# Patient Record
Sex: MALE | Race: WHITE | NOT HISPANIC OR LATINO | Employment: UNEMPLOYED | URBAN - METROPOLITAN AREA
[De-identification: names, ages, dates, MRNs, and addresses within clinical notes are randomized per-mention and may not be internally consistent; named-entity substitution may affect disease eponyms.]

---

## 2018-08-10 ENCOUNTER — HOSPITAL ENCOUNTER (INPATIENT)
Facility: HOSPITAL | Age: 58
LOS: 3 days | Discharge: HOME | DRG: 571 | End: 2018-08-13
Attending: EMERGENCY MEDICINE | Admitting: HOSPITALIST
Payer: MEDICAID

## 2018-08-10 DIAGNOSIS — N30.00 ACUTE CYSTITIS WITHOUT HEMATURIA: ICD-10-CM

## 2018-08-10 DIAGNOSIS — S81.801A WOUND OF RIGHT LOWER EXTREMITY, INITIAL ENCOUNTER: ICD-10-CM

## 2018-08-10 DIAGNOSIS — S81.809A: ICD-10-CM

## 2018-08-10 DIAGNOSIS — R41.82 ALTERED MENTAL STATUS: ICD-10-CM

## 2018-08-10 DIAGNOSIS — R40.4 ALTERED CONSCIOUSNESS: ICD-10-CM

## 2018-08-10 DIAGNOSIS — N30.01 ACUTE CYSTITIS WITH HEMATURIA: ICD-10-CM

## 2018-08-10 DIAGNOSIS — F10.920 ALCOHOLIC INTOXICATION WITHOUT COMPLICATION: ICD-10-CM

## 2018-08-10 DIAGNOSIS — B86 CRUSTED SCABIES: Primary | ICD-10-CM

## 2018-08-10 DIAGNOSIS — S81.809A WOUND OF LOWER EXTREMITY: ICD-10-CM

## 2018-08-10 DIAGNOSIS — G62.1 ALCOHOLIC PERIPHERAL NEUROPATHY: ICD-10-CM

## 2018-08-10 DIAGNOSIS — L03.115 CELLULITIS OF RIGHT LOWER EXTREMITY: ICD-10-CM

## 2018-08-10 PROBLEM — E87.1 HYPONATREMIA: Status: ACTIVE | Noted: 2018-08-10

## 2018-08-10 PROBLEM — F10.929 ALCOHOL INTOXICATION: Status: ACTIVE | Noted: 2018-08-10

## 2018-08-10 PROBLEM — F10.10 ALCOHOL ABUSE: Status: ACTIVE | Noted: 2018-08-10

## 2018-08-10 LAB
ALBUMIN SERPL BCP-MCNC: 3.7 G/DL
ALP SERPL-CCNC: 110 U/L
ALT SERPL W/O P-5'-P-CCNC: 18 U/L
AMMONIA PLAS-SCNC: 28 UMOL/L
AMPHET+METHAMPHET UR QL: NEGATIVE
ANION GAP SERPL CALC-SCNC: 11 MMOL/L
APAP SERPL-MCNC: <3 UG/ML
AST SERPL-CCNC: 40 U/L
BACTERIA #/AREA URNS AUTO: ABNORMAL /HPF
BARBITURATES UR QL SCN>200 NG/ML: NEGATIVE
BASOPHILS # BLD AUTO: 0.13 K/UL
BASOPHILS NFR BLD: 2.3 %
BENZODIAZ UR QL SCN>200 NG/ML: NEGATIVE
BILIRUB SERPL-MCNC: 0.4 MG/DL
BILIRUB UR QL STRIP: NEGATIVE
BNP SERPL-MCNC: 20 PG/ML
BUN SERPL-MCNC: 3 MG/DL
BZE UR QL SCN: NEGATIVE
CALCIUM SERPL-MCNC: 9.1 MG/DL
CANNABINOIDS UR QL SCN: NORMAL
CHLORIDE SERPL-SCNC: 95 MMOL/L
CLARITY UR REFRACT.AUTO: ABNORMAL
CO2 SERPL-SCNC: 23 MMOL/L
COLOR UR AUTO: YELLOW
CREAT SERPL-MCNC: 0.6 MG/DL
CREAT UR-MCNC: 30 MG/DL
CRP SERPL-MCNC: 18 MG/L
DIFFERENTIAL METHOD: ABNORMAL
EOSINOPHIL # BLD AUTO: 0.3 K/UL
EOSINOPHIL NFR BLD: 5 %
ERYTHROCYTE [DISTWIDTH] IN BLOOD BY AUTOMATED COUNT: 15.5 %
EST. GFR  (AFRICAN AMERICAN): >60 ML/MIN/1.73 M^2
EST. GFR  (NON AFRICAN AMERICAN): >60 ML/MIN/1.73 M^2
ESTIMATED AVG GLUCOSE: 80 MG/DL
ETHANOL SERPL-MCNC: 269 MG/DL
GLUCOSE SERPL-MCNC: 91 MG/DL
GLUCOSE UR QL STRIP: NEGATIVE
HBA1C MFR BLD HPLC: 4.4 %
HCT VFR BLD AUTO: 34.6 %
HGB BLD-MCNC: 12 G/DL
HGB UR QL STRIP: ABNORMAL
IMM GRANULOCYTES # BLD AUTO: 0.02 K/UL
IMM GRANULOCYTES NFR BLD AUTO: 0.4 %
KETONES UR QL STRIP: NEGATIVE
LACTATE SERPL-SCNC: 2.1 MMOL/L
LEUKOCYTE ESTERASE UR QL STRIP: ABNORMAL
LIPASE SERPL-CCNC: 20 U/L
LYMPHOCYTES # BLD AUTO: 1.1 K/UL
LYMPHOCYTES NFR BLD: 19.1 %
MCH RBC QN AUTO: 35.1 PG
MCHC RBC AUTO-ENTMCNC: 34.7 G/DL
MCV RBC AUTO: 101 FL
METHADONE UR QL SCN>300 NG/ML: NEGATIVE
MICROSCOPIC COMMENT: ABNORMAL
MONOCYTES # BLD AUTO: 0.8 K/UL
MONOCYTES NFR BLD: 14.1 %
NEUTROPHILS # BLD AUTO: 3.3 K/UL
NEUTROPHILS NFR BLD: 59.1 %
NITRITE UR QL STRIP: POSITIVE
NON-SQ EPI CELLS #/AREA URNS AUTO: <1 /HPF
NRBC BLD-RTO: 0 /100 WBC
OPIATES UR QL SCN: NEGATIVE
PCP UR QL SCN>25 NG/ML: NEGATIVE
PH UR STRIP: 6 [PH] (ref 5–8)
PLATELET # BLD AUTO: 191 K/UL
PMV BLD AUTO: 8.9 FL
POTASSIUM SERPL-SCNC: 3.9 MMOL/L
PROT SERPL-MCNC: 7.8 G/DL
PROT UR QL STRIP: NEGATIVE
RBC # BLD AUTO: 3.42 M/UL
RBC #/AREA URNS AUTO: 12 /HPF (ref 0–4)
SALICYLATES SERPL-MCNC: <5 MG/DL
SODIUM SERPL-SCNC: 129 MMOL/L
SP GR UR STRIP: 1 (ref 1–1.03)
SQUAMOUS #/AREA URNS AUTO: 0 /HPF
TOXICOLOGY INFORMATION: NORMAL
TROPONIN I SERPL DL<=0.01 NG/ML-MCNC: <0.006 NG/ML
URN SPEC COLLECT METH UR: ABNORMAL
UROBILINOGEN UR STRIP-ACNC: NEGATIVE EU/DL
WBC # BLD AUTO: 5.59 K/UL
WBC #/AREA URNS AUTO: 81 /HPF (ref 0–5)
WBC CLUMPS UR QL AUTO: ABNORMAL

## 2018-08-10 PROCEDURE — 82140 ASSAY OF AMMONIA: CPT

## 2018-08-10 PROCEDURE — 83036 HEMOGLOBIN GLYCOSYLATED A1C: CPT

## 2018-08-10 PROCEDURE — 87186 SC STD MICRODIL/AGAR DIL: CPT

## 2018-08-10 PROCEDURE — 99285 EMERGENCY DEPT VISIT HI MDM: CPT | Mod: ,,, | Performed by: EMERGENCY MEDICINE

## 2018-08-10 PROCEDURE — 80329 ANALGESICS NON-OPIOID 1 OR 2: CPT

## 2018-08-10 PROCEDURE — 25000003 PHARM REV CODE 250: Performed by: EMERGENCY MEDICINE

## 2018-08-10 PROCEDURE — 87086 URINE CULTURE/COLONY COUNT: CPT

## 2018-08-10 PROCEDURE — G0378 HOSPITAL OBSERVATION PER HR: HCPCS

## 2018-08-10 PROCEDURE — 11000001 HC ACUTE MED/SURG PRIVATE ROOM

## 2018-08-10 PROCEDURE — 96361 HYDRATE IV INFUSION ADD-ON: CPT

## 2018-08-10 PROCEDURE — 87491 CHLMYD TRACH DNA AMP PROBE: CPT

## 2018-08-10 PROCEDURE — 25500020 PHARM REV CODE 255: Performed by: EMERGENCY MEDICINE

## 2018-08-10 PROCEDURE — 83880 ASSAY OF NATRIURETIC PEPTIDE: CPT

## 2018-08-10 PROCEDURE — 81001 URINALYSIS AUTO W/SCOPE: CPT

## 2018-08-10 PROCEDURE — 80307 DRUG TEST PRSMV CHEM ANLYZR: CPT

## 2018-08-10 PROCEDURE — 25000003 PHARM REV CODE 250: Performed by: STUDENT IN AN ORGANIZED HEALTH CARE EDUCATION/TRAINING PROGRAM

## 2018-08-10 PROCEDURE — 96365 THER/PROPH/DIAG IV INF INIT: CPT

## 2018-08-10 PROCEDURE — 87184 SC STD DISK METHOD PER PLATE: CPT

## 2018-08-10 PROCEDURE — 99285 EMERGENCY DEPT VISIT HI MDM: CPT | Mod: 25

## 2018-08-10 PROCEDURE — 25000003 PHARM REV CODE 250: Performed by: HOSPITALIST

## 2018-08-10 PROCEDURE — 99223 1ST HOSP IP/OBS HIGH 75: CPT | Mod: ,,, | Performed by: HOSPITALIST

## 2018-08-10 PROCEDURE — 80053 COMPREHEN METABOLIC PANEL: CPT

## 2018-08-10 PROCEDURE — 83605 ASSAY OF LACTIC ACID: CPT

## 2018-08-10 PROCEDURE — 86140 C-REACTIVE PROTEIN: CPT

## 2018-08-10 PROCEDURE — 87088 URINE BACTERIA CULTURE: CPT

## 2018-08-10 PROCEDURE — 96368 THER/DIAG CONCURRENT INF: CPT

## 2018-08-10 PROCEDURE — 80320 DRUG SCREEN QUANTALCOHOLS: CPT

## 2018-08-10 PROCEDURE — 87077 CULTURE AEROBIC IDENTIFY: CPT

## 2018-08-10 PROCEDURE — 93010 ELECTROCARDIOGRAM REPORT: CPT | Mod: ,,, | Performed by: INTERNAL MEDICINE

## 2018-08-10 PROCEDURE — 63600175 PHARM REV CODE 636 W HCPCS: Performed by: HOSPITALIST

## 2018-08-10 PROCEDURE — 63600175 PHARM REV CODE 636 W HCPCS: Performed by: STUDENT IN AN ORGANIZED HEALTH CARE EDUCATION/TRAINING PROGRAM

## 2018-08-10 PROCEDURE — 84484 ASSAY OF TROPONIN QUANT: CPT

## 2018-08-10 PROCEDURE — 83690 ASSAY OF LIPASE: CPT

## 2018-08-10 PROCEDURE — 85025 COMPLETE CBC W/AUTO DIFF WBC: CPT

## 2018-08-10 PROCEDURE — 96366 THER/PROPH/DIAG IV INF ADDON: CPT

## 2018-08-10 RX ORDER — CEFTRIAXONE 1 G/1
1 INJECTION, POWDER, FOR SOLUTION INTRAMUSCULAR; INTRAVENOUS
Status: DISCONTINUED | OUTPATIENT
Start: 2018-08-10 | End: 2018-08-11

## 2018-08-10 RX ORDER — QUETIAPINE FUMARATE 25 MG/1
50 TABLET, FILM COATED ORAL NIGHTLY
Status: DISCONTINUED | OUTPATIENT
Start: 2018-08-10 | End: 2018-08-13 | Stop reason: HOSPADM

## 2018-08-10 RX ORDER — SODIUM CHLORIDE 9 MG/ML
INJECTION, SOLUTION INTRAVENOUS CONTINUOUS
Status: DISCONTINUED | OUTPATIENT
Start: 2018-08-10 | End: 2018-08-11

## 2018-08-10 RX ORDER — THIAMINE HCL 100 MG
100 TABLET ORAL DAILY
Status: DISCONTINUED | OUTPATIENT
Start: 2018-08-11 | End: 2018-08-13 | Stop reason: HOSPADM

## 2018-08-10 RX ORDER — RAMELTEON 8 MG/1
8 TABLET ORAL NIGHTLY PRN
Status: DISCONTINUED | OUTPATIENT
Start: 2018-08-10 | End: 2018-08-13 | Stop reason: HOSPADM

## 2018-08-10 RX ORDER — FOLIC ACID 1 MG/1
1 TABLET ORAL DAILY
Status: DISCONTINUED | OUTPATIENT
Start: 2018-08-11 | End: 2018-08-13 | Stop reason: HOSPADM

## 2018-08-10 RX ORDER — CEFTRIAXONE 1 G/1
1 INJECTION, POWDER, FOR SOLUTION INTRAMUSCULAR; INTRAVENOUS
Status: DISCONTINUED | OUTPATIENT
Start: 2018-08-10 | End: 2018-08-10

## 2018-08-10 RX ORDER — MAGNESIUM SULFATE HEPTAHYDRATE 40 MG/ML
2 INJECTION, SOLUTION INTRAVENOUS
Status: COMPLETED | OUTPATIENT
Start: 2018-08-10 | End: 2018-08-10

## 2018-08-10 RX ORDER — ACETAMINOPHEN 325 MG/1
650 TABLET ORAL EVERY 6 HOURS PRN
Status: DISCONTINUED | OUTPATIENT
Start: 2018-08-10 | End: 2018-08-13 | Stop reason: HOSPADM

## 2018-08-10 RX ADMIN — RAMELTEON 8 MG: 8 TABLET, FILM COATED ORAL at 10:08

## 2018-08-10 RX ADMIN — SODIUM CHLORIDE: 0.9 INJECTION, SOLUTION INTRAVENOUS at 10:08

## 2018-08-10 RX ADMIN — QUETIAPINE FUMARATE 50 MG: 25 TABLET ORAL at 10:08

## 2018-08-10 RX ADMIN — CEFTRIAXONE SODIUM 1 G: 1 INJECTION, POWDER, FOR SOLUTION INTRAMUSCULAR; INTRAVENOUS at 09:08

## 2018-08-10 RX ADMIN — IOHEXOL 75 ML: 350 INJECTION, SOLUTION INTRAVENOUS at 06:08

## 2018-08-10 RX ADMIN — SODIUM CHLORIDE 500 ML: 0.9 INJECTION, SOLUTION INTRAVENOUS at 03:08

## 2018-08-10 RX ADMIN — MAGNESIUM SULFATE IN WATER 2 G: 40 INJECTION, SOLUTION INTRAVENOUS at 07:08

## 2018-08-10 RX ADMIN — FOLIC ACID: 5 INJECTION, SOLUTION INTRAMUSCULAR; INTRAVENOUS; SUBCUTANEOUS at 05:08

## 2018-08-10 NOTE — ED PROVIDER NOTES
Encounter Date: 8/10/2018    SCRIBE #1 NOTE: I, Martina Cleary, am scribing for, and in the presence of,  Dr. Corea. I have scribed the following portions of the note - the EKG reading and the Resident attestation. Other sections scribed: Attending ED Notes.       History     Chief Complaint   Patient presents with    Altered Mental Status     Pt brought in by EMS for altered mental status and necrotic right foot.      57M with a history of alcohol abuse presents with altered mental status and lesions on R foot. Patient was brought in by his roommate, though patient is not sure why. He has poor recollection of events prior to coming to the hospital, but does remember getting in his roommate's car and driving here with her. He states he has no medical history other than neuropathy of his feet, and he hasn't seen a doctor in some time. He states he takes no medications. He drinks about 10 beers per day. Patient has no complaints other than b/l foot discomfort and numbness, R>L. Denies nausea, vomiting, chest pain, SOB, dysuria, headache, lightheadedness, abdominal pain.  Patient laughed inappropriately throughout interview and repeatedly stated that he doesn't know why he's in the hospital and doesn't want to be here, though he made no attempt to leave. He had a small laceration above his left eye and was unsure of how it got there. Unsure when he last saw a physician.          Review of patient's allergies indicates:  No Known Allergies  Past Medical History:   Diagnosis Date    Neuropathy      History reviewed. No pertinent surgical history.  History reviewed. No pertinent family history.  Social History   Substance Use Topics    Smoking status: Current Every Day Smoker    Smokeless tobacco: Not on file    Alcohol use Yes     Review of Systems   Constitutional: Negative for chills, fatigue and fever.   HENT: Negative for congestion, sinus pain and sneezing.    Eyes: Negative for pain and redness.    Respiratory: Negative for cough, chest tightness and shortness of breath.    Cardiovascular: Negative for chest pain and palpitations.   Gastrointestinal: Negative for abdominal pain, diarrhea, nausea and vomiting.   Genitourinary: Negative for dysuria.   Musculoskeletal: Negative for arthralgias, back pain and neck pain.   Skin: Negative for pallor and rash.   Neurological: Positive for numbness. Negative for dizziness, weakness and light-headedness.   Psychiatric/Behavioral: Negative for agitation, hallucinations and suicidal ideas.       Physical Exam     Initial Vitals [08/10/18 1359]   BP Pulse Resp Temp SpO2   (!) 144/86 88 20 98.3 °F (36.8 °C) 99 %      MAP       --         Physical Exam    Constitutional: He is not diaphoretic. No distress.   HENT:   Head: Normocephalic.   Right Ear: External ear normal.   Left Ear: External ear normal.   Partially healed 2cm laceration above L eyebrow   Eyes: Conjunctivae and EOM are normal. Pupils are equal, round, and reactive to light. No scleral icterus.   Neck: Normal range of motion.   Cardiovascular: Normal rate and regular rhythm.   Pulmonary/Chest: Breath sounds normal. He has no wheezes. He has no rhonchi. He has no rales.   Abdominal: Soft. Bowel sounds are normal. He exhibits no distension. There is hepatomegaly. There is no tenderness.   Musculoskeletal: He exhibits no edema or tenderness.   Mild resting tremor, no asterixis.   Neurological: He is alert.   Oriented to person and place. Stated year is 2003.   Skin: Skin is warm and dry.   Raised pale-colored growth lesions on b/l LE, most prominent on R great toe which is encased. Areas of ulceration. Scattered similar lesions on feet and into calves.   Psychiatric:   Inappropriate laughter, tangential thought, impaired memory         ED Course   Procedures  Labs Reviewed   CBC W/ AUTO DIFFERENTIAL - Abnormal; Notable for the following:        Result Value    RBC 3.42 (*)     Hemoglobin 12.0 (*)     Hematocrit  34.6 (*)      (*)     MCH 35.1 (*)     RDW 15.5 (*)     MPV 8.9 (*)     Basophil% 2.3 (*)     All other components within normal limits    Narrative:     LOUISE SHARED   COMPREHENSIVE METABOLIC PANEL - Abnormal; Notable for the following:     Sodium 129 (*)     BUN, Bld 3 (*)     All other components within normal limits    Narrative:     LAVENDER SHARED   URINALYSIS, REFLEX TO URINE CULTURE - Abnormal; Notable for the following:     Appearance, UA Hazy (*)     Occult Blood UA 1+ (*)     Nitrite, UA Positive (*)     Leukocytes, UA 3+ (*)     All other components within normal limits    Narrative:     Preferred Collection Type->Urine, Clean Catch   ALCOHOL,MEDICAL (ETHANOL) - Abnormal; Notable for the following:     Alcohol, Medical, Serum 269 (*)     All other components within normal limits    Narrative:     LAVENDER SHARED   ACETAMINOPHEN LEVEL - Abnormal; Notable for the following:     Acetaminophen (Tylenol), Serum <3.0 (*)     All other components within normal limits    Narrative:     LAVENDER SHARED   SALICYLATE LEVEL - Abnormal; Notable for the following:     Salicylate Lvl <5.0 (*)     All other components within normal limits    Narrative:     LAVENDER SHARED   URINALYSIS MICROSCOPIC - Abnormal; Notable for the following:     RBC, UA 12 (*)     WBC, UA 81 (*)     WBC Clumps, UA Occasional (*)     Bacteria, UA Many (*)     All other components within normal limits    Narrative:     Preferred Collection Type->Urine, Clean Catch   C. TRACHOMATIS/N. GONORRHOEAE BY AMP DNA   CULTURE, URINE   C. TRACHOMATIS/N. GONORRHOEAE BY AMP DNA   AMMONIA    Narrative:     LAVENDER SHARED   DRUG SCREEN PANEL, URINE EMERGENCY    Narrative:     Preferred Collection Type->Urine, Clean Catch   LIPASE    Narrative:     LAVENDER SHARED   LACTIC ACID, PLASMA    Narrative:     LAVENDER SHARED   TROPONIN I    Narrative:     LAVENDER SHARED   B-TYPE NATRIURETIC PEPTIDE    Narrative:     LAVENDER SHARED   SEDIMENTATION RATE    C-REACTIVE PROTEIN     EKG Readings: (Independently Interpreted)   Initial Reading: No STEMI. Rhythm: Normal Sinus Rhythm. Heart Rate: 76.       Imaging Results          CT Foot With Contrast Right (In process)                CT Head Without Contrast (Final result)  Result time 08/10/18 16:27:12    Final result by Amandeep Coates MD (08/10/18 16:27:12)                 Impression:      1. No acute intracranial abnormalities.  2. Soft tissue induration overlying the left supraorbital region, correlation with any history of trauma.      Electronically signed by: Amandeep Coates MD  Date:    08/10/2018  Time:    16:27             Narrative:    EXAMINATION:  CT HEAD WITHOUT CONTRAST    CLINICAL HISTORY:  Confusion/delirium, altered LOC, unexplained;    TECHNIQUE:  Low dose axial images were obtained through the head.  Coronal and sagittal reformations were also performed. Contrast was not administered.    COMPARISON:  None.    FINDINGS:  There is generalized cerebral volume loss.  There is hypoattenuation in a periventricular fashion, likely sequela of chronic microvascular ischemic change.  There is no evidence of acute major vascular territory infarct, hemorrhage, or mass.  There is no hydrocephalus.  There are no abnormal extra-axial fluid collections.  The paranasal sinuses and mastoid air cells are clear, and there is no evidence of calvarial fracture.  The visualized soft tissues are remarkable for soft tissue induration involving the left supraorbital soft tissues, without postseptal involvement.    .                               X-Ray Chest PA And Lateral (Final result)  Result time 08/10/18 16:01:04    Final result by Damien Silverio Jr., MD (08/10/18 16:01:04)                 Impression:      No significant abnormality seen.      Electronically signed by: Damien Silverio MD  Date:    08/10/2018  Time:    16:01             Narrative:    EXAMINATION:  XR CHEST PA AND LATERAL    CLINICAL HISTORY:  Transient  alteration of awareness    TECHNIQUE:  PA and lateral views of the chest were performed.    COMPARISON:  None    FINDINGS:  Heart size and pulmonary vessels are normal.  The lungs are well aerated.  No confluent consolidation.  Bones are intact.                               X-Ray Foot Complete Bilateral (Final result)  Result time 08/10/18 16:11:21   Procedure changed from X-Ray Foot Complete Left     Final result by Damien Silverio Jr., MD (08/10/18 16:11:21)                 Impression:      Abnormal study as above bilaterally.  Correlation with clinical findings will be needed.      Electronically signed by: Damien Silverio MD  Date:    08/10/2018  Time:    16:11             Narrative:    EXAMINATION:  XR FOOT COMPLETE 3 VIEW BILATERAL    CLINICAL HISTORY:  open wound; Unspecified open wound, unspecified lower leg, initial encounter    TECHNIQUE:  AP, lateral, and oblique views of both feet were performed.    COMPARISON:  None    FINDINGS:  Significant soft tissue swelling about the distal aspect of the great toe.  Some hypertrophic new bone noted.  No definite bony destruction.  Follow-up studies may be beneficial as radiographic findings of osteomyelitis are often delayed.  No fracture.    On the left bones are well mineralized and alignment is satisfactory.  Mild narrowing at the IP joints.  Some irregularity distal fibula appears chronic.  1 cm cystic lucency base 2nd metatarsal.  There is some irregularity involving the proximal aspect of the distal phalanx of the right great toe.  Not certain if this is related to an acute or chronic injury.                                 Medical Decision Making:   History:   Old Medical Records: I decided to obtain old medical records.  Initial Assessment:   58M with history of alcohol abuse presents with altered mental status with hepatomegaly and partially-healed facial laceration as well as extensive lesions and some ulceration of feet, R>L. AMS likely secondary to  trauma or other intracranial process, metabolic abnormality, intoxication, drug or medication effect, infection. Concern for cellulitis vs osteomyelitis of R foot.  Ordering CBC, CMP, drug and ethanol panels, ammonia, lipase, troponin, EKG, head CT, CXR, UA, b/l foot x-ray.  Independently Interpreted Test(s):   I have ordered and independently interpreted EKG Reading(s) - see prior notes  Clinical Tests:   Lab Tests: Ordered and Reviewed  Radiological Study: Ordered and Reviewed  Medical Tests: Ordered and Reviewed  ED Management:  Patient's brother, Anish, now in room stating that patient's current mentation is his baseline with no acute change. Anish says that he is more concerned about the patient's foot. Have ordered a banana bag.  3:38 PM    Workup significant for nitrite positive UTI, sodium 129, EtOH 269, no definite bony erosion on foot x-ray. Ordered Mg. I waited for patient's brother to return to have discussion about admission. Explained that foot lesion may get significantly worse without treatment. Patient ultimately seemed to understand and agreed to inpatient admission for treatment of UTI, foot ulceration, hyponatremia. Per recommendation of medicine, ordered ESR, CRP, and foot CT. Will place in observation.  6:36 PM              Scribe Attestation:   Scribe #1: I performed the above scribed service and the documentation accurately describes the services I performed. I attest to the accuracy of the note.    Attending Attestation:   Physician Attestation Statement for Resident:  As the supervising MD   Physician Attestation Statement: I have personally seen and examined this patient.   I agree with the above history. -: 57 y.o. male with hx of alcohol abuse and neuropathy who was brought in by EMS with family's request to be evaluated for his feet and an ED complaint noted of AMS.  Pt's brother Anish is in the room with my examination.  He states that pt is at his normal baseline mentation and will  forget the year because he drinks all day.  The family is more concerned with the pt's foot to make sure infection is not going on.  The pt denies any complaints and is upset his family made him come here today.  When asked about injury noted on head, pt can't tell me when or how it happened.  He does not also have details about how long his foot has been injured.  Pt reports no to all questions regarding CP, SOB, fevers, chills, and palpitations.       As the supervising MD I agree with the above PE.   -: On exam there is a subacute 1 cm laceration over his left eyebrow with small underlying hematoma.  He is following all commands, unable to verbalize year, and responds that it's 2012.  Otherwise normal sensation, strength, and coordination.  Cardiac exam with regular rate and rhythm.  Abdomen is soft, nontender, and nondistended.  Right great toe with subacute foot ulcer that is dry in texture with scaling around the aspect of the wound over the lateral aspect of the right great toe.  There is adjacent warmth over the dorsal aspect of the right great toe with some erythema noted extending to the right mid foot.     As the supervising MD I agree with the above treatment, course, plan, and disposition.   -: Differential includes cellulitis vs sepsis vs intoxication vs metabolic derangement vs intracranial pathology.  AMS initiated prior to pt's family arriving giving collateral information.  Will reassess disposition pending results.              Attending ED Notes:   5:58 PM   Pt labs reveal positive UTI with hyponatremia with a sodium at 129.  Alcohol level is 269.  Lactate is 2.1.  Negative cardiac markers.  Normal CXR and CT head that doesn't show any intracranial pathologies.  Pt will be admitted with complaint of AMS with hyponatremia and UTI.    I, Dr. Fede Corea, personally performed the services described in this documentation. All medical record entries made by the mily were at my direction and in  my presence.  I have reviewed the chart and agree that the record reflects my personal performance and is accurate and complete. Fede Corea MD.  6:59 PM 08/10/2018             Clinical Impression:   Diagnoses of Altered mental status, Open wound, lower leg, Wound of lower extremity, and Altered consciousness were pertinent to this visit.                             Fede Corea MD  08/10/18 0205

## 2018-08-10 NOTE — ED TRIAGE NOTES
Pt in via EMS from home for eval of AMS/ necrosis to R foot. Pt sts he does not believe that anything is wrong with him and he does not know why he is here. Alert, but laughing when nurse asks him questions. Necrosis and swelling noted to R great toe, swelling/redness noted up into R calf. Pt has no complaints at this time and appears agitated at triage.

## 2018-08-10 NOTE — ED NOTES
Pt resting in bed, no apparent distress noted. Vitals stable. Pt visitor at bedside. Medications started as ordered - infusing well. No redness, swelling, or pain noted to IV site. Pt and pt visitor updated with wait time. No additional questions or concerns at this time. Will continue to monitor.

## 2018-08-11 PROBLEM — G62.1 ALCOHOLIC PERIPHERAL NEUROPATHY: Status: ACTIVE | Noted: 2018-08-11

## 2018-08-11 PROBLEM — B86 CRUSTED SCABIES: Status: ACTIVE | Noted: 2018-08-11

## 2018-08-11 LAB
ALBUMIN SERPL BCP-MCNC: 3.7 G/DL
ALP SERPL-CCNC: 109 U/L
ALT SERPL W/O P-5'-P-CCNC: 20 U/L
ANION GAP SERPL CALC-SCNC: 13 MMOL/L
AST SERPL-CCNC: 39 U/L
BILIRUB SERPL-MCNC: 0.7 MG/DL
BUN SERPL-MCNC: 4 MG/DL
C TRACH DNA SPEC QL NAA+PROBE: NOT DETECTED
C TRACH DNA SPEC QL NAA+PROBE: NOT DETECTED
CALCIUM SERPL-MCNC: 9.6 MG/DL
CHLORIDE SERPL-SCNC: 97 MMOL/L
CO2 SERPL-SCNC: 22 MMOL/L
CREAT SERPL-MCNC: 0.7 MG/DL
ERYTHROCYTE [SEDIMENTATION RATE] IN BLOOD BY WESTERGREN METHOD: 58 MM/HR
EST. GFR  (AFRICAN AMERICAN): >60 ML/MIN/1.73 M^2
EST. GFR  (NON AFRICAN AMERICAN): >60 ML/MIN/1.73 M^2
GLUCOSE SERPL-MCNC: 73 MG/DL
HIV1+2 IGG SERPL QL IA.RAPID: NEGATIVE
MAGNESIUM SERPL-MCNC: 2.3 MG/DL
N GONORRHOEA DNA SPEC QL NAA+PROBE: NOT DETECTED
N GONORRHOEA DNA SPEC QL NAA+PROBE: NOT DETECTED
POTASSIUM SERPL-SCNC: 3.8 MMOL/L
PROT SERPL-MCNC: 7.8 G/DL
SODIUM SERPL-SCNC: 132 MMOL/L

## 2018-08-11 PROCEDURE — 80053 COMPREHEN METABOLIC PANEL: CPT

## 2018-08-11 PROCEDURE — 0JBQ0ZZ EXCISION OF RIGHT FOOT SUBCUTANEOUS TISSUE AND FASCIA, OPEN APPROACH: ICD-10-PCS | Performed by: PODIATRIST

## 2018-08-11 PROCEDURE — 87491 CHLMYD TRACH DNA AMP PROBE: CPT

## 2018-08-11 PROCEDURE — 99233 SBSQ HOSP IP/OBS HIGH 50: CPT | Mod: ,,, | Performed by: PHYSICIAN ASSISTANT

## 2018-08-11 PROCEDURE — 87186 SC STD MICRODIL/AGAR DIL: CPT

## 2018-08-11 PROCEDURE — 87077 CULTURE AEROBIC IDENTIFY: CPT | Mod: 59

## 2018-08-11 PROCEDURE — 25000003 PHARM REV CODE 250: Performed by: PHYSICIAN ASSISTANT

## 2018-08-11 PROCEDURE — 86703 HIV-1/HIV-2 1 RESULT ANTBDY: CPT

## 2018-08-11 PROCEDURE — 83735 ASSAY OF MAGNESIUM: CPT

## 2018-08-11 PROCEDURE — 11000001 HC ACUTE MED/SURG PRIVATE ROOM

## 2018-08-11 PROCEDURE — 85652 RBC SED RATE AUTOMATED: CPT

## 2018-08-11 PROCEDURE — 36415 COLL VENOUS BLD VENIPUNCTURE: CPT

## 2018-08-11 PROCEDURE — 87070 CULTURE OTHR SPECIMN AEROBIC: CPT

## 2018-08-11 PROCEDURE — 11042 DBRDMT SUBQ TIS 1ST 20SQCM/<: CPT | Mod: ,,, | Performed by: PODIATRIST

## 2018-08-11 PROCEDURE — 99232 SBSQ HOSP IP/OBS MODERATE 35: CPT | Mod: 25,,, | Performed by: PODIATRIST

## 2018-08-11 PROCEDURE — 25000003 PHARM REV CODE 250: Performed by: HOSPITALIST

## 2018-08-11 RX ORDER — DIAZEPAM 5 MG/1
10 TABLET ORAL EVERY 6 HOURS
Status: DISCONTINUED | OUTPATIENT
Start: 2018-08-11 | End: 2018-08-13 | Stop reason: HOSPADM

## 2018-08-11 RX ORDER — SULFAMETHOXAZOLE AND TRIMETHOPRIM 800; 160 MG/1; MG/1
1 TABLET ORAL 2 TIMES DAILY
Status: DISCONTINUED | OUTPATIENT
Start: 2018-08-11 | End: 2018-08-13 | Stop reason: HOSPADM

## 2018-08-11 RX ORDER — CLINDAMYCIN HYDROCHLORIDE 150 MG/1
300 CAPSULE ORAL EVERY 6 HOURS
Status: DISCONTINUED | OUTPATIENT
Start: 2018-08-11 | End: 2018-08-11

## 2018-08-11 RX ORDER — DIAZEPAM 5 MG/1
5 TABLET ORAL EVERY 6 HOURS PRN
Status: DISCONTINUED | OUTPATIENT
Start: 2018-08-11 | End: 2018-08-11

## 2018-08-11 RX ADMIN — Medication 100 MG: at 08:08

## 2018-08-11 RX ADMIN — QUETIAPINE FUMARATE 50 MG: 25 TABLET ORAL at 09:08

## 2018-08-11 RX ADMIN — DIAZEPAM 10 MG: 5 TABLET ORAL at 11:08

## 2018-08-11 RX ADMIN — SULFAMETHOXAZOLE AND TRIMETHOPRIM 1 TABLET: 800; 160 TABLET ORAL at 09:08

## 2018-08-11 RX ADMIN — FOLIC ACID 1 MG: 1 TABLET ORAL at 08:08

## 2018-08-11 RX ADMIN — SULFAMETHOXAZOLE AND TRIMETHOPRIM 1 TABLET: 800; 160 TABLET ORAL at 03:08

## 2018-08-11 RX ADMIN — THERA TABS 1 TABLET: TAB at 08:08

## 2018-08-11 RX ADMIN — DIAZEPAM 10 MG: 5 TABLET ORAL at 06:08

## 2018-08-11 NOTE — PROGRESS NOTES
[facility] Hospital Medicine  Progress Note    Patient Name: Mohamud Patel  MRN: [id number]  Patient Class: IP- Inpatient   Admission Date: 8/10/2018  Length of Stay: 0 days  Attending Physician: Annamarie Germain MD  Primary Care Provider: Enmanuel Ferguson MD    [facility] Medicine Team: [facility] [place] HOSP MED E Gilda Griffith PA-C    Subjective:     Principal Problem:Crusted scabies    HPI:  Mr. Mohamud Patel is a 57 y.o. male with history of alcohol abuse who presents to the emergency department with a right foot lesion as well as altered mental status.  The patient is a poor historian, and is unable to tell why he came to the hospital.  He continues to voice that he does not want to be in the hospital and that he is ready to go home.  In regards to his right foot wound, he mentions that he stubbed his toe a couple weeks ago and that the swelling has been getting worse.  He denies any drainage from the toe.  He drinks about 10 beers a day, his last drink was the day prior to admission.  He denies any chest pain, shortness of breath, nausea, or vomiting.  His only complaint, is of neuropathy in his feet.  He denies any car accidents or history of diabetes causing his neuropathy.  He continues to state that he does not want to be in the hospital but does not want to leave.     In the emergency department, labs were notable for an elevated ethanol level in sodium of 129.  CT scan of the foot was performed which did not show osteo, only soft tissue swelling. He was given IV ceftriaxone for a urinary tract infection and right lower extremity cellulitis.  He was admitted to Hospital Medicine for further management.    Hospital Course:  Patient placed in observation for right foot lesion and cellulitis. Started on Ceftriaxone. Podiatry consulted and food debrided 8/11/18. Dermatology consulted due to concern for scabies and patient placed on contact precautions. He also has known alcohol abuse and  intoxication on admission. Started on MVI, Folic Acid, thiamine, and started Valium taper 8/11. Podiatry recommending LORIE (pending).     Interval History: No acute events overnight. Patient awake and alert on exam, anxious appearing. He states right toe has been that way for months but denies any itching or irritation. He says it started with hitting toe on a curb. Podiatry was already in room and did bedside debridement and dressed foot. Ordered LORIE and pending.   Patient is requesting to leave hospital, but explained concern that toe infection may be [place] scabies based on clinical appearance, dermatology consulted. Patient placed on contact precautions.     Review of Systems   Constitutional: Negative for chills, diaphoresis, fatigue, fever and unexpected weight change.   Respiratory: Negative for cough, shortness of breath and wheezing.    Cardiovascular: Negative for chest pain and leg swelling.   Gastrointestinal: Negative for abdominal pain, nausea and vomiting.   Skin: Positive for color change and wound.   Neurological: Positive for tremors and numbness. Negative for dizziness.   Psychiatric/Behavioral: Negative for agitation. The patient is nervous/anxious.      Objective:     Vital Signs (Most Recent):  Temp: 98.1 °F (36.7 °C) (08/11/18 1432)  Pulse: 75 (08/11/18 0715)  Resp: 14 (08/11/18 0715)  BP: (!) 185/77 (08/11/18 1134)  SpO2: 97 % (08/11/18 0715) Vital Signs (24h Range):  Temp:  [98.1 °F (36.7 °C)-98.7 °F (37.1 °C)] 98.1 °F (36.7 °C)  Pulse:  [73-90] 75  Resp:  [14-18] 14  SpO2:  [96 %-100 %] 97 %  BP: (139-185)/(69-89) 185/77     Weight: 71.5 kg (157 lb 10.1 oz)  Body mass index is 25.44 kg/m².    Intake/Output Summary (Last 24 hours) at 08/11/18 1513  Last data filed at 08/10/18 1628   Gross per 24 hour   Intake              500 ml   Output                0 ml   Net              500 ml      Physical Exam   Constitutional: He is oriented to person, place, and time. He appears well-developed  and well-nourished. No distress.   HENT:   Head: Normocephalic and atraumatic.   Neck: Normal range of motion. Neck supple. No JVD present.   Cardiovascular: Normal rate and regular rhythm.    No murmur heard.  Pulmonary/Chest: Effort normal and breath sounds normal. He has no wheezes. He has no rales.   Abdominal: Soft. Bowel sounds are normal. He exhibits no distension and no mass. There is no tenderness. There is no guarding.   Musculoskeletal: He exhibits no edema.   Right foot dressed and see podiatry and H&P for photo    Neurological: He is alert and oriented to person, place, and time. No cranial nerve deficit.       Significant Labs:   CBC:   Recent Labs  Lab 08/10/18  1517   WBC 5.59   HGB 12.0*   HCT 34.6*        CMP:   Recent Labs  Lab 08/10/18  1517 08/11/18  0648   * 132*   K 3.9 3.8   CL 95 97   CO2 23 22*   GLU 91 73   BUN 3* 4*   CREATININE 0.6 0.7   CALCIUM 9.1 9.6   PROT 7.8 7.8   ALBUMIN 3.7 3.7   BILITOT 0.4 0.7   ALKPHOS 110 109   AST 40 39   ALT 18 20   ANIONGAP 11 13   EGFRNONAA >60.0 >60.0     All pertinent labs within the past 24 hours have been reviewed.    Assessment/Plan:      Cellulitis of right lower extremity    Wound of right lower extremity  Crusted Scabies  · DDx include [place] scabies based on clinical picture and patient on contact precautions. Dermatology consulted.  If this is [place] scabies, roommate and house will need to be treated to prevent spread.   · Podiatry performed bedside debridement 8/11 and wound care noted. Patient will need DARCO shoe and non weight bearing to forefoot. Follow up with Podaitry in 1 week  · LORIE ordered and pending  · XR without findings suggestive of osteomyelitis  · ESR pending and CRP 18; check HIV  · Start IV ceftriaxone to cover strep and transitioned to PO Bactrim        Acute cystitis with hematuria    · (+) UA   · F/u urine culture  · Ceftriaxone started on admission and discontinued and started on PO bactrim  · GC screen  negative        Hyponatremia    Improving; Na+ 129>132  - Improved with IVFs and likely from dehydration and etoh  - repeat labs in AM        Alcohol abuse    Alcohol Intoxication - resolved  · Last drink on 8/9   · Banana bag, daily MVI, thiamine, and FA  · Monitor CIWA score  · 8/11: increased anxiety and tremors. Start Valium 10 mg PO q 6 hours and will taper           VTE Risk Mitigation         Ordered     IP VTE HIGH RISK PATIENT  Once      08/11/18 1454     Reason for No Pharmacological VTE Prophylaxis  Once      08/11/18 1454     Reason for no Mechanical VTE Prophylaxis  Once      08/11/18 1454              Gilda Griffith PA-C  Department of Hospital Medicine   [facility]

## 2018-08-11 NOTE — NURSING
Md advised of elevated BP. Manual systolic 185. IV fluids discontinued per MD order. Will continue to monitor.

## 2018-08-11 NOTE — ASSESSMENT & PLAN NOTE
Wound noted to plantar hallux, no acute SOI noted  Imaging reviewed  Debridement performed, see procedure note below  Cultures taken, orders placed  Pulses diminished, arterial jen and US ordered  Pt to f/u with podiatry within 1 week of D/C, wound benefit from HH   Wound dressed with medihoney, xeroform, 4x4, kerlix, ACE  Nursing and HH to change dressings as above  Pt NWB to forefoot, may ambulate in heel with DARCO shoe  Ok to D/C from podiatry standpoint  Podiatry will follow

## 2018-08-11 NOTE — HPI
Pt is a 56 y/o male  has a past medical history of Neuropathy. Also with PMHx of alcoholism admitted and consulted to podiatry for right great toe infection. Pt denies pain. States it has never been infected before. Does not currently see a podiatrist. No other pedal complaints at this time.

## 2018-08-11 NOTE — PLAN OF CARE
Problem: Fall Risk (Adult)  Goal: Absence of Falls  Patient will demonstrate the desired outcomes by discharge/transition of care.   Outcome: Ongoing (interventions implemented as appropriate)  Patient admitted from ED with wound to right toe.  Patient also intoxicated.  Friend says patient drinks a case of beer and 2 pints of Crown a day.  Patient is oriented to self and place.  Patient doesn't know the year or day.  Patient confused and unable to retain information.  Bed alarm and Tele sitter initiated due to being a fall risk.  Patient had 2 episodes of urine incontinence and 1 episode of urine continence.  Collected urine sample and sent to lab this am.  Patient has fine tremors.  Patient is unsteady and needs assistance ambulating.  Patient needs reminders and redirection.  Patient follows direct commands.  Continues to have confusion.  Patient has had no falls so far this shift.  Bed locked and in lowest position.  Call light within reach.  Non-skid yellow socks on.

## 2018-08-11 NOTE — CONSULTS
[facility]  Podiatry  Consult Note    Patient Name: Mohamud Patel  MRN: [id number]  Admission Date: 8/10/2018  Hospital Length of Stay: 0 days  Attending Physician: Annamarie Germain MD  Primary Care Provider: Enmanuel Ferguson MD     Inpatient consult to Podiatry  Consult performed by: Xin Santana MD  Consult ordered by: Jeri Smith MD  Reason for consult: Wound  Assessment/Recommendations: See A/P        Subjective:     History of Present Illness:  Pt is a 58 y/o male  has a past medical history of Neuropathy. Also with PMHx of alcoholism admitted and consulted to podiatry for right great toe infection. Pt denies pain. States it has never been infected before. Does not currently see a podiatrist. No other pedal complaints at this time.    Scheduled Meds:   cefTRIAXone (ROCEPHIN) IVPB  1 g Intravenous Q24H    folic acid  1 mg Oral Daily    multivitamin  1 tablet Oral Daily    QUEtiapine  50 mg Oral QHS    thiamine  100 mg Oral Daily     Continuous Infusions:  PRN Meds:acetaminophen, ramelteon    Review of patient's allergies indicates:  No Known Allergies     Past Medical History:   Diagnosis Date    Neuropathy      History reviewed. No pertinent surgical history.    Family History     None        Social History Main Topics    Smoking status: Current Every Day Smoker    Smokeless tobacco: Not on file    Alcohol use Yes    Drug use: Yes    Sexual activity: Not on file     Review of Systems   Constitutional: Negative for chills, diaphoresis, fatigue, fever and unexpected weight change.   Respiratory: Negative for shortness of breath.    Cardiovascular: Negative for chest pain and leg swelling.   Gastrointestinal: Negative for nausea and vomiting.   Skin: Positive for color change and wound.   Neurological: Positive for numbness.     Objective:     Vital Signs (Most Recent):  Temp: 98.4 °F (36.9 °C) (08/11/18 0715)  Pulse: 75 (08/11/18 0715)  Resp: 14 (08/11/18  0715)  BP: (!) 154/89 (08/11/18 0715)  SpO2: 97 % (08/11/18 0715) Vital Signs (24h Range):  Temp:  [98.3 °F (36.8 °C)-98.7 °F (37.1 °C)] 98.4 °F (36.9 °C)  Pulse:  [73-90] 75  Resp:  [14-20] 14  SpO2:  [96 %-100 %] 97 %  BP: (139-162)/(69-89) 154/89     Weight: 71.5 kg (157 lb 10.1 oz)  Body mass index is 25.44 kg/m².    Foot Exam    General  General Appearance: appears stated age and healthy   Orientation: alert and oriented to person, place, and time   Affect: appropriate       Right Foot/Ankle     Inspection and Palpation  Ecchymosis: none  Tenderness: none   Swelling: great toe interphalangeal joint   Skin Exam: drainage, dry skin, crusting dorsally and plantarly, skin changes and ulcer;     Neurovascular  Dorsalis pedis: 1+  Posterior tibial: 2+  Saphenous nerve sensation: diminished  Tibial nerve sensation: diminished  Superficial peroneal nerve sensation: diminished  Deep peroneal nerve sensation: diminished  Sural nerve sensation: diminished      Left Foot/Ankle      Inspection and Palpation  Ecchymosis: none  Tenderness: none   Swelling: none   Skin Exam: skin intact;     Neurovascular  Dorsalis pedis: 1+  Posterior tibial: 2+  Saphenous nerve sensation: diminished  Tibial nerve sensation: diminished  Superficial peroneal nerve sensation: diminished  Deep peroneal nerve sensation: diminished  Sural nerve sensation: diminished            Laboratory:  A1C:   Recent Labs  Lab 08/10/18  2027   HGBA1C 4.4     Blood Cultures: No results for input(s): LABBLOO in the last 48 hours.  BMP:   Recent Labs  Lab 08/11/18  0648   GLU 73   *   K 3.8   CL 97   CO2 22*   BUN 4*   CREATININE 0.7   CALCIUM 9.6   MG 2.3     CBC:   Recent Labs  Lab 08/10/18  1517   WBC 5.59   RBC 3.42*   HGB 12.0*   HCT 34.6*      *   MCH 35.1*   MCHC 34.7     CMP:   Recent Labs  Lab 08/11/18  0648   GLU 73   CALCIUM 9.6   ALBUMIN 3.7   PROT 7.8   *   K 3.8   CO2 22*   CL 97   BUN 4*   CREATININE 0.7   ALKPHOS 109   ALT  20   AST 39   BILITOT 0.7     Coagulation: No results for input(s): PT, INR, APTT in the last 168 hours.  CRP:   Recent Labs  Lab 08/10/18  1517   CRP 18.0*     ESR: No results for input(s): SEDRATE in the last 168 hours.  Prealbumin: No results for input(s): PREALBUMIN in the last 48 hours.  Wound Cultures: No results for input(s): LABAERO in the last 4320 hours.  Microbiology Results (last 7 days)     Procedure Component Value Units Date/Time    C. trachomatis/N. gonorrhoeae by AMP DNA [[id number]] Collected:  08/11/18 0524    Order Status:  Sent Specimen:  Genital Updated:  08/11/18 0626    C. trachomatis/N. gonorrhoeae by AMP DNA [[id number]] Collected:  08/10/18 1646    Order Status:  Completed Updated:  08/11/18 0221     Chlamydia, Amplified DNA Not Detected     N gonorrhoeae, amplified DNA Not Detected    C. trachomatis/N. gonorrhoeae by AMP DNA [facility]; Urine [[id number]]     Order Status:  Completed Specimen:  Genital     Urine culture [[id number]] Collected:  08/10/18 1646    Order Status:  No result Specimen:  Urine Updated:  08/10/18 1748        Specimen (12h ago through future)    None          Diagnostic Results:  CT: I have reviewed all pertinent results/findings within the past 24 hours.  Reviewed  X-Ray: I have reviewed all pertinent results/findings within the past 24 hours.  Reviewed    Xray: Significant soft tissue swelling about the distal aspect of the great toe.  Some hypertrophic new bone noted.  No definite bony destruction.  Follow-up studies may be beneficial as radiographic findings of osteomyelitis are often delayed.  No fracture.    On the left bones are well mineralized and alignment is satisfactory.  Mild narrowing at the IP joints.  Some irregularity distal fibula appears chronic.  1 cm cystic lucency base 2nd metatarsal.  There is some irregularity involving the proximal aspect of the distal phalanx of the right great toe.  Not certain if this is related to an acute or chronic  injury.    CT: Soft tissue swelling and induration identified at the right great toe suggesting infectious or noninfectious inflammatory changes.  No bony destruction to suggest osteomyelitis.  No focal collection to suggest abscess.    Clinical Findings:    Wound to dorsal right foot, measuring with hyperkeratotic borders, granular base, no periwound erythema, edema noted. No purulence, fluctuance or crepitus noted, stable.     Wound to plantar hallux as described below.     Pre-debridement 1.6xx2.6x0.1cm with hyperkeratotic borders and fibrogranular base. No purulence, fluctuance, or crepitus noted. No periwound erythema or edema noted. Skin temperature is WNL.       Post debridement 1.7x3.0x0.1cm with hyperkeratotic borders and fibrogranular base. No purulence, fluctuance, or crepitus noted. No periwound erythema or edema noted. Skin temperature is WNL.         Assessment/Plan:     * Wound of lower extremity    Wound noted to plantar hallux and dorsal foot, no acute SOI noted  Imaging reviewed  Debridement performed, see procedure note below  Cultures taken of big toe, orders placed  Pulses diminished, arterial jen and US ordered  Pt to f/u with podiatry within 1 week of D/C, wound benefit from HH   Wound dressed with medihoney, xeroform, 4x4, kerlix, ACE  Nursing and HH to change dressings as above  Pt NWB to forefoot, may ambulate in heel with DARCO shoe  Ok to D/C from podiatry standpoint  Podiatry will follow         Alcohol abuse    Per primary        Alcoholic peripheral neuropathy affecting both feet.    Crusted scabies on R foot determined after we left the room in discussion with primary team. Contact isolation initiated. Discussed with infection control. Derm consult pending.     Thank you for your consult. I will follow-up with patient. Please contact us if you have any additional questions.       OPERATIVE REPORT    SURGEON: Silvia Aguilar DPM  ASSITANT: Xin Santana DPM  PRE-OP DX: Ulceration  right plantar hallux  POST-OP DX: same.  PROCEDURE: Debridement ulceration plantar hallux and callous to dorsal foot, R foot  ANESTHESIA:  Absent protective sensation therefore no anesthesia required.  HEMOSTASIS: 4x4 with presssure  EBL: less than 1 ml    Time Out performed to verify patient and site and informed consent obtained. Betadine prep of site.      Procedure: Attention was directed to dorsal callous of right foot which was debrided of all hyperkeratotic tissue to reveal a granular wound measuring 0.7 x 1.2 x 0.1 cm with no periwound SOI noted, stable. Attention was then directed to the right hallux which was debrided of all hyperkeratotic tissue from the dorsal and plantar surface. The ulcer was debrided excisionally to the level of subcutaneous tissue utilizing a #15 scalpel and forceps.  Tissue removed included callus, fibrin, epidermis, dermis, and subcutaneous tissue. Pre-debridement, the plantar wound measured 1.6 x 2.6 x 0.1cm with hyperkeratotic borders and fibrogranular base. Post-debridement 1.7 x 3 x 0.2 cm with a beefy red granular base. No purulence, fluctuance, or crepitus noted. No periwound erythema or edema noted. Skin temperature surrounding the wound was within normal limits. No acute signs of infection noted. Cultures were taken of the plantar hallux wounds. Bleeding was controlled with pressure. Next, the ulcerations were flushed with sterile saline and dressed with medihoney, xeroform, 4x4, kerlix, and a loose ACE. The pt tolerated the procedure well.         Xin Santana MD  Podiatry  [facility] have personally taken the history and examined the patient and agree with the resident's note as stated above.

## 2018-08-11 NOTE — ASSESSMENT & PLAN NOTE
Alcohol Intoxication - resolved  · Last drink on 8/9   · Banana bag, daily MVI, thiamine, and FA  · Monitor CIWA score  · 8/11: increased anxiety and tremors. Start Valium 10 mg PO q 6 hours and will taper

## 2018-08-11 NOTE — H&P
Hospital Medicine  History and Physical      Patient Name: Mohamud Patel  MRN:  [id number]  [facility] Medicine Team: [facility] [place] [place] HOSP MED E Jeri Smith MD  Date of Admission:  8/10/2018     Principal Problem:  Wound of lower extremity   Primary Care Physician: Enmanuel Ferguson MD       History of Present Illness:    Mr. Mohamud Patel is a 57 y.o. male with history of alcohol abuse who presents to the emergency department with a right foot lesion as well as altered mental status.  The patient is a poor historian, and is unable to tell why he came to the hospital.  He continues to voice that he does not want to be in the hospital and that he is ready to go home.  In regards to his right foot wound, he mentions that he stubbed his toe a couple weeks ago and that the swelling has been getting worse.  He denies any drainage from the toe.  He drinks about 10 beers a day, his last drink was the day prior to admission.  He denies any chest pain, shortness of breath, nausea, or vomiting.  His only complaint, is of neuropathy in his feet.  He denies any car accidents or history of diabetes causing his neuropathy.  He continues to state that he does not want to be in the hospital but does not want to leave.    In the emergency department, labs were notable for an elevated ethanol level in sodium of 129.  CT scan of the foot was performed which did not show osteo, only soft tissue swelling. He was given IV ceftriaxone for a urinary tract infection and right lower extremity cellulitis.  He was admitted to Hospital Medicine for further management.      Review of Systems:  Constitutional: Negative for chills, fatigue, fever.   HENT: Negative for sore throat, trouble swallowing.    Eyes: Negative for photophobia, visual disturbance.   Respiratory: Negative for cough, shortness of breath.    Cardiovascular: Negative for chest pain, palpitations, leg swelling.   Gastrointestinal: Negative for abdominal pain, constipation,  diarrhea, nausea, vomiting.   Endocrine: Negative for cold intolerance, heat intolerance.   Genitourinary: Negative for dysuria, frequency.   Musculoskeletal: Negative for arthralgias, myalgias.   Skin: Negative for rash, wound, erythema   Neurological: Negative for dizziness, syncope, weakness, light-headedness.   Psychiatric/Behavioral: + confusion  All other systems reviewed and are negative.      Past Medical History: Patient has a past medical history of Neuropathy.    Past Surgical History: Patient has no past surgical history on file.    Social History: Patient reports that he has been smoking.  He does not have any smokeless tobacco history on file. He reports that he drinks alcohol. He reports that he uses drugs.    Family History: Patient's family history is not on file.    Medications: Scheduled Meds:   cefTRIAXone (ROCEPHIN) IVPB  1 g Intravenous Q24H    magnesium sulfate IVPB  2 g Intravenous ED 1 Time     Continuous Infusions:   sodium chloride 0.9%       PRN Meds:.    Allergies: Patient has No Known Allergies.      Physical Exam:    Temp:  [98.3 °F (36.8 °C)-98.4 °F (36.9 °C)]   Pulse:  [73-88]   Resp:  [20]   BP: (139-162)/(69-86)   SpO2:  [98 %-100 %]     Constitutional: Appears disheveled  Head: Normocephalic and atraumatic.   Mouth/Throat: Oropharynx is clear and moist.   Eyes: EOM are normal. Pupils are equal, round, and reactive to light. No scleral icterus. Left upper eyelid laceration  Neck: Normal range of motion. Neck supple.   Cardiovascular: Normal heart rate.  Regular heart rhythm.  No murmur heard.  Pulmonary/Chest: Effort normal and breath sounds normal. No respiratory distress. No wheezes, rales, or rhonchi  Abdominal: Soft. Bowel sounds are normal.  No distension.  No tenderness  Musculoskeletal: Normal range of motion. No edema.   Neurological: Alert and oriented to person, place, and time.   Skin: Skin is warm and dry. See foot photo  Psychiatric: Normal mood and affect. Behavior  is normal.                 Intake/Output Summary (Last 24 hours) at 08/10/18 2019  Last data filed at 08/10/18 1628   Gross per 24 hour   Intake              500 ml   Output                0 ml   Net              500 ml       Recent Labs  Lab 08/10/18  1517   WBC 5.59   HGB 12.0*   HCT 34.6*          Recent Labs  Lab 08/10/18  1517   *   K 3.9   CL 95   CO2 23   BUN 3*   CREATININE 0.6   GLU 91   CALCIUM 9.1   LIPASE 20       Recent Labs  Lab 08/10/18  1517   ALKPHOS 110   ALT 18   AST 40   ALBUMIN 3.7   PROT 7.8   BILITOT 0.4      No results for input(s): POCTGLUCOSE in the last 168 hours.  Recent Labs      08/10/18   1517   TROPONINI  <0.006       Recent Labs      08/10/18   1517   LACTATE  2.1          Assessment and Plan:    Mr. Mohamud Patel is a 57 y.o. male who presented to [facility] on 8/10/2018 with a RLE wound.    Wound of RLE  RLE Cellulitis  · CT scan without findings suggestive of osteomyelitis  · Check ESR and CRP  · Start IV ceftriaxone to cover strep  · Podiatry consult for toe wounds    Alcohol Abuse  Alcohol Intoxication  · Last drink on 8/9  · Banana bag  · Daily multivitamin  · Daily thiamine  · Daily folic acid  · CIWAR score  · Hold on Valium for now    Hyponatremia  · Sodium 129  · Likely from dehydration and alcohol  · Gentle IVF    Acute Cystitis with Hematuria  · UA dirty  · F/u urine culture  · Ceftriaxone as above  · Check GC screen     Diet:  Regular  GI PPx:  Not indicated  DVT PPx:  Ambulatory  Goals of Care:  Full      Disposition:  Home noel pending Podiatry  Discharge Needs:  TBABNER Smith MD  Hospital Medicine  Cell:  [phone]  Spectra:  [id number]  Pager:  [phone]

## 2018-08-11 NOTE — ASSESSMENT & PLAN NOTE
Improving; Na+ 129>132  - Improved with IVFs and likely from dehydration and etoh  - repeat labs in AM

## 2018-08-11 NOTE — HOSPITAL COURSE
Patient placed in observation for right foot lesion and cellulitis. Started on Ceftriaxone. Podiatry consulted and food debrided 8/11/18. Dermatology consulted due to concern for scabies and patient placed on contact precautions, see derm note 8/12. Patient treated empirically for scabies with permetherin and ivermectin. He also has known alcohol abuse and intoxication on admission. Started on MVI, Folic Acid, thiamine, and started Valium taper 8/11. Podiatry recommending ABIs which were unremarkable. Patient will need referral to podiatry on discharge for biopsy of neoplastic lesion on foot. Pending urine and wound culture results, likely home tomorrow.

## 2018-08-11 NOTE — SUBJECTIVE & OBJECTIVE
Scheduled Meds:   cefTRIAXone (ROCEPHIN) IVPB  1 g Intravenous Q24H    folic acid  1 mg Oral Daily    multivitamin  1 tablet Oral Daily    QUEtiapine  50 mg Oral QHS    thiamine  100 mg Oral Daily     Continuous Infusions:  PRN Meds:acetaminophen, ramelteon    Review of patient's allergies indicates:  No Known Allergies     Past Medical History:   Diagnosis Date    Neuropathy      History reviewed. No pertinent surgical history.    Family History     None        Social History Main Topics    Smoking status: Current Every Day Smoker    Smokeless tobacco: Not on file    Alcohol use Yes    Drug use: Yes    Sexual activity: Not on file     Review of Systems   Constitutional: Negative for chills, diaphoresis, fatigue, fever and unexpected weight change.   Respiratory: Negative for shortness of breath.    Cardiovascular: Negative for chest pain and leg swelling.   Gastrointestinal: Negative for nausea and vomiting.   Skin: Positive for color change and wound.   Neurological: Positive for numbness.     Objective:     Vital Signs (Most Recent):  Temp: 98.4 °F (36.9 °C) (08/11/18 0715)  Pulse: 75 (08/11/18 0715)  Resp: 14 (08/11/18 0715)  BP: (!) 154/89 (08/11/18 0715)  SpO2: 97 % (08/11/18 0715) Vital Signs (24h Range):  Temp:  [98.3 °F (36.8 °C)-98.7 °F (37.1 °C)] 98.4 °F (36.9 °C)  Pulse:  [73-90] 75  Resp:  [14-20] 14  SpO2:  [96 %-100 %] 97 %  BP: (139-162)/(69-89) 154/89     Weight: 71.5 kg (157 lb 10.1 oz)  Body mass index is 25.44 kg/m².    Foot Exam    General  General Appearance: appears stated age and healthy   Orientation: alert and oriented to person, place, and time   Affect: appropriate       Right Foot/Ankle     Inspection and Palpation  Ecchymosis: none  Tenderness: none   Swelling: great toe interphalangeal joint   Skin Exam: drainage, dry skin, maceration, skin changes and ulcer;     Neurovascular  Dorsalis pedis: 1+  Posterior tibial: 2+  Saphenous nerve sensation: diminished  Tibial nerve  sensation: diminished  Superficial peroneal nerve sensation: diminished  Deep peroneal nerve sensation: diminished  Sural nerve sensation: diminished      Left Foot/Ankle      Inspection and Palpation  Ecchymosis: none  Tenderness: none   Swelling: none   Skin Exam: skin intact;     Neurovascular  Dorsalis pedis: 1+  Posterior tibial: 2+  Saphenous nerve sensation: diminished  Tibial nerve sensation: diminished  Superficial peroneal nerve sensation: diminished  Deep peroneal nerve sensation: diminished  Sural nerve sensation: diminished            Laboratory:  A1C:   Recent Labs  Lab 08/10/18  2027   HGBA1C 4.4     Blood Cultures: No results for input(s): LABBLOO in the last 48 hours.  BMP:   Recent Labs  Lab 08/11/18  0648   GLU 73   *   K 3.8   CL 97   CO2 22*   BUN 4*   CREATININE 0.7   CALCIUM 9.6   MG 2.3     CBC:   Recent Labs  Lab 08/10/18  1517   WBC 5.59   RBC 3.42*   HGB 12.0*   HCT 34.6*      *   MCH 35.1*   MCHC 34.7     CMP:   Recent Labs  Lab 08/11/18  0648   GLU 73   CALCIUM 9.6   ALBUMIN 3.7   PROT 7.8   *   K 3.8   CO2 22*   CL 97   BUN 4*   CREATININE 0.7   ALKPHOS 109   ALT 20   AST 39   BILITOT 0.7     Coagulation: No results for input(s): PT, INR, APTT in the last 168 hours.  CRP:   Recent Labs  Lab 08/10/18  1517   CRP 18.0*     ESR: No results for input(s): SEDRATE in the last 168 hours.  Prealbumin: No results for input(s): PREALBUMIN in the last 48 hours.  Wound Cultures: No results for input(s): LABAERO in the last 4320 hours.  Microbiology Results (last 7 days)     Procedure Component Value Units Date/Time    C. trachomatis/N. gonorrhoeae by AMP DNA [[id number]] Collected:  08/11/18 0524    Order Status:  Sent Specimen:  Genital Updated:  08/11/18 0626    C. trachomatis/N. gonorrhoeae by AMP DNA [[id number]] Collected:  08/10/18 1646    Order Status:  Completed Updated:  08/11/18 0221     Chlamydia, Amplified DNA Not Detected     N gonorrhoeae, amplified DNA Not  Detected    C. trachomatis/N. gonorrhoeae by AMP DNA [facility]; Urine [[id number]]     Order Status:  Completed Specimen:  Genital     Urine culture [[id number]] Collected:  08/10/18 1646    Order Status:  No result Specimen:  Urine Updated:  08/10/18 1748        Specimen (12h ago through future)    None          Diagnostic Results:  CT: I have reviewed all pertinent results/findings within the past 24 hours.  Reviewed  X-Ray: I have reviewed all pertinent results/findings within the past 24 hours.  Reviewed    Xray: Significant soft tissue swelling about the distal aspect of the great toe.  Some hypertrophic new bone noted.  No definite bony destruction.  Follow-up studies may be beneficial as radiographic findings of osteomyelitis are often delayed.  No fracture.    On the left bones are well mineralized and alignment is satisfactory.  Mild narrowing at the IP joints.  Some irregularity distal fibula appears chronic.  1 cm cystic lucency base 2nd metatarsal.  There is some irregularity involving the proximal aspect of the distal phalanx of the right great toe.  Not certain if this is related to an acute or chronic injury.    CT: Soft tissue swelling and induration identified at the right great toe suggesting infectious or noninfectious inflammatory changes.  No bony destruction to suggest osteomyelitis.  No focal collection to suggest abscess.    Clinical Findings:

## 2018-08-11 NOTE — ASSESSMENT & PLAN NOTE
· (+) UA   · F/u urine culture  · Ceftriaxone started on admission and discontinued and started on PO bactrim  · GC screen negative

## 2018-08-11 NOTE — SUBJECTIVE & OBJECTIVE
Interval History: No acute events overnight. Patient awake and alert on exam, anxious appearing. Mental status appears to be at baseline. He states right toe has been that way for months but denies any itching or irritation. He says it started with hitting toe on a curb. Podiatry was already in room and did bedside debridement and dressed foot. Ordered LORIE and pending.   Patient is requesting to leave hospital, but explained concern that toe infection may be norwegian scabies based on clinical appearance, dermatology consulted. Patient placed on contact precautions.     Review of Systems   Constitutional: Negative for chills, diaphoresis, fatigue, fever and unexpected weight change.   Respiratory: Negative for cough, shortness of breath and wheezing.    Cardiovascular: Negative for chest pain and leg swelling.   Gastrointestinal: Negative for abdominal pain, nausea and vomiting.   Skin: Positive for color change and wound.   Neurological: Positive for tremors and numbness. Negative for dizziness.   Psychiatric/Behavioral: Negative for agitation. The patient is nervous/anxious.      Objective:     Vital Signs (Most Recent):  Temp: 98.1 °F (36.7 °C) (08/11/18 1432)  Pulse: 75 (08/11/18 0715)  Resp: 14 (08/11/18 0715)  BP: (!) 185/77 (08/11/18 1134)  SpO2: 97 % (08/11/18 0715) Vital Signs (24h Range):  Temp:  [98.1 °F (36.7 °C)-98.7 °F (37.1 °C)] 98.1 °F (36.7 °C)  Pulse:  [73-90] 75  Resp:  [14-18] 14  SpO2:  [96 %-100 %] 97 %  BP: (139-185)/(69-89) 185/77     Weight: 71.5 kg (157 lb 10.1 oz)  Body mass index is 25.44 kg/m².    Intake/Output Summary (Last 24 hours) at 08/11/18 1513  Last data filed at 08/10/18 1628   Gross per 24 hour   Intake              500 ml   Output                0 ml   Net              500 ml      Physical Exam   Constitutional: He is oriented to person, place, and time. He appears well-developed and well-nourished. No distress.   HENT:   Head: Normocephalic and atraumatic.   Neck: Normal  range of motion. Neck supple. No JVD present.   Cardiovascular: Normal rate and regular rhythm.    No murmur heard.  Pulmonary/Chest: Effort normal and breath sounds normal. He has no wheezes. He has no rales.   Abdominal: Soft. Bowel sounds are normal. He exhibits no distension and no mass. There is no tenderness. There is no guarding.   Musculoskeletal: He exhibits no edema.   Right foot dressed and see podiatry and H&P for photo    Neurological: He is alert and oriented to person, place, and time. No cranial nerve deficit.       Significant Labs:   CBC:   Recent Labs  Lab 08/10/18  1517   WBC 5.59   HGB 12.0*   HCT 34.6*        CMP:   Recent Labs  Lab 08/10/18  1517 08/11/18  0648   * 132*   K 3.9 3.8   CL 95 97   CO2 23 22*   GLU 91 73   BUN 3* 4*   CREATININE 0.6 0.7   CALCIUM 9.1 9.6   PROT 7.8 7.8   ALBUMIN 3.7 3.7   BILITOT 0.4 0.7   ALKPHOS 110 109   AST 40 39   ALT 18 20   ANIONGAP 11 13   EGFRNONAA >60.0 >60.0     All pertinent labs within the past 24 hours have been reviewed.

## 2018-08-11 NOTE — ASSESSMENT & PLAN NOTE
Wound of right lower extremity  Crusted Scabies  · DDx include Nauruan scabies based on clinical picture and patient on contact precautions. Dermatology consulted.  If this is Nauruan scabies, roommate and house will need to be treated to prevent spread.   · Podiatry performed bedside debridement 8/11 and wound care noted. Patient will need DARCO shoe and non weight bearing to forefoot. Follow up with Podaitry in 1 week  · LORIE ordered and pending  · XR without findings suggestive of osteomyelitis  · ESR pending and CRP 18; check HIV  · Start IV ceftriaxone to cover strep and transitioned to PO Bactrim

## 2018-08-11 NOTE — HPI
Mr. Mohamud Patel is a 57 y.o. male with history of alcohol abuse who presents to the emergency department with a right foot lesion as well as altered mental status.  The patient is a poor historian, and is unable to tell why he came to the hospital.  He continues to voice that he does not want to be in the hospital and that he is ready to go home.  In regards to his right foot wound, he mentions that he stubbed his toe a couple weeks ago and that the swelling has been getting worse.  He denies any drainage from the toe.  He drinks about 10 beers a day, his last drink was the day prior to admission.  He denies any chest pain, shortness of breath, nausea, or vomiting.  His only complaint, is of neuropathy in his feet.  He denies any car accidents or history of diabetes causing his neuropathy.  He continues to state that he does not want to be in the hospital but does not want to leave.     In the emergency department, labs were notable for an elevated ethanol level in sodium of 129.  CT scan of the foot was performed which did not show osteo, only soft tissue swelling. He was given IV ceftriaxone for a urinary tract infection and right lower extremity cellulitis.  He was admitted to Hospital Medicine for further management.

## 2018-08-11 NOTE — NURSING
Pt compliant with treatment plan and medications. VSS. Afebrile. Pt denies pain. Call light within reach. telesitter at bedside. Bed alarms activated.

## 2018-08-12 PROCEDURE — 25000003 PHARM REV CODE 250: Performed by: HOSPITALIST

## 2018-08-12 PROCEDURE — 25000003 PHARM REV CODE 250: Performed by: PHYSICIAN ASSISTANT

## 2018-08-12 PROCEDURE — 99233 SBSQ HOSP IP/OBS HIGH 50: CPT | Mod: ,,, | Performed by: PHYSICIAN ASSISTANT

## 2018-08-12 PROCEDURE — 11000001 HC ACUTE MED/SURG PRIVATE ROOM

## 2018-08-12 RX ORDER — PERMETHRIN 50 MG/G
CREAM TOPICAL ONCE
Status: COMPLETED | OUTPATIENT
Start: 2018-08-12 | End: 2018-08-12

## 2018-08-12 RX ORDER — IVERMECTIN 3 MG/1
200 TABLET ORAL ONCE
Status: COMPLETED | OUTPATIENT
Start: 2018-08-12 | End: 2018-08-12

## 2018-08-12 RX ADMIN — IVERMECTIN 15000 MCG: 3 TABLET ORAL at 12:08

## 2018-08-12 RX ADMIN — DIAZEPAM 10 MG: 5 TABLET ORAL at 01:08

## 2018-08-12 RX ADMIN — PERMETHRIN: 50 CREAM TOPICAL at 12:08

## 2018-08-12 RX ADMIN — SULFAMETHOXAZOLE AND TRIMETHOPRIM 1 TABLET: 800; 160 TABLET ORAL at 09:08

## 2018-08-12 RX ADMIN — Medication 100 MG: at 09:08

## 2018-08-12 RX ADMIN — DIAZEPAM 10 MG: 5 TABLET ORAL at 05:08

## 2018-08-12 RX ADMIN — QUETIAPINE FUMARATE 50 MG: 25 TABLET ORAL at 09:08

## 2018-08-12 RX ADMIN — RAMELTEON 8 MG: 8 TABLET, FILM COATED ORAL at 09:08

## 2018-08-12 RX ADMIN — FOLIC ACID 1 MG: 1 TABLET ORAL at 09:08

## 2018-08-12 RX ADMIN — ACETAMINOPHEN 650 MG: 325 TABLET ORAL at 09:08

## 2018-08-12 RX ADMIN — THERA TABS 1 TABLET: TAB at 09:08

## 2018-08-12 NOTE — PLAN OF CARE
CM attempted to assess pt - pt is confused and unable to participate. No emergency contacts and only number listed on facesheet (pt's number) p [phone] is disconnected. CM unable to obtain or clarify any info on pt.    Becca Khalil, Wknd   [id number]

## 2018-08-12 NOTE — PROGRESS NOTES
[facility] Hospital Medicine  Progress Note    Patient Name: Mohamud Patel  MRN: [id number]  Patient Class: IP- Inpatient   Admission Date: 8/10/2018  Length of Stay: 1 days  Attending Physician: Annamarie Germain MD  Primary Care Provider: Enmanuel Ferguson MD    [facility] Medicine Team: [facility] MED E Robles Nava PA-C    Subjective:     Principal Problem:Cellulitis of right lower extremity    HPI:  Mr. Mohamud Patel is a 57 y.o. male with history of alcohol abuse who presents to the emergency department with a right foot lesion as well as altered mental status.  The patient is a poor historian, and is unable to tell why he came to the hospital.  He continues to voice that he does not want to be in the hospital and that he is ready to go home.  In regards to his right foot wound, he mentions that he stubbed his toe a couple weeks ago and that the swelling has been getting worse.  He denies any drainage from the toe.  He drinks about 10 beers a day, his last drink was the day prior to admission.  He denies any chest pain, shortness of breath, nausea, or vomiting.  His only complaint, is of neuropathy in his feet.  He denies any car accidents or history of diabetes causing his neuropathy.  He continues to state that he does not want to be in the hospital but does not want to leave.     In the emergency department, labs were notable for an elevated ethanol level in sodium of 129.  CT scan of the foot was performed which did not show osteo, only soft tissue swelling. He was given IV ceftriaxone for a urinary tract infection and right lower extremity cellulitis.  He was admitted to Hospital Medicine for further management.    Hospital Course:  Patient placed in observation for right foot lesion and cellulitis. Started on Ceftriaxone. Podiatry consulted and food debrided 8/11/18. Dermatology consulted due to concern for scabies and patient placed on contact precautions, see derm note  8/12. Patient treated empirically for scabies with permetherin and ivermectin. He also has known alcohol abuse and intoxication on admission. Started on MVI, Folic Acid, thiamine, and started Valium taper 8/11. Podiatry recommending ABIs which were unremarkable. Patient will need referral to podiatry on discharge for biopsy of neoplastic lesion on foot. Pending urine and wound culture results, likely home tomorrow.     Interval History: Patient was entering other peoples rooms overnight looking for cigarettes and beer. Patient placed in restraints. Continue scheduled valium taper. Discussed consult with Dermatology, they feel this is low differential for scabies but will treat empirically. Patient updated on plan of care.    Review of Systems   Constitutional: Negative for chills, diaphoresis, fatigue, fever and unexpected weight change.   Respiratory: Negative for cough, shortness of breath and wheezing.    Cardiovascular: Negative for chest pain and leg swelling.   Gastrointestinal: Negative for abdominal pain, nausea and vomiting.   Skin: Positive for color change and wound.   Neurological: Positive for tremors and numbness. Negative for dizziness.   Psychiatric/Behavioral: Negative for agitation. The patient is nervous/anxious.      Objective:     Vital Signs (Most Recent):  Temp: 99 °F (37.2 °C) (08/12/18 0743)  Pulse: 104 (08/12/18 0743)  Resp: 16 (08/12/18 0743)  BP: (!) 157/84 (08/12/18 0743)  SpO2: 99 % (08/12/18 0743) Vital Signs (24h Range):  Temp:  [98 °F (36.7 °C)-99 °F (37.2 °C)] 99 °F (37.2 °C)  Pulse:  [] 104  Resp:  [14-17] 16  SpO2:  [99 %] 99 %  BP: (157-187)/(65-84) 157/84     Weight: 71.5 kg (157 lb 10.1 oz)  Body mass index is 25.44 kg/m².    Intake/Output Summary (Last 24 hours) at 8/12/2018 1121  Last data filed at 8/12/2018 0400  Gross per 24 hour   Intake 960 ml   Output --   Net 960 ml      Physical Exam   Constitutional: He is oriented to person, place, and time. He appears  well-developed and well-nourished. No distress.   HENT:   Head: Normocephalic and atraumatic.   Neck: Normal range of motion. Neck supple. No JVD present.   Cardiovascular: Normal rate and regular rhythm.   No murmur heard.  Pulmonary/Chest: Effort normal and breath sounds normal. He has no wheezes. He has no rales.   Abdominal: Soft. Bowel sounds are normal. He exhibits no distension and no mass. There is no tenderness. There is no guarding.   Musculoskeletal: He exhibits no edema.   Right foot dressed and see podiatry and H&P for photo    Neurological: He is alert and oriented to person, place, and time. No cranial nerve deficit.       Significant Labs:   BMP:   Recent Labs   Lab  08/11/18   0648   GLU  73   NA  132*   K  3.8   CL  97   CO2  22*   BUN  4*   CREATININE  0.7   CALCIUM  9.6   MG  2.3     CBC:   Recent Labs   Lab  08/10/18   1517   WBC  5.59   HGB  12.0*   HCT  34.6*   PLT  191     Urine Culture:   Recent Labs   Lab  08/10/18   1646   LABURIN  GRAM NEGATIVE JEAN MARIE, NON-LACTOSE   >100,000 cfu/ml  Identification and susceptibility pending       Urine Studies:   Recent Labs   Lab  08/10/18   1646   COLORU  Yellow   APPEARANCEUA  Hazy*   PHUR  6.0   SPECGRAV  1.005   PROTEINUA  Negative   GLUCUA  Negative   KETONESU  Negative   BILIRUBINUA  Negative   OCCULTUA  1+*   NITRITE  Positive*   UROBILINOGEN  Negative   LEUKOCYTESUR  3+*   RBCUA  12*   WBCUA  81*   BACTERIA  Many*   SQUAMEPITHEL  0       Significant Imaging: I have reviewed all pertinent imaging results/findings within the past 24 hours.    Assessment/Plan:      * Cellulitis of right lower extremity    Wound of right lower extremity  Crusted Scabies  · DDx include Eritrean scabies based on clinical picture and patient on contact precautions. Dermatology consulted.  If this is Eritrean scabies, roommate and house will need to be treated to prevent spread.   · Dermatology discussed, will treat empirically with Permethrin cream and  Ivermectin 200mcg/kg x 1, then repeat in 7 days. Concern for neoplastic lesion on toe, will need referral on discharge for biopsy. See consult 8/11  · Podiatry performed bedside debridement 8/11 and wound care noted. Patient will need DARCO shoe and non weight bearing to forefoot. Follow up with Podaitry in 1 week  · LORIE ordered and unremarkable   · XR without findings suggestive of osteomyelitis  · ESR 18 and CRP 18; HIV negative  · Start IV ceftriaxone to cover strep and transitioned to PO Bactrim        Acute cystitis with hematuria    · (+) UA   · F/u urine culture, growing GNR   · Ceftriaxone started on admission and discontinued and started on PO bactrim  · GC screen negative        Alcohol abuse    Alcohol Intoxication - resolved  · Last drink on 8/9   · Banana bag, daily MVI, thiamine, and FA  · Monitor CIWA score  · 8/11: increased anxiety and tremors. Start Valium 10 mg PO q 6 hours and will taper         Hyponatremia    Improving; Na+ 129>132  - Improved with IVFs and likely from dehydration and etoh  - continue labs          VTE Risk Mitigation (From admission, onward)        Ordered     IP VTE HIGH RISK PATIENT  Once      08/11/18 1454     Reason for No Pharmacological VTE Prophylaxis  Once      08/11/18 1454     Reason for no Mechanical VTE Prophylaxis  Once      08/11/18 1454              Robles Nava PA-C  Department of Hospital Medicine   [facility]

## 2018-08-12 NOTE — SUBJECTIVE & OBJECTIVE
Interval History: Patient was entering other peoples rooms overnight looking for cigarettes and beer. Patient placed in restraints. Continue scheduled valium taper. Discussed consult with Dermatology, they feel this is low differential for scabies but will treat empirically. Patient updated on plan of care.    Review of Systems   Constitutional: Negative for chills, diaphoresis, fatigue, fever and unexpected weight change.   Respiratory: Negative for cough, shortness of breath and wheezing.    Cardiovascular: Negative for chest pain and leg swelling.   Gastrointestinal: Negative for abdominal pain, nausea and vomiting.   Skin: Positive for color change and wound.   Neurological: Positive for tremors and numbness. Negative for dizziness.   Psychiatric/Behavioral: Negative for agitation. The patient is nervous/anxious.      Objective:     Vital Signs (Most Recent):  Temp: 99 °F (37.2 °C) (08/12/18 0743)  Pulse: 104 (08/12/18 0743)  Resp: 16 (08/12/18 0743)  BP: (!) 157/84 (08/12/18 0743)  SpO2: 99 % (08/12/18 0743) Vital Signs (24h Range):  Temp:  [98 °F (36.7 °C)-99 °F (37.2 °C)] 99 °F (37.2 °C)  Pulse:  [] 104  Resp:  [14-17] 16  SpO2:  [99 %] 99 %  BP: (157-187)/(65-84) 157/84     Weight: 71.5 kg (157 lb 10.1 oz)  Body mass index is 25.44 kg/m².    Intake/Output Summary (Last 24 hours) at 8/12/2018 1121  Last data filed at 8/12/2018 0400  Gross per 24 hour   Intake 960 ml   Output --   Net 960 ml      Physical Exam   Constitutional: He is oriented to person, place, and time. He appears well-developed and well-nourished. No distress.   HENT:   Head: Normocephalic and atraumatic.   Neck: Normal range of motion. Neck supple. No JVD present.   Cardiovascular: Normal rate and regular rhythm.   No murmur heard.  Pulmonary/Chest: Effort normal and breath sounds normal. He has no wheezes. He has no rales.   Abdominal: Soft. Bowel sounds are normal. He exhibits no distension and no mass. There is no tenderness. There  is no guarding.   Musculoskeletal: He exhibits no edema.   Right foot dressed and see podiatry and H&P for photo    Neurological: He is alert and oriented to person, place, and time. No cranial nerve deficit.       Significant Labs:   BMP:   Recent Labs   Lab  08/11/18   0648   GLU  73   NA  132*   K  3.8   CL  97   CO2  22*   BUN  4*   CREATININE  0.7   CALCIUM  9.6   MG  2.3     CBC:   Recent Labs   Lab  08/10/18   1517   WBC  5.59   HGB  12.0*   HCT  34.6*   PLT  191     Urine Culture:   Recent Labs   Lab  08/10/18   1646   LABURIN  GRAM NEGATIVE JEAN MARIE, NON-LACTOSE   >100,000 cfu/ml  Identification and susceptibility pending       Urine Studies:   Recent Labs   Lab  08/10/18   1646   COLORU  Yellow   APPEARANCEUA  Hazy*   PHUR  6.0   SPECGRAV  1.005   PROTEINUA  Negative   GLUCUA  Negative   KETONESU  Negative   BILIRUBINUA  Negative   OCCULTUA  1+*   NITRITE  Positive*   UROBILINOGEN  Negative   LEUKOCYTESUR  3+*   RBCUA  12*   WBCUA  81*   BACTERIA  Many*   SQUAMEPITHEL  0       Significant Imaging: I have reviewed all pertinent imaging results/findings within the past 24 hours.

## 2018-08-12 NOTE — CONSULTS
[facility]  Dermatology  Consult Note    Patient Name: Mohamud Patel  MRN: [id number]  Admission Date: 8/10/2018  Hospital Length of Stay: 1 days  Attending Physician: Annamarie Germain MD  Primary Care Provider: Enmanuel Ferguson MD     Inpatient consult to Dermatology  Consult performed by: Nano Anguiano MD  Consult ordered by: Gilda Griffith PA-C        Subjective:     Principal Problem:Crusted scabies    HPI:  Spoke to primary team regarding patient's case. Patient is being disruptive on the floor and wants to be discharged. I have discussed the case with staff and we have reviewed the clinical pictures. At this point, crusted scabies is low in our differential; however, we are ok with empirically treating patient for scabies. Based on the clinical pictures, we are concerned for a neoplastic vs infectious etiology and think that an excisional biopsy sent for H&E and tissue culture by general surgery or podiatry is warranted. I have spoken to the primary team who is in agreement and will set up a follow up for the patient.        No new subjective & objective note has been filed under this hospital service since the last note was generated.    Assessment/Plan:     No new Assessment & Plan notes have been filed under this hospital service since the last note was generated.  Service: Dermatology    Case discussed with resident. I would be unusual for such a severe case of scabies to present only on one isolated digit. Concern for infectious v neoplastic etiology.  Requires excisional bx for definitive diagnosis.  Primary team to schedule as an out patient. Can empirically treat for scabies as well. Thank you for consultation   Thank you for your consult.     Nano Anguiano MD  Dermatology  [facility]

## 2018-08-12 NOTE — ASSESSMENT & PLAN NOTE
Wound of right lower extremity  Crusted Scabies  · DDx include Spanish scabies based on clinical picture and patient on contact precautions. Dermatology consulted.  If this is Spanish scabies, roommate and house will need to be treated to prevent spread.   · Dermatology discussed, will treat empirically with Permethrin cream and Ivermectin 200mcg/kg x 1, then repeat in 7 days. Concern for neoplastic lesion on toe, will need referral on discharge for biopsy. See consult 8/11  · Podiatry performed bedside debridement 8/11 and wound care noted. Patient will need DARCO shoe and non weight bearing to forefoot. Follow up with Podaitry in 1 week  · LORIE ordered and unremarkable   · XR without findings suggestive of osteomyelitis  · ESR 18 and CRP 18; HIV negative  · Start IV ceftriaxone to cover strep and transitioned to PO Bactrim

## 2018-08-12 NOTE — ASSESSMENT & PLAN NOTE
Improving; Na+ 129>132  - Improved with IVFs and likely from dehydration and etoh  - continue labs

## 2018-08-12 NOTE — ASSESSMENT & PLAN NOTE
· (+) UA   · F/u urine culture, growing GNR   · Ceftriaxone started on admission and discontinued and started on PO bactrim  · GC screen negative

## 2018-08-12 NOTE — NURSING
Pt. Ambulating in hallway most of the night,looking for beer and cigarettes,in and out of other pts room,refusing to stay in room as isolation  contact initiated, placed call to Team E,requested soft wrist restraints,resitraints applied and pt. Is being monitored closely as he has attempted to take them off..

## 2018-08-12 NOTE — PLAN OF CARE
"CM to bedside - pt unable to provided assessment info; CM obtain info from pt's S/O, Chitra. Pt w/ DME in place, lives w/ S/O. Pt will likely d/c home w/ no needs. Chitra reports that pt appears to be at his baseline. Pt's S/O will provide at ride home at d/c.     Chitra has the following questions: was pt on 2 of his meds - gabapentin 300mg po tid and Seroquel 100mg po qhs. She also was inquiring if there was any further care needed r/t the scabies and she had questions/concerns about pt's decreased kidney function although pt's creatinine level is fine.       CM provided patient anticipated YONAS which will be update by nursing staff. Patient provided a Blue "My Health Packet" for d/c planning and health tool. Patient verbalized understanding.     08/12/18 7252   Discharge Assessment   Assessment Type Discharge Planning Assessment   Confirmed/corrected address and phone number on facesheet? Yes   Assessment information obtained from? Caregiver   Expected Length of Stay (days) 3   Communicated expected length of stay with patient/caregiver yes   Prior to hospitilization cognitive status: Unable to Assess   Prior to hospitalization functional status: Assistive Equipment;Needs Assistance   Current cognitive status: Not Oriented to Time;Not Oriented to Place   Current Functional Status: Assistive Equipment;Needs Assistance   Facility Arrived From: N/A   Lives With significant other   Able to Return to Prior Arrangements unable to determine at this time (comments)   Is patient able to care for self after discharge? Unable to determine at this time (comments)   Who are your caregiver(s) and their phone number(s)? S/O - Chitra [phone]   Patient's perception of discharge disposition home or selfcare   Readmission Within The Last 30 Days no previous admission in last 30 days   Patient currently being followed by outpatient case management? No   Patient currently receives any other outside agency services? No   Equipment " Currently Used at Home cane, straight;walker, rolling;shower chair;bedside commode   Do you have any problems affording any of your prescribed medications? No   Is the patient taking medications as prescribed? yes   Does the patient have transportation home? Yes   Transportation Available family or friend will provide   Dialysis Name and Scheduled days N/A   Does the patient receive services at the [facility]madin Clinic? No   Discharge Plan A Home with family   Discharge Plan B Home with family;Home Health   Patient/Family In Agreement With Plan yes

## 2018-08-12 NOTE — HPI
Spoke to primary team regarding patient's case. Patient is being disruptive on the floor and wants to be discharged. I have discussed the case with staff and we have reviewed the clinical pictures. At this point, crusted scabies is low in our differential; however, we are ok with empirically treating patient for scabies. Based on the clinical pictures, we are concerned for a neoplastic vs infectious etiology and think that an excisional biopsy sent for H&E and tissue culture by general surgery or podiatry is warranted. I have spoken to the primary team who is in agreement and will set up a follow up for the patient.

## 2018-08-13 VITALS
RESPIRATION RATE: 19 BRPM | TEMPERATURE: 98 F | WEIGHT: 157.63 LBS | HEIGHT: 66 IN | DIASTOLIC BLOOD PRESSURE: 66 MMHG | HEART RATE: 88 BPM | OXYGEN SATURATION: 100 % | SYSTOLIC BLOOD PRESSURE: 120 MMHG | BODY MASS INDEX: 25.33 KG/M2

## 2018-08-13 PROBLEM — D48.9 NEOPLASM OF UNCERTAIN BEHAVIOR: Status: ACTIVE | Noted: 2018-08-13

## 2018-08-13 LAB
ALBUMIN SERPL BCP-MCNC: 3.5 G/DL
ALP SERPL-CCNC: 95 U/L
ALT SERPL W/O P-5'-P-CCNC: 20 U/L
ANION GAP SERPL CALC-SCNC: 13 MMOL/L
AST SERPL-CCNC: 47 U/L
BACTERIA UR CULT: NORMAL
BASOPHILS # BLD AUTO: 0.05 K/UL
BASOPHILS NFR BLD: 1 %
BILIRUB SERPL-MCNC: 0.5 MG/DL
BUN SERPL-MCNC: 12 MG/DL
CALCIUM SERPL-MCNC: 9.9 MG/DL
CHLORIDE SERPL-SCNC: 98 MMOL/L
CO2 SERPL-SCNC: 21 MMOL/L
CREAT SERPL-MCNC: 0.8 MG/DL
DIFFERENTIAL METHOD: ABNORMAL
EOSINOPHIL # BLD AUTO: 0.1 K/UL
EOSINOPHIL NFR BLD: 1.6 %
ERYTHROCYTE [DISTWIDTH] IN BLOOD BY AUTOMATED COUNT: 14.8 %
EST. GFR  (AFRICAN AMERICAN): >60 ML/MIN/1.73 M^2
EST. GFR  (NON AFRICAN AMERICAN): >60 ML/MIN/1.73 M^2
GLUCOSE SERPL-MCNC: 94 MG/DL
HCT VFR BLD AUTO: 39 %
HGB BLD-MCNC: 13.4 G/DL
IMM GRANULOCYTES # BLD AUTO: 0.03 K/UL
IMM GRANULOCYTES NFR BLD AUTO: 0.6 %
LYMPHOCYTES # BLD AUTO: 0.4 K/UL
LYMPHOCYTES NFR BLD: 7.8 %
MAGNESIUM SERPL-MCNC: 1.7 MG/DL
MCH RBC QN AUTO: 35.3 PG
MCHC RBC AUTO-ENTMCNC: 34.4 G/DL
MCV RBC AUTO: 103 FL
MONOCYTES # BLD AUTO: 0.3 K/UL
MONOCYTES NFR BLD: 6.6 %
NEUTROPHILS # BLD AUTO: 4 K/UL
NEUTROPHILS NFR BLD: 82.4 %
NRBC BLD-RTO: 0 /100 WBC
PLATELET # BLD AUTO: 184 K/UL
PMV BLD AUTO: 9.7 FL
POTASSIUM SERPL-SCNC: 3.2 MMOL/L
PROT SERPL-MCNC: 7.9 G/DL
RBC # BLD AUTO: 3.8 M/UL
SODIUM SERPL-SCNC: 132 MMOL/L
WBC # BLD AUTO: 4.88 K/UL

## 2018-08-13 PROCEDURE — 25000003 PHARM REV CODE 250: Performed by: PHYSICIAN ASSISTANT

## 2018-08-13 PROCEDURE — 85025 COMPLETE CBC W/AUTO DIFF WBC: CPT

## 2018-08-13 PROCEDURE — 83735 ASSAY OF MAGNESIUM: CPT

## 2018-08-13 PROCEDURE — 25000003 PHARM REV CODE 250: Performed by: HOSPITALIST

## 2018-08-13 PROCEDURE — 36415 COLL VENOUS BLD VENIPUNCTURE: CPT

## 2018-08-13 PROCEDURE — 80053 COMPREHEN METABOLIC PANEL: CPT

## 2018-08-13 RX ORDER — PERMETHRIN 50 MG/G
CREAM TOPICAL ONCE
Qty: 60 G | Refills: 0 | Status: SHIPPED | OUTPATIENT
Start: 2018-08-13 | End: 2018-08-13

## 2018-08-13 RX ORDER — SULFAMETHOXAZOLE AND TRIMETHOPRIM 800; 160 MG/1; MG/1
1 TABLET ORAL 2 TIMES DAILY
Qty: 24 TABLET | Refills: 0 | Status: ON HOLD | OUTPATIENT
Start: 2018-08-13 | End: 2018-11-09 | Stop reason: HOSPADM

## 2018-08-13 RX ORDER — IVERMECTIN 3 MG/1
15 TABLET ORAL ONCE
Qty: 5 TABLET | Refills: 0 | Status: SHIPPED | OUTPATIENT
Start: 2018-08-19 | End: 2018-08-19

## 2018-08-13 RX ADMIN — THERA TABS 1 TABLET: TAB at 11:08

## 2018-08-13 RX ADMIN — DIAZEPAM 10 MG: 5 TABLET ORAL at 05:08

## 2018-08-13 RX ADMIN — SULFAMETHOXAZOLE AND TRIMETHOPRIM 1 TABLET: 800; 160 TABLET ORAL at 11:08

## 2018-08-13 RX ADMIN — DIAZEPAM 10 MG: 5 TABLET ORAL at 12:08

## 2018-08-13 RX ADMIN — FOLIC ACID 1 MG: 1 TABLET ORAL at 11:08

## 2018-08-13 RX ADMIN — Medication 100 MG: at 11:08

## 2018-08-13 RX ADMIN — DIAZEPAM 10 MG: 5 TABLET ORAL at 06:08

## 2018-08-13 NOTE — PLAN OF CARE
Problem: Patient Care Overview  Goal: Plan of Care Review  Outcome: Ongoing (interventions implemented as appropriate)  Pt is free from injury and falls during shift. Pt shows no signs of acute distress. Pt denies pain. Vitals stable. Dressing to foot changed per orders- clean, dry, intact. Bed is in low position with breaks locked. Side rails are up x 2. Environment is clutter free. Call light is within reach of the patient. Will continue to monitor.

## 2018-08-13 NOTE — PLAN OF CARE
Message sent to Dr. Aguilar's (pod) nurse requesting a hospital follow up appointment in 1-2 weeks. Dr. Aguilar's nurse to call this CM today or the patient tomorrow with appointment date & time. Will continue to follow.

## 2018-08-13 NOTE — SUBJECTIVE & OBJECTIVE
Subjective:     Principal Problem:Cellulitis of right lower extremity    Interval History: Patient seen and interviewed today. States has been having this toe wound for few weeks. Denies any pruritus.     Medications:  Continuous Infusions:  Scheduled Meds:   diazePAM  10 mg Oral Q6H    folic acid  1 mg Oral Daily    multivitamin  1 tablet Oral Daily    QUEtiapine  50 mg Oral QHS    sulfamethoxazole-trimethoprim 800-160mg  1 tablet Oral BID    thiamine  100 mg Oral Daily     PRN Meds:acetaminophen, ramelteon    Review of Systems   Constitutional: Negative for fever and chills.   HENT: Negative for mouth sores.    Gastrointestinal: Negative for nausea and vomiting.   Skin: Positive for dry skin. Negative for itching and rash.   Hematologic/Lymphatic: Negative for adenopathy.     Objective:     Vital Signs (Most Recent):  Temp: 98 °F (36.7 °C) (08/13/18 1143)  Pulse: 77 (08/13/18 1143)  Resp: 20 (08/13/18 1143)  BP: 121/63 (08/13/18 1143)  SpO2: 100 % (08/13/18 1143) Vital Signs (24h Range):  Temp:  [98 °F (36.7 °C)-98.9 °F (37.2 °C)] 98 °F (36.7 °C)  Pulse:  [77-95] 77  Resp:  [16-20] 20  SpO2:  [90 %-100 %] 100 %  BP: (117-146)/(63-78) 121/63     Weight: 71.5 kg (157 lb 10.1 oz)  Body mass index is 25.44 kg/m².    Physical Exam   Constitutional: He appears well-developed and well-nourished.   Psychiatric: He exhibits a depressed mood.   Skin:   Areas Examined (abnormalities noted in diagram):   Head / Face Inspection Performed  Neck Inspection Performed  Chest / Axilla Inspection Performed  RUE Inspected  LUE Inspection Performed  RLE Inspected  LLE Inspection Performed  Nails and Digits Inspection Performed

## 2018-08-13 NOTE — PROGRESS NOTES
[facility]  Dermatology  Progress Note    Patient Name: Mohamud Patle  MRN: [id number]  Admission Date: 8/10/2018  Hospital Length of Stay: 2 days  Attending Physician: Annamarie Germain MD  Primary Care Provider: Enmanuel Ferguson MD     Subjective:     Principal Problem:Cellulitis of right lower extremity    Interval History: Patient seen and interviewed today. States has been having this toe wound for few weeks. Denies any pruritus.     Medications:  Continuous Infusions:  Scheduled Meds:   diazePAM  10 mg Oral Q6H    folic acid  1 mg Oral Daily    multivitamin  1 tablet Oral Daily    QUEtiapine  50 mg Oral QHS    sulfamethoxazole-trimethoprim 800-160mg  1 tablet Oral BID    thiamine  100 mg Oral Daily     PRN Meds:acetaminophen, ramelteon    Review of Systems   Constitutional: Negative for fever and chills.   HENT: Negative for mouth sores.    Gastrointestinal: Negative for nausea and vomiting.   Skin: Positive for dry skin. Negative for itching and rash.   Hematologic/Lymphatic: Negative for adenopathy.     Objective:     Vital Signs (Most Recent):  Temp: 98 °F (36.7 °C) (08/13/18 1143)  Pulse: 77 (08/13/18 1143)  Resp: 20 (08/13/18 1143)  BP: 121/63 (08/13/18 1143)  SpO2: 100 % (08/13/18 1143) Vital Signs (24h Range):  Temp:  [98 °F (36.7 °C)-98.9 °F (37.2 °C)] 98 °F (36.7 °C)  Pulse:  [77-95] 77  Resp:  [16-20] 20  SpO2:  [90 %-100 %] 100 %  BP: (117-146)/(63-78) 121/63     Weight: 71.5 kg (157 lb 10.1 oz)  Body mass index is 25.44 kg/m².    Physical Exam   Constitutional: He appears well-developed and well-nourished.   Psychiatric: He exhibits a depressed mood.   Skin:   Areas Examined (abnormalities noted in diagram):   Head / Face Inspection Performed  Neck Inspection Performed  Chest / Axilla Inspection Performed  RUE Inspected  LUE Inspection Performed  RLE Inspected  LLE Inspection Performed  Nails and Digits Inspection Performed             Assessment/Plan:     Neoplasm of  uncertain behavior    Dermatology consulted for evaluation of possible crusted scabies. No scabies or eggs seen on mineral oil prep today. No other lesions elsewhere that is suggestive of scabies infection. Recommends evaluation by general surgery or podiatry for excisional biopsy of mass on medial right toe.             Thank you for your consult.     Nano Anguiano MD  Dermatology  [facility]

## 2018-08-13 NOTE — PLAN OF CARE
Problem: Patient Care Overview  Goal: Plan of Care Review  Outcome: Ongoing (interventions implemented as appropriate)  Pt remains confused on shift, disorientated to surroundings, and agitated at times. Tele sitter maintained, no falls on shift, pt remained in bed all night. Bed alarm on for added support. Pt incontinent to bowels 2x on shift.  No other issues.

## 2018-08-13 NOTE — ASSESSMENT & PLAN NOTE
Dermatology consulted for evaluation of possible crusted scabies. No scabies or eggs seen on mineral oil prep today. No other lesions elsewhere that is suggestive of scabies infection. Recommends evaluation by general surgery or podiatry for excisional biopsy of mass on medial right toe.

## 2018-08-13 NOTE — PROGRESS NOTES
Several attempts to contact significant other, Chitra, to inform of discharge orders. No answer and no call back at this time. Will try again.    2843- Spoke with Chitra. Chitra to send someone to  patient shortly.

## 2018-08-14 NOTE — DISCHARGE SUMMARY
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       History of the Present Illness:      Mr. Mohamud Patel is a 57 y.o. male with history of alcohol abuse who presents to the emergency department with a right foot lesion as well as altered mental status.  The patient is a poor historian, and is unable to tell why he came to the hospital.  He continues to voice that he does not want to be in the hospital and that he is ready to go home.  In regards to his right foot wound, he mentions that he stubbed his toe a couple weeks ago and that the swelling has been getting worse.  He denies any drainage from the toe.  He drinks about 10 beers a day, his last drink was the day prior to admission.  He denies any chest pain, shortness of breath, nausea, or vomiting.  His only complaint, is of neuropathy in his feet.  He denies any car accidents or history of diabetes causing his neuropathy.  He continues to state that he does not want to be in the hospital but does not want to leave.     In the emergency department, labs were notable for an elevated ethanol level in sodium of 129.  CT scan of the foot was performed which did not show osteo, only soft tissue swelling. He was given IV ceftriaxone for a  urinary tract infection and right lower extremity cellulitis.  He was admitted to Hospital Medicine for further management.        Hospital Course of Principle Problem Addressed:       Patient placed in observation for right foot lesion and cellulitis. Started on Ceftriaxone. Podiatry consulted and food debrided 8/11/18. Dermatology consulted due to concern for scabies and patient placed on contact precautions, see derm note 8/12. Patient treated empirically for scabies with permetherin and ivermectin. He also has known alcohol abuse and intoxication on admission. Started on MVI, Folic Acid, thiamine, and started Valium taper 8/11 until discharge. Given patient likely to drink, will not discharge with valium Podiatry recommending ABIs which were unremarkable. Patient will need referral to podiatry on discharge for biopsy of neoplastic lesion on foot. Referral placed. Patient stable for discharge, repeat permetherin cream and ivermectin ordered on 8/19.        Other Medical Problems Addressed in the Hospital:     * Cellulitis of right lower extremity     Wound of right lower extremity  Crusted Scabies  · DDx include [place] scabies based on clinical picture and patient on contact precautions. Dermatology consulted.  If this is [place] scabies, roommate and house will need to be treated to prevent spread.   · Dermatology discussed, will treat empirically with Permethrin cream and Ivermectin 200mcg/kg x 1, then repeat in 7 days. Concern for neoplastic lesion on toe, will need referral on discharge for biopsy. See consult 8/11  · Podiatry performed bedside debridement 8/11 and wound care noted. Patient will need DARCO shoe and non weight bearing to forefoot. Follow up with Podaitry in 1 week  · LORIE ordered and unremarkable   · XR without findings suggestive of osteomyelitis  · ESR 18 and CRP 18; HIV negative  · Start IV ceftriaxone to cover strep and transitioned to PO Bactrim          Acute cystitis with hematuria      · (+) UA   · F/u urine culture, growing GNR   · Ceftriaxone started on admission and discontinued and started on PO bactrim  · GC screen negative          Alcohol abuse     Alcohol Intoxication - resolved  · Last drink on 8/9   · Banana bag, daily MVI, thiamine, and FA  · Monitor CIWA score  · 8/11: increased anxiety and tremors. Start Valium 10 mg PO q 6 hours and will taper           Hyponatremia     Improving; Na+ 129>132  - Improved with IVFs and likely from dehydration and etoh  - continue labs              Significant diagnostic tests     Recent Labs   Lab  08/10/18   1517  08/13/18   0704   WBC  5.59  4.88   HGB  12.0*  13.4*   HCT  34.6*  39.0*   PLT  191  184     Recent Labs   Lab  08/10/18   1517  08/11/18   0648  08/13/18   0704   NA  129*  132*  132*   K  3.9  3.8  3.2*   CL  95  97  98   CO2  23  22*  21*   BUN  3*  4*  12   CREATININE  0.6  0.7  0.8   CALCIUM  9.1  9.6  9.9   MG   --   2.3  1.7   PROT  7.8  7.8  7.9   BILITOT  0.4  0.7  0.5   ALKPHOS  110  109  95   ALT  18  20  20   AST  40  39  47*       Other diagnostic tests none    Special Treatments/ Procedures:      none    Discharge Medications:          Discharge Medication List as of 8/13/2018  5:19 PM      START taking these medications    Details   ivermectin (STROMECTOL) 3 mg Tab Take 5 tablets (15 mg total) by mouth once. for 1 dose, Starting Sun 8/19/2018, Print      permethrin (ELIMITE) 5 % cream Apply topically once. Apply to entire body once on 8/19/18. for 1 dose, Starting Mon 8/13/2018, Print      sulfamethoxazole-trimethoprim 800-160mg (BACTRIM DS) 800-160 mg Tab Take 1 tablet by mouth 2 (two) times daily., Starting Mon 8/13/2018, Print             Discharge Diet:cardiac diet with Normal Fluid intake of 1500 - 2000 mL per day    Activity: activity as tolerated    Discharge Condition: Good    Disposition: Home or Self Care    Tests pending at the time of discharge: none      Time spent  on the discharge of the patient including  review of hospital course with the patient. reviewing discharge medications and arranging follow-up care 36 minutes    Discharge examination Patient was seen and examined on the date of discharge and determined to be suitable for discharge.    No future appointments.    Robles Nava PA-C   [facility]

## 2018-08-14 NOTE — PLAN OF CARE
08/14/18 0755   Final Note   Assessment Type Final Discharge Note     Patient discharged home with no needs on 8/13/18. Pt's face sheet, ambulatory referral to podiatry, discharge summary, & podiatry progress note faxed to [facility] (f) [phone] to have a hospital follow up appointment scheduled. [facility] to call the patient with appointment date & time. CM informed the patient's significant other, Chitra Bacon ([phone]), of above. Chitra verbalized understanding.

## 2018-08-15 ENCOUNTER — PATIENT OUTREACH (OUTPATIENT)
Dept: ADMINISTRATIVE | Facility: CLINIC | Age: 58
End: 2018-08-15

## 2018-08-15 LAB
BACTERIA SPEC AEROBE CULT: NORMAL
BACTERIA SPEC AEROBE CULT: NORMAL

## 2018-08-15 NOTE — PATIENT INSTRUCTIONS
Discharge Instructions for Cellulitis  You have been diagnosed with cellulitis. This is an infection in the deepest layer of the skin. In some cases, the infection also affects the muscle. Cellulitis is caused by bacteria. The bacteria can enter the body through broken skin. This can happen with a cut, scratch, animal bite, or an insect bite that has been scratched. You may have been treated in the hospital with antibiotics and fluids. You will likely be given a prescription for antibiotics to take at home. This sheet will help you take care of yourself at home.  Home care  When you are home:  · Take the prescribed antibiotic medicine you are given as directed until it is gone. Take it even if you feel better. It treats the infection and stops it from returning. Not taking all the medicine can make future infections hard to treat.  · Keep the infected area clean.  · When possible, raise the infected area above the level of your heart. This helps keep swelling down.  · Talk with your healthcare provider if you are in pain. Ask what kind of over-the-counter medicine you can take for pain.  · Apply clean bandages as advised.  · Take your temperature once a day for a week.  · Wash your hands often to prevent spreading the infection.  In the future, wash your hands before and after you touch cuts, scratches, or bandages. This will help prevent infection.   When to call your healthcare provider  Call your healthcare provider immediately if you have any of the following:  · Difficulty or pain when moving the joints above or below the infected area  · Discharge or pus draining from the area  · Fever of 100.4°F (38°C) or higher, or as directed by your healthcare provider  · Pain that gets worse in or around the infected   · Redness that gets worse in or around the infected area, particularly if the area of redness expands to a wider area  · Shaking chills  · Swelling of the infected area  · Vomiting   Date Last Reviewed:  8/1/2016  © 9948-1450 [employer]. [address] [place] [address], [place] [address]. All rights reserved. This information is not intended as a substitute for professional medical care. Always follow your healthcare professional's instructions.

## 2018-08-20 NOTE — PHYSICIAN QUERY
"PT Name: Mohamud Patel  MR #: [id number]    Physician Query Form - Procedure Clarification     CDS: Randi Junior RN, CCDS         Contact information :ext [phone]  [email or web address]                                                                                                                                Query closed, procedure note  8/21/18  "The ulcer was debrided excisionally to the level of subcutaneous tissue utilizing a #15 scalpel and forceps.  Tissue removed included callus, fibrin, epidermis, dermis, and subcutaneous tissue"      This form is a permanent document in the medical record                                                                                                                                             Query Date: August 20, 2018  By submitting this query, we are merely seeking further clarification of documentation. Please utilize your independent clinical judgment when addressing the question(s) below.    The Medical record contains the following:     Indicators       Supporting Clinical Findings   Location in Medical Record   x Documentation of "Debridement"   "Attention was directed to dorsal callous of right foot which was debrided of all hyperkeratotic tissue to reveal a granular wound measuring 0.7 x 1.2 x 0.1 cm with no periwound SOI noted, stable. Attention was then directed to the right hallux which was debrided of all hyperkeratotic tissue from the dorsal and plantar surface. Pre-debridement, the plantar wound measured 1.6x2.6x0.1cm. Post-debridement 1.7 x 3 x 0.2 cm with hyperkeratotic borders and fibrogranular base." Podiatry consult 8/11/18    Documentation of "I & D"      EBL =      Other:       Excisional debridement is a surgical removal of  nonvitalized tissue, necrosis or slough. The use of a sharp instrument does not always indicate that an excisional debridement was performed.  Non excisional debridement is the scraping, washing, irrigating, " brushing away or removal of loose tissue fragments.    Doctor, please specify type of procedure(s) performed:  Please document type of debridement as excisional or non-excisional and please document depth of tissue debrided.    [  ] Excisional Debridement    * Depth of tissue excised:    [  ] Skin [  ]Subcutaneous Tissue/Fascia   [ ] Muscle [  ] Tendon [ ] Bone     [  ] Non-excisional Debridement   * Depth of tissue excised:    [  ] Skin [  ]Subcutaneous Tissue/Fascia   [ ] Muscle [  ] Tendon [ ] Bone           [  ] Other Procedure (Specify) ________________________________    [  ] Clinically Undetermined

## 2018-08-20 NOTE — PHYSICIAN QUERY
"PT Name: Mohamud Patel  MR #: [id number]     CDS: Randi Junior RN, CCDS         Contact information :ext [phone])  [email or web address]     This form is a permanent document in the medical record.     Query Date: August 20, 2018    By submitting this query, we are merely seeking further clarification of documentation to reflect the severity of illness of your patient. Please utilize your independent clinical judgment when addressing the question(s) below.    The Medical record reflects the following:     Indicators   Supporting Clinical Findings Location in Medical Record   x AMS, Confusion,  LOC, etc.  " altered mental status"  "+confusion"   H&P 8/10/18   x Acute / Chronic Illness "RLE cellulitis"  "Acute cystitis"  "Alcohol abuse"  "alcohol Intoxication" H&P 8/10/18    Radiology Findings     x Electrolyte Imbalance "Hyponatremia  Sodium 129  Likely from dehydration and alcohol  Gentle IVF" H&P 8/10/18   x Medication diazePAM tablet 10 mg    MAR 8/11-8/13/18   x Treatment         "Gentle IVF"  "Banana bag" H&P m8/10/18   x Other Na 129-132 Lab 8/10-8/13/18     Encephalopathy- is a general term for any diffuse disease of the brain that alters brain function or structure. Treatment of the cognitive dysfunction varies but is ultimately dependent on the treatment of the underlying condition.    Major Symptoms of Encephalopathy - Decreased level of consciousness, fluctuating alertness/concentration, confusion, agitation, lethargy, somnolence, drowsiness, obtundation, stupor, or coma.         References: National Institutes of Healths (NIH) National [place] of Neurological Disorders and Strokes;  HCPro 2016; Advisory Board     Clinical Guidelines:   These guidelines will set system standards to assist providers in managing, documentation, and coding of encephalopathy. The intent of this document is to serve as a system guideline, not replace the providers clinical judgment:  Provider, please specify " the diagnosis or diagnoses associated with above clinical findings.    [ x  ] Alcoholic Encephalopathy - Degeneration of the nervous system due to alcohol     [   ] Metabolic Encephalopathy - Due to electrolye imbalance, metabolic derangements, or infections processes, includes Septic Encephalopthy    [   ] Wernickes Encephalopathy - Neurological symptoms caused by biochemical lesions of the central nervous system after exhaustion of   B-vitamin reserves, in particular thiamine. Commonly related to alcohol use    [   ] Other Encephalopathy     [   ] Unspecified Encephalopathy      [   ] Other Neurological Condition- (please specify condition): _________    [   ] Clinically Undetermined  Please document in your progress notes daily for the duration of treatment until resolved, and include in your discharge summary.

## 2018-10-04 ENCOUNTER — HOSPITAL ENCOUNTER (INPATIENT)
Facility: HOSPITAL | Age: 58
LOS: 37 days | Discharge: HOME | DRG: 897 | End: 2018-11-10
Attending: EMERGENCY MEDICINE | Admitting: HOSPITALIST
Payer: MEDICAID

## 2018-10-04 DIAGNOSIS — F10.20 ALCOHOL USE DISORDER, SEVERE, DEPENDENCE: Chronic | ICD-10-CM

## 2018-10-04 DIAGNOSIS — F10.931 ALCOHOL WITHDRAWAL DELIRIUM: ICD-10-CM

## 2018-10-04 DIAGNOSIS — S81.801D WOUND OF RIGHT LOWER EXTREMITY, SUBSEQUENT ENCOUNTER: ICD-10-CM

## 2018-10-04 DIAGNOSIS — E51.2 WERNICKE ENCEPHALOPATHY: ICD-10-CM

## 2018-10-04 DIAGNOSIS — T50.905A DRUG-INDUCED LONG QT SYNDROME: ICD-10-CM

## 2018-10-04 DIAGNOSIS — E87.1 HYPONATREMIA: ICD-10-CM

## 2018-10-04 DIAGNOSIS — R94.31 QT PROLONGATION: ICD-10-CM

## 2018-10-04 DIAGNOSIS — F99 MENTAL HEALTH DISORDER: Primary | ICD-10-CM

## 2018-10-04 DIAGNOSIS — S81.801A WOUND OF RIGHT LOWER EXTREMITY, INITIAL ENCOUNTER: ICD-10-CM

## 2018-10-04 DIAGNOSIS — F10.939 ALCOHOL WITHDRAWAL: ICD-10-CM

## 2018-10-04 DIAGNOSIS — F10.930 ALCOHOL WITHDRAWAL SYNDROME WITHOUT COMPLICATION: ICD-10-CM

## 2018-10-04 DIAGNOSIS — Z78.9 IMPAIRED MOBILITY AND ADLS: ICD-10-CM

## 2018-10-04 DIAGNOSIS — I45.81 DRUG-INDUCED LONG QT SYNDROME: ICD-10-CM

## 2018-10-04 DIAGNOSIS — Z74.09 IMPAIRED MOBILITY AND ADLS: ICD-10-CM

## 2018-10-04 LAB
ALBUMIN SERPL BCP-MCNC: 3.2 G/DL
ALP SERPL-CCNC: 112 U/L
ALT SERPL W/O P-5'-P-CCNC: 41 U/L
ANION GAP SERPL CALC-SCNC: 16 MMOL/L
AST SERPL-CCNC: 78 U/L
BASOPHILS # BLD AUTO: 0.07 K/UL
BASOPHILS NFR BLD: 1.1 %
BILIRUB SERPL-MCNC: 0.4 MG/DL
BUN SERPL-MCNC: 4 MG/DL
CALCIUM SERPL-MCNC: 9 MG/DL
CHLORIDE SERPL-SCNC: 97 MMOL/L
CO2 SERPL-SCNC: 18 MMOL/L
CREAT SERPL-MCNC: 0.7 MG/DL
DIFFERENTIAL METHOD: ABNORMAL
EOSINOPHIL # BLD AUTO: 0.1 K/UL
EOSINOPHIL NFR BLD: 1.6 %
ERYTHROCYTE [DISTWIDTH] IN BLOOD BY AUTOMATED COUNT: 13.6 %
EST. GFR  (AFRICAN AMERICAN): >60 ML/MIN/1.73 M^2
EST. GFR  (NON AFRICAN AMERICAN): >60 ML/MIN/1.73 M^2
ETHANOL SERPL-MCNC: 239 MG/DL
GLUCOSE SERPL-MCNC: 113 MG/DL
HCT VFR BLD AUTO: 35.9 %
HGB BLD-MCNC: 12.7 G/DL
IMM GRANULOCYTES # BLD AUTO: 0.02 K/UL
IMM GRANULOCYTES NFR BLD AUTO: 0.3 %
LYMPHOCYTES # BLD AUTO: 0.8 K/UL
LYMPHOCYTES NFR BLD: 12.6 %
MCH RBC QN AUTO: 33.9 PG
MCHC RBC AUTO-ENTMCNC: 35.4 G/DL
MCV RBC AUTO: 96 FL
MONOCYTES # BLD AUTO: 0.6 K/UL
MONOCYTES NFR BLD: 8.6 %
NEUTROPHILS # BLD AUTO: 4.9 K/UL
NEUTROPHILS NFR BLD: 75.8 %
NRBC BLD-RTO: 0 /100 WBC
PLATELET # BLD AUTO: 164 K/UL
PMV BLD AUTO: 9.5 FL
POTASSIUM SERPL-SCNC: 3.8 MMOL/L
PROT SERPL-MCNC: 7.6 G/DL
RBC # BLD AUTO: 3.75 M/UL
SODIUM SERPL-SCNC: 131 MMOL/L
WBC # BLD AUTO: 6.41 K/UL

## 2018-10-04 PROCEDURE — 99285 EMERGENCY DEPT VISIT HI MDM: CPT | Mod: ,,, | Performed by: PHYSICIAN ASSISTANT

## 2018-10-04 PROCEDURE — 99285 EMERGENCY DEPT VISIT HI MDM: CPT | Mod: 25

## 2018-10-04 PROCEDURE — 85025 COMPLETE CBC W/AUTO DIFF WBC: CPT

## 2018-10-04 PROCEDURE — 12000002 HC ACUTE/MED SURGE SEMI-PRIVATE ROOM

## 2018-10-04 PROCEDURE — 80320 DRUG SCREEN QUANTALCOHOLS: CPT

## 2018-10-04 PROCEDURE — 80053 COMPREHEN METABOLIC PANEL: CPT

## 2018-10-04 PROCEDURE — 93005 ELECTROCARDIOGRAM TRACING: CPT

## 2018-10-04 PROCEDURE — 96374 THER/PROPH/DIAG INJ IV PUSH: CPT

## 2018-10-04 PROCEDURE — 96361 HYDRATE IV INFUSION ADD-ON: CPT

## 2018-10-05 PROBLEM — F99 MENTAL HEALTH DISORDER: Status: ACTIVE | Noted: 2018-10-05

## 2018-10-05 PROBLEM — E51.2 WERNICKE ENCEPHALOPATHY: Status: ACTIVE | Noted: 2018-10-05

## 2018-10-05 PROBLEM — F10.939 ALCOHOL WITHDRAWAL: Status: ACTIVE | Noted: 2018-10-05

## 2018-10-05 LAB
AMMONIA PLAS-SCNC: 37 UMOL/L
AMPHET+METHAMPHET UR QL: NEGATIVE
APAP SERPL-MCNC: <3 UG/ML
BACTERIA #/AREA URNS AUTO: ABNORMAL /HPF
BARBITURATES UR QL SCN>200 NG/ML: NEGATIVE
BENZODIAZ UR QL SCN>200 NG/ML: NEGATIVE
BILIRUB UR QL STRIP: NEGATIVE
BZE UR QL SCN: NEGATIVE
CANNABINOIDS UR QL SCN: NORMAL
CLARITY UR REFRACT.AUTO: ABNORMAL
COLOR UR AUTO: YELLOW
CREAT UR-MCNC: 38 MG/DL
ESTIMATED AVG GLUCOSE: 105 MG/DL
ETHANOL SERPL-MCNC: 95 MG/DL
GLUCOSE UR QL STRIP: NEGATIVE
HBA1C MFR BLD HPLC: 5.3 %
HGB UR QL STRIP: ABNORMAL
INR PPP: 0.9
KETONES UR QL STRIP: NEGATIVE
LEUKOCYTE ESTERASE UR QL STRIP: ABNORMAL
METHADONE UR QL SCN>300 NG/ML: NEGATIVE
MICROSCOPIC COMMENT: ABNORMAL
NITRITE UR QL STRIP: NEGATIVE
OPIATES UR QL SCN: NEGATIVE
OSMOLALITY SERPL: 312 MOSM/KG
OSMOLALITY UR: 392 MOSM/KG
PCP UR QL SCN>25 NG/ML: NEGATIVE
PH UR STRIP: 6 [PH] (ref 5–8)
PROT UR QL STRIP: NEGATIVE
PROTHROMBIN TIME: 9.9 SEC
RBC #/AREA URNS AUTO: 3 /HPF (ref 0–4)
SODIUM UR-SCNC: 130 MMOL/L
SP GR UR STRIP: 1 (ref 1–1.03)
SQUAMOUS #/AREA URNS AUTO: 1 /HPF
TOXICOLOGY INFORMATION: NORMAL
URN SPEC COLLECT METH UR: ABNORMAL
UROBILINOGEN UR STRIP-ACNC: NEGATIVE EU/DL
WBC #/AREA URNS AUTO: 10 /HPF (ref 0–5)

## 2018-10-05 PROCEDURE — 11000001 HC ACUTE MED/SURG PRIVATE ROOM

## 2018-10-05 PROCEDURE — 87086 URINE CULTURE/COLONY COUNT: CPT

## 2018-10-05 PROCEDURE — 25000003 PHARM REV CODE 250: Performed by: HOSPITALIST

## 2018-10-05 PROCEDURE — 82140 ASSAY OF AMMONIA: CPT

## 2018-10-05 PROCEDURE — 36415 COLL VENOUS BLD VENIPUNCTURE: CPT

## 2018-10-05 PROCEDURE — 83930 ASSAY OF BLOOD OSMOLALITY: CPT

## 2018-10-05 PROCEDURE — 83935 ASSAY OF URINE OSMOLALITY: CPT

## 2018-10-05 PROCEDURE — 85610 PROTHROMBIN TIME: CPT

## 2018-10-05 PROCEDURE — 80307 DRUG TEST PRSMV CHEM ANLYZR: CPT

## 2018-10-05 PROCEDURE — 84300 ASSAY OF URINE SODIUM: CPT

## 2018-10-05 PROCEDURE — 81001 URINALYSIS AUTO W/SCOPE: CPT

## 2018-10-05 PROCEDURE — 80329 ANALGESICS NON-OPIOID 1 OR 2: CPT

## 2018-10-05 PROCEDURE — 93010 ELECTROCARDIOGRAM REPORT: CPT | Mod: ,,, | Performed by: INTERNAL MEDICINE

## 2018-10-05 PROCEDURE — 99223 1ST HOSP IP/OBS HIGH 75: CPT | Mod: ,,, | Performed by: HOSPITALIST

## 2018-10-05 PROCEDURE — 87186 SC STD MICRODIL/AGAR DIL: CPT

## 2018-10-05 PROCEDURE — 80320 DRUG SCREEN QUANTALCOHOLS: CPT

## 2018-10-05 PROCEDURE — 63600175 PHARM REV CODE 636 W HCPCS: Performed by: PHYSICIAN ASSISTANT

## 2018-10-05 PROCEDURE — S4991 NICOTINE PATCH NONLEGEND: HCPCS | Performed by: EMERGENCY MEDICINE

## 2018-10-05 PROCEDURE — 63600175 PHARM REV CODE 636 W HCPCS: Performed by: HOSPITALIST

## 2018-10-05 PROCEDURE — 83036 HEMOGLOBIN GLYCOSYLATED A1C: CPT

## 2018-10-05 PROCEDURE — 25000003 PHARM REV CODE 250: Performed by: PHYSICIAN ASSISTANT

## 2018-10-05 PROCEDURE — 25000003 PHARM REV CODE 250: Performed by: EMERGENCY MEDICINE

## 2018-10-05 PROCEDURE — 87088 URINE BACTERIA CULTURE: CPT

## 2018-10-05 PROCEDURE — 87077 CULTURE AEROBIC IDENTIFY: CPT

## 2018-10-05 RX ORDER — THIAMINE HCL 100 MG
100 TABLET ORAL DAILY
Status: DISCONTINUED | OUTPATIENT
Start: 2018-10-06 | End: 2018-10-05

## 2018-10-05 RX ORDER — IBUPROFEN 200 MG
1 TABLET ORAL
Status: COMPLETED | OUTPATIENT
Start: 2018-10-05 | End: 2018-10-06

## 2018-10-05 RX ORDER — LORAZEPAM 2 MG/ML
1 INJECTION INTRAMUSCULAR
Status: DISCONTINUED | OUTPATIENT
Start: 2018-10-05 | End: 2018-10-05

## 2018-10-05 RX ORDER — THIAMINE HCL 100 MG
100 TABLET ORAL
Status: COMPLETED | OUTPATIENT
Start: 2018-10-05 | End: 2018-10-05

## 2018-10-05 RX ORDER — IBUPROFEN 200 MG
1 TABLET ORAL DAILY
Status: DISCONTINUED | OUTPATIENT
Start: 2018-10-06 | End: 2018-11-10 | Stop reason: HOSPADM

## 2018-10-05 RX ORDER — LORAZEPAM 1 MG/1
2 TABLET ORAL EVERY 4 HOURS PRN
Status: DISCONTINUED | OUTPATIENT
Start: 2018-10-05 | End: 2018-10-05

## 2018-10-05 RX ORDER — LORAZEPAM 2 MG/ML
2 INJECTION INTRAMUSCULAR EVERY 4 HOURS PRN
Status: DISCONTINUED | OUTPATIENT
Start: 2018-10-05 | End: 2018-10-06

## 2018-10-05 RX ORDER — SODIUM CHLORIDE 0.9 % (FLUSH) 0.9 %
5 SYRINGE (ML) INJECTION
Status: DISCONTINUED | OUTPATIENT
Start: 2018-10-05 | End: 2018-11-10 | Stop reason: HOSPADM

## 2018-10-05 RX ORDER — CHLORDIAZEPOXIDE HYDROCHLORIDE 25 MG/1
25 CAPSULE, GELATIN COATED ORAL EVERY 4 HOURS PRN
Status: DISCONTINUED | OUTPATIENT
Start: 2018-10-05 | End: 2018-10-06

## 2018-10-05 RX ORDER — LORAZEPAM 2 MG/ML
2 INJECTION INTRAMUSCULAR EVERY 4 HOURS PRN
Status: DISCONTINUED | OUTPATIENT
Start: 2018-10-05 | End: 2018-10-05

## 2018-10-05 RX ORDER — FOLIC ACID 1 MG/1
1 TABLET ORAL DAILY
Status: DISCONTINUED | OUTPATIENT
Start: 2018-10-06 | End: 2018-11-10 | Stop reason: HOSPADM

## 2018-10-05 RX ORDER — CHLORDIAZEPOXIDE HYDROCHLORIDE 25 MG/1
25 CAPSULE, GELATIN COATED ORAL 4 TIMES DAILY PRN
Status: DISCONTINUED | OUTPATIENT
Start: 2018-10-05 | End: 2018-10-05

## 2018-10-05 RX ORDER — CEFTRIAXONE 1 G/1
1 INJECTION, POWDER, FOR SOLUTION INTRAMUSCULAR; INTRAVENOUS
Status: DISCONTINUED | OUTPATIENT
Start: 2018-10-05 | End: 2018-10-08

## 2018-10-05 RX ORDER — CHLORDIAZEPOXIDE HYDROCHLORIDE 25 MG/1
25 CAPSULE, GELATIN COATED ORAL EVERY 4 HOURS PRN
Status: DISCONTINUED | OUTPATIENT
Start: 2018-10-05 | End: 2018-10-05

## 2018-10-05 RX ORDER — FOLIC ACID 1 MG/1
1 TABLET ORAL
Status: COMPLETED | OUTPATIENT
Start: 2018-10-05 | End: 2018-10-05

## 2018-10-05 RX ORDER — LORAZEPAM 2 MG/ML
2 INJECTION INTRAMUSCULAR
Status: COMPLETED | OUTPATIENT
Start: 2018-10-05 | End: 2018-10-05

## 2018-10-05 RX ORDER — THIAMINE HCL 100 MG
100 TABLET ORAL DAILY
Status: DISCONTINUED | OUTPATIENT
Start: 2018-10-05 | End: 2018-10-05

## 2018-10-05 RX ORDER — FOLIC ACID 1 MG/1
1 TABLET ORAL DAILY
Status: DISCONTINUED | OUTPATIENT
Start: 2018-10-05 | End: 2018-10-05

## 2018-10-05 RX ORDER — ENOXAPARIN SODIUM 100 MG/ML
40 INJECTION SUBCUTANEOUS EVERY 24 HOURS
Status: DISCONTINUED | OUTPATIENT
Start: 2018-10-05 | End: 2018-11-10 | Stop reason: HOSPADM

## 2018-10-05 RX ADMIN — ENOXAPARIN SODIUM 40 MG: 100 INJECTION SUBCUTANEOUS at 05:10

## 2018-10-05 RX ADMIN — THIAMINE HYDROCHLORIDE 500 MG: 100 INJECTION, SOLUTION INTRAMUSCULAR; INTRAVENOUS at 11:10

## 2018-10-05 RX ADMIN — THIAMINE HYDROCHLORIDE 500 MG: 100 INJECTION, SOLUTION INTRAMUSCULAR; INTRAVENOUS at 05:10

## 2018-10-05 RX ADMIN — SODIUM CHLORIDE 1000 ML: 0.9 INJECTION, SOLUTION INTRAVENOUS at 01:10

## 2018-10-05 RX ADMIN — LORAZEPAM 2 MG: 2 INJECTION INTRAMUSCULAR; INTRAVENOUS at 06:10

## 2018-10-05 RX ADMIN — LORAZEPAM 2 MG: 2 INJECTION, SOLUTION INTRAMUSCULAR; INTRAVENOUS at 01:10

## 2018-10-05 RX ADMIN — LORAZEPAM 2 MG: 1 TABLET ORAL at 12:10

## 2018-10-05 RX ADMIN — NICOTINE 1 PATCH: 21 PATCH, EXTENDED RELEASE TOPICAL at 04:10

## 2018-10-05 RX ADMIN — Medication 100 MG: at 01:10

## 2018-10-05 RX ADMIN — FOLIC ACID 1 MG: 1 TABLET ORAL at 01:10

## 2018-10-05 RX ADMIN — CEFTRIAXONE SODIUM 1 G: 1 INJECTION, POWDER, FOR SOLUTION INTRAMUSCULAR; INTRAVENOUS at 08:10

## 2018-10-05 RX ADMIN — CHLORDIAZEPOXIDE HYDROCHLORIDE 25 MG: 25 CAPSULE ORAL at 02:10

## 2018-10-05 NOTE — ED NOTES
Assigned to care for patient at this time.  Report received from Camryn Vivar RN.  Patient does not appear to be in any acute distress.  Patient is pending inpatient admission.  Patient denies any chest pain, SOB, N/V/D or generalized pain.  Will continue to monitor patient for any acute changes or distress. Patient to be moved to room 13 for cardiac monitoring.

## 2018-10-05 NOTE — ED NOTES
"Silvestre SY from Psych briefly spoke to patient while in the ED. Pt reports trouble ambulating, he states he has a hard time getting around his house, does not use a walker or warren. Pt is asked if a walker will help with his gait, he states "i don't know, we can try". Pt reports he is able to perform ADLs, occasional incontinence reported.   "

## 2018-10-05 NOTE — ED NOTES
Pt remains in paper scrubs, resting in stretcher comfortably. No signs of distress noted. Sitter remains at bedside in direct visual contact, charting per protocol every 15 minutes. No equipment or belongings are in the patients room. Will continue to monitor.

## 2018-10-05 NOTE — ED NOTES
Requested psych consult to [facility] Charge nurse. Dr Rios would like to see pt prior to seeking placement in behavioral unit.

## 2018-10-05 NOTE — H&P
"Hospital Medicine  History and Physical Exam    [place] [facility] [place] [facility] [place] Prateek Gudino MD  Admit Date: 10/4/2018  Principal Problem:  Alcohol withdrawal   Patient information was obtained from patient and ER records.   Primary Care Physician: Enmanuel Ferguson MD  Code status: Full Code    HPI:   Mohamud Patel is a 57 y.o. man with a history of Severe Alcohol Abuse, Chronic Wounds of Bilateral LE's, Alcoholic Neuropathy, Hyponatremia, and Tobacco Use Disorder who was brought to the ED by EMS after his girlfriend called 911 while the patient was severely intoxicated. Per ED documentation, patient has not been caring for himself at home, often urinating and defecating on his clothes. He as also not been tending to his chronic bilateral LE wounds which includes a large horizontal laceration to the right foot. He is able to answer most questions appropriately but is not oriented to time or place. He endorses mild burning of his bilateral feet. He has very poor insight into the gravity of his alcohol addiction and its consequences on his health. He believes that he is taking care of himself well at home and that he has actually "cut back and doing a better job with his drinking;" however, he endorses drinking 6-12 beers per day at home, last drink yesterday morning. He believes that he took a medication for his urinary tract infection following last hospital discharge but is unsure. He denies current dysuria, oliguria, or darkened urine and believes that the last time he had dysuria was "several months ago." He has been doing minimal wound care to his bilateral feet. He was also seen in the ED by Psychiatry who feel that he does not need a PEC but does not have capacity to make his own medical decisions    Past Medical History: Patient has a past medical history of Neuropathy and Scabies.    Past Surgical History: Patient has no past surgical history on file.    Social History: Patient reports that he has been smoking " cigarettes.  He has been smoking about 2.00 packs per day. he has never used smokeless tobacco. He reports that he drinks about 42.0 oz of alcohol per week. He reports that he does not use drugs.    Family History: family history is not on file.    Medications: reviewed     Allergies: Patient has No Known Allergies.    ROS  Pain Scale: 0 /10   Constitutional: no fever, positive for chills  Respiratory: no cough or shortness of breath  Cardiovascular: no chest pain or palpitations  Gastrointestinal: no nausea or vomiting, no abdominal pain or change in bowel habits  Genitourinary: no hematuria or dysuria  Integument/Breast: positive for multiple scabs on bilateral feet and lateral laceration of right foot, negative for pruritis  Hematologic/Lymphatic: no easy bruising or lymphadenopathy  Musculoskeletal: no arthralgias or myalgias  Neurological: no seizures, positive for tremors  Behavioral/Psych: no depression, positive for anxiety    PEx  Temp:  [97.7 °F (36.5 °C)-98.9 °F (37.2 °C)]   Pulse:  [81-98]   Resp:  [11-24]   BP: (116-168)/(60-87)   SpO2:  [96 %-100 %]   Body mass index is 23.75 kg/m².   No intake or output data in the 24 hours ending 10/05/18 1455    General appearance: no distress, laying down in bed, answers most questions appropriately  Mental status: Alert and oriented to person only, not to place or time  HEENT:  conjunctivae/corneas clear, PERRL  Pulm:   normal respiratory effort, CTA B, no c/w/r  Card: RRR, S1, S2 normal, no murmur, click, rub or gallop  Abd: soft, NT, ND, BS present; no masses, no organomegaly  Ext: no clubbing  Pulses: 2+, symmetric  Skin: multiple scabbed lesions on bilateral feet, thickened skin on bilateral dorsal feet worse at the toes, no interdigital pits or evidence of scabies, large linear laceration with secondary healing on right foot, no surrounding erythema or induration  Neuro: ataxia with finger to nose bilaterally,  strength 5/5, cranial nerves  intact    Recent Results (from the past 24 hour(s))   CBC auto differential    Collection Time: 10/04/18 10:44 PM   Result Value Ref Range    WBC 6.41 3.90 - 12.70 K/uL    RBC 3.75 (L) 4.60 - 6.20 M/uL    Hemoglobin 12.7 (L) 14.0 - 18.0 g/dL    Hematocrit 35.9 (L) 40.0 - 54.0 %    MCV 96 82 - 98 fL    MCH 33.9 (H) 27.0 - 31.0 pg    MCHC 35.4 32.0 - 36.0 g/dL    RDW 13.6 11.5 - 14.5 %    Platelets 164 150 - 350 K/uL    MPV 9.5 9.2 - 12.9 fL    Immature Granulocytes 0.3 0.0 - 0.5 %    Gran # (ANC) 4.9 1.8 - 7.7 K/uL    Immature Grans (Abs) 0.02 0.00 - 0.04 K/uL    Lymph # 0.8 (L) 1.0 - 4.8 K/uL    Mono # 0.6 0.3 - 1.0 K/uL    Eos # 0.1 0.0 - 0.5 K/uL    Baso # 0.07 0.00 - 0.20 K/uL    nRBC 0 0 /100 WBC    Gran% 75.8 (H) 38.0 - 73.0 %    Lymph% 12.6 (L) 18.0 - 48.0 %    Mono% 8.6 4.0 - 15.0 %    Eosinophil% 1.6 0.0 - 8.0 %    Basophil% 1.1 0.0 - 1.9 %    Differential Method Automated    Comprehensive metabolic panel    Collection Time: 10/04/18 10:44 PM   Result Value Ref Range    Sodium 131 (L) 136 - 145 mmol/L    Potassium 3.8 3.5 - 5.1 mmol/L    Chloride 97 95 - 110 mmol/L    CO2 18 (L) 23 - 29 mmol/L    Glucose 113 (H) 70 - 110 mg/dL    BUN, Bld 4 (L) 6 - 20 mg/dL    Creatinine 0.7 0.5 - 1.4 mg/dL    Calcium 9.0 8.7 - 10.5 mg/dL    Total Protein 7.6 6.0 - 8.4 g/dL    Albumin 3.2 (L) 3.5 - 5.2 g/dL    Total Bilirubin 0.4 0.1 - 1.0 mg/dL    Alkaline Phosphatase 112 55 - 135 U/L    AST 78 (H) 10 - 40 U/L    ALT 41 10 - 44 U/L    Anion Gap 16 8 - 16 mmol/L    eGFR if African American >60.0 >60 mL/min/1.73 m^2    eGFR if non African American >60.0 >60 mL/min/1.73 m^2   Ethanol    Collection Time: 10/04/18 10:44 PM   Result Value Ref Range    Alcohol, Medical, Serum 239 (H) <10 mg/dL   Urinalysis, Reflex to Urine Culture    Collection Time: 10/05/18 12:30 AM   Result Value Ref Range    Specimen UA Urine, Clean Catch     Color, UA Yellow Yellow, Straw, Kimberly    Appearance, UA Hazy (A) Clear    pH, UA 6.0 5.0 - 8.0     Specific Gravity, UA 1.005 1.005 - 1.030    Protein, UA Negative Negative    Glucose, UA Negative Negative    Ketones, UA Negative Negative    Bilirubin (UA) Negative Negative    Occult Blood UA 1+ (A) Negative    Nitrite, UA Negative Negative    Urobilinogen, UA Negative <2.0 EU/dL    Leukocytes, UA 3+ (A) Negative   Drug screen panel, emergency    Collection Time: 10/05/18 12:30 AM   Result Value Ref Range    Benzodiazepines Negative     Methadone metabolites Negative     Cocaine (Metab.) Negative     Opiate Scrn, Ur Negative     Barbiturate Screen, Ur Negative     Amphetamine Screen, Ur Negative     THC Presumptive Positive     Phencyclidine Negative     Creatinine, Random Ur 38.0 23.0 - 375.0 mg/dL    Toxicology Information SEE COMMENT    Urinalysis Microscopic    Collection Time: 10/05/18 12:30 AM   Result Value Ref Range    RBC, UA 3 0 - 4 /hpf    WBC, UA 10 (H) 0 - 5 /hpf    Bacteria, UA Many (A) None-Occ /hpf    Squam Epithel, UA 1 /hpf    Microscopic Comment SEE COMMENT    Protime-INR    Collection Time: 10/05/18  1:21 AM   Result Value Ref Range    Prothrombin Time 9.9 9.0 - 12.5 sec    INR 0.9 0.8 - 1.2   Ammonia    Collection Time: 10/05/18  2:12 AM   Result Value Ref Range    Ammonia 37 10 - 50 umol/L   Ethanol    Collection Time: 10/05/18  4:52 AM   Result Value Ref Range    Alcohol, Medical, Serum 95 (H) <10 mg/dL   Acetaminophen level    Collection Time: 10/05/18  4:52 AM   Result Value Ref Range    Acetaminophen (Tylenol), Serum <3.0 (L) 10.0 - 20.0 ug/mL     Active Hospital Problems    Diagnosis  POA    *Alcohol withdrawal [F10.239]  Yes    Mental health disorder [F99]  Yes    Wernicke encephalopathy [E51.2]  Yes    Alcoholic peripheral neuropathy [G62.1]  Yes    Wound of lower extremity [S81.809A]  Yes    Alcohol abuse [F10.10]  Yes    Hyponatremia [E87.1]  Yes      Resolved Hospital Problems   No resolved problems to display.     Assessment and Plan:  Mohamud Patel is a 57 y.o.  man with a history of Severe Alcohol Abuse, Chronic Wounds of Bilateral LE's, Alcoholic Neuropathy, Hyponatremia, and Tobacco Use Disorder who was brought to the ED by EMS after his girlfriend called 911 while the patient was severely intoxicated. 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 Initial ethanol level elevated (239); UDS positive for marijuana. Denies any history of Alcoholic Hallucinosis, DT's, or Withdrawal Seizures, but showing signs of alcohol withdrawal - CIWA (14): Tremor, Agitation, Orientation  - Will treat with symptom triggered therapy, per guidelines: Librium 25 mg PO every 4 hours PRN CIWA > 8. Atival 1 mg IV PRN withdrawal seizures. CIWA assessments every 4 hours per nursing  - Will treat for Wernicke's Encephalopathy: Thiamine 500 mg IV every 8 hours, infuse over 30 minutes x 2 days > Thiamine 250 mg IV daily x 5 days > Thiamine 100 mg PO daily. Folic acid and MVI daily  - Hyponatremia likely 2/2 Beer Potomania, f/u Serum Osm + Urine sodium and osm. Daily CMP, Mg, Phos  - Fall, aspiration, and seizure precautions  - PT/OT consulted  - Psychiatry consulted appreciate assistance, will probably rescind PEC tomorrow if patient shows some clinical improvement. He does not have capacity to make medical decisions at this time. Sitter at bedside    Complicated UTI  - UA again consistent with UTI without significant hematuria. No fevers, no leukocytosis, unclear if patient was adequately treated as outpatient for previous UTI in 9/18  - Ceftriaxone 1 g IV daily for now, f/u Urine Cx to narrow if possible    Multiple Bilateral Lower Extremity Wounds  - No signs of active infection and no evidence of systemic  toxicity. Starting CTX for UTI per above  - Wound Care consult, pay warrant Orthopedics consult for RLE foot laceration    Tobacco Use Disorder  - Nicotine patch daily      DVT PPx: Lovenox SubQ Daily    Diet: Regular      Prateek Gudino MD  Hospital Medicine Staff  [facility]   Pager: [phone]

## 2018-10-05 NOTE — CONSULTS
"Emergency Psychiatry Consult Note      Chief Complaint / Reason for Consult:     Inability to care for self, heavy alcohol use      Subjective:     History of Present Illness:   Mohamud Patel is a 57 y.o. male with a history of Alcohol Use Disorder who [place] [facility] [place] 911 with concern that patient was unable to care for himself. Psychiatry was consulted to address the patient's symptoms of potential grave disability.     Per ED RN: "Spoke with PT's significant other who stated that PT was having difficulty breathing at home, urinating and defecating on hisself, and will not take care of the wounds on his feet or take his medication. She is concerned about his health and feels he needs to be kept in the hospital."       Per ER PA: "57-year-old male presents to the ER via EMS for alcohol intoxication.  Reportedly the patient's room a contacted 911 because the patient was intoxicated.  EMS brought the patient here and reported that he has been urinating on himself, not caring for his chronic foot wounds, and drinking daily. Pt is alert and oriented and in no distress. He stated that he did not want to come to the hospital but was told he did have a choice.  He denies any fever chills nausea vomiting and chest pain.  He denies any abdominal pain or back pain. He denies any syncope dizziness or lightheadedness.  When questioned about why the patient does not have any close he tells me that he does not know.  Per pt room mate who called the ER, she was concerned about his drinking problem and that he was not caring for himself @ home."      Per Psych RN: "Silvestre SY from Psych briefly spoke to patient while in the ED. Pt reports trouble ambulating, he states he has a hard time getting around his house, does not use a walker or warren. Pt is asked if a walker will help with his gait, he states "i don't know, we can try". Pt reports he is able to perform ADLs, occasional incontinence " "reported."      Patient was calm and cooperative at time of evaluation.  Bedside sitter reported that patient had recently lost continence of bowel and bladder and his bedsheets had just been changed.  When asked what brought him to the ED, patient was unable to answer and talked about his worry about his girlfriend being home alone.  Patient discussed the wounds on his feet, and states "Maybe I could put on some neosporin.  I'm not worried about it right now."  Discussed that patient is also experiencing some neuropathy.  The only medication he has been taking in ativan 2-3 times daily at unknown dose, as prescribed by Dr. Yanez his GP.  Denies problems with depressed mood or SI/HI.  Believes it is currently 2012.  Patient reports he has been drinking 6 beers daily.  (Per chart review from prior admission, family member reported patient drank large volumes of liquor and at baseline had memory problems).  Patient extending hands to display tremors.  Denies history of seizures or hallucinations related to alcohol withdrawal.  Reports being to rehab in the past, but when pressed for details digressed into his problem with neuropathy.  Patient reports unemployment and a recent inheritance from his  mother.  His girlfriend is living with him in [facility].  Makes comments about retiring at age 65 but cannot report any recent employment history.  Patient reports that he is unable to walk in large places, like this hospital, but is able to manage at home with a lot of carefulness.  States he does not think he could stand at this moment.     Medical Review of Systems:  Pertinent items are noted in HPI.    Psychiatric Review of Systems:  sleep: yes  appetite: yes  weight: yes  energy/anergy: yes  interest/pleasure/anhedonia: yes  somatic symptoms: no  libido: yes  anxiety/panic: no  guilty/hopelessness: no  concentration: yes  S.I.B.s/risky behavior: no  any drugs: yes, marijuana   alcohol: yes " "    Allergies:  Patient has no known allergies.    Past Medical/Surgical History:  Past Medical History:   Diagnosis Date    Neuropathy     Scabies      History reviewed. No pertinent surgical history.    Past Psychiatric History:  Previous Medication Trials: yes, ativan from PCP  Previous Psychiatric Hospitalizations: "I want to say no.  I'm not sure"   Previous Suicide Attempts: no   History of Violence: no  Outpatient Psychiatrist: no    Social History:  Marital Status:   Children: 1   Employment Status/Info: unemployed  Education: college graduate, Associates degree in Computer Science   Special Ed: no  Housing Status: with girlfriend in [facility]   History of phys/sexual abuse: "I want to say no"  Access to gun: yes    Substance Abuse History:  Recreational Drugs: marijuana  Use of Alcohol: heavy  Rehab History:yes   Tobacco Use:yes  Use of OTC: denied    Legal History:  Past Charges/Incarcerations:yes, DUI   Pending charges:no     Family Psychiatric History:   denied    Objective:     Current Medications:  Infusions:    Scheduled:   lorazepam  1 mg Intravenous ED 1 Time    nicotine  1 patch Transdermal ED 1 Time     PRN:  lorazepam, LORazepam    Home Medications:  Prior to Admission medications    Medication Sig Start Date End Date Taking? Authorizing Provider   sulfamethoxazole-trimethoprim 800-160mg (BACTRIM DS) 800-160 mg Tab Take 1 tablet by mouth 2 (two) times daily. 8/13/18   Robles Nava Jr., PA-C     Vital Signs:  Temp:  [97.7 °F (36.5 °C)-98.9 °F (37.2 °C)]   Pulse:  [81-98]   Resp:  [11-24]   BP: (116-168)/(60-87)   SpO2:  [96 %-100 %]     Physical Exam:  Gen: calm, cooperative, NAD   Head: MMM, nystagmus    Back: Symmetric, no curvature, ROM normal   Lungs: respirations unlabored   Chest wall: No tenderness or deformity   Heart: RRR   Abdomen: +BS   Extremities: Open wounds on toes and feet bilaterally    Skin:  no rashes   Neurologic: Reports poor balance, UE tremor with arm " extension      Mental Status Exam:  Appearance: older than stated age, disheveled, malnourished, lying in bed   Behavior: friendly and cooperative   Speech/Language: normal tone, normal rate, normal pitch, normal volume   Mood: steady   Affect: Easily distracted, steady, mood congruent   Thought Process:  tangential   Thought Content: normal, no suicidality, no homicidality, delusions, or paranoia   Orientation: person, place, disoriented to date, president, year   Cognition: impaired   Insight: poor   Judgment: poor     Laboratory Data:  Recent Results (from the past 48 hour(s))   CBC auto differential    Collection Time: 10/04/18 10:44 PM   Result Value Ref Range    WBC 6.41 3.90 - 12.70 K/uL    RBC 3.75 (L) 4.60 - 6.20 M/uL    Hemoglobin 12.7 (L) 14.0 - 18.0 g/dL    Hematocrit 35.9 (L) 40.0 - 54.0 %    MCV 96 82 - 98 fL    MCH 33.9 (H) 27.0 - 31.0 pg    MCHC 35.4 32.0 - 36.0 g/dL    RDW 13.6 11.5 - 14.5 %    Platelets 164 150 - 350 K/uL    MPV 9.5 9.2 - 12.9 fL    Immature Granulocytes 0.3 0.0 - 0.5 %    Gran # (ANC) 4.9 1.8 - 7.7 K/uL    Immature Grans (Abs) 0.02 0.00 - 0.04 K/uL    Lymph # 0.8 (L) 1.0 - 4.8 K/uL    Mono # 0.6 0.3 - 1.0 K/uL    Eos # 0.1 0.0 - 0.5 K/uL    Baso # 0.07 0.00 - 0.20 K/uL    nRBC 0 0 /100 WBC    Gran% 75.8 (H) 38.0 - 73.0 %    Lymph% 12.6 (L) 18.0 - 48.0 %    Mono% 8.6 4.0 - 15.0 %    Eosinophil% 1.6 0.0 - 8.0 %    Basophil% 1.1 0.0 - 1.9 %    Differential Method Automated    Comprehensive metabolic panel    Collection Time: 10/04/18 10:44 PM   Result Value Ref Range    Sodium 131 (L) 136 - 145 mmol/L    Potassium 3.8 3.5 - 5.1 mmol/L    Chloride 97 95 - 110 mmol/L    CO2 18 (L) 23 - 29 mmol/L    Glucose 113 (H) 70 - 110 mg/dL    BUN, Bld 4 (L) 6 - 20 mg/dL    Creatinine 0.7 0.5 - 1.4 mg/dL    Calcium 9.0 8.7 - 10.5 mg/dL    Total Protein 7.6 6.0 - 8.4 g/dL    Albumin 3.2 (L) 3.5 - 5.2 g/dL    Total Bilirubin 0.4 0.1 - 1.0 mg/dL    Alkaline Phosphatase 112 55 - 135 U/L    AST 78 (H)  10 - 40 U/L    ALT 41 10 - 44 U/L    Anion Gap 16 8 - 16 mmol/L    eGFR if African American >60.0 >60 mL/min/1.73 m^2    eGFR if non African American >60.0 >60 mL/min/1.73 m^2   Ethanol    Collection Time: 10/04/18 10:44 PM   Result Value Ref Range    Alcohol, Medical, Serum 239 (H) <10 mg/dL   Urinalysis, Reflex to Urine Culture    Collection Time: 10/05/18 12:30 AM   Result Value Ref Range    Specimen UA Urine, Clean Catch     Color, UA Yellow Yellow, Straw, Kimberly    Appearance, UA Hazy (A) Clear    pH, UA 6.0 5.0 - 8.0    Specific Gravity, UA 1.005 1.005 - 1.030    Protein, UA Negative Negative    Glucose, UA Negative Negative    Ketones, UA Negative Negative    Bilirubin (UA) Negative Negative    Occult Blood UA 1+ (A) Negative    Nitrite, UA Negative Negative    Urobilinogen, UA Negative <2.0 EU/dL    Leukocytes, UA 3+ (A) Negative   Drug screen panel, emergency    Collection Time: 10/05/18 12:30 AM   Result Value Ref Range    Benzodiazepines Negative     Methadone metabolites Negative     Cocaine (Metab.) Negative     Opiate Scrn, Ur Negative     Barbiturate Screen, Ur Negative     Amphetamine Screen, Ur Negative     THC Presumptive Positive     Phencyclidine Negative     Creatinine, Random Ur 38.0 23.0 - 375.0 mg/dL    Toxicology Information SEE COMMENT    Urinalysis Microscopic    Collection Time: 10/05/18 12:30 AM   Result Value Ref Range    RBC, UA 3 0 - 4 /hpf    WBC, UA 10 (H) 0 - 5 /hpf    Bacteria, UA Many (A) None-Occ /hpf    Squam Epithel, UA 1 /hpf    Microscopic Comment SEE COMMENT    Protime-INR    Collection Time: 10/05/18  1:21 AM   Result Value Ref Range    Prothrombin Time 9.9 9.0 - 12.5 sec    INR 0.9 0.8 - 1.2   Ammonia    Collection Time: 10/05/18  2:12 AM   Result Value Ref Range    Ammonia 37 10 - 50 umol/L   Ethanol    Collection Time: 10/05/18  4:52 AM   Result Value Ref Range    Alcohol, Medical, Serum 95 (H) <10 mg/dL   Acetaminophen level    Collection Time: 10/05/18  4:52 AM    Result Value Ref Range    Acetaminophen (Tylenol), Serum <3.0 (L) 10.0 - 20.0 ug/mL      No results found for: PHENYTOIN, PHENOBARB, VALPROATE, CBMZ  Imaging:  Imaging Results    None       Assessment:     Mohamud Patel is a 57 y.o. male with a history of Alcohol Use Disorder who [place] [facility] [place] after his girlfriend called 911 with concern that patient was unable to care for himself. Psychiatry was consulted to address the patient's symptoms of potential grave disability.     Recommendations:     Alcohol Use Disorder, severe  Wernicke's Encephalopathy   · Psychiatry was consulted to evaluate this patient for grave disability due to a mental illness.  It appears at this time that cognitive problems related to a severe alcohol use disorder and nutritional deficiency are responsible for patient's inability to care for himself.  These problems would be best suited for a medical floor.  Patient does not require a PEC for grave disability.  However, he does not have capacity for medical decision making at this time. He appears to be cooperative with medical recommendations.     · Patient is currently experiencing peripheral neuropathy, encephalopathy, oculomotor dysfunction, and gait ataxia consistent with Wernicke's Encephalopathy.  He would benefit from IV Thiamine and may require further assistance for activities of daily living in a nursing home setting after completion of hospitalization.  · Patient's alcohol use disorder is severe.  To prevent withdrawals, recommend starting Valium taper at 10 mg QID, first dose now, with additional ativan q1h prn for breakthrough withdrawal symptoms  · Psychiatry to sign off.    Case discussed with staff psychiatrist, Andre Hahn MD.      Edel Rios MD  [facility] PGY3  [phone]

## 2018-10-05 NOTE — ED NOTES
Spoke with PT's significant other who stated that PT was having difficulty breathing at home, urinating and defecating on hisself, and will not take care of the wounds on his feet or take his medication. She is concerned about his health and feels he needs to be kept in the hospital.

## 2018-10-05 NOTE — ED NOTES
Updated report from nurse Bryan  In which he reports that patient's placement to the [facility] is jeopardized by his shaky, unsteady gait & wounds to his lower extremities. Oncoming RN to follow up.

## 2018-10-05 NOTE — ED PROVIDER NOTES
Encounter Date: 10/4/2018       History     Chief Complaint   Patient presents with    Alcohol Intoxication     Pt presents to ED via EMS c/o +ETOH intoxication. EMS reports pt has been incontinent and unable to walk independently. + scabies      57-year-old male presents to the ER via EMS for alcohol intoxication.  Reportedly the patient's room a contacted 911 because the patient was intoxicated.  EMS brought the patient here and reported that he has been urinating on himself, not caring for his chronic foot wounds, and drinking daily. Pt is alert and oriented and in no distress. He stated that he did not want to come to the hospital but was told he did have a choice.  He denies any fever chills nausea vomiting and chest pain.  He denies any abdominal pain or back pain. He denies any syncope dizziness or lightheadedness.  When questioned about why the patient does not have any close he tells me that he does not know.    Per pt room mate who called the ER, she was concerned about his drinking problem and that he was not caring for himself @ home.           Review of patient's allergies indicates:  No Known Allergies  Past Medical History:   Diagnosis Date    Neuropathy     Scabies      History reviewed. No pertinent surgical history.  History reviewed. No pertinent family history.  Social History     Tobacco Use    Smoking status: Current Every Day Smoker     Packs/day: 2.00     Types: Cigarettes    Smokeless tobacco: Never Used   Substance Use Topics    Alcohol use: Yes     Alcohol/week: 42.0 oz     Types: 70 Shots of liquor per week    Drug use: No     Review of Systems   Constitutional: Negative for fever.   HENT: Negative for sore throat.    Respiratory: Negative for shortness of breath.    Cardiovascular: Negative for chest pain.   Gastrointestinal: Negative for nausea.   Genitourinary: Negative for dysuria.   Musculoskeletal: Negative for back pain.   Skin: Positive for rash.   Neurological: Negative  for weakness.   Hematological: Does not bruise/bleed easily.       Physical Exam     Initial Vitals [10/04/18 2104]   BP Pulse Resp Temp SpO2   116/60 90 18 97.7 °F (36.5 °C) 100 %      MAP       --         Physical Exam    Constitutional: Vital signs are normal. He appears well-developed and well-nourished. He is not diaphoretic. No distress.   HENT:   Head: Normocephalic and atraumatic.   Right Ear: External ear normal.   Left Ear: External ear normal.   Eyes: Conjunctivae are normal.   Cardiovascular: Normal rate, regular rhythm and normal heart sounds. Exam reveals no gallop and no friction rub.    No murmur heard.  Pulmonary/Chest: No respiratory distress. He has no wheezes. He has no rhonchi. He has no rales. He exhibits no tenderness.   Lungs are clear   Abdominal: Soft. Normal appearance and bowel sounds are normal. There is no tenderness.   Nontender and soft   Musculoskeletal: Normal range of motion.   Neurological: He is alert and oriented to person, place, and time.   Skin: Skin is warm and intact.   Chronic maculopapular wound to the feet bilaterally worse to the right great toe,   crusting to multiple areas, worse on R great toe.     No signs of cellulitis, No open wounds or drainage.   No signs of abscess.    Psychiatric: He has a normal mood and affect. His speech is normal and behavior is normal. Cognition and memory are normal.         ED Course   Procedures  Labs Reviewed   CBC W/ AUTO DIFFERENTIAL - Abnormal; Notable for the following components:       Result Value    RBC 3.75 (*)     Hemoglobin 12.7 (*)     Hematocrit 35.9 (*)     MCH 33.9 (*)     Lymph # 0.8 (*)     Gran% 75.8 (*)     Lymph% 12.6 (*)     All other components within normal limits   COMPREHENSIVE METABOLIC PANEL - Abnormal; Notable for the following components:    Sodium 131 (*)     CO2 18 (*)     Glucose 113 (*)     BUN, Bld 4 (*)     Albumin 3.2 (*)     AST 78 (*)     All other components within normal limits   URINALYSIS,  REFLEX TO URINE CULTURE - Abnormal; Notable for the following components:    Appearance, UA Hazy (*)     Occult Blood UA 1+ (*)     Leukocytes, UA 3+ (*)     All other components within normal limits    Narrative:     1 orange cup   ALCOHOL,MEDICAL (ETHANOL) - Abnormal; Notable for the following components:    Alcohol, Medical, Serum 239 (*)     All other components within normal limits   URINALYSIS MICROSCOPIC - Abnormal; Notable for the following components:    WBC, UA 10 (*)     Bacteria, UA Many (*)     All other components within normal limits    Narrative:     1 orange cup   ALCOHOL,MEDICAL (ETHANOL) - Abnormal; Notable for the following components:    Alcohol, Medical, Serum 95 (*)     All other components within normal limits   ACETAMINOPHEN LEVEL - Abnormal; Notable for the following components:    Acetaminophen (Tylenol), Serum <3.0 (*)     All other components within normal limits   DRUG SCREEN PANEL, URINE EMERGENCY    Narrative:     1 orange cup   PROTIME-INR   AMMONIA          Imaging Results    None          Medical Decision Making:   ED Management:  57-year-old male to the ER with alcohol intoxication  Patient was admitted recently for similar presentation with skin infection concern for scabies.  He was evaluated extensively by Dermatology and Infectious Disease, see previous admission for further details.   Exam tonight not concerning for cellulitis, Pt has chronic wounds to the feet that can be managed in the outpatient setting  While allowing the patient to rest in the ER and sober up he became anxious and tachycardic  Patient was given fluids and Ativan oral thiamine and folic acid out of precaution  Case discussed with hospital medicine who does not believe the pt to be in DTs based on his elevated Ethanol and vitals.   I do not believe he is stable for DC.   The patient's vital signs have improved greatly,   Patient remains disoriented and confused, on further exam he appears disheveled, there  is concern for grave disability.  Secondary to this the patient will be placed under a PEC and Transferred for mental health  Repeat ethanol at 5:40 a.m. less than 100.   Patient is medically cleared for transfer to mental health facility as we do not have any beds available here                      Clinical Impression:   Mental Health Disorder      Disposition:   Disposition: Transferred  Condition: Stable                        Amandeep Pena PA-C  10/05/18 0540       Amandeep Pena PA-C  10/05/18 0652

## 2018-10-05 NOTE — ED TRIAGE NOTES
"Mohamud Patel, a 57 y.o. male presents to the ED via EMS and has no complaints. PT stated that "he does not know why he is here." EMS stated that PT's girlfriend called because PT was unable to stay standing and fell multiple times. Denies chest pain, SOB, fever, and n/v/d. PT drinks a pint of liquor a day and had last drink 2 hours ago and denies withdrawal symptoms at this time. PT has no other concerns.    Triage note:  Chief Complaint   Patient presents with    Alcohol Intoxication     Pt presents to ED via EMS c/o +ETOH intoxication. EMS reports pt has been incontinent and unable to walk independently. + scabies      Review of patient's allergies indicates:  No Known Allergies  Past Medical History:   Diagnosis Date    Neuropathy     Scabies      Adult Physical Assessment  LOC: Mohamud Patel, 57 y.o. male verified via two identifiers.  The patient is awake, alert, oriented and speaking appropriately at this time.  APPEARANCE: Patient resting comfortably and appears to be in no acute distress at this time. Patient is unkept with visible dirt on body. When asked PT denies incontinence.  SKIN:The skin is warm and dry, color consistent with ethnicity, patient has normal skin turgor and moist mucus membranes. 4 in laceration to top of right foot, lichenification to right and left toes and ball of feet, generalized bruising to all extremities.  MUSCULOSKELETAL: Patient moving all extremities well, no obvious swelling or deformities noted.  RESPIRATORY: Airway is open and patent, respirations are spontaneous, patient has a normal effort and rate, no accessory muscle use noted.  CARDIAC: Patient has a normal rate and rhythm, no periphreal edema noted in any extremity, capillary refill < 3 seconds in all extremities  ABDOMEN: Soft and non tender to palpation, no abdominal distention noted  NEUROLOGIC: Eyes open spontaneously, behavior appropriate to situation, follows commands, facial expression " symmetrical, purposeful motor response noted.

## 2018-10-06 PROBLEM — T14.8XXA ABRASION: Status: ACTIVE | Noted: 2018-10-06

## 2018-10-06 PROBLEM — F10.931 ALCOHOL WITHDRAWAL DELIRIUM: Status: ACTIVE | Noted: 2018-10-06

## 2018-10-06 PROBLEM — L97.529 ULCER OF TOE OF LEFT FOOT: Status: ACTIVE | Noted: 2018-10-06

## 2018-10-06 PROBLEM — F10.20 ALCOHOL USE DISORDER, SEVERE, DEPENDENCE: Chronic | Status: ACTIVE | Noted: 2018-08-10

## 2018-10-06 LAB
ALBUMIN SERPL BCP-MCNC: 3.4 G/DL
ALP SERPL-CCNC: 112 U/L
ALT SERPL W/O P-5'-P-CCNC: 33 U/L
ANION GAP SERPL CALC-SCNC: 15 MMOL/L
AST SERPL-CCNC: 45 U/L
BASOPHILS # BLD AUTO: 0.08 K/UL
BASOPHILS NFR BLD: 1.4 %
BILIRUB SERPL-MCNC: 0.8 MG/DL
BUN SERPL-MCNC: 8 MG/DL
CALCIUM SERPL-MCNC: 9.9 MG/DL
CHLORIDE SERPL-SCNC: 92 MMOL/L
CO2 SERPL-SCNC: 24 MMOL/L
CREAT SERPL-MCNC: 0.7 MG/DL
DIFFERENTIAL METHOD: ABNORMAL
EOSINOPHIL # BLD AUTO: 0.1 K/UL
EOSINOPHIL NFR BLD: 1 %
ERYTHROCYTE [DISTWIDTH] IN BLOOD BY AUTOMATED COUNT: 13.5 %
EST. GFR  (AFRICAN AMERICAN): >60 ML/MIN/1.73 M^2
EST. GFR  (NON AFRICAN AMERICAN): >60 ML/MIN/1.73 M^2
GLUCOSE SERPL-MCNC: 98 MG/DL
HCT VFR BLD AUTO: 35.2 %
HGB BLD-MCNC: 12.3 G/DL
IMM GRANULOCYTES # BLD AUTO: 0.03 K/UL
IMM GRANULOCYTES NFR BLD AUTO: 0.5 %
LYMPHOCYTES # BLD AUTO: 0.7 K/UL
LYMPHOCYTES NFR BLD: 11.8 %
MAGNESIUM SERPL-MCNC: 1.8 MG/DL
MCH RBC QN AUTO: 34.1 PG
MCHC RBC AUTO-ENTMCNC: 34.9 G/DL
MCV RBC AUTO: 98 FL
MONOCYTES # BLD AUTO: 0.6 K/UL
MONOCYTES NFR BLD: 10.4 %
NEUTROPHILS # BLD AUTO: 4.4 K/UL
NEUTROPHILS NFR BLD: 74.9 %
NRBC BLD-RTO: 0 /100 WBC
PHOSPHATE SERPL-MCNC: 2.6 MG/DL
PLATELET # BLD AUTO: 142 K/UL
PMV BLD AUTO: 10.2 FL
POTASSIUM SERPL-SCNC: 3.1 MMOL/L
PROT SERPL-MCNC: 8 G/DL
RBC # BLD AUTO: 3.61 M/UL
SODIUM SERPL-SCNC: 131 MMOL/L
TSH SERPL DL<=0.005 MIU/L-ACNC: 1.88 UIU/ML
WBC # BLD AUTO: 5.86 K/UL

## 2018-10-06 PROCEDURE — G8978 MOBILITY CURRENT STATUS: HCPCS | Mod: CK

## 2018-10-06 PROCEDURE — 97535 SELF CARE MNGMENT TRAINING: CPT

## 2018-10-06 PROCEDURE — 85025 COMPLETE CBC W/AUTO DIFF WBC: CPT

## 2018-10-06 PROCEDURE — 36415 COLL VENOUS BLD VENIPUNCTURE: CPT

## 2018-10-06 PROCEDURE — 25000003 PHARM REV CODE 250: Performed by: HOSPITALIST

## 2018-10-06 PROCEDURE — 97165 OT EVAL LOW COMPLEX 30 MIN: CPT

## 2018-10-06 PROCEDURE — 97602 WOUND(S) CARE NON-SELECTIVE: CPT

## 2018-10-06 PROCEDURE — 84443 ASSAY THYROID STIM HORMONE: CPT

## 2018-10-06 PROCEDURE — 63600175 PHARM REV CODE 636 W HCPCS: Performed by: HOSPITALIST

## 2018-10-06 PROCEDURE — S4991 NICOTINE PATCH NONLEGEND: HCPCS | Performed by: HOSPITALIST

## 2018-10-06 PROCEDURE — 90792 PSYCH DIAG EVAL W/MED SRVCS: CPT | Mod: AF,HB,, | Performed by: PSYCHIATRY & NEUROLOGY

## 2018-10-06 PROCEDURE — 99232 SBSQ HOSP IP/OBS MODERATE 35: CPT | Mod: ,,, | Performed by: HOSPITALIST

## 2018-10-06 PROCEDURE — G8979 MOBILITY GOAL STATUS: HCPCS | Mod: CJ

## 2018-10-06 PROCEDURE — 84100 ASSAY OF PHOSPHORUS: CPT

## 2018-10-06 PROCEDURE — 80053 COMPREHEN METABOLIC PANEL: CPT

## 2018-10-06 PROCEDURE — 83735 ASSAY OF MAGNESIUM: CPT

## 2018-10-06 PROCEDURE — 97161 PT EVAL LOW COMPLEX 20 MIN: CPT

## 2018-10-06 PROCEDURE — 11000001 HC ACUTE MED/SURG PRIVATE ROOM

## 2018-10-06 RX ORDER — POTASSIUM CHLORIDE 20 MEQ/1
40 TABLET, EXTENDED RELEASE ORAL
Status: COMPLETED | OUTPATIENT
Start: 2018-10-06 | End: 2018-10-06

## 2018-10-06 RX ORDER — LORAZEPAM 2 MG/ML
1 INJECTION INTRAMUSCULAR EVERY 4 HOURS PRN
Status: DISCONTINUED | OUTPATIENT
Start: 2018-10-06 | End: 2018-10-14

## 2018-10-06 RX ORDER — DIAZEPAM 5 MG/1
10 TABLET ORAL 4 TIMES DAILY
Status: DISCONTINUED | OUTPATIENT
Start: 2018-10-06 | End: 2018-10-08

## 2018-10-06 RX ADMIN — DIAZEPAM 10 MG: 5 TABLET ORAL at 04:10

## 2018-10-06 RX ADMIN — LORAZEPAM 1 MG: 2 INJECTION, SOLUTION INTRAMUSCULAR; INTRAVENOUS at 01:10

## 2018-10-06 RX ADMIN — POTASSIUM CHLORIDE 40 MEQ: 1500 TABLET, EXTENDED RELEASE ORAL at 12:10

## 2018-10-06 RX ADMIN — DIAZEPAM 10 MG: 5 TABLET ORAL at 12:10

## 2018-10-06 RX ADMIN — POTASSIUM CHLORIDE 40 MEQ: 1500 TABLET, EXTENDED RELEASE ORAL at 02:10

## 2018-10-06 RX ADMIN — LORAZEPAM 1 MG: 2 INJECTION, SOLUTION INTRAMUSCULAR; INTRAVENOUS at 06:10

## 2018-10-06 RX ADMIN — POTASSIUM CHLORIDE 40 MEQ: 1500 TABLET, EXTENDED RELEASE ORAL at 04:10

## 2018-10-06 RX ADMIN — MICONAZOLE NITRATE: 20 OINTMENT TOPICAL at 08:10

## 2018-10-06 RX ADMIN — THIAMINE HYDROCHLORIDE 500 MG: 100 INJECTION, SOLUTION INTRAMUSCULAR; INTRAVENOUS at 08:10

## 2018-10-06 RX ADMIN — ENOXAPARIN SODIUM 40 MG: 100 INJECTION SUBCUTANEOUS at 04:10

## 2018-10-06 RX ADMIN — THIAMINE HYDROCHLORIDE 500 MG: 100 INJECTION, SOLUTION INTRAMUSCULAR; INTRAVENOUS at 03:10

## 2018-10-06 RX ADMIN — DIAZEPAM 10 MG: 5 TABLET ORAL at 08:10

## 2018-10-06 RX ADMIN — NICOTINE 1 PATCH: 21 PATCH, EXTENDED RELEASE TRANSDERMAL at 08:10

## 2018-10-06 RX ADMIN — FOLIC ACID 1 MG: 1 TABLET ORAL at 08:10

## 2018-10-06 RX ADMIN — DIAZEPAM 10 MG: 5 TABLET ORAL at 10:10

## 2018-10-06 RX ADMIN — CEFTRIAXONE SODIUM 1 G: 1 INJECTION, POWDER, FOR SOLUTION INTRAMUSCULAR; INTRAVENOUS at 06:10

## 2018-10-06 NOTE — PLAN OF CARE
Problem: Patient Care Overview  Goal: Plan of Care Review  Pt free of falls and injury. Pt AAOx3. Constantly asking for beer and cigarettes, teaching provided, no plan of future compliance expressed by pt.Denies pain during shift.VS stable.Bed low, breaks locked, SR up x2, sitter at bedside,call light within reach, will continue to monitor.

## 2018-10-06 NOTE — NURSING
Assumed care of pt from nurse Héctor.  Pt is very agitated and asking me to go get him a beer and a shot of crown.  Explained to pt that he is in the hospital and we do not have alcohol here.  Will continue to assess for withdrawal symptoms and confusion.

## 2018-10-06 NOTE — PROGRESS NOTES
A Wound  Consult was received from MD for multiple bilateral foot wounds, significant laceration of the right foot no evidence of infection   The patient was admitted with severely intoxicated and has a past medical history of urinating/defecating on clothes, not caring for his feet/wounds, mild burning to feet, UTI  Upon assessment, the patient has occasional tremors, resting in bed. Bilateral lower legs have numerous scabbed over abrasions with animal hair noted. The bilateral 1st toes have ulcerations noted with fungal infection, callused around wound beds and thick skin peeling off around wounds. The right dorsal foot has a large laceration- cleansed of debris, animal hair, exudate.  Wound bed is red/dry no sign of infection noted, no erythema, no pain, no drainage.  Cleansed lower legs with cleansing cloths, debri-soft to feet/toes. Sween 24 lotion applied to lower legs. The right foot laceration- applied medi-honey to wound bed, covered with gauze and telfa island dressing.  The bilateral 1st toes- medi-honey applied to wound beds, covered with foam dressings to provide moisture to the wound beds and promote autolytic debridement of wound beds while promoting healing.  Betadine solution applied to scabs on bilateral lower legs to dry the scabs, promote healing.  Tolerated well.   The plan of care was discussed with the patient, verbalized understanding.   The Unit Nurse was notified of the care provided and we discussed the treatment plan.  Dr Gudino was notified of and approved recommendations for care.     Consultant Recommendations:   1. Right foot laceration/bilateral 1st toes- medi-honey to wound base, mepore/telfa island dressing daily  2.  Bilateral  lower legs abrasion scabs- betadine daily  3. Bilateral toes- yeast infection- miconazole ointment BID  4.  Consult podiatry     Wound care team to follow prn.   10/06/18 1225       Wound 10/05/18 1600 Ulceration plantar Toe, first   Date First  Assessed/Time First Assessed: 10/05/18 1600   Pre-existing: Yes  Primary Wound Type: Ulceration  Side: Left  Orientation: plantar  Location: Toe, first   Wound Image    Wound WDL ex   Dressing Appearance Open to air;No dressing   Drainage Amount None   Appearance Red;Dry   Tissue loss description Full thickness   Red (%), Wound Tissue Color 100 %   Periwound Area Intact;Dry  (dry hard skin)   Wound Edges Callused;Open   Wound Length (cm) 3 cm   Wound Width (cm) 1 cm   Wound Depth (cm) 0.5 cm   Wound Volume (cm^3) 1.5 cm^3   Tunneling (depth (cm)/location) 0   Undermining (depth (cm)/location) 0   Care Cleansed with:;Sterile normal saline   Dressing Applied;Honey;Island/border   Dressing Change Due 10/07/18       Wound 10/05/18 1600 Laceration anterior Foot   Date First Assessed/Time First Assessed: 10/05/18 1600   Primary Wound Type: Laceration  Side: Right  Orientation: anterior  Location: Foot   Wound Image    Wound WDL ex   Dressing Appearance Open to air;No dressing   Drainage Amount None   Appearance Red;Slough;Dry   Tissue loss description Full thickness   Red (%), Wound Tissue Color 50 %   Yellow (%), Wound Tissue Color 50 %   Periwound Area Intact;Dry  (yeast)   Wound Edges Open   Wound Length (cm) 1 cm   Wound Width (cm) 6 cm   Wound Depth (cm) 0.5 cm   Wound Volume (cm^3) 3 cm^3   Tunneling (depth (cm)/location) 0   Undermining (depth (cm)/location) 0   Care Cleansed with:;Sterile normal saline;Applied:;Skin Barrier   Dressing Applied;Honey;Gauze;Island/border   Dressing Change Due 10/07/18       Wound 10/06/18 Ulceration anterior Toe, first   Date First Assessed: 10/06/18   Pre-existing: Yes  Primary Wound Type: Ulceration  Side: Right  Orientation: anterior  Location: Toe, first   Wound Image    Wound WDL ex   Dressing Appearance Open to air;No dressing   Drainage Amount None   Appearance Red;Dry   Tissue loss description Full thickness   Red (%), Wound Tissue Color 100 %   Periwound Area Intact;Dry    Wound Edges Callused;Open  (yeast)   Wound Length (cm) 1 cm   Wound Width (cm) 1 cm   Wound Depth (cm) 0.6 cm   Wound Volume (cm^3) 0.6 cm^3   Tunneling (depth (cm)/location) 0   Undermining (depth (cm)/location) 0   Dressing Applied;Honey;Island/border   Dressing Change Due 10/07/18       Wound 10/06/18 1225 Abrasion(s) lower Leg   Date First Assessed/Time First Assessed: 10/06/18 1225   Pre-existing: Yes  Primary Wound Type: Abrasion(s)  Side: Right  Orientation: lower  Location: Leg   Wound Image    Wound WDL ex   Dressing Appearance Open to air;No dressing   Drainage Amount None   Appearance Dry  (multiple scabbed over abrasions)   Periwound Area Intact   Care Cleansed with:;Soap and water;Applied:;Povidone iodine       Wound 10/06/18 1225 Abrasion(s) lower Leg   Date First Assessed/Time First Assessed: 10/06/18 1225   Pre-existing: Yes  Primary Wound Type: Abrasion(s)  Side: Left  Orientation: lower  Location: Leg   Wound WDL ex   Dressing Appearance Open to air;No dressing   Drainage Amount None   Appearance Dry   Periwound Area Intact   Care Cleansed with:;Soap and water;Applied:;Povidone iodine

## 2018-10-06 NOTE — PROGRESS NOTES
"Hospital Medicine   Progress Note     Team: [facility] Prateek Gudino MD   Admit Date: 10/4/2018   YONAS    Code status: Full Code   Principal Problem: Alcohol withdrawal     Interval hx: NAEO. This AM patient severely disoriented and confabulating - stated that he was "only here for a brief vacation from home." He also implied that he had a relationship with his sitter and that she "has driven him around and sometimes she brings me here." He was oriented to person and place only, not to time. Was able to perform serial additions. Worked with PT/OT who described gait as severely ataxic and wide-based.    ROS   Const: negative for fevers and chills  Respiratory: negative for cough, shortness of breath   Cardiovascular: negative for chest discomfort, palpitations   Gastrointestinal: negative for nausea or vomiting, abdominal pain, constipation, diarrhea     PEx   Temp:  [98.2 °F (36.8 °C)-99.2 °F (37.3 °C)]   Pulse:  []   Resp:  [16-18]   BP: (118-143)/(63-89)   SpO2:  [98 %-100 %]      I & O (Last 24H):     Intake/Output Summary (Last 24 hours) at 10/6/2018 1525  Last data filed at 10/6/2018 0400  Gross per 24 hour   Intake --   Output 250 ml   Net -250 ml     General appearance: no distress, siting up in bed, tremulous  Mental status: Alert and oriented to person only, not to place or time  HEENT: conjunctivae/corneas clear, PERRL  Pulm:  normal respiratory effort, CTA B, no c/w/r  Card: RRR, S1, S2 normal, no murmur, click, rub or gallop  Abd: soft, NT, ND, BS present; no masses, no organomegaly  Ext: no clubbing  Pulses: 2+, symmetric  Skin: multiple scabbed lesions on bilateral feet, thickened skin on bilateral dorsal feet worse at the toes consistent with onychomycosis, no interdigital pits or evidence of scabies, large linear laceration with secondary healing on right foot, no surrounding erythema or induration  Neuro: ataxia with finger to nose bilaterally,  strength 5/5, wide-based ataxic " gait    Recent Results (from the past 24 hour(s))   Hemoglobin A1c    Collection Time: 10/05/18  3:27 PM   Result Value Ref Range    Hemoglobin A1C 5.3 4.0 - 5.6 %    Estimated Avg Glucose 105 68 - 131 mg/dL   Comprehensive Metabolic Panel (CMP)    Collection Time: 10/06/18  5:54 AM   Result Value Ref Range    Sodium 131 (L) 136 - 145 mmol/L    Potassium 3.1 (L) 3.5 - 5.1 mmol/L    Chloride 92 (L) 95 - 110 mmol/L    CO2 24 23 - 29 mmol/L    Glucose 98 70 - 110 mg/dL    BUN, Bld 8 6 - 20 mg/dL    Creatinine 0.7 0.5 - 1.4 mg/dL    Calcium 9.9 8.7 - 10.5 mg/dL    Total Protein 8.0 6.0 - 8.4 g/dL    Albumin 3.4 (L) 3.5 - 5.2 g/dL    Total Bilirubin 0.8 0.1 - 1.0 mg/dL    Alkaline Phosphatase 112 55 - 135 U/L    AST 45 (H) 10 - 40 U/L    ALT 33 10 - 44 U/L    Anion Gap 15 8 - 16 mmol/L    eGFR if African American >60.0 >60 mL/min/1.73 m^2    eGFR if non African American >60.0 >60 mL/min/1.73 m^2   Magnesium    Collection Time: 10/06/18  5:54 AM   Result Value Ref Range    Magnesium 1.8 1.6 - 2.6 mg/dL   Phosphorus    Collection Time: 10/06/18  5:54 AM   Result Value Ref Range    Phosphorus 2.6 (L) 2.7 - 4.5 mg/dL   CBC with Automated Differential    Collection Time: 10/06/18  5:54 AM   Result Value Ref Range    WBC 5.86 3.90 - 12.70 K/uL    RBC 3.61 (L) 4.60 - 6.20 M/uL    Hemoglobin 12.3 (L) 14.0 - 18.0 g/dL    Hematocrit 35.2 (L) 40.0 - 54.0 %    MCV 98 82 - 98 fL    MCH 34.1 (H) 27.0 - 31.0 pg    MCHC 34.9 32.0 - 36.0 g/dL    RDW 13.5 11.5 - 14.5 %    Platelets 142 (L) 150 - 350 K/uL    MPV 10.2 9.2 - 12.9 fL    Immature Granulocytes 0.5 0.0 - 0.5 %    Gran # (ANC) 4.4 1.8 - 7.7 K/uL    Immature Grans (Abs) 0.03 0.00 - 0.04 K/uL    Lymph # 0.7 (L) 1.0 - 4.8 K/uL    Mono # 0.6 0.3 - 1.0 K/uL    Eos # 0.1 0.0 - 0.5 K/uL    Baso # 0.08 0.00 - 0.20 K/uL    nRBC 0 0 /100 WBC    Gran% 74.9 (H) 38.0 - 73.0 %    Lymph% 11.8 (L) 18.0 - 48.0 %    Mono% 10.4 4.0 - 15.0 %    Eosinophil% 1.0 0.0 - 8.0 %    Basophil% 1.4 0.0 - 1.9  %    Differential Method Automated       cefTRIAXone (ROCEPHIN) IVPB  1 g Intravenous Q24H    diazePAM  10 mg Oral QID    enoxparin  40 mg Subcutaneous Daily    folic acid  1 mg Oral Daily    miconazole nitrate 2%   Topical (Top) BID    nicotine  1 patch Transdermal Daily    potassium chloride  40 mEq Oral Q2H    [START ON 10/7/2018] thiamine (VITAMIN B1) IVPB  250 mg Intravenous Daily    thiamine (VITAMIN B1) IVPB  500 mg Intravenous TID     lorazepam, sodium chloride 0.9%    Assessment & Plan:  Mohamud Patel is a 57 y.o. man with a history of Severe Alcohol Abuse, Chronic Wounds of Bilateral LE's with Onychomycosis, Alcoholic Neuropathy, Hyponatremia, and Tobacco Use Disorder who was brought to the ED by EMS after his girlfriend called 911 while the patient was severely intoxicated. He is admitted to Hospital Medicine for Alcohol Withdrawal, possible Wernicke's Encephalopathy, and Acute Cystitis.     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 Initial ethanol level elevated (239); UDS positive for marijuana. Denies any history of Alcoholic Hallucinosis, DT's, or Withdrawal Seizures, but showing signs of alcohol withdrawal - CIWA (14) at admission: Tremor, Agitation, Orientation  - Attempted symptom triggered therapy, per guidelines; however, patient clearly not receiving enough benzodiazepine coverage for withdrawal symptoms, will start Valium Taper: 10 mg four times daily today > will taper accordingly.  - Atival 1 mg IV PRN breakthrough symptoms or withdrawal seizures.   - CIWA assessments every 4 hours per nursing  - Will treat for Wernicke's Encephalopathy: Thiamine 500 mg IV every 8 hours, infuse over 30 minutes x 2 days > Thiamine 250 mg IV daily x 5 days > Thiamine 100 mg PO daily. Folic  acid and MVI daily. Patient confabulating and exam becoming more concerning for WE  - Hyponatremia possibly 2/2 Beer Potomania -  However, Urine osm and sodium elevated, will check TSH to rule out hypothyroidism - if this is negative will consider workup for adrenal insufficiency (much less likely)  - Daily CMP, Mg, Phos  - Fall, aspiration, and seizure precautions  - PT/OT consulted  - Will check MMA and Homocysteine to evaluate for B12 and folate deficiency given ataxic wide-based gait  - Will rescind PEC as patient nor threatening to leave, is calm and cooperative. Patient does not have capacity to make his own medical decisions at this time due to encephalopathy. Will maintain sitter at bedside     Acute Cystitis without Hematuria  - UA again consistent with UTI without significant hematuria. No fevers, no leukocytosis, unclear if patient was adequately treated as outpatient for previous UTI in 9/18  - Ceftriaxone 1 g IV daily for now, f/u Urine Cx to narrow if possible     Multiple Bilateral Lower Extremity Wounds  Onychomycosis of Bilateral Feet   - No signs of active infection and no evidence of systemic toxicity. Starting CTX for UTI per above  - Wound Care consulted, appreciate assistance - have debrided wounds, applying medi-honey, no evidence of active infection  - Podiatry consulted for further assistance     Tobacco Use Disorder  - Nicotine patch daily        DVT PPx: Lovenox SubQ Daily    Diet: Regular  Discharge plan and follow up: Plan for possible D/C to SNF following medical stabalization      Prateek Gudino MD  Hospital Medicine Staff  [facility]   Pager: [phone]

## 2018-10-06 NOTE — PLAN OF CARE
Problem: Occupational Therapy Goal  Goal: Occupational Therapy Goal  Goals to be met by: 10/20  Patient will increase functional independence with ADLs by performing:    UE Dressing with Modified [place] while seated.  LE Dressing with Modified [place].  Grooming while standing at sink with Supervision.  Toileting from toilet with Modified [place] for hygiene and clothing management.   Supine to sit with Supervision and HOB flat.  Stand pivot transfers with Supervision to chair and/or toilet.  Functional mobility with AD as needed for short household distance with no LOB with supervision.     Outcome: Ongoing (interventions implemented as appropriate)  OT livier completed. SAM Almaraz 10/6/2018

## 2018-10-06 NOTE — PT/OT/SLP EVAL
Physical Therapy Evaluation    Patient Name:  Mohamud Patel   MRN:  [id number]    Recommendations:     Discharge Recommendations:  ([facility] SNF)   Discharge Equipment Recommendations: none   Barriers to discharge: Inaccessible home and Decreased caregiver support    Assessment:     Mohamud Patel is a 57 y.o. male admitted with a medical diagnosis of Alcohol withdrawal.  He presents with the following impairments/functional limitations:  weakness, impaired functional mobilty, impaired endurance, gait instability, impaired balance, impaired cognition Patient tolerated evaluation  fairly well. Patient limited at this time by increased tremors and req min A for all OOB mobility. Pt very confused stating he was in [facility] despite constant re direction to orientation. Pt able to gait x 24 feet with no AD with min A. .  Patient will continue to require skilled PT services to address the above impairments to return to prior level of function as independent as possible. Discharge recommendation  to short stay skilled nursing facility  to maximize functional mobility, safety and decrease caregiver burden prior to returning home.         Rehab Prognosis:  good; patient would benefit from acute skilled PT services to address these deficits and reach maximum level of function.      Recent Surgery: * No surgery found *      Plan:     During this hospitalization, patient to be seen 3 x/week to address the above listed problems via gait training, therapeutic activities, therapeutic exercises, neuromuscular re-education  · Plan of Care Expires:  11/05/18   Plan of Care Reviewed with: patient    Subjective     Communicated with RN  prior to session.  Patient found supine upon PT entry to room, agreeable to evaluation.      Chief Complaint: pt states he is too tired   Patient comments/goals: pt states that he just needs some sleep   Pain/Comfort:  · Pain Rating 1: 0/10    Patients cultural, spiritual, Sikh conflicts  given the current situation: none stated    Living Environment:  Pt questionable historian; states he lives in a H with  With threshold to enter with his girlfriend.   Prior to admission, patients level of function was ( I) with all activities, driving but not working. Pt states he has not worked in year.  Patient has the following equipment: (walker, rolling; cane, straight).  DME owned (not currently used): none.  Upon discharge, patient will have assistance from girlfriend PRN2.    Objective:     Patient found with: (telemetry)     General Precautions: Standard, (fall; aspiration; seizure)   Orthopedic Precautions:N/A   Braces: N/A     Exams:  · Cognitive Exam:  Patient is oriented to Person and unaware of location, time or situation despite cues; pt unable to recall when given orientation after 5 min  · Fine Motor Coordination: -       Intact  · Gross Motor Coordination:  gross tremors with all UE/LE movement  · Postural Exam:  Patient presented with the following abnormalities: -       Rounded shoulders  · -       Forward head  · Sensation: -       Impaired  light/touch feet   · Skin Integrity/Edema:  -       Skin integrity: laceration to R ankle at dorsal proximal foot, decubitus ulcer on R great toe; MD aware and states wound care has been consulted  · RLE ROM: WFL  · RLE Strength: WFL  · LLE ROM: WFL  · LLE Strength: WFL    Functional Mobility:  · Bed Mobility:  Rolling Left:  contact guard assistance  · Scooting: contact guard assistance  · Supine to Sit: minimum assistance  · Sit to Supine: contact guard assistance  · Transfers:  Sit to Stand:  minimum assistance with no AD and hand-held assist  · Gait: x 24 feet with min A for increased global tremors; pt with increased CORNELL and decreased stacey  · Balance: min A for dyanmic gait; SBA for EOB sitting    AM-PAC 6 CLICK MOBILITY  Total Score:18       Therapeutic Activities and Exercises:  ·  Whiteboard updated in patients room to current assistance  level  · All of patients questions were answered within the scope of PT    Patient education  · Patient educated on the role of PT and POC  · Patient educated on importance  activity while in the hosptial per tolerance for improved endurance and to limit deconditioning   · Patient educated on safe transfers with nursing as appropriate  · Patient educated on energy conservation, pursed lip breathing  · Patient educated on proper transfer mechanics and safety      Patient left HOB elevated with all lines intact and call button in reach.    GOALS:   Multidisciplinary Problems     Physical Therapy Goals        Problem: Physical Therapy Goal    Goal Priority Disciplines Outcome Goal Variances Interventions   Physical Therapy Goal     PT, PT/OT Ongoing (interventions implemented as appropriate)     Description:  Goals to be met by: 10/16/18     Patient will increase functional independence with mobility by performin. Sit to stand transfer with Stand-by Assistance  2. Bed to chair transfer with Stand-by Assistance using LRD.   3. Gait  x 100 feet with Contact Guard Assistance using Rolling Walker or LRD>   4. Lower extremity exercise program x20  reps per handout, with independence                      History:     Past Medical History:   Diagnosis Date    Neuropathy     Scabies        History reviewed. No pertinent surgical history.    Clinical Decision Making:     History  Co-morbidities and personal factors that may impact the plan of care Examination  Body Structures and Functions, activity limitations and participation restrictions that may impact the plan of care Clinical Presentation   Decision Making/ Complexity Score   Co-morbidities:   [] Time since onset of injury / illness / exacerbation  [] Status of current condition  []Patient's cognitive status and safety concerns    [] Multiple Medical Problems (see med hx)  Personal Factors:   [] Patient's age  [x] Prior Level of function   [] Patient's home  situation (environment and family support)  [] Patient's level of motivation  [] Expected progression of patient      HISTORY:(criteria)    [] 26039 - no personal factors/history    [x] 19284 - has 1-2 personal factor/comorbidity     [] 27712 - has >3 personal factor/comorbidity     Body Regions:  [] Objective examination findings  [x] Head     []  Neck  [] Trunk   [] Upper Extremity  [x] Lower Extremity    Body Systems:  [x] For communication ability, affect, cognition, language, and learning style: the assessment of the ability to make needs known, consciousness, orientation (person, place, and time), expected emotional /behavioral responses, and learning preferences (eg, learning barriers, education  needs)  [x] For the neuromuscular system: a general assessment of gross coordinated movement (eg, balance, gait, locomotion, transfers, and transitions) and motor function  (motor control and motor learning)  [] For the musculoskeletal system: the assessment of gross symmetry, gross range of motion, gross strength, height, and weight  [] For the integumentary system: the assessment of pliability(texture), presence of scar formation, skin color, and skin integrity  [] For cardiovascular/pulmonary system: the assessment of heart rate, respiratory rate, blood pressure, and edema     Activity limitations:    [] Patient's cognitive status and saf ety concerns          [] Status of current condition      [] Weight bearing restriction  [] Cardiopulmunary Restriction    Participation Restrictions:   [] Goals and goal agreement with the patient     [] Rehab potential (prognosis) and probable outcome      Examination of Body System: (criteria)    [] 99741 - addressing 1-2 elements    [x] 43850 - addressing a total of 3 or more elements     [] 44220 -  Addressing a total of 4 or more elements         Clinical Presentation: (criteria)  Stable - 07307     On examination of body system using standardized tests and measures patient  presents with 1-2 elements from any of the following: body structures and functions, activity limitations, and/or participation restrictions.  Leading to a clinical presentation that is considered evolving with changing characteristics                              Clinical Decision Making  (Eval Complexity):  Low- 58669     Time Tracking:     PT Received On: 10/06/18  PT Start Time: 0933     PT Stop Time: 0954  PT Total Time (min): 21 min     Billable Minutes: Evaluation x16 min      Tacho Sevilla, PT  10/06/2018

## 2018-10-06 NOTE — PT/OT/SLP EVAL
"Occupational Therapy   Evaluation    Name: Mohamud Patel  MRN: [id number]  Admitting Diagnosis:  Alcohol withdrawal      Recommendations:     Discharge Recommendations: nursing facility, skilled(short stay)  Discharge Equipment Recommendations:  none  Barriers to discharge:  None    History:     Occupational Profile:  *pt is a questionable historian  Living Environment: Pt lives with girlfriend in [facility] with no steps to enter. Walk in shower with seat.   Previous level of function: Pt was independent with ADL and IADL tasks. He occasionally drives. Was a , but has not worked in over 1 year.   Roles and Routines: caretaker to self and home, significant other, , community dweller  Equipment Used at Home:  walker, rolling, cane, straight  Assistance upon Discharge: unable to determine, possibly from girlfriend    Past Medical History:   Diagnosis Date    Neuropathy     Scabies        History reviewed. No pertinent surgical history.    Subjective     Chief Complaint: "We just came on a little vacation, that's all."  Patient/Family Comments/goals: none stated (pt disoriented and unable to state)    Pain/Comfort:  · Pain Rating 1: 0/10    Patients cultural, spiritual, Gnosticist conflicts given the current situation: none reported     Objective:     Communicated with: RN (Alvaro) prior to session.  Patient found all lines intact, call button in reach and sitter present and telemetry(bedside sitter, paper scrubs) upon OT entry to room. Eval completed with PT.     General Precautions: Standard, fall, aspiration, seizure(delirium)   Orthopedic Precautions:N/A   Braces: N/A     Occupational Performance:    Bed Mobility:    · Patient completed Rolling/Turning to Right with contact guard assistance  · Patient completed Scooting/Bridging with contact guard assistance  · Patient completed Supine to Sit with minimum assistance  · Patient completed Sit to Supine with minimum " "assistance    Functional Mobility/Transfers:  · Patient completed Sit <> Stand Transfer with minimum assistance  with  no assistive device   · Functional Mobility: min-mod A for short household distance with no AD--demo unsteadiness, tremors, and wide base of support     Activities of Daily Living:  · Lower Body Dressing: minimum assistance to don R sock, CGA to don L sock, while seated in 4 point position EOB     Cognitive/Visual Perceptual:  Cognitive/Psychosocial Skills:     -       Oriented to: Person    -day of week: stated "thursday"   -month: stated "march"   -year: stated "2013"   -place: stated "[place]"   -correct daily orientation of Saturday, October, 2018, and LACEY provided to pt due to delirium precautions  -       Follows Commands/attention:Follows one-step commands  -       Communication: clear/fluent  -       Memory: Impaired STM, Impaired LTM and Poor immediate recall   -~5min delay after OT providing correct daily orientation before MD entered and asked pt orientation questions    -stated "2539-6715" for year  -       Safety awareness/insight to disability: impaired   -       Mood/Affect/Coping skills/emotional control: Lethargic and Agitated  Visual/Perceptual:      -Intact     Physical Exam:  Balance:    -       SBA for sitting, CGA-min A to recover from LOB  Postural examination/scapula alignment:    -       Rounded shoulders  -       Posterior pelvic tilt  Skin integrity: Wound B feet  Edema:  None noted  Sensation:    -       Intact BUE  Motor Planning:    -       intact   Dominant hand:    -       R  Upper Extremity Range of Motion:     -       Right Upper Extremity: Deficits: shoulder flexion limited to ~45*   -       Left Upper Extremity: WFL    Upper Extremity Strength:    -       Right Upper Extremity: WFL  -       Left Upper Extremity: WFL    Strength:    -       Right Upper Extremity: WFL   -       Left Upper Extremity: WFL   Fine Motor Coordination:    -       Intact  Left " "hand thumb/finger opposition skills, Right hand thumb/finger opposition skills, Left hand, manipulation of objects and Right hand, manipulation of objects    AMPAC 6 Click ADL:  AMPAC Total Score: 17    Treatment & Education:  -Edu for OT role and POC  -Edu for importance of OOB activity and impact on healing  -Pt repeatedly referred to juan as his girlfriend "Kerry" despite reorientation efforts   -Whiteboard updated   -Questions and concerns addressed within OT scope of practice   Education:    Patient left with bed in chair position with all lines intact, call button in reach and sitter present    Assessment:     Mohamud Patel is a 57 y.o. male with a medical diagnosis of Alcohol withdrawal.  He presents with the following performance deficits affecting function: weakness, impaired endurance, impaired self care skills, impaired functional mobilty, decreased coordination, impaired cognition, impaired balance, gait instability, decreased upper extremity function, decreased lower extremity function, impaired cardiopulmonary response to activity, impaired coordination, decreased safety awareness. He demo decreased functional independence in various areas of his life. He demo decreased cognition, particularly memory and orientation. He demo decreased balance. He demo decreased ADL and functional mobility skills. Pt demo decreased endurance to functional task. Pt would continue to benefit from skilled OT services for maximum functional outcome and safety.    Rehab Prognosis: Fair; patient would benefit from acute skilled OT services to address these deficits and reach maximum level of function.         Clinical Decision Makin.  OT Low:  "Pt evaluation falls under low complexity for evaluation coding due to performance deficits noted in 1-3 areas as stated above and 0 co-morbities affecting current functional status. Data obtained from problem focused assessments. No modifications or assistance was " "required for completion of evaluation. Only brief occupational profile and history review completed."     Plan:     Patient to be seen 3 x/week to address the above listed problems via self-care/home management, therapeutic activities, therapeutic exercises  · Plan of Care Expires: 11/04/18  · Plan of Care Reviewed with: patient    This Plan of care has been discussed with the patient who was involved in its development and understands and is in agreement with the identified goals and treatment plan    GOALS:   Multidisciplinary Problems     Occupational Therapy Goals        Problem: Occupational Therapy Goal    Goal Priority Disciplines Outcome Interventions   Occupational Therapy Goal     OT, PT/OT Ongoing (interventions implemented as appropriate)    Description:  Goals to be met by: 10/20  Patient will increase functional independence with ADLs by performing:    UE Dressing with Modified [place] while seated.  LE Dressing with Modified [place].  Grooming while standing at sink with Supervision.  Toileting from toilet with Modified [place] for hygiene and clothing management.   Supine to sit with Supervision and HOB flat.  Stand pivot transfers with Supervision to chair and/or toilet.  Functional mobility with AD as needed for short household distance with no LOB with supervision.                       Time Tracking:     OT Date of Treatment: 10/06/18  OT Start Time: 0933  OT Stop Time: 0955  OT Total Time (min): 22 min    Billable Minutes:Aramis 12  Self Care/Home Management 8    SAM Almaraz  10/6/2018    "

## 2018-10-06 NOTE — PLAN OF CARE
Problem: Physical Therapy Goal  Goal: Physical Therapy Goal  Goals to be met by: 10/16/18     Patient will increase functional independence with mobility by performin. Sit to stand transfer with Stand-by Assistance  2. Bed to chair transfer with Stand-by Assistance using LRD.   3. Gait  x 100 feet with Contact Guard Assistance using Rolling Walker or LRD>   4. Lower extremity exercise program x20  reps per handout, with independence    Outcome: Ongoing (interventions implemented as appropriate)  Patient evaluated today. All goals established are appropriate for patient progression at this time.   Tacho Sevilla PT, DPT  10/6/2018  Pager: [phone]

## 2018-10-06 NOTE — NURSING
Psych nursing safety rounds done on patient today. Notified RN of safety findings. Sitter @ bedside, 1:1 charting every 15 mins. Per flowsheet. PEC/CEC in patient chart.

## 2018-10-07 LAB
ALBUMIN SERPL BCP-MCNC: 3.2 G/DL
ALP SERPL-CCNC: 109 U/L
ALT SERPL W/O P-5'-P-CCNC: 30 U/L
ANION GAP SERPL CALC-SCNC: 13 MMOL/L
AST SERPL-CCNC: 48 U/L
BASOPHILS # BLD AUTO: 0.05 K/UL
BASOPHILS NFR BLD: 1 %
BILIRUB SERPL-MCNC: 0.8 MG/DL
BUN SERPL-MCNC: 8 MG/DL
CALCIUM SERPL-MCNC: 9.4 MG/DL
CHLORIDE SERPL-SCNC: 92 MMOL/L
CHOLEST SERPL-MCNC: 118 MG/DL
CHOLEST/HDLC SERPL: 2.7 {RATIO}
CO2 SERPL-SCNC: 20 MMOL/L
CREAT SERPL-MCNC: 0.6 MG/DL
DIFFERENTIAL METHOD: ABNORMAL
EOSINOPHIL # BLD AUTO: 0.1 K/UL
EOSINOPHIL NFR BLD: 2.4 %
ERYTHROCYTE [DISTWIDTH] IN BLOOD BY AUTOMATED COUNT: 13.1 %
EST. GFR  (AFRICAN AMERICAN): >60 ML/MIN/1.73 M^2
EST. GFR  (NON AFRICAN AMERICAN): >60 ML/MIN/1.73 M^2
GLUCOSE SERPL-MCNC: 97 MG/DL
HCT VFR BLD AUTO: 33.4 %
HCYS SERPL-SCNC: 13.7 UMOL/L
HDLC SERPL-MCNC: 43 MG/DL
HDLC SERPL: 36.4 %
HGB BLD-MCNC: 11.3 G/DL
IMM GRANULOCYTES # BLD AUTO: 0.02 K/UL
IMM GRANULOCYTES NFR BLD AUTO: 0.4 %
LDLC SERPL CALC-MCNC: 62.8 MG/DL
LYMPHOCYTES # BLD AUTO: 0.7 K/UL
LYMPHOCYTES NFR BLD: 14.9 %
MAGNESIUM SERPL-MCNC: 1.5 MG/DL
MCH RBC QN AUTO: 32.9 PG
MCHC RBC AUTO-ENTMCNC: 33.8 G/DL
MCV RBC AUTO: 97 FL
MONOCYTES # BLD AUTO: 0.5 K/UL
MONOCYTES NFR BLD: 9.4 %
NEUTROPHILS # BLD AUTO: 3.5 K/UL
NEUTROPHILS NFR BLD: 71.9 %
NONHDLC SERPL-MCNC: 75 MG/DL
NRBC BLD-RTO: 0 /100 WBC
PHOSPHATE SERPL-MCNC: 3 MG/DL
PLATELET # BLD AUTO: 139 K/UL
PMV BLD AUTO: 9.8 FL
POTASSIUM SERPL-SCNC: 3.3 MMOL/L
PROT SERPL-MCNC: 7.3 G/DL
RBC # BLD AUTO: 3.43 M/UL
SODIUM SERPL-SCNC: 125 MMOL/L
SODIUM SERPL-SCNC: 126 MMOL/L
SODIUM SERPL-SCNC: 127 MMOL/L
SODIUM SERPL-SCNC: 128 MMOL/L
TRIGL SERPL-MCNC: 61 MG/DL
WBC # BLD AUTO: 4.91 K/UL

## 2018-10-07 PROCEDURE — 80053 COMPREHEN METABOLIC PANEL: CPT

## 2018-10-07 PROCEDURE — 85025 COMPLETE CBC W/AUTO DIFF WBC: CPT

## 2018-10-07 PROCEDURE — 83921 ORGANIC ACID SINGLE QUANT: CPT

## 2018-10-07 PROCEDURE — 83735 ASSAY OF MAGNESIUM: CPT

## 2018-10-07 PROCEDURE — 11000001 HC ACUTE MED/SURG PRIVATE ROOM

## 2018-10-07 PROCEDURE — 25000003 PHARM REV CODE 250: Performed by: HOSPITALIST

## 2018-10-07 PROCEDURE — 84295 ASSAY OF SERUM SODIUM: CPT | Mod: 91

## 2018-10-07 PROCEDURE — 83090 ASSAY OF HOMOCYSTEINE: CPT

## 2018-10-07 PROCEDURE — S4991 NICOTINE PATCH NONLEGEND: HCPCS | Performed by: HOSPITALIST

## 2018-10-07 PROCEDURE — 36415 COLL VENOUS BLD VENIPUNCTURE: CPT

## 2018-10-07 PROCEDURE — 84100 ASSAY OF PHOSPHORUS: CPT

## 2018-10-07 PROCEDURE — 80061 LIPID PANEL: CPT

## 2018-10-07 PROCEDURE — 63600175 PHARM REV CODE 636 W HCPCS: Performed by: HOSPITALIST

## 2018-10-07 PROCEDURE — 99233 SBSQ HOSP IP/OBS HIGH 50: CPT | Mod: ,,,

## 2018-10-07 PROCEDURE — 99232 SBSQ HOSP IP/OBS MODERATE 35: CPT | Mod: ,,, | Performed by: HOSPITALIST

## 2018-10-07 RX ORDER — DIAZEPAM 5 MG/1
5 TABLET ORAL ONCE
Status: COMPLETED | OUTPATIENT
Start: 2018-10-07 | End: 2018-10-07

## 2018-10-07 RX ORDER — SODIUM CHLORIDE AND POTASSIUM CHLORIDE 150; 900 MG/100ML; MG/100ML
INJECTION, SOLUTION INTRAVENOUS CONTINUOUS
Status: DISPENSED | OUTPATIENT
Start: 2018-10-07 | End: 2018-10-08

## 2018-10-07 RX ADMIN — DIAZEPAM 10 MG: 5 TABLET ORAL at 12:10

## 2018-10-07 RX ADMIN — DIAZEPAM 10 MG: 5 TABLET ORAL at 08:10

## 2018-10-07 RX ADMIN — ENOXAPARIN SODIUM 40 MG: 100 INJECTION SUBCUTANEOUS at 05:10

## 2018-10-07 RX ADMIN — DIAZEPAM 10 MG: 5 TABLET ORAL at 09:10

## 2018-10-07 RX ADMIN — CEFTRIAXONE SODIUM 1 G: 1 INJECTION, POWDER, FOR SOLUTION INTRAMUSCULAR; INTRAVENOUS at 06:10

## 2018-10-07 RX ADMIN — THIAMINE HYDROCHLORIDE 500 MG: 100 INJECTION, SOLUTION INTRAMUSCULAR; INTRAVENOUS at 09:10

## 2018-10-07 RX ADMIN — THIAMINE HYDROCHLORIDE 250 MG: 100 INJECTION, SOLUTION INTRAMUSCULAR; INTRAVENOUS at 08:10

## 2018-10-07 RX ADMIN — MICONAZOLE NITRATE: 20 OINTMENT TOPICAL at 09:10

## 2018-10-07 RX ADMIN — POTASSIUM CHLORIDE AND SODIUM CHLORIDE: 900; 150 INJECTION, SOLUTION INTRAVENOUS at 09:10

## 2018-10-07 RX ADMIN — MICONAZOLE NITRATE: 20 OINTMENT TOPICAL at 08:10

## 2018-10-07 RX ADMIN — POTASSIUM CHLORIDE AND SODIUM CHLORIDE: 900; 150 INJECTION, SOLUTION INTRAVENOUS at 11:10

## 2018-10-07 RX ADMIN — FOLIC ACID 1 MG: 1 TABLET ORAL at 09:10

## 2018-10-07 RX ADMIN — DIAZEPAM 10 MG: 5 TABLET ORAL at 05:10

## 2018-10-07 RX ADMIN — LORAZEPAM 1 MG: 2 INJECTION, SOLUTION INTRAMUSCULAR; INTRAVENOUS at 04:10

## 2018-10-07 RX ADMIN — DIAZEPAM 5 MG: 5 TABLET ORAL at 03:10

## 2018-10-07 RX ADMIN — NICOTINE 1 PATCH: 21 PATCH, EXTENDED RELEASE TRANSDERMAL at 09:10

## 2018-10-07 NOTE — PLAN OF CARE
Problem: Patient Care Overview  Goal: Plan of Care Review  Outcome: Ongoing (interventions implemented as appropriate)  Patient is alert to self only.Patient is unaware of place,time and situation. Pt shows no s/s of distress or discomfort. Safety measures met. Free from falls and injury. Denies any pain. Able to verbalize all needs. Remains with soft wrist restraints. Has adequate food and fluid intake. Bed locked and placed in lowest position. Bed alarm set. Call light within reach.

## 2018-10-07 NOTE — SUBJECTIVE & OBJECTIVE
Scheduled Meds:   cefTRIAXone (ROCEPHIN) IVPB  1 g Intravenous Q24H    diazePAM  10 mg Oral QID    enoxparin  40 mg Subcutaneous Daily    folic acid  1 mg Oral Daily    miconazole nitrate 2%   Topical (Top) BID    nicotine  1 patch Transdermal Daily    thiamine (VITAMIN B1) IVPB  250 mg Intravenous Daily     Continuous Infusions:   0/9% NACL & POTASSIUM CHLORIDE 20 MEQ/L 125 mL/hr at 10/07/18 0921     PRN Meds:lorazepam, sodium chloride 0.9%    Review of patient's allergies indicates:  No Known Allergies     Past Medical History:   Diagnosis Date    Neuropathy     Scabies      History reviewed. No pertinent surgical history.    Family History     None        Tobacco Use    Smoking status: Current Every Day Smoker     Packs/day: 2.00     Types: Cigarettes    Smokeless tobacco: Never Used   Substance and Sexual Activity    Alcohol use: Yes     Alcohol/week: 42.0 oz     Types: 70 Shots of liquor per week    Drug use: No    Sexual activity: Yes     Partners: Female     Review of Systems   Unable to perform ROS: Other   Patient refusing to answer questions. Constantly pleading to be brought outside.  Objective:     Vital Signs (Most Recent):  Temp: 97.9 °F (36.6 °C) (10/07/18 1508)  Pulse: 92 (10/07/18 1508)  Resp: 17 (10/07/18 1508)  BP: 116/72 (10/07/18 1508)  SpO2: 98 % (10/07/18 1508) Vital Signs (24h Range):  Temp:  [97.4 °F (36.3 °C)-98.5 °F (36.9 °C)] 97.9 °F (36.6 °C)  Pulse:  [66-95] 92  Resp:  [16-18] 17  SpO2:  [96 %-99 %] 98 %  BP: (115-156)/(69-86) 116/72     Weight: 67.2 kg (148 lb 2.4 oz)  Body mass index is 19.02 kg/m².    Foot Exam    General  General Appearance: appears stated age and healthy   Orientation: unable to assess   Affect: appropriate       Right Foot/Ankle     Inspection and Palpation  Ecchymosis: none  Tenderness: none   Swelling: none     Neurovascular  Dorsalis pedis: 2+  Posterior tibial: 2+  Saphenous nerve sensation: diminished  Tibial nerve sensation:  diminished  Superficial peroneal nerve sensation: diminished  Deep peroneal nerve sensation: diminished  Sural nerve sensation: diminished    Comments  B/l hallux wounds are stable with no malodor. Fibrotic bases with hyperkeratotic rim. No fluctuance, no erythema, no signs of infection.    The right anterior ankle wound is also stable. No drainage, no fluctuance, no signs of infection.    Left Foot/Ankle      Inspection and Palpation  Ecchymosis: none  Tenderness: none   Swelling: none     Neurovascular  Dorsalis pedis: 2+  Posterior tibial: 2+  Saphenous nerve sensation: diminished  Tibial nerve sensation: diminished  Superficial peroneal nerve sensation: diminished  Deep peroneal nerve sensation: diminished  Sural nerve sensation: diminished                            Laboratory:  CBC:   Recent Labs   Lab  10/07/18   0615   WBC  4.91   RBC  3.43*   HGB  11.3*   HCT  33.4*   PLT  139*   MCV  97   MCH  32.9*   MCHC  33.8     CMP:   Recent Labs   Lab  10/07/18   0615   10/07/18   1539   GLU  97   --    --    CALCIUM  9.4   --    --    ALBUMIN  3.2*   --    --    PROT  7.3   --    --    NA  125*   < >  127*   K  3.3*   --    --    CO2  20*   --    --    CL  92*   --    --    BUN  8   --    --    CREATININE  0.6   --    --    ALKPHOS  109   --    --    ALT  30   --    --    AST  48*   --    --    BILITOT  0.8   --    --     < > = values in this interval not displayed.       Diagnostic Results:  None

## 2018-10-07 NOTE — PROGRESS NOTES
"Hospital Medicine   Progress Note     [place] [facility] [place] Prateek Gudino MD   Admit Date: 10/4/2018   YONAS    Code status: Full Code   Principal Problem: Alcohol withdrawal     Interval hx: NAEO. Patient remains disoriented to place, believes that he is at home and asking for cigarettes and his lighter which are "on the table next to the stump." VS remain stable, afebrile.    ROS   Const: negative for fevers and chills  Respiratory: negative for cough, shortness of breath   Cardiovascular: negative for chest discomfort, palpitations   Gastrointestinal: negative for nausea or vomiting, abdominal pain, constipation, diarrhea     PEx   Temp:  [97.4 °F (36.3 °C)-98.5 °F (36.9 °C)]   Pulse:  [66-95]   Resp:  [16-18]   BP: (115-178)/(69-86)   SpO2:  [96 %-100 %]      I & O (Last 24H):     Intake/Output Summary (Last 24 hours) at 10/7/2018 1400  Last data filed at 10/7/2018 0323  Gross per 24 hour   Intake --   Output 500 ml   Net -500 ml     General appearance: no distress, siting up in bed, mild tremors  Mental status: Alert and oriented to person only, not to place or time  Pulm:  normal respiratory effort, CTA B, no c/w/r  Card: RRR, S1, S2 normal, no murmur, click, rub or gallop  Abd: soft, NT, ND, BS present; no masses, no organomegaly  Ext: no clubbing  Pulses: 2+, symmetric  Skin: multiple scabbed lesions on bilateral feet, thickened skin on bilateral dorsal feet worse at the toes consistent with onychomycosis, no interdigital pits or evidence of scabies, large linear laceration with secondary healing on right foot, no surrounding erythema or induration  Neuro: ataxia with finger to nose bilaterally,  strength 5/5, wide-based ataxic gait    Recent Results (from the past 24 hour(s))   Comprehensive Metabolic Panel (CMP)    Collection Time: 10/07/18  6:15 AM   Result Value Ref Range    Sodium 125 (L) 136 - 145 mmol/L    Potassium 3.3 (L) 3.5 - 5.1 mmol/L    Chloride 92 (L) 95 - 110 mmol/L    CO2 20 (L) 23 - 29 " mmol/L    Glucose 97 70 - 110 mg/dL    BUN, Bld 8 6 - 20 mg/dL    Creatinine 0.6 0.5 - 1.4 mg/dL    Calcium 9.4 8.7 - 10.5 mg/dL    Total Protein 7.3 6.0 - 8.4 g/dL    Albumin 3.2 (L) 3.5 - 5.2 g/dL    Total Bilirubin 0.8 0.1 - 1.0 mg/dL    Alkaline Phosphatase 109 55 - 135 U/L    AST 48 (H) 10 - 40 U/L    ALT 30 10 - 44 U/L    Anion Gap 13 8 - 16 mmol/L    eGFR if African American >60.0 >60 mL/min/1.73 m^2    eGFR if non African American >60.0 >60 mL/min/1.73 m^2   Magnesium    Collection Time: 10/07/18  6:15 AM   Result Value Ref Range    Magnesium 1.5 (L) 1.6 - 2.6 mg/dL   Phosphorus    Collection Time: 10/07/18  6:15 AM   Result Value Ref Range    Phosphorus 3.0 2.7 - 4.5 mg/dL   CBC with Automated Differential    Collection Time: 10/07/18  6:15 AM   Result Value Ref Range    WBC 4.91 3.90 - 12.70 K/uL    RBC 3.43 (L) 4.60 - 6.20 M/uL    Hemoglobin 11.3 (L) 14.0 - 18.0 g/dL    Hematocrit 33.4 (L) 40.0 - 54.0 %    MCV 97 82 - 98 fL    MCH 32.9 (H) 27.0 - 31.0 pg    MCHC 33.8 32.0 - 36.0 g/dL    RDW 13.1 11.5 - 14.5 %    Platelets 139 (L) 150 - 350 K/uL    MPV 9.8 9.2 - 12.9 fL    Immature Granulocytes 0.4 0.0 - 0.5 %    Gran # (ANC) 3.5 1.8 - 7.7 K/uL    Immature Grans (Abs) 0.02 0.00 - 0.04 K/uL    Lymph # 0.7 (L) 1.0 - 4.8 K/uL    Mono # 0.5 0.3 - 1.0 K/uL    Eos # 0.1 0.0 - 0.5 K/uL    Baso # 0.05 0.00 - 0.20 K/uL    nRBC 0 0 /100 WBC    Gran% 71.9 38.0 - 73.0 %    Lymph% 14.9 (L) 18.0 - 48.0 %    Mono% 9.4 4.0 - 15.0 %    Eosinophil% 2.4 0.0 - 8.0 %    Basophil% 1.0 0.0 - 1.9 %    Differential Method Automated    Homocysteine, serum    Collection Time: 10/07/18  6:15 AM   Result Value Ref Range    Homocysteine 13.7 4.0 - 16.5 umol/L   Lipid panel    Collection Time: 10/07/18  8:11 AM   Result Value Ref Range    Cholesterol 118 (L) 120 - 199 mg/dL    Triglycerides 61 30 - 150 mg/dL    HDL 43 40 - 75 mg/dL    LDL Cholesterol 62.8 (L) 63.0 - 159.0 mg/dL    HDL/Chol Ratio 36.4 20.0 - 50.0 %    Total  Cholesterol/HDL Ratio 2.7 2.0 - 5.0    Non-HDL Cholesterol 75 mg/dL   Sodium    Collection Time: 10/07/18 12:05 PM   Result Value Ref Range    Sodium 126 (L) 136 - 145 mmol/L      cefTRIAXone (ROCEPHIN) IVPB  1 g Intravenous Q24H    diazePAM  10 mg Oral QID    enoxparin  40 mg Subcutaneous Daily    folic acid  1 mg Oral Daily    miconazole nitrate 2%   Topical (Top) BID    nicotine  1 patch Transdermal Daily    thiamine (VITAMIN B1) IVPB  250 mg Intravenous Daily     lorazepam, sodium chloride 0.9%    Assessment & Plan:  Mohamud Patel is a 57 y.o. man with a history of Severe Alcohol Abuse, Chronic Wounds of Bilateral LE's with Onychomycosis, Alcoholic Neuropathy, Hyponatremia, and Tobacco Use Disorder who was brought to the ED by EMS after his girlfriend called 911 while the patient was severely intoxicated. He is admitted to Hospital Medicine for Alcohol Withdrawal, possible Wernicke's Encephalopathy, and Acute Cystitis.     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 Initial ethanol level elevated (239); UDS positive for marijuana. Denies any history of Alcoholic Hallucinosis, DT's, or Withdrawal Seizures, but showing signs of alcohol withdrawal - CIWA (14) at admission: Tremor, Agitation, Orientation  - Attempted symptom triggered therapy, per guidelines; however, patient was not receiving adequate benzodiazepine coverage for withdrawal symptoms, Started Valium at 10 mg four times daily today > will taper accordingly, unable to taper 10/7 due to persistent symptoms  - Atival 1 mg IV PRN breakthrough symptoms or withdrawal seizures.   - CIWA assessments every 4 hours per nursing  - For Wernicke's Encephalopathy: Thiamine 500 mg IV every 8 hours, infuse over 30 minutes x 2 days > Thiamine 250 mg IV  daily x 5 days > Thiamine 100 mg PO daily. Folic acid and MVI daily. Patient confabulating and exam concerning for WE  - Hyponatremia worsening (131>126) and possibly 2/2 Beer Potomania -  however, Urine osm and sodium elevated, TSH and Lipid Panel wnl's: starting 0.9% NS with 20m Eq potassium at 125 cc/hour, with q4 hour sodiums, discussed with nursing  - Daily CMP, Mg, Phos  - Fall, aspiration, and seizure precautions  - PT/OT consulted  - Homocysteine level wnl's, MMA pending - low suspicion for Vitamin B12 deficiency at this time     Acute Cystitis without Hematuria  - UA again consistent with UTI without significant hematuria. No fevers, no leukocytosis, unclear if patient was adequately treated as outpatient for previous UTI in 9/18  - Ceftriaxone 1 g IV daily for now, Urine Culture growing GNR's non lactose , will f/u speciation and sensitivities     Multiple Bilateral Lower Extremity Wounds  Onychomycosis of Bilateral Feet   - No signs of active infection and no evidence of systemic toxicity. Starting CTX for UTI per above  - Wound Care consulted, appreciate assistance - have debrided wounds, applying medi-honey, no evidence of active infection. Miconazole ointment to toes  - Podiatry consulted for further assistance     Tobacco Use Disorder  - Nicotine 21 mg patch daily        DVT PPx: Lovenox SubQ Daily    Diet: Regular  Discharge plan and follow up: Plan for possible D/C to SNF following medical stabalization      Prateek Gudino MD  Hospital Medicine Staff  [facility]   Pager: [phone]

## 2018-10-07 NOTE — HPI
Mohamud Patel is a 57 y.o. male who  has a past medical history of Neuropathy and Scabies. alcohol abuse and withdrawal    Patient admitted for alcohol withdrawal.  Consulted to Podiatry for foot wounds. Patient has history of foot wounds dating back to his last admission on 08/11/18. It is unclear if he has had follow up since but presents today with new wounds of unknown cause. Patient is unable to give any history as he is constantly pleading to be brought outside.

## 2018-10-07 NOTE — NURSING
Pt very agitated.  Gave IV lorazepam for agitation and CIWA score 16.  PEC discontinued.  Pt will need a sitter as he continuously tries to get out of bed and is very confused.  Pt is unaware of his own limitations and is a high falls risk.  Will put bed alarm on as well as order an avaysys if available.  Pt had incontinent episode, changed pt's and applied barrier cream to pts buttocks and perineum as he has multiple blisters, scratches, incontinent associated dermatitis.

## 2018-10-08 PROBLEM — Z74.09 IMPAIRED MOBILITY AND ADLS: Status: ACTIVE | Noted: 2018-10-08

## 2018-10-08 PROBLEM — Z78.9 IMPAIRED MOBILITY AND ADLS: Status: ACTIVE | Noted: 2018-10-08

## 2018-10-08 LAB
ALBUMIN SERPL BCP-MCNC: 3 G/DL
ALP SERPL-CCNC: 98 U/L
ALT SERPL W/O P-5'-P-CCNC: 29 U/L
ANION GAP SERPL CALC-SCNC: 10 MMOL/L
AST SERPL-CCNC: 41 U/L
BACTERIA UR CULT: NORMAL
BASOPHILS # BLD AUTO: 0.05 K/UL
BASOPHILS NFR BLD: 1.1 %
BILIRUB SERPL-MCNC: 0.7 MG/DL
BUN SERPL-MCNC: 7 MG/DL
CALCIUM SERPL-MCNC: 9 MG/DL
CHLORIDE SERPL-SCNC: 101 MMOL/L
CO2 SERPL-SCNC: 20 MMOL/L
CREAT SERPL-MCNC: 0.7 MG/DL
DIFFERENTIAL METHOD: ABNORMAL
EOSINOPHIL # BLD AUTO: 0.1 K/UL
EOSINOPHIL NFR BLD: 3 %
ERYTHROCYTE [DISTWIDTH] IN BLOOD BY AUTOMATED COUNT: 13.2 %
EST. GFR  (AFRICAN AMERICAN): >60 ML/MIN/1.73 M^2
EST. GFR  (NON AFRICAN AMERICAN): >60 ML/MIN/1.73 M^2
GLUCOSE SERPL-MCNC: 89 MG/DL
HCT VFR BLD AUTO: 33.6 %
HGB BLD-MCNC: 11.7 G/DL
IMM GRANULOCYTES # BLD AUTO: 0.02 K/UL
IMM GRANULOCYTES NFR BLD AUTO: 0.5 %
LYMPHOCYTES # BLD AUTO: 1 K/UL
LYMPHOCYTES NFR BLD: 23 %
MAGNESIUM SERPL-MCNC: 1.5 MG/DL
MCH RBC QN AUTO: 33.7 PG
MCHC RBC AUTO-ENTMCNC: 34.8 G/DL
MCV RBC AUTO: 97 FL
MONOCYTES # BLD AUTO: 0.5 K/UL
MONOCYTES NFR BLD: 11.8 %
NEUTROPHILS # BLD AUTO: 2.7 K/UL
NEUTROPHILS NFR BLD: 60.6 %
NRBC BLD-RTO: 0 /100 WBC
PHOSPHATE SERPL-MCNC: 3.1 MG/DL
PLATELET # BLD AUTO: 146 K/UL
PMV BLD AUTO: 9.8 FL
POTASSIUM SERPL-SCNC: 3.4 MMOL/L
PROT SERPL-MCNC: 6.8 G/DL
RBC # BLD AUTO: 3.47 M/UL
SODIUM SERPL-SCNC: 127 MMOL/L
SODIUM SERPL-SCNC: 130 MMOL/L
SODIUM SERPL-SCNC: 131 MMOL/L
SODIUM SERPL-SCNC: 131 MMOL/L
SODIUM SERPL-SCNC: 132 MMOL/L
SODIUM SERPL-SCNC: 133 MMOL/L
SODIUM SERPL-SCNC: 134 MMOL/L
WBC # BLD AUTO: 4.4 K/UL

## 2018-10-08 PROCEDURE — 99233 SBSQ HOSP IP/OBS HIGH 50: CPT | Mod: ,,, | Performed by: NURSE PRACTITIONER

## 2018-10-08 PROCEDURE — 63600175 PHARM REV CODE 636 W HCPCS: Performed by: HOSPITALIST

## 2018-10-08 PROCEDURE — 97530 THERAPEUTIC ACTIVITIES: CPT

## 2018-10-08 PROCEDURE — 84100 ASSAY OF PHOSPHORUS: CPT

## 2018-10-08 PROCEDURE — 99232 SBSQ HOSP IP/OBS MODERATE 35: CPT | Mod: ,,, | Performed by: HOSPITALIST

## 2018-10-08 PROCEDURE — 84295 ASSAY OF SERUM SODIUM: CPT | Mod: 91

## 2018-10-08 PROCEDURE — 97535 SELF CARE MNGMENT TRAINING: CPT

## 2018-10-08 PROCEDURE — 80053 COMPREHEN METABOLIC PANEL: CPT

## 2018-10-08 PROCEDURE — 85025 COMPLETE CBC W/AUTO DIFF WBC: CPT

## 2018-10-08 PROCEDURE — 83735 ASSAY OF MAGNESIUM: CPT

## 2018-10-08 PROCEDURE — 11000001 HC ACUTE MED/SURG PRIVATE ROOM

## 2018-10-08 PROCEDURE — 97116 GAIT TRAINING THERAPY: CPT

## 2018-10-08 PROCEDURE — 25000003 PHARM REV CODE 250: Performed by: HOSPITALIST

## 2018-10-08 PROCEDURE — S4991 NICOTINE PATCH NONLEGEND: HCPCS | Performed by: HOSPITALIST

## 2018-10-08 PROCEDURE — 36415 COLL VENOUS BLD VENIPUNCTURE: CPT

## 2018-10-08 RX ORDER — DIAZEPAM 5 MG/1
10 TABLET ORAL 3 TIMES DAILY
Status: DISCONTINUED | OUTPATIENT
Start: 2018-10-08 | End: 2018-10-11

## 2018-10-08 RX ORDER — LANOLIN ALCOHOL/MO/W.PET/CERES
400 CREAM (GRAM) TOPICAL 2 TIMES DAILY
Status: DISCONTINUED | OUTPATIENT
Start: 2018-10-08 | End: 2018-11-10 | Stop reason: HOSPADM

## 2018-10-08 RX ORDER — CIPROFLOXACIN 500 MG/1
500 TABLET ORAL EVERY 12 HOURS
Status: DISCONTINUED | OUTPATIENT
Start: 2018-10-09 | End: 2018-10-12

## 2018-10-08 RX ORDER — SODIUM CHLORIDE AND POTASSIUM CHLORIDE 150; 900 MG/100ML; MG/100ML
INJECTION, SOLUTION INTRAVENOUS CONTINUOUS
Status: DISCONTINUED | OUTPATIENT
Start: 2018-10-08 | End: 2018-10-09

## 2018-10-08 RX ADMIN — THIAMINE HYDROCHLORIDE 250 MG: 100 INJECTION, SOLUTION INTRAMUSCULAR; INTRAVENOUS at 08:10

## 2018-10-08 RX ADMIN — Medication 400 MG: at 10:10

## 2018-10-08 RX ADMIN — FOLIC ACID 1 MG: 1 TABLET ORAL at 08:10

## 2018-10-08 RX ADMIN — ENOXAPARIN SODIUM 40 MG: 100 INJECTION SUBCUTANEOUS at 05:10

## 2018-10-08 RX ADMIN — DIAZEPAM 10 MG: 5 TABLET ORAL at 08:10

## 2018-10-08 RX ADMIN — DIAZEPAM 10 MG: 5 TABLET ORAL at 02:10

## 2018-10-08 RX ADMIN — MICONAZOLE NITRATE: 20 OINTMENT TOPICAL at 08:10

## 2018-10-08 RX ADMIN — POTASSIUM CHLORIDE AND SODIUM CHLORIDE: 900; 150 INJECTION, SOLUTION INTRAVENOUS at 08:10

## 2018-10-08 RX ADMIN — NICOTINE 1 PATCH: 21 PATCH, EXTENDED RELEASE TRANSDERMAL at 08:10

## 2018-10-08 RX ADMIN — CEFTRIAXONE SODIUM 1 G: 1 INJECTION, POWDER, FOR SOLUTION INTRAMUSCULAR; INTRAVENOUS at 05:10

## 2018-10-08 RX ADMIN — POTASSIUM CHLORIDE AND SODIUM CHLORIDE: 900; 150 INJECTION, SOLUTION INTRAVENOUS at 11:10

## 2018-10-08 NOTE — PLAN OF CARE
Problem: Physical Therapy Goal  Goal: Physical Therapy Goal  Goals to be met by: 10/16/18     Patient will increase functional independence with mobility by performin. Sit to stand transfer with Stand-by Assistance  2. Bed to chair transfer with Stand-by Assistance using LRD.   3. Gait  x 100 feet with Contact Guard Assistance using Rolling Walker or LRD>   4. Lower extremity exercise program x20  reps per handout, with independence     Outcome: Ongoing (interventions implemented as appropriate)  Pt goals remain appropriate.     GALINA MAYA, PT  10/8/2018

## 2018-10-08 NOTE — PLAN OF CARE
Problem: Occupational Therapy Goal  Goal: Occupational Therapy Goal  Goals to be met by: 10/20  Patient will increase functional independence with ADLs by performing:    UE Dressing with Modified [place] while seated.  LE Dressing with Modified [place].  Grooming while standing at sink with Supervision.  Toileting from toilet with Modified [place] for hygiene and clothing management.   Supine to sit with Supervision and HOB flat.  Stand pivot transfers with Supervision to chair and/or toilet.  Functional mobility with AD as needed for short household distance with no LOB with supervision.      Outcome: Ongoing (interventions implemented as appropriate)  Patient's goals are appropriate.   SAM Rosario  10/8/2018

## 2018-10-08 NOTE — SUBJECTIVE & OBJECTIVE
Past Medical History:   Diagnosis Date    Neuropathy     Scabies      History reviewed. No pertinent surgical history.  Review of patient's allergies indicates:  No Known Allergies    Scheduled Medications:    cefTRIAXone (ROCEPHIN) IVPB  1 g Intravenous Q24H    diazePAM  10 mg Oral QID    enoxparin  40 mg Subcutaneous Daily    folic acid  1 mg Oral Daily    miconazole nitrate 2%   Topical (Top) BID    nicotine  1 patch Transdermal Daily    thiamine (VITAMIN B1) IVPB  250 mg Intravenous Daily       PRN Medications: lorazepam, sodium chloride 0.9%    Family History     None        Tobacco Use    Smoking status: Current Every Day Smoker     Packs/day: 2.00     Types: Cigarettes    Smokeless tobacco: Never Used   Substance and Sexual Activity    Alcohol use: Yes     Alcohol/week: 42.0 oz     Types: 70 Shots of liquor per week    Drug use: No    Sexual activity: Yes     Partners: Female     Review of Systems   Unable to perform ROS: Other (impaired cognition/patient participation)     Objective:     Vital Signs (Most Recent):  Temp: 97.6 °F (36.4 °C) (10/08/18 0817)  Pulse: 66 (10/08/18 0817)  Resp: 20 (10/08/18 0817)  BP: 134/79 (10/08/18 0817)  SpO2: 99 % (10/08/18 0817)    Vital Signs (24h Range):  Temp:  [97.6 °F (36.4 °C)-98.5 °F (36.9 °C)] 97.6 °F (36.4 °C)  Pulse:  [66-92] 66  Resp:  [17-20] 20  SpO2:  [94 %-99 %] 99 %  BP: (116-160)/(72-85) 134/79     Body mass index is 19.19 kg/m².    Physical Exam   Constitutional: He appears well-developed and well-nourished. He appears lethargic. He is easily aroused. No distress.   HENT:   Head: Normocephalic. Head is with contusion (above R eye/eyebrow).   Right Ear: External ear normal.   Left Ear: External ear normal.   Nose: Nose normal.   Eyes: Right eye exhibits no discharge. Left eye exhibits no discharge. No scleral icterus.   Neck: Normal range of motion.   Cardiovascular: Normal rate, regular rhythm and intact distal pulses.   Pulmonary/Chest: Effort  normal. No respiratory distress. He has no wheezes.   Abdominal: Soft. He exhibits no distension. There is no tenderness.   Musculoskeletal: Normal range of motion. He exhibits no edema or tenderness.   BLE/feet dressing CDI.    Neurological: He is easily aroused. He appears lethargic. He exhibits normal muscle tone.   -  Mental Status:  Lethargic.  Opened eyes to voice.  Oriented to self only.  Follows commands.    -  Facial movement (CN VII): symmetrical.   -  Vision:  Difficulty opening right eye  -  Motor:  Moves all extremities spontaneously.  Unable to formally assess 22 patient participation.    Skin: Skin is warm and dry. No rash noted.   Psychiatric: He is slowed. Cognition and memory are impaired.   Bilateral wrist restraints + camera sitter He is inattentive.   Vitals reviewed.         Diagnostic Results:   Labs: Reviewed  EKG: Reviewed

## 2018-10-08 NOTE — PT/OT/SLP PROGRESS
Physical Therapy Treatment    Patient Name:  Mohamud Patel   MRN:  [id number]    Recommendations:     Discharge Recommendations:  rehabilitation facility   Discharge Equipment Recommendations: other (see comments)(TBD)   Barriers to discharge: Decreased caregiver support    Assessment:     Mohamud Patel is a 57 y.o. male admitted with a medical diagnosis of Alcohol withdrawal.  He presents with the following impairments/functional limitations:  weakness, gait instability, impaired endurance, decreased lower extremity function, impaired balance, decreased safety awareness, decreased coordination, impaired functional mobilty, impaired self care skills. Pt performed bed mobility CGA/min A and transfers min A. Pt took 3-4 side steps EOB min A with B HHA, ataxic gait and post lean requiring assist with WS and upright posture; x3 trials. Pt will continue to benefit from skilled PT to improve deficits and increase overall functional mobility.     Rehab Prognosis:  Good; patient would benefit from acute skilled PT services to address these deficits and reach maximum level of function.      Recent Surgery: * No surgery found *      Plan:     During this hospitalization, patient to be seen 3 x/week to address the above listed problems via gait training, therapeutic activities, therapeutic exercises, neuromuscular re-education  · Plan of Care Expires:  11/05/18   Plan of Care Reviewed with: patient    Subjective     Communicated with RN prior to session.  Patient found supine in bed upon PT entry to room, agreeable to treatment.      Chief Complaint: NA  Patient comments/goals: ambulate   Pain/Comfort:  · Pain Rating 1: 0/10  · Pain Rating Post-Intervention 1: 0/10    Patients cultural, spiritual, Moravian conflicts given the current situation: none stated    Objective:     Patient found with: telemetry     General Precautions: Standard, aspiration, fall, seizure   Orthopedic Precautions:N/A   Braces: N/A      Functional Mobility:  Bed Mobility:     · Supine to Sit: minimum assistance  · Sit to Supine: contact guard assistance    Transfers:     · Sit to Stand:  minimum assistance with no AD    Gait: 3-4 side steps EOB min A with B HHA, ataxic gait and post lean requiring assist with WS and upright posture; x3 trials       AM-PAC 6 CLICK MOBILITY  Turning over in bed (including adjusting bedclothes, sheets and blankets)?: 3  Sitting down on and standing up from a chair with arms (e.g., wheelchair, bedside commode, etc.): 3  Moving from lying on back to sitting on the side of the bed?: 3  Moving to and from a bed to a chair (including a wheelchair)?: 3  Need to walk in hospital room?: 3  Climbing 3-5 steps with a railing?: 2  Basic Mobility Total Score: 17       Therapeutic Activities and Exercises:  Pt sat EOB with CGA/SBA, verbal and tactile cues for upright posture.  Pt stood EOB min A with post lean, assist with WS.  Pt educated on:  -safety with mobility  -importance of OOB activity    Patient left HOB elevated with all lines intact, call button in reach, restraints reapplied at end of session and RN notified..    GOALS:   Multidisciplinary Problems     Physical Therapy Goals        Problem: Physical Therapy Goal    Goal Priority Disciplines Outcome Goal Variances Interventions   Physical Therapy Goal     PT, PT/OT Ongoing (interventions implemented as appropriate)     Description:  Goals to be met by: 10/16/18     Patient will increase functional independence with mobility by performin. Sit to stand transfer with Stand-by Assistance  2. Bed to chair transfer with Stand-by Assistance using LRD.   3. Gait  x 100 feet with Contact Guard Assistance using Rolling Walker or LRD>   4. Lower extremity exercise program x20  reps per handout, with independence                      Time Tracking:     PT Received On: 10/08/18  PT Start Time: 1318     PT Stop Time: 1341  PT Total Time (min): 23 min     Billable Minutes:  Gait Training 10 and Therapeutic Activity 13    Treatment Type: Treatment  PT/PTA: PT     PTA Visit Number: 0     GALINA MAYA, PT  10/08/2018

## 2018-10-08 NOTE — CONSULTS
Consult received.  Reviewed patient history and current admission.  Rehab team following.  Full consult to follow.    NICA Ferrer, FNP-C  Physical Medicine & Rehabilitation   10/08/2018  Spectralink: [phone]

## 2018-10-08 NOTE — ASSESSMENT & PLAN NOTE
Wounds assessed today. Stable with no signs of infection. Wounds appear to be doing well. Patient constantly begging to be brought outside so ROS unobtainable.     Plan:  -No surgical intervention warranted.  -Agree with Wound Care's recommendations.  -Podiatry will sign off. Please re-consult if wounds regress.

## 2018-10-08 NOTE — PT/OT/SLP PROGRESS
Occupational Therapy   Treatment    Name: Mohamud Patel  MRN: [id number]  Admitting Diagnosis:  Alcohol withdrawal       Recommendations:     Discharge Recommendations: rehabilitation facility  Discharge Equipment Recommendations:  (TBD)  Barriers to discharge:  None    Subjective     Communicated with: patient prior to session.  Pain/Comfort:  · Pain Rating 1: 0/10  · Pain Rating Post-Intervention 1: 0/10    Patients cultural, spiritual, Pentecostalism conflicts given the current situation: none reported     Objective:     Patient found with: telemetry    General Precautions: Standard, aspiration, fall, seizure   Orthopedic Precautions:N/A   Braces: N/A     Occupational Performance:    Bed Mobility:    · Patient completed Supine to Sit with minimum assistance  · Patient completed Sit to Supine with contact guard assistance     Functional Mobility/Transfers:  · Patient completed Sit <> Stand Transfer with minimum assistance  with  no assistive device   · Functional Mobility: Patient side stepped to [facility] HHA. Patient presents with ataxia and a posterior lean needing assistance to maintain upright posture    Activities of Daily Living:  · Upper Body Dressing: moderate assistance Donning and doffing back gown  · Lower Body Dressing: total assistance Donning and doffing socks    Patient left HOB elevated with call button in reach, bed alarm on, restraints reapplied at end of session, nurse notified and all needs met.     [facility] 6 Click:  AMPA Total Score: 14    Treatment & Education:  Role and purpose of OT  Education:  safety with functional mobility    Assessment:     Mohamud Patel is a 57 y.o. male with a medical diagnosis of Alcohol withdrawal.    Performance deficits affecting function are weakness, impaired endurance, impaired self care skills, impaired functional mobilty, gait instability, impaired balance, decreased safety awareness, decreased coordination, decreased lower extremity function. Patient  tolerated treatment session and was motivated to complete tasks. Patient was unsteady during functional mobility. Continue POC to decrease burden of care, maximize independence, and ensure safety prior to discharge.       Rehab Prognosis:  good; patient would benefit from acute skilled OT services to address these deficits and reach maximum level of function.       Plan:     Patient to be seen 3 x/week to address the above listed problems via self-care/home management, therapeutic activities, therapeutic exercises  · Plan of Care Expires: 11/04/18  · Plan of Care Reviewed with: patient    This Plan of care has been discussed with the patient who was involved in its development and understands and is in agreement with the identified goals and treatment plan    GOALS:   Multidisciplinary Problems     Occupational Therapy Goals        Problem: Occupational Therapy Goal    Goal Priority Disciplines Outcome Interventions   Occupational Therapy Goal     OT, PT/OT Ongoing (interventions implemented as appropriate)    Description:  Goals to be met by: 10/20  Patient will increase functional independence with ADLs by performing:    UE Dressing with Modified [place] while seated.  LE Dressing with Modified [place].  Grooming while standing at sink with Supervision.  Toileting from toilet with Modified [place] for hygiene and clothing management.   Supine to sit with Supervision and HOB flat.  Stand pivot transfers with Supervision to chair and/or toilet.  Functional mobility with AD as needed for short household distance with no LOB with supervision.                       Time Tracking:     OT Date of Treatment: 10/08/18  OT Start Time: 1321  OT Stop Time: 1340  OT Total Time (min): 19 min    Billable Minutes:Self Care/Home Management 19    SAM Rosario  10/8/2018

## 2018-10-08 NOTE — CONSULTS
[facility]  Podiatry  Consult Note    Patient Name: Mohamud Patel  MRN: [id number]  Admission Date: 10/4/2018  Hospital Length of Stay: 2 days  Attending Physician: Prateek Gudino MD  Primary Care Provider: Enmanuel Ferguson MD     Inpatient consult to Podiatry  Consult performed by: Chadwick Milton MD  Consult ordered by: Prateek Gudino MD  Reason for consult: b/l LE wounds  Assessment/Recommendations: Please see bottom of consult note for recommendations.        Subjective:     History of Present Illness:  Mohamud Patel is a 57 y.o. male who  has a past medical history of Neuropathy and Scabies. alcohol abuse and withdrawal    Patient admitted for alcohol withdrawal.  Consulted to Podiatry for foot wounds. Patient has history of foot wounds dating back to his last admission on 08/11/18. It is unclear if he has had follow up since but presents today with new wounds of unknown cause. Patient is unable to give any history as he is constantly pleading to be brought outside.       Review of Systems   Unable to perform ROS: Other   Patient refusing to answer questions. Constantly pleading to be brought outside.  Objective:      Vital Signs (Most Recent):  Temp: 97.9 °F (36.6 °C) (10/07/18 1508)  Pulse: 92 (10/07/18 1508)  Resp: 17 (10/07/18 1508)  BP: 116/72 (10/07/18 1508)  SpO2: 98 % (10/07/18 1508) Vital Signs (24h Range):  Temp:  [97.4 °F (36.3 °C)-98.5 °F (36.9 °C)] 97.9 °F (36.6 °C)  Pulse:  [66-95] 92  Resp:  [16-18] 17  SpO2:  [96 %-99 %] 98 %  BP: (115-156)/(69-86) 116/72      Weight: 67.2 kg (148 lb 2.4 oz)  Body mass index is 19.02 kg/m².     Foot Exam     General  General Appearance: appears stated age and healthy   Orientation: unable to assess   Affect: appropriate         Right Foot/Ankle      Inspection and Palpation  Ecchymosis: none  Tenderness: none   Swelling: none      Neurovascular  Dorsalis pedis: 2+  Posterior tibial: 2+  Saphenous nerve sensation:  diminished  Tibial nerve sensation: diminished  Superficial peroneal nerve sensation: diminished  Deep peroneal nerve sensation: diminished  Sural nerve sensation: diminished     Comments  B/l hallux wounds are stable with no malodor. Fibrotic bases with hyperkeratotic rim. No fluctuance, no erythema, no signs of infection.     The right anterior ankle wound is also stable. No drainage, no fluctuance, no signs of infection.     Left Foot/Ankle       Inspection and Palpation  Ecchymosis: none  Tenderness: none   Swelling: none      Neurovascular  Dorsalis pedis: 2+  Posterior tibial: 2+  Saphenous nerve sensation: diminished  Tibial nerve sensation: diminished  Superficial peroneal nerve sensation: diminished  Deep peroneal nerve sensation: diminished  Sural nerve sensation: diminished                                     Laboratory:  CBC:       Recent Labs   Lab  10/07/18   0615   WBC  4.91   RBC  3.43*   HGB  11.3*   HCT  33.4*   PLT  139*   MCV  97   MCH  32.9*   MCHC  33.8      CMP:         Recent Labs   Lab  10/07/18   0615    10/07/18   1539   GLU  97   --    --    CALCIUM  9.4   --    --    ALBUMIN  3.2*   --    --    PROT  7.3   --    --    NA  125*   < >  127*   K  3.3*   --    --    CO2  20*   --    --    CL  92*   --    --    BUN  8   --    --    CREATININE  0.6   --    --    ALKPHOS  109   --    --    ALT  30   --    --    AST  48*   --    --    BILITOT  0.8   --    --     < > = values in this interval not displayed.         Diagnostic Results:  None           Assessment/Plan:          Wound of lower extremity     Wounds assessed today. Stable with no signs of infection. Wounds appear to be doing well. Patient constantly begging to be brought outside so ROS unobtainable.      Plan:  -No surgical intervention warranted.  -Agree with Wound Care's recommendations.  -Podiatry will sign off. Please re-consult if wounds regress.                Chadwick Milton MD  Podiatry  [facility]            Thank you for your consult. I will sign off. Please contact us if you have any additional questions.    Chadwick Milton MD  Podiatry  [facility]

## 2018-10-08 NOTE — SUBJECTIVE & OBJECTIVE
Scheduled Meds:   cefTRIAXone (ROCEPHIN) IVPB  1 g Intravenous Q24H    diazePAM  10 mg Oral QID    enoxparin  40 mg Subcutaneous Daily    folic acid  1 mg Oral Daily    miconazole nitrate 2%   Topical (Top) BID    nicotine  1 patch Transdermal Daily    thiamine (VITAMIN B1) IVPB  250 mg Intravenous Daily     Continuous Infusions:   0/9% NACL & POTASSIUM CHLORIDE 20 MEQ/L 125 mL/hr at 10/07/18 0921     PRN Meds:lorazepam, sodium chloride 0.9%    Review of patient's allergies indicates:  No Known Allergies     Past Medical History:   Diagnosis Date    Neuropathy     Scabies      History reviewed. No pertinent surgical history.    Family History     None        Tobacco Use    Smoking status: Current Every Day Smoker     Packs/day: 2.00     Types: Cigarettes    Smokeless tobacco: Never Used   Substance and Sexual Activity    Alcohol use: Yes     Alcohol/week: 42.0 oz     Types: 70 Shots of liquor per week    Drug use: No    Sexual activity: Yes     Partners: Female     Review of Systems  Objective:     Vital Signs (Most Recent):  Temp: 98.5 °F (36.9 °C) (10/07/18 1939)  Pulse: 85 (10/07/18 1939)  Resp: 18 (10/07/18 1939)  BP: (!) 144/79 (10/07/18 1939)  SpO2: 98 % (10/07/18 1939) Vital Signs (24h Range):  Temp:  [97.4 °F (36.3 °C)-98.5 °F (36.9 °C)] 98.5 °F (36.9 °C)  Pulse:  [66-95] 85  Resp:  [16-18] 18  SpO2:  [96 %-99 %] 98 %  BP: (115-156)/(69-86) 144/79     Weight: 67.2 kg (148 lb 2.4 oz)  Body mass index is 19.02 kg/m².    Foot Exam    Laboratory:  CBC:   Recent Labs   Lab  10/07/18   0615   WBC  4.91   RBC  3.43*   HGB  11.3*   HCT  33.4*   PLT  139*   MCV  97   MCH  32.9*   MCHC  33.8     CMP:   Recent Labs   Lab  10/07/18   0615   10/07/18   1539   GLU  97   --    --    CALCIUM  9.4   --    --    ALBUMIN  3.2*   --    --    PROT  7.3   --    --    NA  125*   < >  127*   K  3.3*   --    --    CO2  20*   --    --    CL  92*   --    --    BUN  8   --    --    CREATININE  0.6   --    --    ALKPHOS   109   --    --    ALT  30   --    --    AST  48*   --    --    BILITOT  0.8   --    --     < > = values in this interval not displayed.       Diagnostic Results:  None

## 2018-10-08 NOTE — HPI
Mohamud Patel is a 57-year-old male with PMHx of chronic BLE wounds with onychomycosis, tobacco use, severe EtOH abuse, and recent admission 8/10/18-8/13/18 for RLE wound/cellulitis, crusted scabies, UTI, EtOH intoxication, and hyponatremia with discharge home.  Patient presented to [facility] [place] [place] on 10/4/18 for severe alcohol intoxication with concern from family that patient is unable to care for himself.  Patient's girlfriend reported patient to have episodes of difficulty breathing and urinating and defecating on himself.  In addition, he is not caring for chronic foot wounds or taking prescribed medications.  On arrival, toxicology presumptive positive for THC with serum alcohol level of 239.  Evaluated by Psych.  Presentation consistent with Wernicke's encephalopathy and severe alcohol use disorder, and IV Thiamine and Valium taper recommended.  Admitted to Hospital Medicine for further management.  Podiatry consulted for chronic LE wounds.  No surgical intervention indicated, wound care following.  Hospital course further complicated by UTI (urine culture +GNR) treated with Ceftriaxone, EtOH withdrawal, hyponatremia with possible beer potomania, alcoholic neuropathy, multiple BLE wounds, and discharge planning.      Functional History: Patient lives in [place] with his girlfriend in a single story home with threshold to enter.  Prior to admission, he required assistance with ADLs and mobility.  Girlfriend/caregiver provided assistance with all shopping, cooking, cleaning, bill paying, etc.  Frequent falls at home.  DME: [employer].

## 2018-10-08 NOTE — PLAN OF CARE
Problem: Patient Care Overview  Goal: Plan of Care Review  Outcome: Ongoing (interventions implemented as appropriate)  Patient turned and repositioned PRN. Continues with abrasions to backside and all extremities, protective cream applied. Patient pain and safety monitored q 1-2 hrs this shift. Dressing dry and intact to bilateral feet. Bedrest maintained. Bed locked and in lowest position. Bed side rails elevated x 3. Bed alarm set and audible. Avasys monitor in place. Remains disoriented to place and situation. Bilateral wrist restraint order renewed at 0511 for confusion/pulling at medical devices. Will continue to monitor   10/08/18 0532   Coping/Psychosocial   Plan Of Care Reviewed With patient

## 2018-10-08 NOTE — ASSESSMENT & PLAN NOTE
-  (I) at baseline--> GF reported patient not caring for himself at home  -  Evaluated by PT and OT- not at baseline  Recommendations  -  Encourage mobility, OOB in chair, and early ambulation as appropriate  -  PT/OT evaluate and treat  -  Pain management  -  DVT prophylaxis  -  Monitor for and prevent skin breakdown and pressure ulcers  · Early mobility, repositioning/weight shifting every 20-30 minutes when sitting, turn patient every 2 hours, proper mattress/overlay and chair cushioning, pressure relief/heel protector boots

## 2018-10-08 NOTE — HOSPITAL COURSE
10/6/18:  Evaluated by PT and OT.  Bed mobility CGA-Raul.  Sit to stand CGA and transfers Raul.  Ambulated 24 ft Raul.  LBD CGA-Raul.  10/7/18:  No PT or OT.   10/8/18:  Participated with PT and OT.  Bed mobility CGA-Raul.  EOB SBA-CGA.  Sit to stand Raul.  Ambulated 3-4 side steps Raul.  UBD modA and LBD totalA.   10/9/18:  No PT or OT.   10/10/18:  No PT or OT.  10/11/18:  Participated with PT and OT.  Bed mobility SV.  Sit to stand SBA with RW.  Ambulated 75 ft CGA with RW.  UBD Raul and LBD maxA.   10/15/18:  Participated with PT.  Bed mobility SV.  Sit to stand SBA with RW.  Ambulated 100 ft CGA with RW.   10/16/18:  No OT 2/2 agitation and attempting to leave.   10/17/18:  Participated with PT and OT.  Bed mobility mod(I)-SBA.  Sit to stand and transfers min-modA with RW.  Ambulated 20 ft Raul with RW.  UBD Raul and LBD maxA.    10/19/18:  Participated with PT.  Bed mobility CGA.  Sit to stand CGA with RW.  Ambulated 10 ft + 20 ft Raul with RW.   10/22/18:  Participated with PT and OT.  Bed mobility CGA-Raul.  Sit to stand mod-totalA (max-totalA x 2) with RW.  Ambulated 4 lateral steps totalA (maxA x 2) with RW.  UBD modA and LBD SBA.  10/23/18:  Participated with PT.  Evaluated by SLP.  Found to have cognitive-linguistic impairment with moderate deficits possibly near baseline d/t long term ETOH abuse, significant delusional behavior, and poor insight.  Will require significant supervision.  His ability to participate in skilled SLP services is limited d/t poor insight and understanding, as well as psych barriers.  Bed mobility SV-Raul.  Sit to stand min-maxA with RW.  Ambulated 5-6 ft forward and backwards maxA with RW.   10/24/18:  Participated with OT.  Showed improvement in cognition.  Bed mobility SV.  Sit to stand modA.  Ambulated 2 lateral steps maxA.  UBD Raul, grooming set-up assist, and toileting maxA.    10/25/18:  Participated with PT and OT.  Bed mobility SV.  Sit to stand CGA-Raul with RW.   Ambulated 20 ft min-modA with RW.  EOB SV.  UBD SBA.      10/28/18:  Participated with OT.  Bed mobility SBA.  Sit to stand CGA with RW.  Ambulated 15 ft Raul with RW.  LBD SBA.  Highly impulsive during session.

## 2018-10-08 NOTE — PLAN OF CARE
Problem: Patient Care Overview  Goal: Plan of Care Review  Outcome: Ongoing (interventions implemented as appropriate)  Neuro: Pt. is A & O x1 self. CIWA and Neurocheck completed-see assessment.    Bilateral soft wrist restraints maintained.   Skin: Wound care completed per order. Leah care completed.   Activity: Pt. worked with PT and OT today.   VSS. Assisted with Oral intake.   Safety and pt. care rounds maintained. Avasys in use. Wctm.

## 2018-10-08 NOTE — ASSESSMENT & PLAN NOTE
-  H/o EtOH use disorder  -  Presented intoxicated with serum alcohol level of 239  -  Psych following

## 2018-10-08 NOTE — PROGRESS NOTES
[facility]  Podiatry  Progress Note    Patient Name: Mohamud Patel  MRN: [id number]  Admission Date: 10/4/2018  Hospital Length of Stay: 2 days  Attending Physician: Prateek Gudino MD  Primary Care Provider: Enmanuel Ferguson MD   Scheduled Meds:   cefTRIAXone (ROCEPHIN) IVPB  1 g Intravenous Q24H    diazePAM  10 mg Oral QID    enoxparin  40 mg Subcutaneous Daily    folic acid  1 mg Oral Daily    miconazole nitrate 2%   Topical (Top) BID    nicotine  1 patch Transdermal Daily    thiamine (VITAMIN B1) IVPB  250 mg Intravenous Daily     Continuous Infusions:   0/9% NACL & POTASSIUM CHLORIDE 20 MEQ/L 125 mL/hr at 10/07/18 0921     PRN Meds:lorazepam, sodium chloride 0.9%    Review of patient's allergies indicates:  No Known Allergies     Past Medical History:   Diagnosis Date    Neuropathy     Scabies      History reviewed. No pertinent surgical history.    Family History     None        Tobacco Use    Smoking status: Current Every Day Smoker     Packs/day: 2.00     Types: Cigarettes    Smokeless tobacco: Never Used   Substance and Sexual Activity    Alcohol use: Yes     Alcohol/week: 42.0 oz     Types: 70 Shots of liquor per week    Drug use: No    Sexual activity: Yes     Partners: Female     Review of Systems   Unable to perform ROS: Other   Patient refusing to answer questions. Constantly pleading to be brought outside.  Objective:     Vital Signs (Most Recent):  Temp: 97.9 °F (36.6 °C) (10/07/18 1508)  Pulse: 92 (10/07/18 1508)  Resp: 17 (10/07/18 1508)  BP: 116/72 (10/07/18 1508)  SpO2: 98 % (10/07/18 1508) Vital Signs (24h Range):  Temp:  [97.4 °F (36.3 °C)-98.5 °F (36.9 °C)] 97.9 °F (36.6 °C)  Pulse:  [66-95] 92  Resp:  [16-18] 17  SpO2:  [96 %-99 %] 98 %  BP: (115-156)/(69-86) 116/72     Weight: 67.2 kg (148 lb 2.4 oz)  Body mass index is 19.02 kg/m².    Foot Exam    General  General Appearance: appears stated age and healthy   Orientation: unable to assess   Affect:  appropriate       Right Foot/Ankle     Inspection and Palpation  Ecchymosis: none  Tenderness: none   Swelling: none     Neurovascular  Dorsalis pedis: 2+  Posterior tibial: 2+  Saphenous nerve sensation: diminished  Tibial nerve sensation: diminished  Superficial peroneal nerve sensation: diminished  Deep peroneal nerve sensation: diminished  Sural nerve sensation: diminished    Comments  B/l hallux wounds are stable with no malodor. Fibrotic bases with hyperkeratotic rim. No fluctuance, no erythema, no signs of infection.    The right anterior ankle wound is also stable. No drainage, no fluctuance, no signs of infection.    Left Foot/Ankle      Inspection and Palpation  Ecchymosis: none  Tenderness: none   Swelling: none     Neurovascular  Dorsalis pedis: 2+  Posterior tibial: 2+  Saphenous nerve sensation: diminished  Tibial nerve sensation: diminished  Superficial peroneal nerve sensation: diminished  Deep peroneal nerve sensation: diminished  Sural nerve sensation: diminished                            Laboratory:  CBC:   Recent Labs   Lab  10/07/18   0615   WBC  4.91   RBC  3.43*   HGB  11.3*   HCT  33.4*   PLT  139*   MCV  97   MCH  32.9*   MCHC  33.8     CMP:   Recent Labs   Lab  10/07/18   0615   10/07/18   1539   GLU  97   --    --    CALCIUM  9.4   --    --    ALBUMIN  3.2*   --    --    PROT  7.3   --    --    NA  125*   < >  127*   K  3.3*   --    --    CO2  20*   --    --    CL  92*   --    --    BUN  8   --    --    CREATININE  0.6   --    --    ALKPHOS  109   --    --    ALT  30   --    --    AST  48*   --    --    BILITOT  0.8   --    --     < > = values in this interval not displayed.       Diagnostic Results:  None        Assessment/Plan:     Wound of lower extremity    Wounds assessed today. Stable with no signs of infection. Wounds appear to be doing well. Patient constantly begging to be brought outside so ROS unobtainable.     Plan:  -No surgical intervention warranted.  -Agree with Wound  Care's recommendations.  -Podiatry will sign off. Please re-consult if wounds regress.            Chadwick Milton MD  Podiatry  [facility]

## 2018-10-08 NOTE — CONSULTS
[facility]  Physical Medicine & Rehab  Consult Note    Patient Name: Mohamud Patel  MRN: [id number]  Admission Date: 10/4/2018  Hospital Length of Stay: 3 days  Attending Physician: Prateek Gudino MD     Inpatient consult to Physical Medicine & Rehabilitation  Consult performed by: Di Nickerson NP  Consult requested by:  Prateek Gudino MD    Collaborating Physician: Leydi Donald MD  Reason for Consult:  Rehab evaluation     Consults  Subjective:     Principal Problem: Alcohol withdrawal    HPI: Mohamud Patel is a 57-year-old male with PMHx of chronic BLE wounds with onychomycosis, tobacco use, severe EtOH abuse, and recent admission 8/10/18-8/13/18 for RLE wound/cellulitis, crusted scabies, UTI, EtOH intoxication, and hyponatremia with discharge home.  Patient presented to [facility] on 10/4/18 for severe alcohol intoxication with concern from family that patient is unable to care for himself.  Patient's girlfriend reported patient to have episodes of difficulty breathing and urinating and defecating on himself.  In addition, he is not caring for chronic foot wounds or taking prescribed medications.  On arrival, toxicology presumptive positive for THC with serum alcohol level of 239.  Evaluated by Psych.  Presentation consistent with Wernicke's encephalopathy and severe alcohol use disorder, and IV Thiamine and Valium taper recommended.  Admitted to Hospital Medicine for further management.  Hospital course further complicated by UTI (urine culture +GNR) treated with Ceftriaxone, EtOH withdrawal, hyponatremia with possible beer potomania, alcoholic neuropathy, and multiple BLE wounds.  Podiatry consulted for chronic LE wounds.  No surgical intervention indicated, wound care following.     Functional History: Patient lives in [place] with his girlfriend in a single story home with threshold to enter.  Prior to admission, he was (I) with ADLs, including driving, and mobility.  [place]    Hospital Course: 10/6/18:  Evaluated by PT and OT.  Bed mobility CGA-Raul.  Sit to stand CGA and transfers Raul.  Ambulated 24 ft Raul.  LBD CGA-Raul.      Past Medical History:   Diagnosis Date    Neuropathy     Scabies      History reviewed. No pertinent surgical history.  Review of patient's allergies indicates:  No Known Allergies    Scheduled Medications:    cefTRIAXone (ROCEPHIN) IVPB  1 g Intravenous Q24H    diazePAM  10 mg Oral QID    enoxparin  40 mg Subcutaneous Daily    folic acid  1 mg Oral Daily    miconazole nitrate 2%   Topical (Top) BID    nicotine  1 patch Transdermal Daily    thiamine (VITAMIN B1) IVPB  250 mg Intravenous Daily       PRN Medications: lorazepam, sodium chloride 0.9%    Family History     None        Tobacco Use    Smoking status: Current Every Day Smoker     Packs/day: 2.00     Types: Cigarettes    Smokeless tobacco: Never Used   Substance and Sexual Activity    Alcohol use: Yes     Alcohol/week: 42.0 oz     Types: 70 Shots of liquor per week    Drug use: No    Sexual activity: Yes     Partners: Female     Review of Systems   Unable to perform ROS: Other (impaired cognition/patient participation)     Objective:     Vital Signs (Most Recent):  Temp: 97.6 °F (36.4 °C) (10/08/18 0817)  Pulse: 66 (10/08/18 0817)  Resp: 20 (10/08/18 0817)  BP: 134/79 (10/08/18 0817)  SpO2: 99 % (10/08/18 0817)    Vital Signs (24h Range):  Temp:  [97.6 °F (36.4 °C)-98.5 °F (36.9 °C)] 97.6 °F (36.4 °C)  Pulse:  [66-92] 66  Resp:  [17-20] 20  SpO2:  [94 %-99 %] 99 %  BP: (116-160)/(72-85) 134/79     Body mass index is 19.19 kg/m².    Physical Exam   Constitutional: He appears well-developed and well-nourished. He appears lethargic. He is easily aroused. No distress.   HENT:   Head: Normocephalic. Head is with contusion (above R eye/eyebrow).   Right Ear: External ear normal.   Left Ear: External ear normal.   Nose: Nose normal.   Eyes: Right eye exhibits no discharge. Left eye exhibits  no discharge. No scleral icterus.   Neck: Normal range of motion.   Cardiovascular: Normal rate, regular rhythm and intact distal pulses.   Pulmonary/Chest: Effort normal. No respiratory distress. He has no wheezes.   Abdominal: Soft. He exhibits no distension. There is no tenderness.   Musculoskeletal: Normal range of motion. He exhibits no edema or tenderness.   BLE/feet dressing CDI.    Neurological: He is easily aroused. He appears lethargic. He exhibits normal muscle tone.   -  Mental Status:  Lethargic.  Opened eyes to voice.  Oriented to self only.  Follows commands.    -  Facial movement (CN VII): symmetrical.   -  Vision:  Difficulty opening right eye  -  Motor:  Moves all extremities spontaneously.  Unable to formally assess 22 patient participation.    Skin: Skin is warm and dry. No rash noted.   Psychiatric: He is slowed. Cognition and memory are impaired.   Bilateral wrist restraints + camera sitter He is inattentive.   Vitals reviewed.    Diagnostic Results:   Labs: Reviewed  EKG: Reviewed    Assessment/Plan:     Impaired mobility and ADLs    -  (I) at baseline--> GF reported patient not caring for himself at home  -  Evaluated by PT and OT- not at baseline  Recommendations  -  Encourage mobility, OOB in chair, and early ambulation as appropriate  -  PT/OT evaluate and treat  -  Pain management  -  DVT prophylaxis  -  Monitor for and prevent skin breakdown and pressure ulcers  · Early mobility, repositioning/weight shifting every 20-30 minutes when sitting, turn patient every 2 hours, proper mattress/overlay and chair cushioning, pressure relief/heel protector boots      Wernicke encephalopathy    -  On Thiamine       Hyponatremia    -  Possibly /2 beer potomania  -  NS with 20m Eq potassium at 125 ml/hr started  -  Monitoring sodium       Alcohol use disorder, severe, dependence    -  H/o EtOH use disorder  -  Presented intoxicated with serum alcohol level of 239  -  Psych following      Wound of  lower extremity    -  Wound care following  -  Podiatry consulted--> No surgical intervention indicated      Patient with therapy needs.  Lethargic with limited participation on evaluation.  Not medically ready for discharge.  Similar admission in August with discharge home.  Continue therapy.  Will follow progress for final post-acute/rehab recommendation.      Thank you for your consult.     ZEE Ferrer  Department of Physical Medicine & Rehab  [facility]     hand held assist

## 2018-10-09 LAB
ALBUMIN SERPL BCP-MCNC: 3.4 G/DL
ALP SERPL-CCNC: 100 U/L
ALT SERPL W/O P-5'-P-CCNC: 31 U/L
ANION GAP SERPL CALC-SCNC: 11 MMOL/L
AST SERPL-CCNC: 38 U/L
BASOPHILS # BLD AUTO: 0.07 K/UL
BASOPHILS NFR BLD: 1.5 %
BILIRUB SERPL-MCNC: 0.6 MG/DL
BUN SERPL-MCNC: 6 MG/DL
CALCIUM SERPL-MCNC: 9.6 MG/DL
CHLORIDE SERPL-SCNC: 103 MMOL/L
CO2 SERPL-SCNC: 20 MMOL/L
CREAT SERPL-MCNC: 0.6 MG/DL
DIFFERENTIAL METHOD: ABNORMAL
EOSINOPHIL # BLD AUTO: 0.2 K/UL
EOSINOPHIL NFR BLD: 3.5 %
ERYTHROCYTE [DISTWIDTH] IN BLOOD BY AUTOMATED COUNT: 13.4 %
EST. GFR  (AFRICAN AMERICAN): >60 ML/MIN/1.73 M^2
EST. GFR  (NON AFRICAN AMERICAN): >60 ML/MIN/1.73 M^2
GLUCOSE SERPL-MCNC: 87 MG/DL
HCT VFR BLD AUTO: 37.4 %
HGB BLD-MCNC: 12.7 G/DL
IMM GRANULOCYTES # BLD AUTO: 0.02 K/UL
IMM GRANULOCYTES NFR BLD AUTO: 0.4 %
LYMPHOCYTES # BLD AUTO: 0.9 K/UL
LYMPHOCYTES NFR BLD: 18.6 %
MAGNESIUM SERPL-MCNC: 1.7 MG/DL
MCH RBC QN AUTO: 33.8 PG
MCHC RBC AUTO-ENTMCNC: 34 G/DL
MCV RBC AUTO: 100 FL
MONOCYTES # BLD AUTO: 0.5 K/UL
MONOCYTES NFR BLD: 10.6 %
NEUTROPHILS # BLD AUTO: 3.1 K/UL
NEUTROPHILS NFR BLD: 65.4 %
NRBC BLD-RTO: 0 /100 WBC
PHOSPHATE SERPL-MCNC: 2.8 MG/DL
PLATELET # BLD AUTO: 178 K/UL
PMV BLD AUTO: 9.6 FL
POCT GLUCOSE: 88 MG/DL (ref 70–110)
POTASSIUM SERPL-SCNC: 3.3 MMOL/L
PROT SERPL-MCNC: 7.7 G/DL
RBC # BLD AUTO: 3.76 M/UL
SODIUM SERPL-SCNC: 134 MMOL/L
SODIUM SERPL-SCNC: 135 MMOL/L
SODIUM SERPL-SCNC: 138 MMOL/L
WBC # BLD AUTO: 4.79 K/UL

## 2018-10-09 PROCEDURE — 25000003 PHARM REV CODE 250: Performed by: HOSPITALIST

## 2018-10-09 PROCEDURE — 85025 COMPLETE CBC W/AUTO DIFF WBC: CPT

## 2018-10-09 PROCEDURE — 36415 COLL VENOUS BLD VENIPUNCTURE: CPT

## 2018-10-09 PROCEDURE — 99232 SBSQ HOSP IP/OBS MODERATE 35: CPT | Mod: ,,, | Performed by: HOSPITALIST

## 2018-10-09 PROCEDURE — 99232 SBSQ HOSP IP/OBS MODERATE 35: CPT | Mod: ,,, | Performed by: NURSE PRACTITIONER

## 2018-10-09 PROCEDURE — 84295 ASSAY OF SERUM SODIUM: CPT | Mod: 91

## 2018-10-09 PROCEDURE — 84295 ASSAY OF SERUM SODIUM: CPT

## 2018-10-09 PROCEDURE — 84100 ASSAY OF PHOSPHORUS: CPT

## 2018-10-09 PROCEDURE — 63600175 PHARM REV CODE 636 W HCPCS: Performed by: HOSPITALIST

## 2018-10-09 PROCEDURE — S4991 NICOTINE PATCH NONLEGEND: HCPCS | Performed by: HOSPITALIST

## 2018-10-09 PROCEDURE — 80053 COMPREHEN METABOLIC PANEL: CPT

## 2018-10-09 PROCEDURE — A4216 STERILE WATER/SALINE, 10 ML: HCPCS | Performed by: HOSPITALIST

## 2018-10-09 PROCEDURE — 11000001 HC ACUTE MED/SURG PRIVATE ROOM

## 2018-10-09 PROCEDURE — 83735 ASSAY OF MAGNESIUM: CPT

## 2018-10-09 RX ORDER — POTASSIUM CHLORIDE 20 MEQ/1
40 TABLET, EXTENDED RELEASE ORAL 2 TIMES DAILY
Status: COMPLETED | OUTPATIENT
Start: 2018-10-09 | End: 2018-10-09

## 2018-10-09 RX ADMIN — MICONAZOLE NITRATE: 20 OINTMENT TOPICAL at 10:10

## 2018-10-09 RX ADMIN — ENOXAPARIN SODIUM 40 MG: 100 INJECTION SUBCUTANEOUS at 06:10

## 2018-10-09 RX ADMIN — DIAZEPAM 10 MG: 5 TABLET ORAL at 10:10

## 2018-10-09 RX ADMIN — MICONAZOLE NITRATE: 20 OINTMENT TOPICAL at 09:10

## 2018-10-09 RX ADMIN — CIPROFLOXACIN HYDROCHLORIDE 500 MG: 500 TABLET, FILM COATED ORAL at 09:10

## 2018-10-09 RX ADMIN — LORAZEPAM 1 MG: 2 INJECTION, SOLUTION INTRAMUSCULAR; INTRAVENOUS at 04:10

## 2018-10-09 RX ADMIN — DIAZEPAM 10 MG: 5 TABLET ORAL at 02:10

## 2018-10-09 RX ADMIN — LORAZEPAM 1 MG: 2 INJECTION, SOLUTION INTRAMUSCULAR; INTRAVENOUS at 11:10

## 2018-10-09 RX ADMIN — FOLIC ACID 1 MG: 1 TABLET ORAL at 10:10

## 2018-10-09 RX ADMIN — THIAMINE HYDROCHLORIDE 250 MG: 100 INJECTION, SOLUTION INTRAMUSCULAR; INTRAVENOUS at 09:10

## 2018-10-09 RX ADMIN — Medication 5 ML: at 11:10

## 2018-10-09 RX ADMIN — POTASSIUM CHLORIDE 40 MEQ: 1500 TABLET, EXTENDED RELEASE ORAL at 10:10

## 2018-10-09 RX ADMIN — DIAZEPAM 10 MG: 5 TABLET ORAL at 09:10

## 2018-10-09 RX ADMIN — Medication 400 MG: at 09:10

## 2018-10-09 RX ADMIN — POTASSIUM CHLORIDE 40 MEQ: 1500 TABLET, EXTENDED RELEASE ORAL at 09:10

## 2018-10-09 RX ADMIN — NICOTINE 1 PATCH: 21 PATCH, EXTENDED RELEASE TRANSDERMAL at 10:10

## 2018-10-09 RX ADMIN — CIPROFLOXACIN HYDROCHLORIDE 500 MG: 500 TABLET, FILM COATED ORAL at 10:10

## 2018-10-09 RX ADMIN — Medication 400 MG: at 10:10

## 2018-10-09 NOTE — PROGRESS NOTES
"Hospital Medicine   Progress Note     [place] [facility] [place] Prateek Gudino MD   Admit Date: 10/4/2018   YONAS    Code status: Full Code   Principal Problem: Alcohol withdrawal     Interval hx: NAEO. Patient remains disoriented to place, asking me why I want to visit "[facility]" and saying he knows I "took the map."    ROS   Const: negative for fevers and chills  Respiratory: negative for cough, shortness of breath   Cardiovascular: negative for chest discomfort, palpitations   Gastrointestinal: negative for nausea or vomiting, abdominal pain, constipation, diarrhea     PEx   Temp:  [97.5 °F (36.4 °C)-98.7 °F (37.1 °C)]   Pulse:  [66-77]   Resp:  [16-20]   BP: (130-160)/(77-85)   SpO2:  [94 %-100 %]      I & O (Last 24H):     Intake/Output Summary (Last 24 hours) at 10/8/2018 2159  Last data filed at 10/8/2018 1800  Gross per 24 hour   Intake 1600 ml   Output 1600 ml   Net 0 ml     General appearance: no distress, siting up in bed, no tremors  Mental status: Awake and oriented to person only, not to place or time  Pulm:  normal respiratory effort, CTA B, no c/w/r  Card: RRR, S1, S2 normal, no murmur, click, rub or gallop  Abd: soft, NT, ND, BS present; no masses, no organomegaly  Ext: no clubbing  Pulses: 2+, symmetric  Skin: multiple scabbed lesions on bilateral feet, thickened skin on bilateral dorsal feet worse at the toes consistent with onychomycosis, no interdigital pits or evidence of scabies, large linear laceration with secondary healing on right foot, no surrounding erythema or induration  Neuro: ataxia with finger to nose bilaterally,  strength 5/5, wide-based ataxic gait    Recent Results (from the past 24 hour(s))   Sodium    Collection Time: 10/07/18 11:50 PM   Result Value Ref Range    Sodium 127 (L) 136 - 145 mmol/L   Comprehensive Metabolic Panel (CMP)    Collection Time: 10/08/18  6:58 AM   Result Value Ref Range    Sodium 131 (L) 136 - 145 mmol/L    Potassium 3.4 (L) 3.5 - 5.1 mmol/L    " Chloride 101 95 - 110 mmol/L    CO2 20 (L) 23 - 29 mmol/L    Glucose 89 70 - 110 mg/dL    BUN, Bld 7 6 - 20 mg/dL    Creatinine 0.7 0.5 - 1.4 mg/dL    Calcium 9.0 8.7 - 10.5 mg/dL    Total Protein 6.8 6.0 - 8.4 g/dL    Albumin 3.0 (L) 3.5 - 5.2 g/dL    Total Bilirubin 0.7 0.1 - 1.0 mg/dL    Alkaline Phosphatase 98 55 - 135 U/L    AST 41 (H) 10 - 40 U/L    ALT 29 10 - 44 U/L    Anion Gap 10 8 - 16 mmol/L    eGFR if African American >60.0 >60 mL/min/1.73 m^2    eGFR if non African American >60.0 >60 mL/min/1.73 m^2   Magnesium    Collection Time: 10/08/18  6:58 AM   Result Value Ref Range    Magnesium 1.5 (L) 1.6 - 2.6 mg/dL   Phosphorus    Collection Time: 10/08/18  6:58 AM   Result Value Ref Range    Phosphorus 3.1 2.7 - 4.5 mg/dL   CBC with Automated Differential    Collection Time: 10/08/18  6:58 AM   Result Value Ref Range    WBC 4.40 3.90 - 12.70 K/uL    RBC 3.47 (L) 4.60 - 6.20 M/uL    Hemoglobin 11.7 (L) 14.0 - 18.0 g/dL    Hematocrit 33.6 (L) 40.0 - 54.0 %    MCV 97 82 - 98 fL    MCH 33.7 (H) 27.0 - 31.0 pg    MCHC 34.8 32.0 - 36.0 g/dL    RDW 13.2 11.5 - 14.5 %    Platelets 146 (L) 150 - 350 K/uL    MPV 9.8 9.2 - 12.9 fL    Immature Granulocytes 0.5 0.0 - 0.5 %    Gran # (ANC) 2.7 1.8 - 7.7 K/uL    Immature Grans (Abs) 0.02 0.00 - 0.04 K/uL    Lymph # 1.0 1.0 - 4.8 K/uL    Mono # 0.5 0.3 - 1.0 K/uL    Eos # 0.1 0.0 - 0.5 K/uL    Baso # 0.05 0.00 - 0.20 K/uL    nRBC 0 0 /100 WBC    Gran% 60.6 38.0 - 73.0 %    Lymph% 23.0 18.0 - 48.0 %    Mono% 11.8 4.0 - 15.0 %    Eosinophil% 3.0 0.0 - 8.0 %    Basophil% 1.1 0.0 - 1.9 %    Differential Method Automated    Sodium    Collection Time: 10/08/18  6:58 AM   Result Value Ref Range    Sodium 131 (L) 136 - 145 mmol/L   Sodium    Collection Time: 10/08/18  8:58 AM   Result Value Ref Range    Sodium 130 (L) 136 - 145 mmol/L   Sodium    Collection Time: 10/08/18 12:31 PM   Result Value Ref Range    Sodium 132 (L) 136 - 145 mmol/L   Sodium    Collection Time: 10/08/18   3:41 PM   Result Value Ref Range    Sodium 133 (L) 136 - 145 mmol/L   Sodium    Collection Time: 10/08/18  7:43 PM   Result Value Ref Range    Sodium 134 (L) 136 - 145 mmol/L      cefTRIAXone (ROCEPHIN) IVPB  1 g Intravenous Q24H    diazePAM  10 mg Oral TID    enoxparin  40 mg Subcutaneous Daily    folic acid  1 mg Oral Daily    miconazole nitrate 2%   Topical (Top) BID    nicotine  1 patch Transdermal Daily    thiamine (VITAMIN B1) IVPB  250 mg Intravenous Daily     lorazepam, sodium chloride 0.9%    Assessment & Plan:  Mohamud Patel is a 57 y.o. man with a history of Severe Alcohol Abuse, Chronic Wounds of Bilateral LE's with Onychomycosis, Alcoholic Neuropathy, Hyponatremia, and Tobacco Use Disorder who was brought to the ED by EMS after his girlfriend called 911 while the patient was severely intoxicated. He is admitted to Hospital Medicine for Alcohol Withdrawal, possible Wernicke's Encephalopathy, and Acute Cystitis.     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 Initial ethanol level elevated (239); UDS positive for marijuana. Denies any history of Alcoholic Hallucinosis, DT's, or Withdrawal Seizures, but showing signs of alcohol withdrawal - CIWA (14) at admission: Tremor, Agitation, Orientation  - Attempted symptom triggered therapy, per guidelines; however, patient was not receiving adequate benzodiazepine coverage for withdrawal symptoms, Started Valium at 10 mg four times daily today > tapered 10/8 to Valium 10 mg TID.  - Ativan 1 mg IV PRN breakthrough symptoms or withdrawal seizures.   - CIWA assessments every 4 hours per nursing  - For Wernicke's Encephalopathy: Thiamine 500 mg IV every 8 hours, infuse over 30 minutes x 2 days > Thiamine 250 mg IV daily x 5 days > Thiamine 100 mg PO  daily. Folic acid and MVI daily. Patient confabulating and exam concerning for WE  - Hyponatremia improvin (10/8), possibly 2/2 Beer Potomania -  however, Urine osm and sodium elevated, TSH and Lipid Panel wnl's: continue 0.9% NS with 20m Eq potassium at 125 cc/hour, with q4 hour sodiums, goal for 10/9 of 135  - Daily CMP, Mg, Phos  - Fall, aspiration, and seizure precautions  - PT/OT consulted  - Homocysteine level wnl's, MMA pending - low suspicion for Vitamin B12 deficiency at this time     Acute Cystitis without Hematuria  - UA again consistent with UTI without significant hematuria, UCx: E.Coli, sensitive to Ciprofloxacin and Augmentin. De-escalate from Ceftriaxone 1 g IV daily to Ciprofloxacin 500 mg PO BID for 6 additional days, end 10/14 Ciprofloxacin     Multiple Bilateral Lower Extremity Wounds  Onychomycosis of Bilateral Feet   - No signs of active infection and no evidence of systemic toxicity. Starting CTX for UTI per above  - Wound Care consulted, appreciate assistance - have debrided wounds, applying medi-honey, no evidence of active infection. Miconazole ointment to toes  - Podiatry consulted for further assistance     Tobacco Use Disorder  - Nicotine 21 mg patch daily        DVT PPx: Lovenox SubQ Daily    Diet: Regular  Discharge plan and follow up: Plan for possible D/C to SNF following medical stabalization      Prateek Gudino MD  Hospital Medicine Staff  [facility]   Pager: [phone]

## 2018-10-09 NOTE — PROGRESS NOTES
[facility]  Physical Medicine & Rehab  Progress Note    Patient Name: Mohamud Patel  MRN: [id number]  Admission Date: 10/4/2018  Length of Stay: 4 days  Attending Physician: Sobia Ortiz MD    Subjective:     Principal Problem:Alcohol withdrawal    Hospital Course:   10/6/18:  Evaluated by PT and OT.  Bed mobility CGA-Raul.  Sit to stand CGA and transfers Raul.  Ambulated 24 ft Raul.  LBD CGA-Raul.  10/8/18:  Participated with PT and OT.  Bed mobility CGA-Raul.  EOB SBA-CGA.  Sit to stand Raul.  Ambulated 3-4 side steps Raul.  UBD modA and LBD totalA.     Interval History 10/9/2018:  Patient is seen for follow-up rehab evaluation and recommendations: No acute events over night.  More awake and interactive today, disoriented to place, time, and situation.  Camera sitter at bedside, restraints discontinued.   Participating with therapy, not at functional baseline.     HPI, Past Medical, Family, and Social History remains the same as documented in the initial encounter.    Scheduled Medications:    ciprofloxacin HCl  500 mg Oral Q12H    diazePAM  10 mg Oral TID    enoxparin  40 mg Subcutaneous Daily    folic acid  1 mg Oral Daily    magnesium oxide  400 mg Oral BID    miconazole nitrate 2%   Topical (Top) BID    nicotine  1 patch Transdermal Daily    potassium chloride  40 mEq Oral BID    thiamine (VITAMIN B1) IVPB  250 mg Intravenous Daily     PRN Medications: lorazepam, sodium chloride 0.9%    Review of Systems   Constitutional: Positive for activity change. Negative for chills, fatigue and fever.   HENT: Negative for drooling, hearing loss, trouble swallowing and voice change.    Eyes: Negative for pain and visual disturbance.   Respiratory: Negative for cough, shortness of breath and wheezing.    Cardiovascular: Negative for chest pain and palpitations.   Gastrointestinal: Negative for abdominal pain, nausea and vomiting.   Genitourinary: Negative for difficulty urinating and  flank pain.   Musculoskeletal: Positive for gait problem and myalgias. Negative for back pain and neck pain.   Skin: Positive for wound. Negative for rash.   Neurological: Negative for dizziness, numbness and headaches.   Psychiatric/Behavioral: Positive for confusion. Negative for agitation and hallucinations. The patient is not nervous/anxious.      Objective:     Vital Signs (Most Recent):  Temp: 98 °F (36.7 °C) (10/09/18 1047)  Pulse: 83 (10/09/18 0819)  Resp: 20 (10/09/18 0819)  BP: (!) 154/85 (10/09/18 0819)  SpO2: 99 % (10/09/18 0819)    Vital Signs (24h Range):  Temp:  [97.5 °F (36.4 °C)-98.7 °F (37.1 °C)] 98 °F (36.7 °C)  Pulse:  [64-83] 83  Resp:  [16-20] 20  SpO2:  [97 %-100 %] 99 %  BP: (140-173)/(77-85) 154/85     Physical Exam   Constitutional: He is easily aroused. No distress.   Appears thin, older than stated age   HENT:   Head: Normocephalic and atraumatic.   Right Ear: External ear normal.   Left Ear: External ear normal.   Nose: Nose normal.   Eyes: Right eye exhibits no discharge. Left eye exhibits no discharge. No scleral icterus.   Neck: Normal range of motion.   Cardiovascular: Normal rate, regular rhythm and intact distal pulses.   Pulmonary/Chest: Effort normal. No respiratory distress. He has no wheezes.   Abdominal: Soft. He exhibits no distension. There is no tenderness.   Musculoskeletal: Normal range of motion. He exhibits no edema or tenderness.   BLE/feet dressing CDI.    Neurological: He is alert and easily aroused. No sensory deficit. He exhibits normal muscle tone.   -  Mental Status:  Awake and alert.  Oriented to self only.  Follows commands.    -  Facial movement (CN VII): symmetrical.   -  Motor:  Moves all extremities spontaneously.  No focal weakness.    Skin: Skin is warm and dry. No rash noted.   Psychiatric: He has a normal mood and affect. His behavior is normal. Cognition and memory are impaired.   Camera sitter at bedside   Vitals reviewed.    Diagnostic Results:    Labs: Reviewed  EKG: Reviewed  CTH: Reviewed    Assessment/Plan:      Impaired mobility and ADLs    -  (I) at baseline--> GF reported patient not caring for himself at home  -  Evaluated by PT and OT- not at baseline  Recommendations  -  Encourage mobility, OOB in chair, and early ambulation as appropriate  -  PT/OT evaluate and treat  -  Pain management  -  DVT prophylaxis  -  Monitor for and prevent skin breakdown and pressure ulcers  · Early mobility, repositioning/weight shifting every 20-30 minutes when sitting, turn patient every 2 hours, proper mattress/overlay and chair cushioning, pressure relief/heel protector boots      Wernicke encephalopathy    -  On Thiamine, folic acid, MVI  -  CT 10/8/18 without acute pathology      Hyponatremia    -  Possibly /2 beer potomania  -  Replacing as needed  -  Monitoring      Alcohol use disorder, severe, dependence    -  H/o EtOH use disorder  -  Presented intoxicated with serum alcohol level of 239  -  Psych following      Wound of lower extremity    -  Wound care following  -  Podiatry consulted--> No surgical intervention indicated      Patient with therapy needs.  Mental status better today.  Will follow progress with therapy and improvement in encephalopathy for final post-acute/rehab recommendation.    ZEE Ferrer  Department of Physical Medicine & Rehab   [facility]

## 2018-10-09 NOTE — PROGRESS NOTES
"Hospital Medicine   Progress note      Team: [facility] R Sobia Ortiz MD   Admit Date: 10/4/2018   Hospital Day: 4  YONAS: 10/12/2018   Code status: Full Code   Principal Problem: Alcohol withdrawal     Summary:  Mohamud Patel is a 57 y.o. man with a history of Severe Alcohol Abuse, Chronic Wounds of Bilateral LE's with Onychomycosis, Alcoholic Neuropathy, Hyponatremia, and Tobacco Use Disorder who was brought to the ED by EMS after his girlfriend called 911 while the patient was severely intoxicated. He is admitted to Hospital Medicine for Alcohol Withdrawal, possible Wernicke's Encephalopathy, and Acute Cystitis.    Interval hx:   Pt was seen and examined at bedside. Pt is acutely confused and actively confabulating. He tells he "is worried about the notes in his chart and wants to make sure everything is updated for his wife". Denies any acute complaints. In the afternoon pt was noted to be increasingly agitated, actively having tactile hallucinations, attempting to get out of bed. He was given ativan IV x1 and bedside sitter was ordered.     ROS (Positive in Bold, otherwise negative) unable to obtain proper review of systems due to pts confusion but pt replies "no" to all questions.   Constitutional: fever, chills, night sweats  CV: chest pain, edema, palpitations  Resp: SOB, cough, sputum production  GI: changes in appetite, NVDC, pain, melena, hematochezia, GERD, hematemesis  : Dysuria, hematuria, urinary urgency, frequency  MSK: arthralgia/myalgia, joint swelling  Neuro/Psych: anxiety, depression    PEx   Temp:  [97.3 °F (36.3 °C)-98.6 °F (37 °C)]   Pulse:  [64-99]   Resp:  [16-20]   BP: (124-173)/(78-85)   SpO2:  [97 %-100 %]      I & O (Last 24H):     Intake/Output Summary (Last 24 hours) at 10/9/2018 1650  Last data filed at 10/8/2018 1800  Gross per 24 hour   Intake 1150 ml   Output 200 ml   Net 950 ml       General:  male  in no acute distress. Nontoxic. Resting in bed. " Cooperative.  HEENT: NCAT. PERRL. EOMI. Sclera Anicteric.  CVS: RRR. Normal S1 S2. No murmurs  Pulm: CTAB. Normal respiratory effort. No wheezes, rhonchi, or crackles.  Abdomen: Soft. Non-distended. No tenderness to palpation. No rebound or guarding. +BS.  Extremities: No edema. No cyanosis. Full ROM.  Skin: multiple scabbed lesions on bilateral feet, thickened skin on bilateral dorsal feet worse at the toes consistent with onychomycosis, no interdigital pits or evidence of scabies, large linear laceration with secondary healing on right foot, no surrounding erythema or induration  Neuro: Alert, oriented x 1, Spont mvt of all extremities with no focal deficits noted.    Recent Results (from the past 24 hour(s))   Sodium    Collection Time: 10/08/18  7:43 PM   Result Value Ref Range    Sodium 134 (L) 136 - 145 mmol/L   Sodium    Collection Time: 10/09/18 12:39 AM   Result Value Ref Range    Sodium 134 (L) 136 - 145 mmol/L   Comprehensive Metabolic Panel (CMP)    Collection Time: 10/09/18  6:31 AM   Result Value Ref Range    Sodium 134 (L) 136 - 145 mmol/L    Potassium 3.3 (L) 3.5 - 5.1 mmol/L    Chloride 103 95 - 110 mmol/L    CO2 20 (L) 23 - 29 mmol/L    Glucose 87 70 - 110 mg/dL    BUN, Bld 6 6 - 20 mg/dL    Creatinine 0.6 0.5 - 1.4 mg/dL    Calcium 9.6 8.7 - 10.5 mg/dL    Total Protein 7.7 6.0 - 8.4 g/dL    Albumin 3.4 (L) 3.5 - 5.2 g/dL    Total Bilirubin 0.6 0.1 - 1.0 mg/dL    Alkaline Phosphatase 100 55 - 135 U/L    AST 38 10 - 40 U/L    ALT 31 10 - 44 U/L    Anion Gap 11 8 - 16 mmol/L    eGFR if African American >60.0 >60 mL/min/1.73 m^2    eGFR if non African American >60.0 >60 mL/min/1.73 m^2   Magnesium    Collection Time: 10/09/18  6:31 AM   Result Value Ref Range    Magnesium 1.7 1.6 - 2.6 mg/dL   Phosphorus    Collection Time: 10/09/18  6:31 AM   Result Value Ref Range    Phosphorus 2.8 2.7 - 4.5 mg/dL   CBC with Automated Differential    Collection Time: 10/09/18  6:31 AM   Result Value Ref Range    WBC  4.79 3.90 - 12.70 K/uL    RBC 3.76 (L) 4.60 - 6.20 M/uL    Hemoglobin 12.7 (L) 14.0 - 18.0 g/dL    Hematocrit 37.4 (L) 40.0 - 54.0 %     (H) 82 - 98 fL    MCH 33.8 (H) 27.0 - 31.0 pg    MCHC 34.0 32.0 - 36.0 g/dL    RDW 13.4 11.5 - 14.5 %    Platelets 178 150 - 350 K/uL    MPV 9.6 9.2 - 12.9 fL    Immature Granulocytes 0.4 0.0 - 0.5 %    Gran # (ANC) 3.1 1.8 - 7.7 K/uL    Immature Grans (Abs) 0.02 0.00 - 0.04 K/uL    Lymph # 0.9 (L) 1.0 - 4.8 K/uL    Mono # 0.5 0.3 - 1.0 K/uL    Eos # 0.2 0.0 - 0.5 K/uL    Baso # 0.07 0.00 - 0.20 K/uL    nRBC 0 0 /100 WBC    Gran% 65.4 38.0 - 73.0 %    Lymph% 18.6 18.0 - 48.0 %    Mono% 10.6 4.0 - 15.0 %    Eosinophil% 3.5 0.0 - 8.0 %    Basophil% 1.5 0.0 - 1.9 %    Differential Method Automated    Sodium    Collection Time: 10/09/18  6:31 AM   Result Value Ref Range    Sodium 134 (L) 136 - 145 mmol/L   POCT glucose    Collection Time: 10/09/18  8:08 AM   Result Value Ref Range    POCT Glucose 88 70 - 110 mg/dL   Sodium    Collection Time: 10/09/18  8:36 AM   Result Value Ref Range    Sodium 135 (L) 136 - 145 mmol/L   Sodium    Collection Time: 10/09/18 11:29 AM   Result Value Ref Range    Sodium 135 (L) 136 - 145 mmol/L   Sodium    Collection Time: 10/09/18  3:41 PM   Result Value Ref Range    Sodium 135 (L) 136 - 145 mmol/L       Recent Labs   Lab  10/09/18   0808   POCTGLUCOSE  88       Hemoglobin A1C   Date Value Ref Range Status   10/05/2018 5.3 4.0 - 5.6 % Final     Comment:     ADA Screening Guidelines:  5.7-6.4%  Consistent with prediabetes  >or=6.5%  Consistent with diabetes  High levels of fetal hemoglobin interfere with the HbA1C  assay. Heterozygous hemoglobin variants (HbS, HgC, etc)do  not significantly interfere with this assay.   However, presence of multiple variants may affect accuracy.     08/10/2018 4.4 4.0 - 5.6 % Final     Comment:     ADA Screening Guidelines:  5.7-6.4%  Consistent with prediabetes  >or=6.5%  Consistent with diabetes  High levels of  fetal hemoglobin interfere with the HbA1C  assay. Heterozygous hemoglobin variants (HbS, HgC, etc)do  not significantly interfere with this assay.   However, presence of multiple variants may affect accuracy.          Active Hospital Problems    Diagnosis  POA    *Alcohol withdrawal [F10.239]  Yes    Impaired mobility and ADLs [Z74.09]  Unknown    Ulcer of toe of left foot [L97.529]  Yes    Abrasion [T14.8XXA]  Yes    Alcohol withdrawal delirium [F10.231]  Yes    Mental health disorder [F99]  Yes    Wernicke encephalopathy [E51.2]  Yes    Alcoholic peripheral neuropathy [G62.1]  Yes    Wound of lower extremity [S81.809A]  Yes    Alcohol use disorder, severe, dependence [F10.20]  Yes     Chronic    Hyponatremia [E87.1]  Yes      Resolved Hospital Problems   No resolved problems to display.      Assessment and Plan for problems addressed today:      ciprofloxacin HCl  500 mg Oral Q12H    diazePAM  10 mg Oral TID    enoxparin  40 mg Subcutaneous Daily    folic acid  1 mg Oral Daily    magnesium oxide  400 mg Oral BID    miconazole nitrate 2%   Topical (Top) BID    nicotine  1 patch Transdermal Daily    potassium chloride  40 mEq Oral BID    thiamine (VITAMIN B1) IVPB  250 mg Intravenous Daily     lorazepam, sodium chloride 0.9%    Alcohol Withdrawal  Severe Alcohol Use Disorder  Concern for Wernicke's Encephalopathy (WE)  Hyponatremia, Suspect Beer Potomania (improving)  Alcoholic Neuropathy  -Patient presents with severe alcohol abuse disorder, last drink morning of admission (10/4). Initial ethanol level elevated (239); UDS positive for marijuana. Denies any history of Alcoholic Hallucinosis, DT's, or Withdrawal Seizures, but showing signs of alcohol withdrawal - CIWA (14) at admission: Tremor, Agitation, Orientation  -Started Valium at 10 mg four times daily today > tapered 10/8 to Valium 10 mg TID.  -Ativan 1 mg IV PRN breakthrough symptoms or withdrawal seizures.   -CIWA assessments every 4  hours per nursing  -For Wernicke's Encephalopathy: Thiamine 500 mg IV every 8 hours, infuse over 30 minutes x 2 days > Thiamine 250 mg IV daily x 5 days > Thiamine 100 mg PO daily. Folic acid and MVI daily. Patient confabulating and exam concerning for WE  -Hyponatremia improvin (10/8), possibly 2/2 Beer Potomania -  however, Urine osm and sodium elevated, TSH and Lipid Panel wnl's  -pt was given IVF, Na improved to 135, will stop IVF, encourage PO intake   -Daily CMP, Mg, Phos  -Fall, aspiration, and seizure precautions  -PT/OT consulted  -Homocysteine level wnl's, MMA pending - low suspicion for Vitamin B12 deficiency at this time     Acute Cystitis without Hematuria  -UA again consistent with UTI without significant hematuria, UCx: E.Coli, sensitive to Ciprofloxacin and Augmentin.  -was given Ceftriaxone 1 g IV daily inially,now changed to Ciprofloxacin 500 mg PO BID for 6 additional days, end 10/14 Ciprofloxacin      Multiple Bilateral Lower Extremity Wounds  Onychomycosis of Bilateral Feet   -No signs of active infection and no evidence of systemic toxicity. Starting CTX for UTI per above  -Wound Care consulted, appreciate assistance - have debrided wounds, applying medi-honey, no evidence of active infection. Miconazole ointment to toes  -Podiatry consulted for further assistance     Tobacco Use Disorder  -Nicotine 21 mg patch daily     DVT PPx: Lovenox SubQ Daily    Diet: Regular  Discharge plan and follow up: Plan for possible D/C to SNF vs NH following medical stabalization    Sobia Ortiz MD  Hospital Medicine Staff  [phone] pager

## 2018-10-09 NOTE — PLAN OF CARE
Problem: Patient Care Overview  Goal: Plan of Care Review  Outcome: Ongoing (interventions implemented as appropriate)  Pt free of falls/injuries throughout the shift. Bed locked, in lowest position, call bell within reach. Pt afebrile, pain assessed & denied. VSS, pt attempting to get out of bed.  Pt restless and needed re-directing during the night, will continue to monitor.

## 2018-10-09 NOTE — PLAN OF CARE
On Discharge planning assessment patient is in bed, resting quietly, pt takes long pauses when answering questions and some answers are not appropriate responses.  Introduced myself and CM role in patients care plan, patient verbalized understanding.     Pt lives in a single story home alone. Patient denies HD, H/H and coumadin.  Verified Pts Address, Emergency Contact, Pharmacy and PCP.     Pt denies any concerns for this visit, CM will continue to follow. CM name and number placed on patients white board and Health Packet given to the patient with a brief overview of the information provided patient verbalized understanding.        10/09/18 0916   Discharge Assessment   Assessment Type Discharge Planning Assessment   Confirmed/corrected address and phone number on facesheet? Yes   Assessment information obtained from? Patient   Communicated expected length of stay with patient/caregiver no  (MD will discuss )   Prior to hospitilization cognitive status: Inappropriate Behavior;Not Oriented to Place;Not Oriented to Time   Prior to hospitalization functional status: Independent   Current cognitive status: Inappropriate Behavior;Not Oriented to Place   Current Functional Status: Needs Assistance   Facility Arrived From: N/A   Lives With significant other   Able to Return to Prior Arrangements unable to determine at this time (comments)   Is patient able to care for self after discharge? No   Who are your caregiver(s) and their phone number(s)? Chitra Jordi-Significant vupto-[phone]   Patient's perception of discharge disposition nursing home   Readmission Within The Last 30 Days no previous admission in last 30 days   Patient currently being followed by outpatient case management? No   Patient currently receives any other outside agency services? No   Equipment Currently Used at Home none   Do you have any problems affording any of your prescribed medications? No   Is the patient taking medications as  prescribed? yes   Does the patient have transportation home? Yes   Transportation Available Medicaid transportation   Dialysis Name and Scheduled days N/A   Does the patient receive services at the [facility]? No   Discharge Plan A Rehab   Discharge Plan B New Nursing Home placement - shelter care facility   Patient/Family In Agreement With Plan yes     Enmanuel Ferguson MD  [address] [address] / [place] [address]    Extended Emergency Contact Information  Primary Emergency Contact: Chitra Bacon   [place]  Mobile Phone: [phone]  Relation: Significant other      [id number] - [place] - [address]  [address]  [place] [address]  Phone: [phone] Fax: [phone]

## 2018-10-09 NOTE — SUBJECTIVE & OBJECTIVE
Interval History 10/9/2018:  Patient is seen for follow-up rehab evaluation and recommendations: No acute events over night.  More awake and interactive today, disoriented to place, time, and situation.  Camera sitter at bedside, restraints discontinued.   Participating with therapy, not at functional baseline.     HPI, Past Medical, Family, and Social History remains the same as documented in the initial encounter.    Scheduled Medications:    ciprofloxacin HCl  500 mg Oral Q12H    diazePAM  10 mg Oral TID    enoxparin  40 mg Subcutaneous Daily    folic acid  1 mg Oral Daily    magnesium oxide  400 mg Oral BID    miconazole nitrate 2%   Topical (Top) BID    nicotine  1 patch Transdermal Daily    potassium chloride  40 mEq Oral BID    thiamine (VITAMIN B1) IVPB  250 mg Intravenous Daily     PRN Medications: lorazepam, sodium chloride 0.9%    Review of Systems   Constitutional: Positive for activity change. Negative for chills, fatigue and fever.   HENT: Negative for drooling, hearing loss, trouble swallowing and voice change.    Eyes: Negative for pain and visual disturbance.   Respiratory: Negative for cough, shortness of breath and wheezing.    Cardiovascular: Negative for chest pain and palpitations.   Gastrointestinal: Negative for abdominal pain, nausea and vomiting.   Genitourinary: Negative for difficulty urinating and flank pain.   Musculoskeletal: Positive for gait problem and myalgias. Negative for back pain and neck pain.   Skin: Positive for wound. Negative for rash.   Neurological: Negative for dizziness, numbness and headaches.   Psychiatric/Behavioral: Positive for confusion. Negative for agitation and hallucinations. The patient is not nervous/anxious.      Objective:     Vital Signs (Most Recent):  Temp: 98 °F (36.7 °C) (10/09/18 1047)  Pulse: 83 (10/09/18 0819)  Resp: 20 (10/09/18 0819)  BP: (!) 154/85 (10/09/18 0819)  SpO2: 99 % (10/09/18 0819)    Vital Signs (24h Range):  Temp:  [97.5 °F  (36.4 °C)-98.7 °F (37.1 °C)] 98 °F (36.7 °C)  Pulse:  [64-83] 83  Resp:  [16-20] 20  SpO2:  [97 %-100 %] 99 %  BP: (140-173)/(77-85) 154/85     Physical Exam   Constitutional: He is easily aroused. No distress.   Appears thin, older than stated age   HENT:   Head: Normocephalic and atraumatic.   Right Ear: External ear normal.   Left Ear: External ear normal.   Nose: Nose normal.   Eyes: Right eye exhibits no discharge. Left eye exhibits no discharge. No scleral icterus.   Neck: Normal range of motion.   Cardiovascular: Normal rate, regular rhythm and intact distal pulses.   Pulmonary/Chest: Effort normal. No respiratory distress. He has no wheezes.   Abdominal: Soft. He exhibits no distension. There is no tenderness.   Musculoskeletal: Normal range of motion. He exhibits no edema or tenderness.   BLE/feet dressing CDI.    Neurological: He is alert and easily aroused. No sensory deficit. He exhibits normal muscle tone.   -  Mental Status:  Awake and alert.  Oriented to self only.  Follows commands.    -  Facial movement (CN VII): symmetrical.   -  Motor:  Moves all extremities spontaneously.  No focal weakness.    Skin: Skin is warm and dry. No rash noted.   Psychiatric: He has a normal mood and affect. His behavior is normal. Cognition and memory are impaired.   Camera sitter at bedside   Vitals reviewed.    Diagnostic Results:   Labs: Reviewed  EKG: Reviewed  CTH: Reviewed

## 2018-10-09 NOTE — PLAN OF CARE
Problem: Patient Care Overview  Goal: Plan of Care Review  Outcome: Ongoing (interventions implemented as appropriate)  Patient confused. Patient VSS. Patient denies pain. Patient free from falls or injury during shift. Patient repositioned independently. Patient in bed, bed in lowest position, call light in reach, bed alarm set, and personal items at bedside. Sitter at beside. Will continue to monitor.

## 2018-10-10 LAB
ALBUMIN SERPL BCP-MCNC: 3.4 G/DL
ALP SERPL-CCNC: 94 U/L
ALT SERPL W/O P-5'-P-CCNC: 31 U/L
ANION GAP SERPL CALC-SCNC: 10 MMOL/L
AST SERPL-CCNC: 35 U/L
BASOPHILS # BLD AUTO: 0.11 K/UL
BASOPHILS NFR BLD: 2.2 %
BILIRUB SERPL-MCNC: 0.6 MG/DL
BUN SERPL-MCNC: 11 MG/DL
CALCIUM SERPL-MCNC: 9.8 MG/DL
CHLORIDE SERPL-SCNC: 107 MMOL/L
CO2 SERPL-SCNC: 19 MMOL/L
CREAT SERPL-MCNC: 0.7 MG/DL
DIFFERENTIAL METHOD: ABNORMAL
EOSINOPHIL # BLD AUTO: 0.2 K/UL
EOSINOPHIL NFR BLD: 3.6 %
ERYTHROCYTE [DISTWIDTH] IN BLOOD BY AUTOMATED COUNT: 13.9 %
EST. GFR  (AFRICAN AMERICAN): >60 ML/MIN/1.73 M^2
EST. GFR  (NON AFRICAN AMERICAN): >60 ML/MIN/1.73 M^2
GLUCOSE SERPL-MCNC: 89 MG/DL
HCT VFR BLD AUTO: 35.6 %
HGB BLD-MCNC: 11.4 G/DL
IMM GRANULOCYTES # BLD AUTO: 0.02 K/UL
IMM GRANULOCYTES NFR BLD AUTO: 0.4 %
LYMPHOCYTES # BLD AUTO: 1 K/UL
LYMPHOCYTES NFR BLD: 19.8 %
MAGNESIUM SERPL-MCNC: 1.9 MG/DL
MCH RBC QN AUTO: 33 PG
MCHC RBC AUTO-ENTMCNC: 32 G/DL
MCV RBC AUTO: 103 FL
MONOCYTES # BLD AUTO: 0.8 K/UL
MONOCYTES NFR BLD: 16.4 %
NEUTROPHILS # BLD AUTO: 2.9 K/UL
NEUTROPHILS NFR BLD: 57.6 %
NRBC BLD-RTO: 0 /100 WBC
PHOSPHATE SERPL-MCNC: 3.3 MG/DL
PLATELET # BLD AUTO: 220 K/UL
PMV BLD AUTO: 9.6 FL
POTASSIUM SERPL-SCNC: 3.8 MMOL/L
PROT SERPL-MCNC: 7.6 G/DL
RBC # BLD AUTO: 3.45 M/UL
SODIUM SERPL-SCNC: 136 MMOL/L
SODIUM SERPL-SCNC: 136 MMOL/L
WBC # BLD AUTO: 5.06 K/UL

## 2018-10-10 PROCEDURE — S4991 NICOTINE PATCH NONLEGEND: HCPCS | Performed by: HOSPITALIST

## 2018-10-10 PROCEDURE — 25000003 PHARM REV CODE 250: Performed by: HOSPITALIST

## 2018-10-10 PROCEDURE — 83735 ASSAY OF MAGNESIUM: CPT

## 2018-10-10 PROCEDURE — 80053 COMPREHEN METABOLIC PANEL: CPT

## 2018-10-10 PROCEDURE — 63600175 PHARM REV CODE 636 W HCPCS: Performed by: HOSPITALIST

## 2018-10-10 PROCEDURE — 84100 ASSAY OF PHOSPHORUS: CPT

## 2018-10-10 PROCEDURE — 36415 COLL VENOUS BLD VENIPUNCTURE: CPT

## 2018-10-10 PROCEDURE — 99232 SBSQ HOSP IP/OBS MODERATE 35: CPT | Mod: ,,, | Performed by: NURSE PRACTITIONER

## 2018-10-10 PROCEDURE — 11000001 HC ACUTE MED/SURG PRIVATE ROOM

## 2018-10-10 PROCEDURE — 84295 ASSAY OF SERUM SODIUM: CPT

## 2018-10-10 PROCEDURE — 85025 COMPLETE CBC W/AUTO DIFF WBC: CPT

## 2018-10-10 PROCEDURE — 99232 SBSQ HOSP IP/OBS MODERATE 35: CPT | Mod: ,,, | Performed by: HOSPITALIST

## 2018-10-10 RX ADMIN — DIAZEPAM 10 MG: 5 TABLET ORAL at 03:10

## 2018-10-10 RX ADMIN — MICONAZOLE NITRATE: 20 OINTMENT TOPICAL at 08:10

## 2018-10-10 RX ADMIN — Medication 400 MG: at 08:10

## 2018-10-10 RX ADMIN — THIAMINE HYDROCHLORIDE 250 MG: 100 INJECTION, SOLUTION INTRAMUSCULAR; INTRAVENOUS at 08:10

## 2018-10-10 RX ADMIN — ENOXAPARIN SODIUM 40 MG: 100 INJECTION SUBCUTANEOUS at 05:10

## 2018-10-10 RX ADMIN — FOLIC ACID 1 MG: 1 TABLET ORAL at 08:10

## 2018-10-10 RX ADMIN — CIPROFLOXACIN HYDROCHLORIDE 500 MG: 500 TABLET, FILM COATED ORAL at 08:10

## 2018-10-10 RX ADMIN — DIAZEPAM 10 MG: 5 TABLET ORAL at 08:10

## 2018-10-10 RX ADMIN — NICOTINE 1 PATCH: 21 PATCH, EXTENDED RELEASE TRANSDERMAL at 08:10

## 2018-10-10 NOTE — PROGRESS NOTES
[facility]  Physical Medicine & Rehab  Progress Note    Patient Name: Mohamud Patel  MRN: [id number]  Admission Date: 10/4/2018  Length of Stay: 5 days  Attending Physician: Sobia Ortiz MD    Subjective:     Principal Problem:Alcohol withdrawal    Hospital Course:   10/6/18:  Evaluated by PT and OT.  Bed mobility CGA-Raul.  Sit to stand CGA and transfers Raul.  Ambulated 24 ft Raul.  LBD CGA-Raul.  10/7/18:  No PT or OT.   10/8/18:  Participated with PT and OT.  Bed mobility CGA-Raul.  EOB SBA-CGA.  Sit to stand Raul.  Ambulated 3-4 side steps Raul.  UBD modA and LBD totalA.   10/9/18:  No PT or OT.     Interval History 10/10/2018:  Patient is seen for follow-up rehab evaluation and recommendations: No acute events over night.  Ativan administered yesterday afternoon for agitation and tactile hallucinations.  Sitter now at bedside.  Wrist restraints back in place.  No therapy yesterday.    HPI, Past Medical, Family, and Social History remains the same as documented in the initial encounter.    Scheduled Medications:    ciprofloxacin HCl  500 mg Oral Q12H    diazePAM  10 mg Oral TID    enoxparin  40 mg Subcutaneous Daily    folic acid  1 mg Oral Daily    magnesium oxide  400 mg Oral BID    miconazole nitrate 2%   Topical (Top) BID    nicotine  1 patch Transdermal Daily    thiamine (VITAMIN B1) IVPB  250 mg Intravenous Daily     PRN Medications: lorazepam, sodium chloride 0.9%    Review of Systems   Constitutional: Positive for activity change. Negative for chills, fatigue and fever.   HENT: Negative for drooling, hearing loss, trouble swallowing and voice change.    Eyes: Negative for pain and visual disturbance.   Respiratory: Negative for cough, shortness of breath and wheezing.    Cardiovascular: Negative for chest pain and palpitations.   Gastrointestinal: Negative for abdominal pain, nausea and vomiting.   Genitourinary: Negative for difficulty urinating and flank pain.    Musculoskeletal: Positive for gait problem and myalgias. Negative for back pain and neck pain.   Skin: Positive for wound. Negative for rash.   Neurological: Negative for dizziness, numbness and headaches.   Psychiatric/Behavioral: Positive for confusion. Negative for agitation and hallucinations. The patient is not nervous/anxious.      Objective:     Vital Signs (Most Recent):  Temp: 98.2 °F (36.8 °C) (10/10/18 1044)  Pulse: 71 (10/10/18 1044)  Resp: 18 (10/10/18 1044)  BP: (!) 156/89 (10/10/18 1044)  SpO2: 100 % (10/10/18 1044)    Vital Signs (24h Range):  Temp:  [97.3 °F (36.3 °C)-98.6 °F (37 °C)] 98.2 °F (36.8 °C)  Pulse:  [59-99] 71  Resp:  [16-18] 18  SpO2:  [96 %-100 %] 100 %  BP: (128-174)/(65-92) 156/89     Physical Exam   Constitutional: He is sleeping. He is easily aroused. No distress.   Appears thin, older than stated age   HENT:   Head: Normocephalic and atraumatic.   Right Ear: External ear normal.   Left Ear: External ear normal.   Nose: Nose normal.   Eyes: Right eye exhibits no discharge. Left eye exhibits no discharge. No scleral icterus.   Neck: Normal range of motion.   Cardiovascular: Normal rate, regular rhythm and intact distal pulses.   Pulmonary/Chest: Effort normal. No respiratory distress. He has no wheezes.   Abdominal: Soft. He exhibits no distension. There is no tenderness.   Musculoskeletal: Normal range of motion. He exhibits no edema or tenderness.   BLE/feet dressing CDI.    Neurological: He is easily aroused. No sensory deficit. He exhibits normal muscle tone.   -  Mental Status: Sleeping.  Opens eyes to voice.  Oriented to self only.  Follows commands.    -  Facial movement (CN VII): symmetrical.   -  Motor:  Moves all extremities spontaneously.  No focal weakness.    Skin: Skin is warm and dry. No rash noted.   Psychiatric: He has a normal mood and affect. His behavior is normal. Cognition and memory are impaired.   Sitter at bedside, wrist restraints intact   Vitals  "reviewed.    Diagnostic Results:   Labs: Reviewed  EKG: Reviewed  CTH: Reviewed    Assessment/Plan:      Impaired mobility and ADLs    -  (I) at baseline--> GF reported patient not caring for himself at home  -  Evaluated by PT and OT- not at baseline  Recommendations  -  Encourage mobility, OOB in chair, and early ambulation as appropriate  -  PT/OT evaluate and treat  -  Pain management  -  DVT prophylaxis  -  Monitor for and prevent skin breakdown and pressure ulcers  · Early mobility, repositioning/weight shifting every 20-30 minutes when sitting, turn patient every 2 hours, proper mattress/overlay and chair cushioning, pressure relief/heel protector boots      Wernicke encephalopathy    -  On Thiamine, folic acid, MVI  -  CTH 10/8/18 without acute pathology  -  10/9: "pt was noted to be increasingly agitated, actively having tactile hallucinations, attempting to get out of bed. He was given ativan IV x1 and bedside sitter was ordered"      Hyponatremia    -  Possibly 2/2 beer potomania  -  Replacing as needed  -  Monitoring      Alcohol use disorder, severe, dependence    -  H/o EtOH use disorder  -  Presented intoxicated with serum alcohol level of 239  -  Psych following      Wound of lower extremity    -  Wound care following  -  Podiatry consulted--> No surgical intervention indicated      Patient with caregiver/girlfriend at home who provides care as need and takes care of all shopping, house hold chores, and bills.  Reports patient with frequent falls at home.  Currently, mental status continues to fluctuate, now with bedside sitter and back in restraints.  Continue PT and OT.  Will follow progress for final post-acute/rehab recommendation.     ZEE Ferrer  Department of Physical Medicine & Rehab   [facility]  "

## 2018-10-10 NOTE — PLAN OF CARE
Problem: Patient Care Overview  Goal: Plan of Care Review  Outcome: Ongoing (interventions implemented as appropriate)  Pt free of falls/injuries throughout the shift. Bed locked, in lowest position, call bell within reach. Pt afebrile, pain assessed & denied. VSS, pt restless & trying to get out of bed, pulling on IV's, wrist restraints initiated will continue to monitor.

## 2018-10-10 NOTE — SUBJECTIVE & OBJECTIVE
Interval History 10/10/2018:  Patient is seen for follow-up rehab evaluation and recommendations: No acute events over night.  Ativan administered yesterday afternoon for agitation and tactile hallucinations.  Sitter now at bedside.  Wrist restraints back in place.  No therapy yesterday.    HPI, Past Medical, Family, and Social History remains the same as documented in the initial encounter.    Scheduled Medications:    ciprofloxacin HCl  500 mg Oral Q12H    diazePAM  10 mg Oral TID    enoxparin  40 mg Subcutaneous Daily    folic acid  1 mg Oral Daily    magnesium oxide  400 mg Oral BID    miconazole nitrate 2%   Topical (Top) BID    nicotine  1 patch Transdermal Daily    thiamine (VITAMIN B1) IVPB  250 mg Intravenous Daily     PRN Medications: lorazepam, sodium chloride 0.9%    Review of Systems   Constitutional: Positive for activity change. Negative for chills, fatigue and fever.   HENT: Negative for drooling, hearing loss, trouble swallowing and voice change.    Eyes: Negative for pain and visual disturbance.   Respiratory: Negative for cough, shortness of breath and wheezing.    Cardiovascular: Negative for chest pain and palpitations.   Gastrointestinal: Negative for abdominal pain, nausea and vomiting.   Genitourinary: Negative for difficulty urinating and flank pain.   Musculoskeletal: Positive for gait problem and myalgias. Negative for back pain and neck pain.   Skin: Positive for wound. Negative for rash.   Neurological: Negative for dizziness, numbness and headaches.   Psychiatric/Behavioral: Positive for confusion. Negative for agitation and hallucinations. The patient is not nervous/anxious.      Objective:     Vital Signs (Most Recent):  Temp: 98.2 °F (36.8 °C) (10/10/18 1044)  Pulse: 71 (10/10/18 1044)  Resp: 18 (10/10/18 1044)  BP: (!) 156/89 (10/10/18 1044)  SpO2: 100 % (10/10/18 1044)    Vital Signs (24h Range):  Temp:  [97.3 °F (36.3 °C)-98.6 °F (37 °C)] 98.2 °F (36.8 °C)  Pulse:  [59-99]  71  Resp:  [16-18] 18  SpO2:  [96 %-100 %] 100 %  BP: (128-174)/(65-92) 156/89     Physical Exam   Constitutional: He is sleeping. He is easily aroused. No distress.   Appears thin, older than stated age   HENT:   Head: Normocephalic and atraumatic.   Right Ear: External ear normal.   Left Ear: External ear normal.   Nose: Nose normal.   Eyes: Right eye exhibits no discharge. Left eye exhibits no discharge. No scleral icterus.   Neck: Normal range of motion.   Cardiovascular: Normal rate, regular rhythm and intact distal pulses.   Pulmonary/Chest: Effort normal. No respiratory distress. He has no wheezes.   Abdominal: Soft. He exhibits no distension. There is no tenderness.   Musculoskeletal: Normal range of motion. He exhibits no edema or tenderness.   BLE/feet dressing CDI.    Neurological: He is easily aroused. No sensory deficit. He exhibits normal muscle tone.   -  Mental Status: Sleeping.  Opens eyes to voice.  Oriented to self only.  Follows commands.    -  Facial movement (CN VII): symmetrical.   -  Motor:  Moves all extremities spontaneously.  No focal weakness.    Skin: Skin is warm and dry. No rash noted.   Psychiatric: He has a normal mood and affect. His behavior is normal. Cognition and memory are impaired.   Sitter at bedside, wrist restraints intact   Vitals reviewed.    Diagnostic Results:   Labs: Reviewed  EKG: Reviewed  CTH: Reviewed

## 2018-10-10 NOTE — PROGRESS NOTES
Pt fell on his butt in room witnessed by sitter. States he was going out to have a drink. No injuries noted. VS stable. Called med team R and notified Dr. Ortiz. No new orders at this time. Sitter at bedside

## 2018-10-10 NOTE — PLAN OF CARE
Discharge planning: Called Chitra Puga  patient's caregiver. She states that she lives in patient's home with him and she provides 24 hr care. She states that patient falls frequently. She does all shopping, paying bills and feels overwhelmed. She does not have POA and states that patient's father is POA but is not involved in his life. Provided CM with father's phone #. Will f/u with POA to discuss d/c plans.

## 2018-10-10 NOTE — ASSESSMENT & PLAN NOTE
"-  On Thiamine, folic acid, MVI  -  [facility] 10/8/18 without acute pathology  -  10/9: "pt was noted to be increasingly agitated, actively having tactile hallucinations, attempting to get out of bed. He was given ativan IV x1 and bedside sitter was ordered"  "

## 2018-10-11 LAB
ALBUMIN SERPL BCP-MCNC: 3.3 G/DL
ALP SERPL-CCNC: 95 U/L
ALT SERPL W/O P-5'-P-CCNC: 29 U/L
ANION GAP SERPL CALC-SCNC: 9 MMOL/L
AST SERPL-CCNC: 26 U/L
BASOPHILS # BLD AUTO: 0.11 K/UL
BASOPHILS NFR BLD: 1.9 %
BILIRUB SERPL-MCNC: 0.5 MG/DL
BUN SERPL-MCNC: 14 MG/DL
CALCIUM SERPL-MCNC: 10 MG/DL
CHLORIDE SERPL-SCNC: 107 MMOL/L
CO2 SERPL-SCNC: 23 MMOL/L
CREAT SERPL-MCNC: 0.7 MG/DL
DIFFERENTIAL METHOD: ABNORMAL
EOSINOPHIL # BLD AUTO: 0.2 K/UL
EOSINOPHIL NFR BLD: 3.9 %
ERYTHROCYTE [DISTWIDTH] IN BLOOD BY AUTOMATED COUNT: 14 %
EST. GFR  (AFRICAN AMERICAN): >60 ML/MIN/1.73 M^2
EST. GFR  (NON AFRICAN AMERICAN): >60 ML/MIN/1.73 M^2
GLUCOSE SERPL-MCNC: 89 MG/DL
HCT VFR BLD AUTO: 36.1 %
HGB BLD-MCNC: 12.1 G/DL
IMM GRANULOCYTES # BLD AUTO: 0.03 K/UL
IMM GRANULOCYTES NFR BLD AUTO: 0.5 %
LYMPHOCYTES # BLD AUTO: 1.1 K/UL
LYMPHOCYTES NFR BLD: 18.6 %
MAGNESIUM SERPL-MCNC: 2 MG/DL
MCH RBC QN AUTO: 34.4 PG
MCHC RBC AUTO-ENTMCNC: 33.5 G/DL
MCV RBC AUTO: 103 FL
METHYLMALONATE SERPL-SCNC: 0.13 UMOL/L
MONOCYTES # BLD AUTO: 1 K/UL
MONOCYTES NFR BLD: 17.5 %
NEUTROPHILS # BLD AUTO: 3.3 K/UL
NEUTROPHILS NFR BLD: 57.6 %
NRBC BLD-RTO: 0 /100 WBC
PHOSPHATE SERPL-MCNC: 4.3 MG/DL
PLATELET # BLD AUTO: 245 K/UL
PMV BLD AUTO: 9.5 FL
POTASSIUM SERPL-SCNC: 3.9 MMOL/L
PROT SERPL-MCNC: 7.6 G/DL
RBC # BLD AUTO: 3.52 M/UL
SODIUM SERPL-SCNC: 139 MMOL/L
WBC # BLD AUTO: 5.7 K/UL

## 2018-10-11 PROCEDURE — 84100 ASSAY OF PHOSPHORUS: CPT

## 2018-10-11 PROCEDURE — 11000001 HC ACUTE MED/SURG PRIVATE ROOM

## 2018-10-11 PROCEDURE — 97116 GAIT TRAINING THERAPY: CPT

## 2018-10-11 PROCEDURE — S4991 NICOTINE PATCH NONLEGEND: HCPCS | Performed by: HOSPITALIST

## 2018-10-11 PROCEDURE — 25000003 PHARM REV CODE 250: Performed by: HOSPITALIST

## 2018-10-11 PROCEDURE — 36415 COLL VENOUS BLD VENIPUNCTURE: CPT

## 2018-10-11 PROCEDURE — 80053 COMPREHEN METABOLIC PANEL: CPT

## 2018-10-11 PROCEDURE — 97535 SELF CARE MNGMENT TRAINING: CPT

## 2018-10-11 PROCEDURE — 99232 SBSQ HOSP IP/OBS MODERATE 35: CPT | Mod: ,,, | Performed by: NURSE PRACTITIONER

## 2018-10-11 PROCEDURE — 85025 COMPLETE CBC W/AUTO DIFF WBC: CPT

## 2018-10-11 PROCEDURE — 99232 SBSQ HOSP IP/OBS MODERATE 35: CPT | Mod: ,,, | Performed by: HOSPITALIST

## 2018-10-11 PROCEDURE — 63600175 PHARM REV CODE 636 W HCPCS: Performed by: HOSPITALIST

## 2018-10-11 PROCEDURE — 97530 THERAPEUTIC ACTIVITIES: CPT

## 2018-10-11 PROCEDURE — 83735 ASSAY OF MAGNESIUM: CPT

## 2018-10-11 RX ORDER — DIAZEPAM 5 MG/1
10 TABLET ORAL 2 TIMES DAILY
Status: DISCONTINUED | OUTPATIENT
Start: 2018-10-11 | End: 2018-10-13

## 2018-10-11 RX ADMIN — LORAZEPAM 1 MG: 2 INJECTION, SOLUTION INTRAMUSCULAR; INTRAVENOUS at 03:10

## 2018-10-11 RX ADMIN — Medication 400 MG: at 08:10

## 2018-10-11 RX ADMIN — MICONAZOLE NITRATE: 20 OINTMENT TOPICAL at 08:10

## 2018-10-11 RX ADMIN — CIPROFLOXACIN HYDROCHLORIDE 500 MG: 500 TABLET, FILM COATED ORAL at 08:10

## 2018-10-11 RX ADMIN — CIPROFLOXACIN HYDROCHLORIDE 500 MG: 500 TABLET, FILM COATED ORAL at 09:10

## 2018-10-11 RX ADMIN — NICOTINE 1 PATCH: 21 PATCH, EXTENDED RELEASE TRANSDERMAL at 09:10

## 2018-10-11 RX ADMIN — ENOXAPARIN SODIUM 40 MG: 100 INJECTION SUBCUTANEOUS at 05:10

## 2018-10-11 RX ADMIN — FOLIC ACID 1 MG: 1 TABLET ORAL at 09:10

## 2018-10-11 RX ADMIN — DIAZEPAM 10 MG: 5 TABLET ORAL at 09:10

## 2018-10-11 RX ADMIN — Medication 400 MG: at 09:10

## 2018-10-11 RX ADMIN — MICONAZOLE NITRATE: 20 OINTMENT TOPICAL at 09:10

## 2018-10-11 RX ADMIN — DIAZEPAM 10 MG: 5 TABLET ORAL at 08:10

## 2018-10-11 RX ADMIN — THIAMINE HYDROCHLORIDE 250 MG: 100 INJECTION, SOLUTION INTRAMUSCULAR; INTRAVENOUS at 08:10

## 2018-10-11 NOTE — PLAN OF CARE
Problem: Patient Care Overview  Goal: Plan of Care Review  Outcome: Ongoing (interventions implemented as appropriate)  Pt free of falls/injuries throughout the shift. Bed locked, in lowest position, call bell within reach. Pt afebrile, pain assessed & denied. VSS, pt in no distress but confused, sitter at the bs, restraints on,, will continue to monitor.

## 2018-10-11 NOTE — SUBJECTIVE & OBJECTIVE
"Interval History 10/11/2018:  Patient is seen for follow-up rehab evaluation and recommendations: No acute events over night.  More awake today, sitter at bedside.  No therapy last 2 days.  Significant other at bedside; reports providing 24/7 care for patient.  He requires HHA with transfers and ambulation for short distances only and at least set-up assist for all ADLs.  She handles all shopping, house management, bills, etc.  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      HPI, Past Medical, Family, and Social History remains the same as documented in the initial encounter.    Scheduled Medications:    ciprofloxacin HCl  500 mg Oral Q12H    diazePAM  10 mg Oral BID    enoxparin  40 mg Subcutaneous Daily    folic acid  1 mg Oral Daily    magnesium oxide  400 mg Oral BID    miconazole nitrate 2%   Topical (Top) BID    nicotine  1 patch Transdermal Daily    thiamine (VITAMIN B1) IVPB  250 mg Intravenous Daily     PRN Medications: lorazepam, sodium chloride 0.9%    Review of Systems   Constitutional: Positive for activity change. Negative for chills, fatigue and fever.   HENT: Negative for drooling, hearing loss, trouble swallowing and voice change.    Eyes: Negative for pain and visual disturbance.   Respiratory: Negative for cough, shortness of breath and wheezing.    Cardiovascular: Negative for chest pain and palpitations.   Gastrointestinal: Negative for abdominal pain, nausea and vomiting.   Genitourinary: Negative for difficulty urinating and flank pain.   Musculoskeletal: Positive for gait problem and myalgias. Negative for back pain and neck pain.   Skin: Positive for wound. Negative for rash.   Neurological: Negative for dizziness, numbness and headaches.   Psychiatric/Behavioral: Positive for confusion. Negative for agitation and hallucinations. The patient is not nervous/anxious.      Objective:     Vital Signs (Most Recent):  Temp: 98 °F (36.7 °C) (10/11/18 1051)  Pulse: 89 " (10/11/18 1051)  Resp: 17 (10/11/18 1051)  BP: 130/81 (10/11/18 1051)  SpO2: 98 % (10/11/18 1051)    Vital Signs (24h Range):  Temp:  [97.4 °F (36.3 °C)-98.6 °F (37 °C)] 98 °F (36.7 °C)  Pulse:  [60-89] 89  Resp:  [17-18] 17  SpO2:  [95 %-99 %] 98 %  BP: (129-189)/(78-91) 130/81     Physical Exam   Constitutional: He is sleeping. He is easily aroused. No distress.   Appears thin, older than stated age   HENT:   Head: Normocephalic and atraumatic.   Right Ear: External ear normal.   Left Ear: External ear normal.   Nose: Nose normal.   Eyes: Right eye exhibits no discharge. Left eye exhibits no discharge. No scleral icterus.   Neck: Normal range of motion.   Cardiovascular: Normal rate, regular rhythm and intact distal pulses.   Pulmonary/Chest: Effort normal. No respiratory distress. He has no wheezes.   Abdominal: Soft. He exhibits no distension. There is no tenderness.   Musculoskeletal: Normal range of motion. He exhibits no edema or tenderness.   BLE/feet dressing CDI.    Neurological: He is easily aroused. He is disoriented. No sensory deficit. He exhibits normal muscle tone.   -  Mental Status: Awake and alert.  Oriented to self only.  Follows commands.    -  Facial movement (CN VII): symmetrical.   -  Motor:  Moves all extremities spontaneously.  No focal weakness.    Skin: Skin is warm and dry. No rash noted.   Psychiatric: He has a normal mood and affect. His behavior is normal. Cognition and memory are impaired.   Sitter at bedside, wrist restraints intact   Vitals reviewed.    Diagnostic Results:   Labs: Reviewed  EKG: Reviewed  CTH: Reviewed

## 2018-10-11 NOTE — PT/OT/SLP PROGRESS
Physical Therapy Treatment    Patient Name:  Mohamud Patel   MRN:  [id number]    Recommendations:     Discharge Recommendations:  nursing facility, skilled(short-stay)   Discharge Equipment Recommendations: bedside commode, wheelchair   Barriers to discharge: Decreased caregiver support    Assessment:     Mohamud Patel is a 57 y.o. male admitted with a medical diagnosis of Alcohol withdrawal.  He presents with the following impairments/functional limitations:  weakness, gait instability, impaired endurance, impaired balance, decreased lower extremity function, decreased safety awareness, impaired cognition, impaired functional mobilty, decreased coordination, impaired coordination. Pt performed bed mobility S and transfers SBA with RW. Pt amb ~75ft CGA with RW, ataxic gait requrinig vc's for safety and AD management. Pt will continue to benefit from skilled PT to improve deficits and increase overall functional mobility.     Rehab Prognosis:  Good; patient would benefit from acute skilled PT services to address these deficits and reach maximum level of function.      Recent Surgery: * No surgery found *      Plan:     During this hospitalization, patient to be seen 3 x/week to address the above listed problems via gait training, therapeutic activities, therapeutic exercises, neuromuscular re-education  · Plan of Care Expires:  11/05/18   Plan of Care Reviewed with: patient    Subjective     Communicated with RN prior to session.  Patient found supine in bed upon PT entry to room, agreeable to treatment.      Chief Complaint: NA  Patient comments/goals: return home  Pain/Comfort:  · Pain Rating 1: 0/10  · Pain Rating Post-Intervention 1: 0/10    Patients cultural, spiritual, Christian conflicts given the current situation: none stated    Objective:     Patient found with: telemetry     General Precautions: Standard, aspiration, fall, seizure   Orthopedic Precautions:N/A   Braces: N/A     Functional  Mobility:  Bed Mobility:     · Supine to Sit: supervision    Transfers:     · Sit to Stand:  stand by assistance with rolling walker; vc's for hand placement    Gait: ~75ft CGA with RW, ataxic gait requrinig vc's for safety and AD management      AM-PAC 6 CLICK MOBILITY  Turning over in bed (including adjusting bedclothes, sheets and blankets)?: 3  Sitting down on and standing up from a chair with arms (e.g., wheelchair, bedside commode, etc.): 3  Moving from lying on back to sitting on the side of the bed?: 3  Moving to and from a bed to a chair (including a wheelchair)?: 3  Need to walk in hospital room?: 3  Climbing 3-5 steps with a railing?: 2  Basic Mobility Total Score: 17       Therapeutic Activities and Exercises:  Pt sat EOB with S.  Pt educated on:  -safety with mobility  -importance of OOB activity  Pt safe to amb to bathroom with RW with RN staff.     Patient left seated EOB with all lines intact, call button in reach, RN notified and sitter present..    GOALS:   Multidisciplinary Problems     Physical Therapy Goals        Problem: Physical Therapy Goal    Goal Priority Disciplines Outcome Goal Variances Interventions   Physical Therapy Goal     PT, PT/OT Ongoing (interventions implemented as appropriate)     Description:  Goals to be met by: 10/16/18     Patient will increase functional independence with mobility by performin. Sit to stand transfer with Stand-by Assistance-met   Revised: sit to stand transfer with Supervision.   2. Bed to chair transfer with Stand-by Assistance using LRD.   3. Gait  x 100 feet with Contact Guard Assistance using Rolling Walker or LRD>   4. Lower extremity exercise program x20  reps per handout, with independence                       Time Tracking:     PT Received On: 10/11/18  PT Start Time: 1346     PT Stop Time: 1403  PT Total Time (min): 17 min     Billable Minutes: Gait Training 17    Treatment Type: Treatment  PT/PTA: PT     PTA Visit Number: 0     GALINA  MAYA, PT  10/11/2018

## 2018-10-11 NOTE — PLAN OF CARE
Problem: Physical Therapy Goal  Goal: Physical Therapy Goal  Goals to be met by: 10/16/18     Patient will increase functional independence with mobility by performin. Sit to stand transfer with Stand-by Assistance-met   Revised: sit to stand transfer with Supervision.   2. Bed to chair transfer with Stand-by Assistance using LRD.   3. Gait  x 100 feet with Contact Guard Assistance using Rolling Walker or LRD>   4. Lower extremity exercise program x20  reps per handout, with independence     Outcome: Ongoing (interventions implemented as appropriate)  Pt progressing towards goals.     GALINA MAYA, PT  10/11/2018

## 2018-10-11 NOTE — PLAN OF CARE
Problem: Occupational Therapy Goal  Goal: Occupational Therapy Goal  Goals to be met by: 10/20  Patient will increase functional independence with ADLs by performing:    UE Dressing with Modified [place] while seated.  LE Dressing with Modified [place].  Grooming while standing at sink with Supervision.  Toileting from toilet with Modified [place] for hygiene and clothing management.   Supine to sit with Supervision and HOB flat.  Stand pivot transfers with Supervision to chair and/or toilet.  Functional mobility with AD as needed for short household distance with no LOB with supervision.      Outcome: Ongoing (interventions implemented as appropriate)  Goals remain appropriate. SAM Almaraz 10/11/2018

## 2018-10-11 NOTE — PLAN OF CARE
Spoke w/ Chitra, pt's caregiver about d/c plans. She is willing to take pt home if PMR doesn't accept pt.   She request a sitter list. I informed her that sitters would be out of pocket. She acknowledged it.  She has concerns about his d/c meds and his history of non compliance. I informed her that upon d/c a list of his meds will be printed.  She has concerns about follow up appt's post hospital. I told her that I would make a follow up appt w/ his PCP. Pt's PCP is Dr Enmanuel Ferguson. I called his office, he is retiring 10/12/18.   An appt was made w/ Lydia Vazquez NP (in Dr Ferguson's practice) for October 30th @ 10:00am  [facility] [address]     [phone]

## 2018-10-11 NOTE — PROGRESS NOTES
"[facility]  Physical Medicine & Rehab  Progress Note    Patient Name: Mohamud Patel  MRN: [id number]  Admission Date: 10/4/2018  Length of Stay: 6 days  Attending Physician: Sobia Ortiz MD    Subjective:     Principal Problem:Alcohol withdrawal    Hospital Course:   10/6/18:  Evaluated by PT and OT.  Bed mobility CGA-Raul.  Sit to stand CGA and transfers Raul.  Ambulated 24 ft Raul.  LBD CGA-Raul.  10/7/18:  No PT or OT.   10/8/18:  Participated with PT and OT.  Bed mobility CGA-Raul.  EOB SBA-CGA.  Sit to stand Raul.  Ambulated 3-4 side steps Raul.  UBD modA and LBD totalA.   10/9/18:  No PT or OT.   10/10/18:  No PT or OT.    Interval History 10/11/2018:  Patient is seen for follow-up rehab evaluation and recommendations: No acute events over night.  More awake today, sitter at bedside.  No therapy last 2 days.  Significant other at bedside; reports providing 24/7 care for patient.  He requires HHA with transfers and ambulation for short distances only and at least set-up assist for all ADLs.  She handles all shopping, house management, bills, etc.  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      HPI, Past Medical, Family, and Social History remains the same as documented in the initial encounter.    Scheduled Medications:    ciprofloxacin HCl  500 mg Oral Q12H    diazePAM  10 mg Oral BID    enoxparin  40 mg Subcutaneous Daily    folic acid  1 mg Oral Daily    magnesium oxide  400 mg Oral BID    miconazole nitrate 2%   Topical (Top) BID    nicotine  1 patch Transdermal Daily    thiamine (VITAMIN B1) IVPB  250 mg Intravenous Daily     PRN Medications: lorazepam, sodium chloride 0.9%    Review of Systems   Constitutional: Positive for activity change. Negative for chills, fatigue and fever.   HENT: Negative for drooling, hearing loss, trouble swallowing and voice change.    Eyes: Negative for pain and visual disturbance.   Respiratory: Negative for cough, " shortness of breath and wheezing.    Cardiovascular: Negative for chest pain and palpitations.   Gastrointestinal: Negative for abdominal pain, nausea and vomiting.   Genitourinary: Negative for difficulty urinating and flank pain.   Musculoskeletal: Positive for gait problem and myalgias. Negative for back pain and neck pain.   Skin: Positive for wound. Negative for rash.   Neurological: Negative for dizziness, numbness and headaches.   Psychiatric/Behavioral: Positive for confusion. Negative for agitation and hallucinations. The patient is not nervous/anxious.      Objective:     Vital Signs (Most Recent):  Temp: 98 °F (36.7 °C) (10/11/18 1051)  Pulse: 89 (10/11/18 1051)  Resp: 17 (10/11/18 1051)  BP: 130/81 (10/11/18 1051)  SpO2: 98 % (10/11/18 1051)    Vital Signs (24h Range):  Temp:  [97.4 °F (36.3 °C)-98.6 °F (37 °C)] 98 °F (36.7 °C)  Pulse:  [60-89] 89  Resp:  [17-18] 17  SpO2:  [95 %-99 %] 98 %  BP: (129-189)/(78-91) 130/81     Physical Exam   Constitutional: He is sleeping. He is easily aroused. No distress.   Appears thin, older than stated age   HENT:   Head: Normocephalic and atraumatic.   Right Ear: External ear normal.   Left Ear: External ear normal.   Nose: Nose normal.   Eyes: Right eye exhibits no discharge. Left eye exhibits no discharge. No scleral icterus.   Neck: Normal range of motion.   Cardiovascular: Normal rate, regular rhythm and intact distal pulses.   Pulmonary/Chest: Effort normal. No respiratory distress. He has no wheezes.   Abdominal: Soft. He exhibits no distension. There is no tenderness.   Musculoskeletal: Normal range of motion. He exhibits no edema or tenderness.   BLE/feet dressing CDI.    Neurological: He is easily aroused. He is disoriented. No sensory deficit. He exhibits normal muscle tone.   -  Mental Status: Awake and alert.  Oriented to self only.  Follows commands.    -  Facial movement (CN VII): symmetrical.   -  Motor:  Moves all extremities spontaneously.  No focal  "weakness.    Skin: Skin is warm and dry. No rash noted.   Psychiatric: He has a normal mood and affect. His behavior is normal. Cognition and memory are impaired.   Sitter at bedside, wrist restraints intact   Vitals reviewed.    Diagnostic Results:   Labs: Reviewed  EKG: Reviewed  CTH: Reviewed    Assessment/Plan:      Impaired mobility and ADLs    -  (I) at baseline--> GF reported patient not caring for himself at home  -  Evaluated by PT and OT- not at baseline  Recommendations  -  Encourage mobility, OOB in chair, and early ambulation as appropriate  -  PT/OT evaluate and treat  -  Pain management  -  DVT prophylaxis  -  Monitor for and prevent skin breakdown and pressure ulcers  · Early mobility, repositioning/weight shifting every 20-30 minutes when sitting, turn patient every 2 hours, proper mattress/overlay and chair cushioning, pressure relief/heel protector boots      Wernicke encephalopathy    -  On Thiamine, folic acid, MVI  -  [facility] 10/8/18 without acute pathology  -  10/9: "pt was noted to be increasingly agitated, actively having tactile hallucinations, attempting to get out of bed. He was given ativan IV x1 and bedside sitter was ordered"      Hyponatremia    -  Possibly 2/2 beer potomania  -  Replacing as needed  -  Monitoring      Alcohol use disorder, severe, dependence    -  H/o EtOH use disorder  -  Presented intoxicated with serum alcohol level of 239  -  Psych following      Wound of lower extremity    -  Wound care following  -  Podiatry consulted--> No surgical intervention indicated      Patient's significant other at bedside today.  She reports patient required hand-held assist with functional mobility (short distances), at least set-up assist with basic self-care, and total assist for higher level ADLs (shopping, cooking, cleaning, house management, bills, etc).  Reports patient with worsening "alcohol dementia" over past 5-6 months with frequent falls at home.  She is overwhelmed with the " amount of daily care and supervision required for patient.     Patient with therapy needs.  Limited goals for Inpatient Rehab.  Recommend Skilled Nursing with potential skilled nursing care vs home with significant other with 24/7 supervision and appropriate DME per therapy recommendations.  Will follow for additional rehab needs or change in post-acute recommendation.    ZEE Ferrer  Department of Physical Medicine & Rehab   [facility]

## 2018-10-11 NOTE — PROGRESS NOTES
"Hospital Medicine   Progress note      [place] [facility] [place] Sobia Ortiz MD   Admit Date: 10/4/2018   Hospital Day: 5  YONAS: 10/12/2018   Code status: Full Code   Principal Problem: Alcohol withdrawal     Summary:  Mohamud Patel is a 57 y.o. man with a history of Severe Alcohol Abuse, Chronic Wounds of Bilateral LE's with Onychomycosis, Alcoholic Neuropathy, Hyponatremia, and Tobacco Use Disorder who was brought to the ED by EMS after his girlfriend called 911 while the patient was severely intoxicated. He is admitted to Hospital Medicine for Alcohol Withdrawal, possible Wernicke's Encephalopathy, and Acute Cystitis.    Interval hx:   Pt was seen and examined at bedside. Pt is acutely confused and actively confabulating. Pt attempted to get out of bed this morning and suffered a fall which was witnessed by the bedside sitter. Pt did not have any LOC or head trauma. He did not have any external injuries during the fall and does not complain of any pain. He does complain of intermittent tremors.     ROS (Positive in Bold, otherwise negative) unable to obtain proper review of systems due to pts confusion but pt replies "no" to all questions.   Constitutional: fever, chills, night sweats  CV: chest pain, edema, palpitations  Resp: SOB, cough, sputum production  GI: changes in appetite, NVDC, pain, melena, hematochezia, GERD, hematemesis  : Dysuria, hematuria, urinary urgency, frequency  MSK: arthralgia/myalgia, joint swelling  Neuro/Psych: anxiety, depression    PEx   Temp:  [97.6 °F (36.4 °C)-98.6 °F (37 °C)]   Pulse:  [59-83]   Resp:  [16-18]   BP: (128-174)/(65-92)   SpO2:  [96 %-100 %]      I & O (Last 24H):     Intake/Output Summary (Last 24 hours) at 10/10/2018 1907  Last data filed at 10/10/2018 1800  Gross per 24 hour   Intake 900 ml   Output --   Net 900 ml       General:  male  in no acute distress. Nontoxic. Resting in bed. In restraints.  HEENT: NCAT. PERRL. EOMI. Sclera " Anicteric.  CVS: RRR. Normal S1 S2. No murmurs  Pulm: CTAB. Normal respiratory effort. No wheezes, rhonchi, or crackles.  Abdomen: Soft. Non-distended. No tenderness to palpation. No rebound or guarding. +BS.  Extremities: No edema. No cyanosis. Full ROM.  Skin: multiple scabbed lesions on bilateral feet, thickened skin on bilateral dorsal feet worse at the toes consistent with onychomycosis, no interdigital pits or evidence of scabies, large linear laceration with secondary healing on right foot, no surrounding erythema or induration  Neuro: Alert, oriented x 1, Spont mvt of all extremities with no focal deficits noted.    Recent Results (from the past 24 hour(s))   Sodium    Collection Time: 10/09/18  7:58 PM   Result Value Ref Range    Sodium 138 136 - 145 mmol/L   Sodium    Collection Time: 10/10/18  6:52 AM   Result Value Ref Range    Sodium 136 136 - 145 mmol/L   Comprehensive Metabolic Panel (CMP)    Collection Time: 10/10/18  6:52 AM   Result Value Ref Range    Sodium 136 136 - 145 mmol/L    Potassium 3.8 3.5 - 5.1 mmol/L    Chloride 107 95 - 110 mmol/L    CO2 19 (L) 23 - 29 mmol/L    Glucose 89 70 - 110 mg/dL    BUN, Bld 11 6 - 20 mg/dL    Creatinine 0.7 0.5 - 1.4 mg/dL    Calcium 9.8 8.7 - 10.5 mg/dL    Total Protein 7.6 6.0 - 8.4 g/dL    Albumin 3.4 (L) 3.5 - 5.2 g/dL    Total Bilirubin 0.6 0.1 - 1.0 mg/dL    Alkaline Phosphatase 94 55 - 135 U/L    AST 35 10 - 40 U/L    ALT 31 10 - 44 U/L    Anion Gap 10 8 - 16 mmol/L    eGFR if African American >60.0 >60 mL/min/1.73 m^2    eGFR if non African American >60.0 >60 mL/min/1.73 m^2   Magnesium    Collection Time: 10/10/18  6:52 AM   Result Value Ref Range    Magnesium 1.9 1.6 - 2.6 mg/dL   Phosphorus    Collection Time: 10/10/18  6:52 AM   Result Value Ref Range    Phosphorus 3.3 2.7 - 4.5 mg/dL   CBC with Automated Differential    Collection Time: 10/10/18  6:52 AM   Result Value Ref Range    WBC 5.06 3.90 - 12.70 K/uL    RBC 3.45 (L) 4.60 - 6.20 M/uL     Hemoglobin 11.4 (L) 14.0 - 18.0 g/dL    Hematocrit 35.6 (L) 40.0 - 54.0 %     (H) 82 - 98 fL    MCH 33.0 (H) 27.0 - 31.0 pg    MCHC 32.0 32.0 - 36.0 g/dL    RDW 13.9 11.5 - 14.5 %    Platelets 220 150 - 350 K/uL    MPV 9.6 9.2 - 12.9 fL    Immature Granulocytes 0.4 0.0 - 0.5 %    Gran # (ANC) 2.9 1.8 - 7.7 K/uL    Immature Grans (Abs) 0.02 0.00 - 0.04 K/uL    Lymph # 1.0 1.0 - 4.8 K/uL    Mono # 0.8 0.3 - 1.0 K/uL    Eos # 0.2 0.0 - 0.5 K/uL    Baso # 0.11 0.00 - 0.20 K/uL    nRBC 0 0 /100 WBC    Gran% 57.6 38.0 - 73.0 %    Lymph% 19.8 18.0 - 48.0 %    Mono% 16.4 (H) 4.0 - 15.0 %    Eosinophil% 3.6 0.0 - 8.0 %    Basophil% 2.2 (H) 0.0 - 1.9 %    Differential Method Automated        Recent Labs   Lab  10/09/18   0808   POCTGLUCOSE  88       Hemoglobin A1C   Date Value Ref Range Status   10/05/2018 5.3 4.0 - 5.6 % Final     Comment:     ADA Screening Guidelines:  5.7-6.4%  Consistent with prediabetes  >or=6.5%  Consistent with diabetes  High levels of fetal hemoglobin interfere with the HbA1C  assay. Heterozygous hemoglobin variants (HbS, HgC, etc)do  not significantly interfere with this assay.   However, presence of multiple variants may affect accuracy.     08/10/2018 4.4 4.0 - 5.6 % Final     Comment:     ADA Screening Guidelines:  5.7-6.4%  Consistent with prediabetes  >or=6.5%  Consistent with diabetes  High levels of fetal hemoglobin interfere with the HbA1C  assay. Heterozygous hemoglobin variants (HbS, HgC, etc)do  not significantly interfere with this assay.   However, presence of multiple variants may affect accuracy.          Active Hospital Problems    Diagnosis  POA    *Alcohol withdrawal [F10.239]  Yes    Impaired mobility and ADLs [Z74.09]  Unknown    Ulcer of toe of left foot [L97.529]  Yes    Abrasion [T14.8XXA]  Yes    Alcohol withdrawal delirium [F10.231]  Yes    Mental health disorder [F99]  Yes    Wernicke encephalopathy [E51.2]  Yes    Alcoholic peripheral neuropathy [G62.1]  Yes     Wound of lower extremity [I59.967S]  Yes    Alcohol use disorder, severe, dependence [F10.20]  Yes     Chronic    Hyponatremia [E87.1]  Yes      Resolved Hospital Problems   No resolved problems to display.      Assessment and Plan for problems addressed today:      ciprofloxacin HCl  500 mg Oral Q12H    diazePAM  10 mg Oral TID    enoxparin  40 mg Subcutaneous Daily    folic acid  1 mg Oral Daily    magnesium oxide  400 mg Oral BID    miconazole nitrate 2%   Topical (Top) BID    nicotine  1 patch Transdermal Daily    thiamine (VITAMIN B1) IVPB  250 mg Intravenous Daily     lorazepam, sodium chloride 0.9%    Alcohol Withdrawal  Severe Alcohol Use Disorder  Concern for Wernicke's Encephalopathy (WE)  Hyponatremia, Suspect Beer Potomania (resolved)  Alcoholic Neuropathy  -Patient presents with severe alcohol abuse disorder, last drink morning of admission (10/4). Initial ethanol level elevated (239); UDS positive for marijuana. Denies any history of Alcoholic Hallucinosis, DT's, or Withdrawal Seizures, but showing signs of alcohol withdrawal - CIWA (14) at admission: Tremor, Agitation, Orientation  -Started Valium at 10 mg four times daily today > tapered 10/8 to Valium 10 mg TID.  -Ativan 1 mg IV PRN breakthrough symptoms or withdrawal seizures.   -CIWA assessments every 4 hours per nursing  -For Wernicke's Encephalopathy: Thiamine 500 mg IV every 8 hours, infuse over 30 minutes x 2 days > Thiamine 250 mg IV daily x 5 days > Thiamine 100 mg PO daily. Folic acid and MVI daily. Patient confabulating and exam concerning for WE  -encourage PO intake   -Daily CMP, Mg, Phos  -Fall, aspiration, and seizure precautions  -PT/OT consulted  -Homocysteine level wnl's, MMA pending - low suspicion for Vitamin B12 deficiency at this time     Acute Cystitis without Hematuria  -UA again consistent with UTI without significant hematuria, UCx: E.Coli, sensitive to Ciprofloxacin and Augmentin.  -was given Ceftriaxone 1 g  IV daily inially,now changed to Ciprofloxacin 500 mg PO BID for 6 additional days, end 10/14 Ciprofloxacin      Multiple Bilateral Lower Extremity Wounds  Onychomycosis of Bilateral Feet   -No signs of active infection and no evidence of systemic toxicity. Starting CTX for UTI per above  -Wound Care consulted, appreciate assistance - have debrided wounds, applying medi-honey, no evidence of active infection. Miconazole ointment to toes  -Podiatry was consulted for further assistance     Tobacco Use Disorder  -Nicotine 21 mg patch daily     DVT PPx: Lovenox SubQ Daily    Diet: Regular  Discharge plan and follow up: Plan for possible D/C to SNF vs NH following medical stabalization    Sobia Ortiz MD  Hospital Medicine Staff  [phone] pager

## 2018-10-11 NOTE — PT/OT/SLP PROGRESS
Occupational Therapy   Treatment    Name: Mohamud Patel  MRN: [id number]  Admitting Diagnosis:  Alcohol withdrawal       Recommendations:     Discharge Recommendations: rehabilitation facility  Discharge Equipment Recommendations:  bedside commode, wheelchair  Barriers to discharge:  None    Subjective     Communicated with: RN (Cyndi) prior to session. No family present for session.   Pain/Comfort:  · Pain Rating 1: 0/10    Patients cultural, spiritual, Orthodox conflicts given the current situation: none reported     Objective:     Patient found with: telemetry, restraints(bedside sitter)    General Precautions: Standard, aspiration, fall, seizure(delirium)   Orthopedic Precautions:N/A   Braces: N/A     Occupational Performance:    Bed Mobility:    · Patient completed Rolling/Turning to Left with  minimum assistance  · Patient completed Scooting/Bridging with minimum assistance  · Patient completed Supine to Sit with minimum assistance     Functional Mobility/Transfers:  · Patient completed Sit <> Stand Transfer with moderate assistance  with  no assistive device x2 trials from EOB   · Functional Mobility: ~4 lateral steps to L with mod A for balance and weight shift   · Demo posterior lean, need for unilateral UE support, and ataxia    Activities of Daily Living:  · Feeding:  independence to drink from coffee cup x5 trials   · Grooming: independence to wash face with RUE while standing; mod A for balance and unilateral UE support required  · Upper Body Dressing: minimum assistance to don gown like jacket while seated; edu for dressing RUE first as he demo decreased shoulder ROM  · Lower Body Dressing: maximal assistance to don socks while seated due to dressings of wounds on feet    Patient left sitting EOB  with all lines intact, call button in reach and MD and bedside sitter present. Bedside sitter in agreement to allow pt to sit EOB as long as he is cooperative and calm as he was during entirety of  "session. MD stated it was okay to leave restraints off while seated EOB. Per MD and CM, pt not eligible for rehab. MD notified that pt would benefit from SS-SNF if eligible. He would also benefit from HH with 24hr assist (which he has per CM).     [facility] 6 Click:  [facility] Total Score: 16    Treatment & Education:  -Edu for OT role and POC  -Edu for importance of OOB activity and impact on healing  -Orientation:    -stated "September or October" when asked month. Oriented to correct answer of October    -stated "2001" when asked year; picked 2005 from choices rather than correct response of 2018, orientation provided   -stated "Thursday" when asked day of week and after one cue    -stated "[facility] [place]" when asked place without any cueing  -Whiteboard updated   -Questions and concerns addressed within OT scope of practice   Education:    Assessment:     Mohamud Patel is a 57 y.o. male with a medical diagnosis of Alcohol withdrawal.  He presents with decreased functional independence in various areas of his life. He demo increased motivation and willingness to participate this date. He demo increased orientation this date as he is now aware that he is in the hospital. He demo increased skill with standing, but remains decreased from baseline. He demo decreased balance in standing. Pt demo decreased endurance to functional task. Pt would continue to benefit from skilled OT services for maximum functional outcome and safety. Performance deficits affecting function are weakness, impaired endurance, impaired self care skills, impaired functional mobilty, decreased coordination, impaired balance, gait instability, decreased upper extremity function, decreased lower extremity function, impaired cardiopulmonary response to activity, impaired coordination, decreased safety awareness, impaired fine motor.      Rehab Prognosis:  good; patient would benefit from acute skilled OT services to address these " deficits and reach maximum level of function.       Plan:     Patient to be seen 3 x/week to address the above listed problems via self-care/home management, therapeutic activities, therapeutic exercises  · Plan of Care Expires: 11/04/18  · Plan of Care Reviewed with: patient    This Plan of care has been discussed with the patient who was involved in its development and understands and is in agreement with the identified goals and treatment plan    GOALS:   Multidisciplinary Problems     Occupational Therapy Goals        Problem: Occupational Therapy Goal    Goal Priority Disciplines Outcome Interventions   Occupational Therapy Goal     OT, PT/OT Ongoing (interventions implemented as appropriate)    Description:  Goals to be met by: 10/20  Patient will increase functional independence with ADLs by performing:    UE Dressing with Modified [place] while seated.  LE Dressing with Modified [place].  Grooming while standing at sink with Supervision.  Toileting from toilet with Modified [place] for hygiene and clothing management.   Supine to sit with Supervision and HOB flat.  Stand pivot transfers with Supervision to chair and/or toilet.  Functional mobility with AD as needed for short household distance with no LOB with supervision.                       Time Tracking:     OT Date of Treatment: 10/11/18  OT Start Time: 1132  OT Stop Time: 1204  OT Total Time (min): 32 min    Billable Minutes:Self Care/Home Management 10  Therapeutic Activity 12    SAM Almaraz  10/11/2018

## 2018-10-12 LAB
ALBUMIN SERPL BCP-MCNC: 3.3 G/DL
ALP SERPL-CCNC: 95 U/L
ALT SERPL W/O P-5'-P-CCNC: 25 U/L
ANION GAP SERPL CALC-SCNC: 9 MMOL/L
AST SERPL-CCNC: 23 U/L
BASOPHILS # BLD AUTO: 0.13 K/UL
BASOPHILS NFR BLD: 2.4 %
BILIRUB SERPL-MCNC: 0.6 MG/DL
BUN SERPL-MCNC: 14 MG/DL
CALCIUM SERPL-MCNC: 9.9 MG/DL
CHLORIDE SERPL-SCNC: 105 MMOL/L
CO2 SERPL-SCNC: 25 MMOL/L
CREAT SERPL-MCNC: 0.8 MG/DL
DIFFERENTIAL METHOD: ABNORMAL
EOSINOPHIL # BLD AUTO: 0.2 K/UL
EOSINOPHIL NFR BLD: 3.8 %
ERYTHROCYTE [DISTWIDTH] IN BLOOD BY AUTOMATED COUNT: 14 %
EST. GFR  (AFRICAN AMERICAN): >60 ML/MIN/1.73 M^2
EST. GFR  (NON AFRICAN AMERICAN): >60 ML/MIN/1.73 M^2
GLUCOSE SERPL-MCNC: 92 MG/DL
HCT VFR BLD AUTO: 35.7 %
HGB BLD-MCNC: 11.8 G/DL
IMM GRANULOCYTES # BLD AUTO: 0.03 K/UL
IMM GRANULOCYTES NFR BLD AUTO: 0.5 %
LYMPHOCYTES # BLD AUTO: 1 K/UL
LYMPHOCYTES NFR BLD: 17.5 %
MAGNESIUM SERPL-MCNC: 2 MG/DL
MCH RBC QN AUTO: 33.7 PG
MCHC RBC AUTO-ENTMCNC: 33.1 G/DL
MCV RBC AUTO: 102 FL
MONOCYTES # BLD AUTO: 0.8 K/UL
MONOCYTES NFR BLD: 14.2 %
NEUTROPHILS # BLD AUTO: 3.4 K/UL
NEUTROPHILS NFR BLD: 61.6 %
NRBC BLD-RTO: 0 /100 WBC
PHOSPHATE SERPL-MCNC: 4.3 MG/DL
PLATELET # BLD AUTO: 286 K/UL
PMV BLD AUTO: 9.8 FL
POTASSIUM SERPL-SCNC: 4 MMOL/L
PROT SERPL-MCNC: 7.5 G/DL
RBC # BLD AUTO: 3.5 M/UL
SODIUM SERPL-SCNC: 139 MMOL/L
WBC # BLD AUTO: 5.5 K/UL

## 2018-10-12 PROCEDURE — 25000003 PHARM REV CODE 250: Performed by: HOSPITALIST

## 2018-10-12 PROCEDURE — 85025 COMPLETE CBC W/AUTO DIFF WBC: CPT

## 2018-10-12 PROCEDURE — 84100 ASSAY OF PHOSPHORUS: CPT

## 2018-10-12 PROCEDURE — 83735 ASSAY OF MAGNESIUM: CPT

## 2018-10-12 PROCEDURE — 99232 SBSQ HOSP IP/OBS MODERATE 35: CPT | Mod: ,,, | Performed by: HOSPITALIST

## 2018-10-12 PROCEDURE — 36415 COLL VENOUS BLD VENIPUNCTURE: CPT

## 2018-10-12 PROCEDURE — 11000001 HC ACUTE MED/SURG PRIVATE ROOM

## 2018-10-12 PROCEDURE — 63600175 PHARM REV CODE 636 W HCPCS: Performed by: HOSPITALIST

## 2018-10-12 PROCEDURE — S4991 NICOTINE PATCH NONLEGEND: HCPCS | Performed by: HOSPITALIST

## 2018-10-12 PROCEDURE — 80053 COMPREHEN METABOLIC PANEL: CPT

## 2018-10-12 RX ORDER — CIPROFLOXACIN 500 MG/1
500 TABLET ORAL EVERY 12 HOURS
Status: DISCONTINUED | OUTPATIENT
Start: 2018-10-12 | End: 2018-10-14

## 2018-10-12 RX ADMIN — DIAZEPAM 10 MG: 5 TABLET ORAL at 09:10

## 2018-10-12 RX ADMIN — MICONAZOLE NITRATE: 20 OINTMENT TOPICAL at 09:10

## 2018-10-12 RX ADMIN — CIPROFLOXACIN HYDROCHLORIDE 500 MG: 500 TABLET, FILM COATED ORAL at 09:10

## 2018-10-12 RX ADMIN — FOLIC ACID 1 MG: 1 TABLET ORAL at 10:10

## 2018-10-12 RX ADMIN — LORAZEPAM 1 MG: 2 INJECTION, SOLUTION INTRAMUSCULAR; INTRAVENOUS at 04:10

## 2018-10-12 RX ADMIN — ENOXAPARIN SODIUM 40 MG: 100 INJECTION SUBCUTANEOUS at 05:10

## 2018-10-12 RX ADMIN — Medication 400 MG: at 09:10

## 2018-10-12 RX ADMIN — MICONAZOLE NITRATE: 20 OINTMENT TOPICAL at 10:10

## 2018-10-12 RX ADMIN — NICOTINE 1 PATCH: 21 PATCH, EXTENDED RELEASE TRANSDERMAL at 10:10

## 2018-10-12 RX ADMIN — DIAZEPAM 10 MG: 5 TABLET ORAL at 10:10

## 2018-10-12 RX ADMIN — Medication 400 MG: at 10:10

## 2018-10-12 RX ADMIN — CIPROFLOXACIN HYDROCHLORIDE 500 MG: 500 TABLET, FILM COATED ORAL at 10:10

## 2018-10-12 NOTE — PROGRESS NOTES
Hospital Medicine   Progress note      Team: [facility] Sobia Ortiz MD   Admit Date: 10/4/2018   Hospital Day: 6  YONAS: 10/13/2018   Code status: Full Code   Principal Problem: Alcohol withdrawal     Summary:  Mohamud Patel is a 57 y.o. man with a history of Severe Alcohol Abuse, Chronic Wounds of Bilateral LE's with Onychomycosis, Alcoholic Neuropathy, Hyponatremia, and Tobacco Use Disorder who was brought to the ED by EMS after his girlfriend called 911 while the patient was severely intoxicated. He is admitted to Hospital Medicine for Alcohol Withdrawal, possible Wernicke's Encephalopathy, and Acute Cystitis.     Interval hx:   Pt was seen and examined at bedside. Pt is sitting up at the side of the bed, able to have a conversation about his drinking habits. Pt is eager to go home, and cut back on his drinking. Reports he would still drink a beer every day.      ROS (Positive in Bold, otherwise negative)   Constitutional: fever, chills, night sweats  CV: chest pain, edema, palpitations  Resp: SOB, cough, sputum production  GI: changes in appetite, NVDC, pain, melena, hematochezia, GERD, hematemesis  : Dysuria, hematuria, urinary urgency, frequency  MSK: arthralgia/myalgia, joint swelling  Neuro/Psych: anxiety, depression    PEx   Temp:  [97.4 °F (36.3 °C)-98.6 °F (37 °C)]   Pulse:  [60-93]   Resp:  [17-18]   BP: (127-189)/(77-91)   SpO2:  [95 %-99 %]      I & O (Last 24H):   No intake or output data in the 24 hours ending 10/11/18 2011    General:  male  in no acute distress. Nontoxic. Resting in bed. In restraints.  HEENT: NCAT. PERRL. EOMI. Sclera Anicteric.  CVS: RRR. Normal S1 S2. No murmurs  Pulm: CTAB. Normal respiratory effort. No wheezes, rhonchi, or crackles.  Abdomen: Soft. Non-distended. No tenderness to palpation. No rebound or guarding. +BS.  Extremities: No edema. No cyanosis. Full ROM.  Skin: multiple scabbed lesions on bilateral feet, thickened skin on bilateral dorsal  feet worse at the toes consistent with onychomycosis, no interdigital pits or evidence of scabies, large linear laceration with secondary healing on right foot, no surrounding erythema or induration  Neuro: Alert, oriented x 1, Spont mvt of all extremities with no focal deficits noted.    Recent Results (from the past 24 hour(s))   Comprehensive Metabolic Panel (CMP)    Collection Time: 10/11/18  7:16 AM   Result Value Ref Range    Sodium 139 136 - 145 mmol/L    Potassium 3.9 3.5 - 5.1 mmol/L    Chloride 107 95 - 110 mmol/L    CO2 23 23 - 29 mmol/L    Glucose 89 70 - 110 mg/dL    BUN, Bld 14 6 - 20 mg/dL    Creatinine 0.7 0.5 - 1.4 mg/dL    Calcium 10.0 8.7 - 10.5 mg/dL    Total Protein 7.6 6.0 - 8.4 g/dL    Albumin 3.3 (L) 3.5 - 5.2 g/dL    Total Bilirubin 0.5 0.1 - 1.0 mg/dL    Alkaline Phosphatase 95 55 - 135 U/L    AST 26 10 - 40 U/L    ALT 29 10 - 44 U/L    Anion Gap 9 8 - 16 mmol/L    eGFR if African American >60.0 >60 mL/min/1.73 m^2    eGFR if non African American >60.0 >60 mL/min/1.73 m^2   Magnesium    Collection Time: 10/11/18  7:16 AM   Result Value Ref Range    Magnesium 2.0 1.6 - 2.6 mg/dL   Phosphorus    Collection Time: 10/11/18  7:16 AM   Result Value Ref Range    Phosphorus 4.3 2.7 - 4.5 mg/dL   CBC with Automated Differential    Collection Time: 10/11/18  7:16 AM   Result Value Ref Range    WBC 5.70 3.90 - 12.70 K/uL    RBC 3.52 (L) 4.60 - 6.20 M/uL    Hemoglobin 12.1 (L) 14.0 - 18.0 g/dL    Hematocrit 36.1 (L) 40.0 - 54.0 %     (H) 82 - 98 fL    MCH 34.4 (H) 27.0 - 31.0 pg    MCHC 33.5 32.0 - 36.0 g/dL    RDW 14.0 11.5 - 14.5 %    Platelets 245 150 - 350 K/uL    MPV 9.5 9.2 - 12.9 fL    Immature Granulocytes 0.5 0.0 - 0.5 %    Gran # (ANC) 3.3 1.8 - 7.7 K/uL    Immature Grans (Abs) 0.03 0.00 - 0.04 K/uL    Lymph # 1.1 1.0 - 4.8 K/uL    Mono # 1.0 0.3 - 1.0 K/uL    Eos # 0.2 0.0 - 0.5 K/uL    Baso # 0.11 0.00 - 0.20 K/uL    nRBC 0 0 /100 WBC    Gran% 57.6 38.0 - 73.0 %    Lymph% 18.6 18.0 -  48.0 %    Mono% 17.5 (H) 4.0 - 15.0 %    Eosinophil% 3.9 0.0 - 8.0 %    Basophil% 1.9 0.0 - 1.9 %    Differential Method Automated        Recent Labs   Lab  10/09/18   0808   POCTGLUCOSE  88       Hemoglobin A1C   Date Value Ref Range Status   10/05/2018 5.3 4.0 - 5.6 % Final     Comment:     ADA Screening Guidelines:  5.7-6.4%  Consistent with prediabetes  >or=6.5%  Consistent with diabetes  High levels of fetal hemoglobin interfere with the HbA1C  assay. Heterozygous hemoglobin variants (HbS, HgC, etc)do  not significantly interfere with this assay.   However, presence of multiple variants may affect accuracy.     08/10/2018 4.4 4.0 - 5.6 % Final     Comment:     ADA Screening Guidelines:  5.7-6.4%  Consistent with prediabetes  >or=6.5%  Consistent with diabetes  High levels of fetal hemoglobin interfere with the HbA1C  assay. Heterozygous hemoglobin variants (HbS, HgC, etc)do  not significantly interfere with this assay.   However, presence of multiple variants may affect accuracy.          Active Hospital Problems    Diagnosis  POA    *Alcohol withdrawal [F10.239]  Yes    Impaired mobility and ADLs [Z74.09]  Unknown    Ulcer of toe of left foot [L97.529]  Yes    Abrasion [T14.8XXA]  Yes    Alcohol withdrawal delirium [F10.231]  Yes    Mental health disorder [F99]  Yes    Wernicke encephalopathy [E51.2]  Yes    Alcoholic peripheral neuropathy [G62.1]  Yes    Wound of lower extremity [S81.809A]  Yes    Alcohol use disorder, severe, dependence [F10.20]  Yes     Chronic    Hyponatremia [E87.1]  Yes      Resolved Hospital Problems   No resolved problems to display.      Assessment and Plan for problems addressed today:      ciprofloxacin HCl  500 mg Oral Q12H    diazePAM  10 mg Oral BID    enoxparin  40 mg Subcutaneous Daily    folic acid  1 mg Oral Daily    magnesium oxide  400 mg Oral BID    miconazole nitrate 2%   Topical (Top) BID    nicotine  1 patch Transdermal Daily    thiamine (VITAMIN B1)  IVPB  250 mg Intravenous Daily     lorazepam, sodium chloride 0.9%    Alcohol Withdrawal  Severe Alcohol Use Disorder  Concern for Wernicke's Encephalopathy (WE)  Hyponatremia, Suspect Beer Potomania (resolved)  Alcoholic Neuropathy  -Patient presents with severe alcohol abuse disorder, last drink morning of admission (10/4). Initial ethanol level elevated (239); UDS positive for marijuana. Denies any history of Alcoholic Hallucinosis, DT's, or Withdrawal Seizures, but showing signs of alcohol withdrawal - CIWA (14) at admission: Tremor, Agitation, Orientation  -Started Valium at 10 mg four times daily > tapered 10/8 to Valium 10 mg TID > further taper to valium 10mg BID on 10/11  -Ativan 1 mg IV PRN breakthrough symptoms or withdrawal seizures.   -CIWA assessments every 4 hours per nursing  -For Wernicke's Encephalopathy: Thiamine 500 mg IV every 8 hours, infuse over 30 minutes x 2 days > Thiamine 250 mg IV daily x 5 days > Thiamine 100 mg PO daily. Folic acid and MVI daily. Patient confabulating and exam concerning for WE  -encourage PO intake   -Daily CMP, Mg, Phos  -Fall, aspiration, and seizure precautions  -PT/OT consulted  -Homocysteine level wnl's, MMA pending - low suspicion for Vitamin B12 deficiency at this time     Acute Cystitis without Hematuria  -UA again consistent with UTI without significant hematuria, UCx: E.Coli, sensitive to Ciprofloxacin and Augmentin.  -was given Ceftriaxone 1 g IV daily inially,now changed to Ciprofloxacin 500 mg PO BID for 6 additional days, end 10/14 Ciprofloxacin      Multiple Bilateral Lower Extremity Wounds  Onychomycosis of Bilateral Feet   -No signs of active infection and no evidence of systemic toxicity. Starting CTX for UTI per above  -Wound Care consulted, appreciate assistance - have debrided wounds, applying medi-honey, no evidence of active infection. Miconazole ointment to toes  -Podiatry was consulted for further assistance     Tobacco Use Disorder  -Nicotine  21 mg patch daily     DVT PPx: Lovenox SubQ Daily    Diet: Regular  Discharge plan and follow up: Plan for possible D/C to SNF vs NH following medical stabalization    Sobia Ortiz MD  [facility] Medicine Staff  [phone] pager

## 2018-10-12 NOTE — PLAN OF CARE
Discharge planning: Received call from Chitra (caregiver) requesting that CM speak with patient's father. Received call from Sony Patel patient's father [phone]. He lives in [place]. Discussed at length discharge options. He does not think nursing home is a good option. Explained to him that physical rehab is not an option as patient does not meet criteria. Discussed hiring sitters and he believes best plan is to discharge home with Chitra and vielka. He has the financial means to provide care. He verbalizes that he is aware patient will likely not get better.  Passed on info to covering CLARENCE Mehta.

## 2018-10-12 NOTE — PLAN OF CARE
"Per MSW, pt has Medicaid and can only get SN for hh, not therapy  So, pt can have inpatient rehab or an outpatient ambulatory referral can be made by MD     10/12/18 7276   Right Care Assessment   Can the patient answer the patient profile reliably? (sleeping. CLARENCE left etoh resources at  and informed nurse.)     MD Ortiz updated w plan    Update:  15:01 -see Indigo's note.  Dc plan is home w outpatient therapy         Per PMR's notes: "Patient with therapy needs.  Limited goals for Inpatient Rehab.  Recommend Skilled Nursing with potential detention care vs home with significant other with 24/7 supervision and appropriate DME per therapy recommendations.  Will follow for additional rehab needs or change in post-acute recommendation.     ZEE Ferrer  Department of Physical Medicine & Rehab   [facility]"    Clarence updated MD and MSW      "

## 2018-10-12 NOTE — PLAN OF CARE
Problem: Patient Care Overview  Goal: Plan of Care Review  Outcome: Ongoing (interventions implemented as appropriate)  Pt free of falls/injuries throughout the shift. Bed locked, in lowest position, call bell within reach. Pt afebrile, pain assessed & denied. VSS, sitter at the bs, will continue to monitor.    No acute events overnight.

## 2018-10-13 LAB
ALBUMIN SERPL BCP-MCNC: 3.2 G/DL
ALP SERPL-CCNC: 90 U/L
ALT SERPL W/O P-5'-P-CCNC: 22 U/L
ANION GAP SERPL CALC-SCNC: 8 MMOL/L
AST SERPL-CCNC: 24 U/L
BASOPHILS # BLD AUTO: 0.14 K/UL
BASOPHILS NFR BLD: 2.1 %
BILIRUB SERPL-MCNC: 0.6 MG/DL
BUN SERPL-MCNC: 14 MG/DL
CALCIUM SERPL-MCNC: 9.7 MG/DL
CHLORIDE SERPL-SCNC: 103 MMOL/L
CO2 SERPL-SCNC: 25 MMOL/L
CREAT SERPL-MCNC: 0.8 MG/DL
DIFFERENTIAL METHOD: ABNORMAL
EOSINOPHIL # BLD AUTO: 0.2 K/UL
EOSINOPHIL NFR BLD: 2.7 %
ERYTHROCYTE [DISTWIDTH] IN BLOOD BY AUTOMATED COUNT: 13.8 %
EST. GFR  (AFRICAN AMERICAN): >60 ML/MIN/1.73 M^2
EST. GFR  (NON AFRICAN AMERICAN): >60 ML/MIN/1.73 M^2
GLUCOSE SERPL-MCNC: 93 MG/DL
HCT VFR BLD AUTO: 35 %
HGB BLD-MCNC: 11.8 G/DL
IMM GRANULOCYTES # BLD AUTO: 0.01 K/UL
IMM GRANULOCYTES NFR BLD AUTO: 0.1 %
LYMPHOCYTES # BLD AUTO: 0.9 K/UL
LYMPHOCYTES NFR BLD: 13.3 %
MAGNESIUM SERPL-MCNC: 2 MG/DL
MCH RBC QN AUTO: 34 PG
MCHC RBC AUTO-ENTMCNC: 33.7 G/DL
MCV RBC AUTO: 101 FL
MONOCYTES # BLD AUTO: 0.9 K/UL
MONOCYTES NFR BLD: 12.9 %
NEUTROPHILS # BLD AUTO: 4.7 K/UL
NEUTROPHILS NFR BLD: 68.9 %
NRBC BLD-RTO: 0 /100 WBC
PHOSPHATE SERPL-MCNC: 4 MG/DL
PLATELET # BLD AUTO: 306 K/UL
PMV BLD AUTO: 9.6 FL
POTASSIUM SERPL-SCNC: 3.6 MMOL/L
PROT SERPL-MCNC: 7.4 G/DL
RBC # BLD AUTO: 3.47 M/UL
SODIUM SERPL-SCNC: 136 MMOL/L
WBC # BLD AUTO: 6.76 K/UL

## 2018-10-13 PROCEDURE — 25000003 PHARM REV CODE 250: Performed by: HOSPITALIST

## 2018-10-13 PROCEDURE — 36415 COLL VENOUS BLD VENIPUNCTURE: CPT

## 2018-10-13 PROCEDURE — 99232 SBSQ HOSP IP/OBS MODERATE 35: CPT | Mod: ,,, | Performed by: HOSPITALIST

## 2018-10-13 PROCEDURE — S4991 NICOTINE PATCH NONLEGEND: HCPCS | Performed by: HOSPITALIST

## 2018-10-13 PROCEDURE — 85025 COMPLETE CBC W/AUTO DIFF WBC: CPT

## 2018-10-13 PROCEDURE — 63600175 PHARM REV CODE 636 W HCPCS: Performed by: HOSPITALIST

## 2018-10-13 PROCEDURE — 80053 COMPREHEN METABOLIC PANEL: CPT

## 2018-10-13 PROCEDURE — 11000001 HC ACUTE MED/SURG PRIVATE ROOM

## 2018-10-13 PROCEDURE — 84100 ASSAY OF PHOSPHORUS: CPT

## 2018-10-13 PROCEDURE — 83735 ASSAY OF MAGNESIUM: CPT

## 2018-10-13 RX ORDER — DIAZEPAM 5 MG/1
10 TABLET ORAL DAILY
Status: DISCONTINUED | OUTPATIENT
Start: 2018-10-14 | End: 2018-10-14

## 2018-10-13 RX ADMIN — CIPROFLOXACIN HYDROCHLORIDE 500 MG: 500 TABLET, FILM COATED ORAL at 09:10

## 2018-10-13 RX ADMIN — MICONAZOLE NITRATE: 20 OINTMENT TOPICAL at 09:10

## 2018-10-13 RX ADMIN — Medication 400 MG: at 09:10

## 2018-10-13 RX ADMIN — NICOTINE 1 PATCH: 21 PATCH, EXTENDED RELEASE TRANSDERMAL at 09:10

## 2018-10-13 RX ADMIN — DIAZEPAM 10 MG: 5 TABLET ORAL at 09:10

## 2018-10-13 RX ADMIN — FOLIC ACID 1 MG: 1 TABLET ORAL at 09:10

## 2018-10-13 RX ADMIN — ENOXAPARIN SODIUM 40 MG: 100 INJECTION SUBCUTANEOUS at 05:10

## 2018-10-13 NOTE — PLAN OF CARE
Problem: Fall Risk (Adult)  Goal: Absence of Falls  Patient will demonstrate the desired outcomes by discharge/transition of care.  Outcome: Unable to achieve outcome(s) by discharge  Found falling over in bathroom by security.

## 2018-10-13 NOTE — PROGRESS NOTES
Hospital Medicine   Progress note      Team: [facility] [place] HOSP MED R Sobia Ortiz MD   Admit Date: 10/4/2018   Hospital Day: 8  YONAS: 10/13/2018   Code status: Full Code   Principal Problem: Alcohol withdrawal     Summary:  Mohamud Patel is a 57 y.o. man with a history of Severe Alcohol Abuse, Chronic Wounds of Bilateral LE's with Onychomycosis, Alcoholic Neuropathy, Hyponatremia, and Tobacco Use Disorder who was brought to the ED by EMS after his girlfriend called 911 while the patient was severely intoxicated. He is admitted to Hospital Medicine for Alcohol Withdrawal, possible Wernicke's Encephalopathy, and Acute Cystitis.     Interval hx:   Pt was seen and examined at bedside. Overnight, pt attempted to get out of bed and suffered a fall. He underwent stat head CT which did not show any acute intercranial abnormalities. He was placed in restraints again overnight which were removed this morning. Pt was noted to be slightly drowsy this morning but easily arousible to verbal stimuli. Called the number on the chart for Ms. Buenrostro multiple times today however have not been able to get in touch with her.     ROS (Positive in Bold, otherwise negative)   Constitutional: fever, chills, night sweats  CV: chest pain, edema, palpitations  Resp: SOB, cough, sputum production  GI: changes in appetite, NVDC, pain, melena, hematochezia, GERD, hematemesis  : Dysuria, hematuria, urinary urgency, frequency  MSK: arthralgia/myalgia, joint swelling  Neuro/Psych: anxiety, depression    PEx   Temp:  [97.6 °F (36.4 °C)-98.4 °F (36.9 °C)]   Pulse:  [61-96]   Resp:  [16-18]   BP: (132-174)/(77-86)   SpO2:  [98 %-100 %]      I & O (Last 24H):     Intake/Output Summary (Last 24 hours) at 10/13/2018 1602  Last data filed at 10/12/2018 1700  Gross per 24 hour   Intake 240 ml   Output --   Net 240 ml       General:  male  in no acute distress. dorwsy. Resting in bed. In restraints.  HEENT: NCAT. PERRL. EOMI. Sclera  Anicteric.  CVS: RRR. Normal S1 S2. No murmurs  Pulm: CTAB. Normal respiratory effort. No wheezes, rhonchi, or crackles.  Abdomen: Soft. Non-distended. No tenderness to palpation. No rebound or guarding. +BS.  Extremities: No edema. No cyanosis. Full ROM.  Skin: multiple scabbed lesions on bilateral feet, thickened skin on bilateral dorsal feet worse at the toes consistent with onychomycosis, no interdigital pits or evidence of scabies, large linear laceration with secondary healing on right foot, no surrounding erythema or induration  Neuro: Alert, oriented x 2, Spont mvt of all extremities with no focal deficits noted.    Recent Results (from the past 24 hour(s))   Comprehensive Metabolic Panel (CMP)    Collection Time: 10/13/18  6:50 AM   Result Value Ref Range    Sodium 136 136 - 145 mmol/L    Potassium 3.6 3.5 - 5.1 mmol/L    Chloride 103 95 - 110 mmol/L    CO2 25 23 - 29 mmol/L    Glucose 93 70 - 110 mg/dL    BUN, Bld 14 6 - 20 mg/dL    Creatinine 0.8 0.5 - 1.4 mg/dL    Calcium 9.7 8.7 - 10.5 mg/dL    Total Protein 7.4 6.0 - 8.4 g/dL    Albumin 3.2 (L) 3.5 - 5.2 g/dL    Total Bilirubin 0.6 0.1 - 1.0 mg/dL    Alkaline Phosphatase 90 55 - 135 U/L    AST 24 10 - 40 U/L    ALT 22 10 - 44 U/L    Anion Gap 8 8 - 16 mmol/L    eGFR if African American >60.0 >60 mL/min/1.73 m^2    eGFR if non African American >60.0 >60 mL/min/1.73 m^2   Magnesium    Collection Time: 10/13/18  6:50 AM   Result Value Ref Range    Magnesium 2.0 1.6 - 2.6 mg/dL   Phosphorus    Collection Time: 10/13/18  6:50 AM   Result Value Ref Range    Phosphorus 4.0 2.7 - 4.5 mg/dL   CBC with Automated Differential    Collection Time: 10/13/18  6:50 AM   Result Value Ref Range    WBC 6.76 3.90 - 12.70 K/uL    RBC 3.47 (L) 4.60 - 6.20 M/uL    Hemoglobin 11.8 (L) 14.0 - 18.0 g/dL    Hematocrit 35.0 (L) 40.0 - 54.0 %     (H) 82 - 98 fL    MCH 34.0 (H) 27.0 - 31.0 pg    MCHC 33.7 32.0 - 36.0 g/dL    RDW 13.8 11.5 - 14.5 %    Platelets 306 150 - 350  K/uL    MPV 9.6 9.2 - 12.9 fL    Immature Granulocytes 0.1 0.0 - 0.5 %    Gran # (ANC) 4.7 1.8 - 7.7 K/uL    Immature Grans (Abs) 0.01 0.00 - 0.04 K/uL    Lymph # 0.9 (L) 1.0 - 4.8 K/uL    Mono # 0.9 0.3 - 1.0 K/uL    Eos # 0.2 0.0 - 0.5 K/uL    Baso # 0.14 0.00 - 0.20 K/uL    nRBC 0 0 /100 WBC    Gran% 68.9 38.0 - 73.0 %    Lymph% 13.3 (L) 18.0 - 48.0 %    Mono% 12.9 4.0 - 15.0 %    Eosinophil% 2.7 0.0 - 8.0 %    Basophil% 2.1 (H) 0.0 - 1.9 %    Differential Method Automated        Recent Labs   Lab  10/09/18   0808   POCTGLUCOSE  88       Hemoglobin A1C   Date Value Ref Range Status   10/05/2018 5.3 4.0 - 5.6 % Final     Comment:     ADA Screening Guidelines:  5.7-6.4%  Consistent with prediabetes  >or=6.5%  Consistent with diabetes  High levels of fetal hemoglobin interfere with the HbA1C  assay. Heterozygous hemoglobin variants (HbS, HgC, etc)do  not significantly interfere with this assay.   However, presence of multiple variants may affect accuracy.     08/10/2018 4.4 4.0 - 5.6 % Final     Comment:     ADA Screening Guidelines:  5.7-6.4%  Consistent with prediabetes  >or=6.5%  Consistent with diabetes  High levels of fetal hemoglobin interfere with the HbA1C  assay. Heterozygous hemoglobin variants (HbS, HgC, etc)do  not significantly interfere with this assay.   However, presence of multiple variants may affect accuracy.          Active Hospital Problems    Diagnosis  POA    *Alcohol withdrawal [F10.239]  Yes    Impaired mobility and ADLs [Z74.09]  Unknown    Ulcer of toe of left foot [L97.529]  Yes    Abrasion [T14.8XXA]  Yes    Alcohol withdrawal delirium [F10.231]  Yes    Mental health disorder [F99]  Yes    Wernicke encephalopathy [E51.2]  Yes    Alcoholic peripheral neuropathy [G62.1]  Yes    Wound of lower extremity [S81.809A]  Yes    Alcohol use disorder, severe, dependence [F10.20]  Yes     Chronic    Hyponatremia [E87.1]  Yes      Resolved Hospital Problems   No resolved problems to  display.      Assessment and Plan for problems addressed today:      ciprofloxacin HCl  500 mg Oral Q12H    [START ON 10/14/2018] diazePAM  10 mg Oral Daily    enoxparin  40 mg Subcutaneous Daily    folic acid  1 mg Oral Daily    magnesium oxide  400 mg Oral BID    miconazole nitrate 2%   Topical (Top) BID    nicotine  1 patch Transdermal Daily     lorazepam, sodium chloride 0.9%    Alcohol Withdrawal  Severe Alcohol Use Disorder  Concern for Wernicke's Encephalopathy (WE)  Hyponatremia, Suspect Beer Potomania (resolved)  Alcoholic Neuropathy  -Patient presents with severe alcohol abuse disorder, last drink morning of admission (10/4). Initial ethanol level elevated (239); UDS positive for marijuana. Denies any history of Alcoholic Hallucinosis, DT's, or Withdrawal Seizures, but showing signs of alcohol withdrawal - CIWA (14) at admission: Tremor, Agitation, Orientation  -Started Valium at 10 mg four times daily > tapered 10/8 to Valium 10 mg TID > further taper to valium 10mg BID on 10/11 >  further taper to valium 10mg daily on 10/13  -Ativan 1 mg IV PRN breakthrough symptoms or withdrawal seizures.   -CIWA assessments every 4 hours per nursing  -For Wernicke's Encephalopathy: Thiamine 500 mg IV every 8 hours, infuse over 30 minutes x 2 days > Thiamine 250 mg IV daily x 5 days > Thiamine 100 mg PO daily. Folic acid and MVI daily. Patient confabulating and exam concerning for WE  -pt continues to have gait instability, weakness, recurrent falls when he attempts to get out of bed, has poor insight into his addiction and encephalopathy and poor decision making. Will continue to get in touch with caregiver Chitra to discuss disposition as patient will require full time supervision to avoid falls and avoid going back to drinking  -encourage PO intake   -Daily CMP, Mg, Phos  -Fall, aspiration, and seizure precautions  -PT/OT consulted  -Homocysteine level wnl's, MMA pending - low suspicion for Vitamin B12  deficiency at this time     Acute Cystitis without Hematuria  -UA again consistent with UTI without significant hematuria, UCx: E.Coli, sensitive to Ciprofloxacin and Augmentin.  -was given Ceftriaxone 1 g IV daily inially,changed to Ciprofloxacin 500 mg PO BID for 6 additional days, end 10/14 Ciprofloxacin      Multiple Bilateral Lower Extremity Wounds  Onychomycosis of Bilateral Feet   -No signs of active infection and no evidence of systemic toxicity. Starting CTX for UTI per above  -Wound Care consulted, appreciate assistance - have debrided wounds, applying medi-honey, no evidence of active infection. Miconazole ointment to toes  -Podiatry was consulted for further assistance     Tobacco Use Disorder  -Nicotine 21 mg patch daily     DVT PPx: Lovenox SubQ Daily    Diet: Regular  Discharge plan and follow up: Plan for possible D/C to SNF vs NH following medical stabalization    Sobia Ortiz MD  Hospital Medicine Staff  [phone] pager

## 2018-10-13 NOTE — PLAN OF CARE
Problem: Patient Care Overview  Goal: Plan of Care Review  Pt free of falls/injuries throughout the shift. Bed locked, in lowest position, call bell within reach. Pt had a low grade temp 99.0  VSS, sitter at the bs, will continue to monitor.

## 2018-10-13 NOTE — PROGRESS NOTES
"Hospital Medicine   Progress note      [place] [facility] [place] Sobia Ortiz MD   Admit Date: 10/4/2018   Hospital Day: 7  YONAS: 10/13/2018   Code status: Full Code   Principal Problem: Alcohol withdrawal     Summary:  Mohamud Patel is a 57 y.o. man with a history of Severe Alcohol Abuse, Chronic Wounds of Bilateral LE's with Onychomycosis, Alcoholic Neuropathy, Hyponatremia, and Tobacco Use Disorder who was brought to the ED by EMS after his girlfriend called 911 while the patient was severely intoxicated. He is admitted to Hospital Medicine for Alcohol Withdrawal, possible Wernicke's Encephalopathy, and Acute Cystitis.     Interval hx:   Pt was seen and examined at bedside. No acute events overnight, no new events this morning. Pt sitting up in chair without any distress today. Reports shaking and jittery feeling has improved. Tolerating PO intake well. [place] to go home. Counseled about drinking again today and reports he cannot quit alcohol completely and will likely drink "a cold beer every morning and that's it".     ROS (Positive in Bold, otherwise negative)   Constitutional: fever, chills, night sweats  CV: chest pain, edema, palpitations  Resp: SOB, cough, sputum production  GI: changes in appetite, NVDC, pain, melena, hematochezia, GERD, hematemesis  : Dysuria, hematuria, urinary urgency, frequency  MSK: arthralgia/myalgia, joint swelling  Neuro/Psych: anxiety, depression    PEx   Temp:  [97.3 °F (36.3 °C)-98.8 °F (37.1 °C)]   Pulse:  [63-86]   Resp:  [15-18]   BP: (106-155)/(57-85)   SpO2:  [96 %-100 %]      I & O (Last 24H):     Intake/Output Summary (Last 24 hours) at 10/12/2018 1907  Last data filed at 10/12/2018 1200  Gross per 24 hour   Intake 480 ml   Output 550 ml   Net -70 ml       General:  male  in no acute distress. Nontoxic. Resting in bed. In restraints.  HEENT: NCAT. PERRL. EOMI. Sclera Anicteric.  CVS: RRR. Normal S1 S2. No murmurs  Pulm: CTAB. Normal respiratory effort. No " wheezes, rhonchi, or crackles.  Abdomen: Soft. Non-distended. No tenderness to palpation. No rebound or guarding. +BS.  Extremities: No edema. No cyanosis. Full ROM.  Skin: multiple scabbed lesions on bilateral feet, thickened skin on bilateral dorsal feet worse at the toes consistent with onychomycosis, no interdigital pits or evidence of scabies, large linear laceration with secondary healing on right foot, no surrounding erythema or induration  Neuro: Alert, oriented x 2, Spont mvt of all extremities with no focal deficits noted.    Recent Results (from the past 24 hour(s))   Comprehensive Metabolic Panel (CMP)    Collection Time: 10/12/18  6:28 AM   Result Value Ref Range    Sodium 139 136 - 145 mmol/L    Potassium 4.0 3.5 - 5.1 mmol/L    Chloride 105 95 - 110 mmol/L    CO2 25 23 - 29 mmol/L    Glucose 92 70 - 110 mg/dL    BUN, Bld 14 6 - 20 mg/dL    Creatinine 0.8 0.5 - 1.4 mg/dL    Calcium 9.9 8.7 - 10.5 mg/dL    Total Protein 7.5 6.0 - 8.4 g/dL    Albumin 3.3 (L) 3.5 - 5.2 g/dL    Total Bilirubin 0.6 0.1 - 1.0 mg/dL    Alkaline Phosphatase 95 55 - 135 U/L    AST 23 10 - 40 U/L    ALT 25 10 - 44 U/L    Anion Gap 9 8 - 16 mmol/L    eGFR if African American >60.0 >60 mL/min/1.73 m^2    eGFR if non African American >60.0 >60 mL/min/1.73 m^2   Magnesium    Collection Time: 10/12/18  6:28 AM   Result Value Ref Range    Magnesium 2.0 1.6 - 2.6 mg/dL   Phosphorus    Collection Time: 10/12/18  6:28 AM   Result Value Ref Range    Phosphorus 4.3 2.7 - 4.5 mg/dL   CBC with Automated Differential    Collection Time: 10/12/18  6:28 AM   Result Value Ref Range    WBC 5.50 3.90 - 12.70 K/uL    RBC 3.50 (L) 4.60 - 6.20 M/uL    Hemoglobin 11.8 (L) 14.0 - 18.0 g/dL    Hematocrit 35.7 (L) 40.0 - 54.0 %     (H) 82 - 98 fL    MCH 33.7 (H) 27.0 - 31.0 pg    MCHC 33.1 32.0 - 36.0 g/dL    RDW 14.0 11.5 - 14.5 %    Platelets 286 150 - 350 K/uL    MPV 9.8 9.2 - 12.9 fL    Immature Granulocytes 0.5 0.0 - 0.5 %    Gran # (ANC) 3.4  1.8 - 7.7 K/uL    Immature Grans (Abs) 0.03 0.00 - 0.04 K/uL    Lymph # 1.0 1.0 - 4.8 K/uL    Mono # 0.8 0.3 - 1.0 K/uL    Eos # 0.2 0.0 - 0.5 K/uL    Baso # 0.13 0.00 - 0.20 K/uL    nRBC 0 0 /100 WBC    Gran% 61.6 38.0 - 73.0 %    Lymph% 17.5 (L) 18.0 - 48.0 %    Mono% 14.2 4.0 - 15.0 %    Eosinophil% 3.8 0.0 - 8.0 %    Basophil% 2.4 (H) 0.0 - 1.9 %    Differential Method Automated        Recent Labs   Lab  10/09/18   0808   POCTGLUCOSE  88       Hemoglobin A1C   Date Value Ref Range Status   10/05/2018 5.3 4.0 - 5.6 % Final     Comment:     ADA Screening Guidelines:  5.7-6.4%  Consistent with prediabetes  >or=6.5%  Consistent with diabetes  High levels of fetal hemoglobin interfere with the HbA1C  assay. Heterozygous hemoglobin variants (HbS, HgC, etc)do  not significantly interfere with this assay.   However, presence of multiple variants may affect accuracy.     08/10/2018 4.4 4.0 - 5.6 % Final     Comment:     ADA Screening Guidelines:  5.7-6.4%  Consistent with prediabetes  >or=6.5%  Consistent with diabetes  High levels of fetal hemoglobin interfere with the HbA1C  assay. Heterozygous hemoglobin variants (HbS, HgC, etc)do  not significantly interfere with this assay.   However, presence of multiple variants may affect accuracy.          Active Hospital Problems    Diagnosis  POA    *Alcohol withdrawal [F10.239]  Yes    Impaired mobility and ADLs [Z74.09]  Unknown    Ulcer of toe of left foot [L97.529]  Yes    Abrasion [T14.8XXA]  Yes    Alcohol withdrawal delirium [F10.231]  Yes    Mental health disorder [F99]  Yes    Wernicke encephalopathy [E51.2]  Yes    Alcoholic peripheral neuropathy [G62.1]  Yes    Wound of lower extremity [S81.9A]  Yes    Alcohol use disorder, severe, dependence [F10.20]  Yes     Chronic    Hyponatremia [E87.1]  Yes      Resolved Hospital Problems   No resolved problems to display.      Assessment and Plan for problems addressed today:      ciprofloxacin HCl  500 mg Oral  Q12H    diazePAM  10 mg Oral BID    enoxparin  40 mg Subcutaneous Daily    folic acid  1 mg Oral Daily    magnesium oxide  400 mg Oral BID    miconazole nitrate 2%   Topical (Top) BID    nicotine  1 patch Transdermal Daily     lorazepam, sodium chloride 0.9%    Alcohol Withdrawal  Severe Alcohol Use Disorder  Concern for Wernicke's Encephalopathy (WE)  Hyponatremia, Suspect Beer Potomania (resolved)  Alcoholic Neuropathy  -Patient presents with severe alcohol abuse disorder, last drink morning of admission (10/4). Initial ethanol level elevated (239); UDS positive for marijuana. Denies any history of Alcoholic Hallucinosis, DT's, or Withdrawal Seizures, but showing signs of alcohol withdrawal - CIWA (14) at admission: Tremor, Agitation, Orientation  -Started Valium at 10 mg four times daily > tapered 10/8 to Valium 10 mg TID > further taper to valium 10mg BID on 10/11  -Ativan 1 mg IV PRN breakthrough symptoms or withdrawal seizures.   -CIWA assessments every 4 hours per nursing  -For Wernicke's Encephalopathy: Thiamine 500 mg IV every 8 hours, infuse over 30 minutes x 2 days > Thiamine 250 mg IV daily x 5 days > Thiamine 100 mg PO daily. Folic acid and MVI daily. Patient confabulating and exam concerning for WE  -encourage PO intake   -Daily CMP, Mg, Phos  -Fall, aspiration, and seizure precautions  -PT/OT consulted  -Homocysteine level wnl's, MMA pending - low suspicion for Vitamin B12 deficiency at this time     Acute Cystitis without Hematuria  -UA again consistent with UTI without significant hematuria, UCx: E.Coli, sensitive to Ciprofloxacin and Augmentin.  -was given Ceftriaxone 1 g IV daily inially,changed to Ciprofloxacin 500 mg PO BID for 6 additional days, end 10/14 Ciprofloxacin      Multiple Bilateral Lower Extremity Wounds  Onychomycosis of Bilateral Feet   -No signs of active infection and no evidence of systemic toxicity. Starting CTX for UTI per above  -Wound Care consulted, appreciate  assistance - have debrided wounds, applying medi-honey, no evidence of active infection. Miconazole ointment to toes  -Podiatry was consulted for further assistance     Tobacco Use Disorder  -Nicotine 21 mg patch daily     DVT PPx: Lovenox SubQ Daily    Diet: Regular  Discharge plan and follow up: Plan for possible D/C to SNF vs NH following medical stabalization    Sobia Ortiz MD  Hospital Medicine Staff  [phone] pager

## 2018-10-14 LAB
ALBUMIN SERPL BCP-MCNC: 3.1 G/DL
ALP SERPL-CCNC: 77 U/L
ALT SERPL W/O P-5'-P-CCNC: 18 U/L
ANION GAP SERPL CALC-SCNC: 12 MMOL/L
AST SERPL-CCNC: 15 U/L
BASOPHILS # BLD AUTO: 0.14 K/UL
BASOPHILS NFR BLD: 2.7 %
BILIRUB SERPL-MCNC: 0.3 MG/DL
BUN SERPL-MCNC: 19 MG/DL
CALCIUM SERPL-MCNC: 9.7 MG/DL
CHLORIDE SERPL-SCNC: 107 MMOL/L
CO2 SERPL-SCNC: 22 MMOL/L
CREAT SERPL-MCNC: 0.8 MG/DL
DIFFERENTIAL METHOD: ABNORMAL
EOSINOPHIL # BLD AUTO: 0.2 K/UL
EOSINOPHIL NFR BLD: 3 %
ERYTHROCYTE [DISTWIDTH] IN BLOOD BY AUTOMATED COUNT: 14.1 %
EST. GFR  (AFRICAN AMERICAN): >60 ML/MIN/1.73 M^2
EST. GFR  (NON AFRICAN AMERICAN): >60 ML/MIN/1.73 M^2
GLUCOSE SERPL-MCNC: 111 MG/DL
HCT VFR BLD AUTO: 37.7 %
HGB BLD-MCNC: 11.5 G/DL
IMM GRANULOCYTES # BLD AUTO: 0.02 K/UL
IMM GRANULOCYTES NFR BLD AUTO: 0.4 %
LYMPHOCYTES # BLD AUTO: 1.2 K/UL
LYMPHOCYTES NFR BLD: 21.8 %
MAGNESIUM SERPL-MCNC: 2.3 MG/DL
MCH RBC QN AUTO: 32.9 PG
MCHC RBC AUTO-ENTMCNC: 30.5 G/DL
MCV RBC AUTO: 108 FL
MONOCYTES # BLD AUTO: 0.7 K/UL
MONOCYTES NFR BLD: 14 %
NEUTROPHILS # BLD AUTO: 3.1 K/UL
NEUTROPHILS NFR BLD: 58.1 %
NRBC BLD-RTO: 0 /100 WBC
PHOSPHATE SERPL-MCNC: 3.8 MG/DL
PLATELET # BLD AUTO: 333 K/UL
PMV BLD AUTO: 9.3 FL
POTASSIUM SERPL-SCNC: 3.8 MMOL/L
PROT SERPL-MCNC: 7.4 G/DL
RBC # BLD AUTO: 3.5 M/UL
SODIUM SERPL-SCNC: 141 MMOL/L
WBC # BLD AUTO: 5.28 K/UL

## 2018-10-14 PROCEDURE — 99232 SBSQ HOSP IP/OBS MODERATE 35: CPT | Mod: ,,, | Performed by: HOSPITALIST

## 2018-10-14 PROCEDURE — S4991 NICOTINE PATCH NONLEGEND: HCPCS | Performed by: HOSPITALIST

## 2018-10-14 PROCEDURE — 83735 ASSAY OF MAGNESIUM: CPT

## 2018-10-14 PROCEDURE — 80053 COMPREHEN METABOLIC PANEL: CPT

## 2018-10-14 PROCEDURE — 63600175 PHARM REV CODE 636 W HCPCS: Performed by: HOSPITALIST

## 2018-10-14 PROCEDURE — 36415 COLL VENOUS BLD VENIPUNCTURE: CPT

## 2018-10-14 PROCEDURE — 85025 COMPLETE CBC W/AUTO DIFF WBC: CPT

## 2018-10-14 PROCEDURE — 84100 ASSAY OF PHOSPHORUS: CPT

## 2018-10-14 PROCEDURE — 11000001 HC ACUTE MED/SURG PRIVATE ROOM

## 2018-10-14 PROCEDURE — 25000003 PHARM REV CODE 250: Performed by: HOSPITALIST

## 2018-10-14 PROCEDURE — 25000003 PHARM REV CODE 250: Performed by: PHYSICIAN ASSISTANT

## 2018-10-14 RX ORDER — CIPROFLOXACIN 500 MG/1
500 TABLET ORAL EVERY 12 HOURS
Status: COMPLETED | OUTPATIENT
Start: 2018-10-14 | End: 2018-10-14

## 2018-10-14 RX ORDER — LORAZEPAM 1 MG/1
1 TABLET ORAL ONCE
Status: COMPLETED | OUTPATIENT
Start: 2018-10-14 | End: 2018-10-14

## 2018-10-14 RX ORDER — LORAZEPAM 1 MG/1
1 TABLET ORAL EVERY 8 HOURS PRN
Status: DISCONTINUED | OUTPATIENT
Start: 2018-10-14 | End: 2018-10-19

## 2018-10-14 RX ORDER — THIAMINE HCL 100 MG
100 TABLET ORAL DAILY
Status: DISCONTINUED | OUTPATIENT
Start: 2018-10-14 | End: 2018-11-10 | Stop reason: HOSPADM

## 2018-10-14 RX ADMIN — MICONAZOLE NITRATE: 20 OINTMENT TOPICAL at 09:10

## 2018-10-14 RX ADMIN — LORAZEPAM 1 MG: 1 TABLET ORAL at 04:10

## 2018-10-14 RX ADMIN — FOLIC ACID 1 MG: 1 TABLET ORAL at 09:10

## 2018-10-14 RX ADMIN — CIPROFLOXACIN HYDROCHLORIDE 500 MG: 500 TABLET, FILM COATED ORAL at 09:10

## 2018-10-14 RX ADMIN — Medication 100 MG: at 04:10

## 2018-10-14 RX ADMIN — NICOTINE 1 PATCH: 21 PATCH, EXTENDED RELEASE TRANSDERMAL at 09:10

## 2018-10-14 RX ADMIN — DIAZEPAM 10 MG: 5 TABLET ORAL at 09:10

## 2018-10-14 RX ADMIN — LORAZEPAM 1 MG: 1 TABLET ORAL at 09:10

## 2018-10-14 RX ADMIN — ENOXAPARIN SODIUM 40 MG: 100 INJECTION SUBCUTANEOUS at 04:10

## 2018-10-14 RX ADMIN — Medication 400 MG: at 09:10

## 2018-10-14 RX ADMIN — LORAZEPAM 1 MG: 1 TABLET ORAL at 01:10

## 2018-10-14 NOTE — PROGRESS NOTES
Hospital Medicine   Progress note      Team: [facility] [place] HOSP MED R Sobia Ortiz MD   Admit Date: 10/4/2018   Hospital Day: 9  YONAS: 10/13/2018   Code status: Full Code   Principal Problem: Alcohol withdrawal     Summary:  Mohamud Patel is a 57 y.o. man with a history of Severe Alcohol Abuse, Chronic Wounds of Bilateral LE's with Onychomycosis, Alcoholic Neuropathy, Hyponatremia, and Tobacco Use Disorder who was brought to the ED by EMS after his girlfriend called 911 while the patient was severely intoxicated. He is admitted to Hospital Medicine for Alcohol Withdrawal, possible Wernicke's Encephalopathy, and Acute Cystitis.     Interval hx:   Pt was seen and examined at bedside. Overnight, no acute events, no new complaints this morning. Pt appeared comfortable. This afternoon patient adamant about going home however he has continued to have unstable gait, recurrent falls and required 24hr sitter. Attempted to get in touch with caregiver Chitra again and left 2 voicemails about disposition planning.     ROS (Positive in Bold, otherwise negative)   Constitutional: fever, chills, night sweats  CV: chest pain, edema, palpitations  Resp: SOB, cough, sputum production  GI: changes in appetite, NVDC, pain, melena, hematochezia, GERD, hematemesis  : Dysuria, hematuria, urinary urgency, frequency  MSK: arthralgia/myalgia, joint swelling  Neuro/Psych: anxiety, depression    PEx   Temp:  [97.5 °F (36.4 °C)-99 °F (37.2 °C)]   Pulse:  [59-87]   Resp:  [16-18]   BP: (102-180)/(57-90)   SpO2:  [95 %-100 %]      I & O (Last 24H):     Intake/Output Summary (Last 24 hours) at 10/14/2018 1545  Last data filed at 10/14/2018 1200  Gross per 24 hour   Intake --   Output 400 ml   Net -400 ml       General:  male  in no acute distress. dorwsy. Resting in bed.  HEENT: NCAT. PERRL. EOMI. Sclera Anicteric.  CVS: RRR. Normal S1 S2. No murmurs  Pulm: CTAB. Normal respiratory effort. No wheezes, rhonchi, or crackles.  Abdomen:  Soft. Non-distended. No tenderness to palpation. No rebound or guarding. +BS.  Extremities: No edema. No cyanosis. Full ROM.  Skin: multiple scabbed lesions on bilateral feet, thickened skin on bilateral dorsal feet worse at the toes consistent with onychomycosis, no interdigital pits or evidence of scabies, large linear laceration with secondary healing on right foot, no surrounding erythema or induration  Neuro: Alert, oriented x 2, Spont mvt of all extremities with no focal deficits noted.    Recent Results (from the past 24 hour(s))   Comprehensive Metabolic Panel (CMP)    Collection Time: 10/14/18  6:41 AM   Result Value Ref Range    Sodium 141 136 - 145 mmol/L    Potassium 3.8 3.5 - 5.1 mmol/L    Chloride 107 95 - 110 mmol/L    CO2 22 (L) 23 - 29 mmol/L    Glucose 111 (H) 70 - 110 mg/dL    BUN, Bld 19 6 - 20 mg/dL    Creatinine 0.8 0.5 - 1.4 mg/dL    Calcium 9.7 8.7 - 10.5 mg/dL    Total Protein 7.4 6.0 - 8.4 g/dL    Albumin 3.1 (L) 3.5 - 5.2 g/dL    Total Bilirubin 0.3 0.1 - 1.0 mg/dL    Alkaline Phosphatase 77 55 - 135 U/L    AST 15 10 - 40 U/L    ALT 18 10 - 44 U/L    Anion Gap 12 8 - 16 mmol/L    eGFR if African American >60.0 >60 mL/min/1.73 m^2    eGFR if non African American >60.0 >60 mL/min/1.73 m^2   Magnesium    Collection Time: 10/14/18  6:41 AM   Result Value Ref Range    Magnesium 2.3 1.6 - 2.6 mg/dL   Phosphorus    Collection Time: 10/14/18  6:41 AM   Result Value Ref Range    Phosphorus 3.8 2.7 - 4.5 mg/dL   CBC with Automated Differential    Collection Time: 10/14/18  6:41 AM   Result Value Ref Range    WBC 5.28 3.90 - 12.70 K/uL    RBC 3.50 (L) 4.60 - 6.20 M/uL    Hemoglobin 11.5 (L) 14.0 - 18.0 g/dL    Hematocrit 37.7 (L) 40.0 - 54.0 %     (H) 82 - 98 fL    MCH 32.9 (H) 27.0 - 31.0 pg    MCHC 30.5 (L) 32.0 - 36.0 g/dL    RDW 14.1 11.5 - 14.5 %    Platelets 333 150 - 350 K/uL    MPV 9.3 9.2 - 12.9 fL    Immature Granulocytes 0.4 0.0 - 0.5 %    Gran # (ANC) 3.1 1.8 - 7.7 K/uL    Immature  Grans (Abs) 0.02 0.00 - 0.04 K/uL    Lymph # 1.2 1.0 - 4.8 K/uL    Mono # 0.7 0.3 - 1.0 K/uL    Eos # 0.2 0.0 - 0.5 K/uL    Baso # 0.14 0.00 - 0.20 K/uL    nRBC 0 0 /100 WBC    Gran% 58.1 38.0 - 73.0 %    Lymph% 21.8 18.0 - 48.0 %    Mono% 14.0 4.0 - 15.0 %    Eosinophil% 3.0 0.0 - 8.0 %    Basophil% 2.7 (H) 0.0 - 1.9 %    Differential Method Automated        Recent Labs   Lab  10/09/18   0808   POCTGLUCOSE  88       Hemoglobin A1C   Date Value Ref Range Status   10/05/2018 5.3 4.0 - 5.6 % Final     Comment:     ADA Screening Guidelines:  5.7-6.4%  Consistent with prediabetes  >or=6.5%  Consistent with diabetes  High levels of fetal hemoglobin interfere with the HbA1C  assay. Heterozygous hemoglobin variants (HbS, HgC, etc)do  not significantly interfere with this assay.   However, presence of multiple variants may affect accuracy.     08/10/2018 4.4 4.0 - 5.6 % Final     Comment:     ADA Screening Guidelines:  5.7-6.4%  Consistent with prediabetes  >or=6.5%  Consistent with diabetes  High levels of fetal hemoglobin interfere with the HbA1C  assay. Heterozygous hemoglobin variants (HbS, HgC, etc)do  not significantly interfere with this assay.   However, presence of multiple variants may affect accuracy.          Active Hospital Problems    Diagnosis  POA    *Alcohol withdrawal [F10.239]  Yes    Impaired mobility and ADLs [Z74.09]  Unknown    Ulcer of toe of left foot [L97.529]  Yes    Abrasion [T14.8XXA]  Yes    Alcohol withdrawal delirium [F10.231]  Yes    Mental health disorder [F99]  Yes    Wernicke encephalopathy [E51.2]  Yes    Alcoholic peripheral neuropathy [G62.1]  Yes    Wound of lower extremity [S81.719A]  Yes    Alcohol use disorder, severe, dependence [F10.20]  Yes     Chronic    Hyponatremia [E87.1]  Yes      Resolved Hospital Problems   No resolved problems to display.      Assessment and Plan for problems addressed today:      ciprofloxacin HCl  500 mg Oral Q12H    enoxparin  40 mg  Subcutaneous Daily    folic acid  1 mg Oral Daily    magnesium oxide  400 mg Oral BID    miconazole nitrate 2%   Topical (Top) BID    nicotine  1 patch Transdermal Daily     lorazepam, sodium chloride 0.9%    Alcohol Withdrawal  Severe Alcohol Use Disorder  Concern for Wernicke's Encephalopathy (WE)  Hyponatremia, Suspect Beer Potomania (resolved)  Alcoholic Neuropathy  -Patient presents with severe alcohol abuse disorder, last drink morning of admission (10/4). Initial ethanol level elevated (239); UDS positive for marijuana. Denies any history of Alcoholic Hallucinosis, DT's, or Withdrawal Seizures, but showing signs of alcohol withdrawal - CIWA (14) at admission: Tremor, Agitation, Orientation  -Started Valium at 10 mg four times daily > tapered 10/8 to Valium 10 mg TID > further taper to valium 10mg BID on 10/11 >  further taper to valium 10mg daily on 10/13 > stop valium on 10/15  -Ativan 1 mg IV PRN breakthrough symptoms or withdrawal seizures.   -CIWA assessments every 4 hours per nursing  -For Wernicke's Encephalopathy: Thiamine 500 mg IV every 8 hours, infuse over 30 minutes x 2 days > Thiamine 250 mg IV daily x 5 days > Thiamine 100 mg PO daily. Folic acid and MVI daily. Patient confabulating and exam concerning for WE  -pt continues to have gait instability, weakness, recurrent falls when he attempts to get out of bed, has poor insight into his addiction and encephalopathy and poor decision making. Will continue to get in touch with caregiver Chitra to discuss disposition as patient will require full time supervision to avoid falls and avoid going back to drinking  -encourage PO intake   -Daily CMP, Mg, Phos  -Fall, aspiration, and seizure precautions  -PT/OT consulted  -Homocysteine level wnl's, MMA pending - low suspicion for Vitamin B12 deficiency at this time     Acute Cystitis without Hematuria  -UA again consistent with UTI without significant hematuria, UCx: E.Coli, sensitive to Ciprofloxacin  and Augmentin.  -was given Ceftriaxone 1 g IV daily inially,changed to Ciprofloxacin 500 mg PO BID for 6 additional days, end 10/14 Ciprofloxacin      Multiple Bilateral Lower Extremity Wounds  Onychomycosis of Bilateral Feet   -No signs of active infection and no evidence of systemic toxicity. Starting CTX for UTI per above  -Wound Care consulted, appreciate assistance - have debrided wounds, applying medi-honey, no evidence of active infection. Miconazole ointment to toes  -Podiatry was consulted for further assistance     Tobacco Use Disorder  -Nicotine 21 mg patch daily     DVT PPx: Lovenox SubQ Daily    Diet: Regular  Discharge plan and follow up: Plan for possible D/C to SNF vs NH following medical stabalization    Sobia Ortiz MD  Hospital Medicine Staff  [phone] pager

## 2018-10-14 NOTE — PLAN OF CARE
Problem: Patient Care Overview  Goal: Plan of Care Review  Outcome: Ongoing (interventions implemented as appropriate)  Pt free of falls/injuries throughout the shift. Bed locked, in lowest position, call bell within reach.   VSS, sitter at the bs, will continue to monitor.

## 2018-10-14 NOTE — PLAN OF CARE
Problem: Fall Risk (Adult)  Goal: Absence of Falls  Patient will demonstrate the desired outcomes by discharge/transition of care.  Outcome: Ongoing (interventions implemented as appropriate)  No falls or injuries noted this shift. VSS. No pain or distress noted. Will continue to monitor.

## 2018-10-15 LAB
ALBUMIN SERPL BCP-MCNC: 3.3 G/DL
ALP SERPL-CCNC: 78 U/L
ALT SERPL W/O P-5'-P-CCNC: 20 U/L
ANION GAP SERPL CALC-SCNC: 10 MMOL/L
AST SERPL-CCNC: 23 U/L
BASOPHILS # BLD AUTO: 0.14 K/UL
BASOPHILS NFR BLD: 2.6 %
BILIRUB SERPL-MCNC: 0.4 MG/DL
BUN SERPL-MCNC: 14 MG/DL
CALCIUM SERPL-MCNC: 10.2 MG/DL
CHLORIDE SERPL-SCNC: 103 MMOL/L
CO2 SERPL-SCNC: 26 MMOL/L
CREAT SERPL-MCNC: 0.7 MG/DL
DIFFERENTIAL METHOD: ABNORMAL
EOSINOPHIL # BLD AUTO: 0.1 K/UL
EOSINOPHIL NFR BLD: 2.4 %
ERYTHROCYTE [DISTWIDTH] IN BLOOD BY AUTOMATED COUNT: 13.8 %
EST. GFR  (AFRICAN AMERICAN): >60 ML/MIN/1.73 M^2
EST. GFR  (NON AFRICAN AMERICAN): >60 ML/MIN/1.73 M^2
GLUCOSE SERPL-MCNC: 94 MG/DL
HCT VFR BLD AUTO: 34.3 %
HGB BLD-MCNC: 11.4 G/DL
IMM GRANULOCYTES # BLD AUTO: 0.02 K/UL
IMM GRANULOCYTES NFR BLD AUTO: 0.4 %
LYMPHOCYTES # BLD AUTO: 1.1 K/UL
LYMPHOCYTES NFR BLD: 19.9 %
MAGNESIUM SERPL-MCNC: 2.2 MG/DL
MCH RBC QN AUTO: 33.7 PG
MCHC RBC AUTO-ENTMCNC: 33.2 G/DL
MCV RBC AUTO: 102 FL
MONOCYTES # BLD AUTO: 0.7 K/UL
MONOCYTES NFR BLD: 13.3 %
NEUTROPHILS # BLD AUTO: 3.3 K/UL
NEUTROPHILS NFR BLD: 61.4 %
NRBC BLD-RTO: 0 /100 WBC
PHOSPHATE SERPL-MCNC: 3.4 MG/DL
PLATELET # BLD AUTO: 351 K/UL
PMV BLD AUTO: 9.7 FL
POTASSIUM SERPL-SCNC: 3.8 MMOL/L
PROT SERPL-MCNC: 7.6 G/DL
RBC # BLD AUTO: 3.38 M/UL
SODIUM SERPL-SCNC: 139 MMOL/L
WBC # BLD AUTO: 5.43 K/UL

## 2018-10-15 PROCEDURE — 25000003 PHARM REV CODE 250: Performed by: HOSPITALIST

## 2018-10-15 PROCEDURE — 80053 COMPREHEN METABOLIC PANEL: CPT

## 2018-10-15 PROCEDURE — 99232 SBSQ HOSP IP/OBS MODERATE 35: CPT | Mod: ,,, | Performed by: HOSPITALIST

## 2018-10-15 PROCEDURE — S4991 NICOTINE PATCH NONLEGEND: HCPCS | Performed by: HOSPITALIST

## 2018-10-15 PROCEDURE — 11000001 HC ACUTE MED/SURG PRIVATE ROOM

## 2018-10-15 PROCEDURE — 83735 ASSAY OF MAGNESIUM: CPT

## 2018-10-15 PROCEDURE — 85025 COMPLETE CBC W/AUTO DIFF WBC: CPT

## 2018-10-15 PROCEDURE — 84100 ASSAY OF PHOSPHORUS: CPT

## 2018-10-15 PROCEDURE — 63600175 PHARM REV CODE 636 W HCPCS: Performed by: HOSPITALIST

## 2018-10-15 PROCEDURE — 36415 COLL VENOUS BLD VENIPUNCTURE: CPT

## 2018-10-15 PROCEDURE — 97116 GAIT TRAINING THERAPY: CPT

## 2018-10-15 RX ADMIN — Medication 400 MG: at 09:10

## 2018-10-15 RX ADMIN — FOLIC ACID 1 MG: 1 TABLET ORAL at 09:10

## 2018-10-15 RX ADMIN — LORAZEPAM 1 MG: 1 TABLET ORAL at 05:10

## 2018-10-15 RX ADMIN — THERA TABS 1 TABLET: TAB at 09:10

## 2018-10-15 RX ADMIN — NICOTINE 1 PATCH: 21 PATCH, EXTENDED RELEASE TRANSDERMAL at 09:10

## 2018-10-15 RX ADMIN — Medication 100 MG: at 09:10

## 2018-10-15 RX ADMIN — MICONAZOLE NITRATE: 20 OINTMENT TOPICAL at 09:10

## 2018-10-15 RX ADMIN — ENOXAPARIN SODIUM 40 MG: 100 INJECTION SUBCUTANEOUS at 05:10

## 2018-10-15 RX ADMIN — LORAZEPAM 1 MG: 1 TABLET ORAL at 09:10

## 2018-10-15 NOTE — PLAN OF CARE
Problem: Physical Therapy Goal  Goal: Physical Therapy Goal  Goals to be met by: 10/16/18     Patient will increase functional independence with mobility by performin. Sit to stand transfer with Stand-by Assistance-met   Revised: sit to stand transfer with Supervision.   2. Bed to chair transfer with Stand-by Assistance using LRD.   3. Gait  x 100 feet with Contact Guard Assistance using Rolling Walker or LRD>   4. Lower extremity exercise program x20  reps per handout, with independence      Pt progressing towards goals. continue with PT POC.Goals remain appropriate.  Johnie Mooney PTA  10/15/2018

## 2018-10-15 NOTE — PROGRESS NOTES
" [facility]  Adult Nutrition  Progress Note    SUMMARY       Recommendations    Recommendation/Intervention:   1. Order Arginaid TID to aid in wound healing and encourage intake of high protein foods.   2. Continue current regular diet.   3. RD following.    Goals: Intake >/=85% EEN/EPN  Nutrition Goal Status: new  Communication of RD Recs: (POC)    Reason for Assessment    Reason for Assessment: length of stay  Diagnosis: (Alcohol withdrawal)  Relevant Medical History: EtOH abuse    General Information Comments: Pt talking on phone to father at time of visit. Per RN documentation, pt with good PO intake. Noted possible mild temporal wasting as unable to perform NFPE and mild wt loss x 2 months PTA per chart review. With multiple BLE wounds/pressure ulcers.    Nutrition Discharge Planning: Adequate PO intake    Nutrition Risk Screen    Nutrition Risk Screen: no indicators present    Nutrition/Diet History    Do you have any cultural, spiritual, Sikhism conflicts, given your current situation?: none  Factors Affecting Nutritional Intake: None identified at this time    Anthropometrics    Temp: 98 °F (36.7 °C)  Height Method: Stated  Height: 6' 2" (188 cm)  Height (inches): 74 in  Weight Method: Bed Scale  Weight: 67.8 kg (149 lb 7.6 oz)  Weight (lb): 149.47 lb  Ideal Body Weight (IBW), Male: 190 lb  % Ideal Body Weight, Male (lb): 78.67 lb  BMI (Calculated): 19.2  BMI Grade: 18.5-24.9 - normal  Usual Body Weight (UBW), k.4 kg(2018 per chart review)  % Usual Body Weight: 95.16  % Weight Change From Usual Weight: -5.04 %       Lab/Procedures/Meds    Pertinent Labs Reviewed: reviewed  Pertinent Labs Comments: Alb 3.3  Pertinent Medications Reviewed: reviewed  Pertinent Medications Comments: folic acid, Mg, MVI, thiamine    Physical Findings/Assessment    Overall Physical Appearance: loss of muscle mass, underweight  Oral/Mouth Cavity: WDL  Skin: pressure ulcer(s), non-healing " wound(s)    Estimated/Assessed Needs    Weight Used For Calorie Calculations: 67.8 kg (149 lb 7.6 oz)  Energy Calorie Requirements (kcal): 1966  Energy Need Method: [place]-St Jeor(x 1.25 (PAL))  Protein Requirements: 82-95 gm (1.2-1.4 gm/kg)  Weight Used For Protein Calculations: 67.8 kg (149 lb 7.6 oz)  Fluid Requirements (mL): per MD     RDA Method (mL): 1966       Nutrition Prescription Ordered    Current Diet Order: Regular  Nutrition Order Comments: 1000 ml FR    Evaluation of Received Nutrient/Fluid Intake       % Intake of Estimated Energy Needs: 75 - 100 %  % Meal Intake: 75 - 100 %    Nutrition Risk    Level of Risk/Frequency of Follow-up: (F/u 1x weekly)     Assessment and Plan    Nutrition Problem  Increased protein needs    Related to (etiology):   Wound healing    Signs and Symptoms (as evidenced by):   Multiple BLE pressure ulcers     Interventions/Recommendations (treatment strategy):  See recs above.    Nutrition Diagnosis Status:   New      Monitor and Evaluation    Food and Nutrient Intake: energy intake, food and beverage intake  Food and Nutrient Adminstration: diet order  Physical Activity and Function: nutrition-related ADLs and IADLs  Anthropometric Measurements: weight, weight change, body mass index  Biochemical Data, Medical Tests and Procedures: electrolyte and renal panel, gastrointestinal profile, glucose/endocrine profile, inflammatory profile  Nutrition-Focused Physical Findings: overall appearance     Nutrition Follow-Up    RD Follow-up?: Yes

## 2018-10-15 NOTE — PT/OT/SLP PROGRESS
Physical Therapy Treatment    Patient Name:  Mohamud Patel   MRN:  [id number]    Recommendations:     Discharge Recommendations:  nursing facility, skilled   Discharge Equipment Recommendations: bedside commode, wheelchair   Barriers to discharge: Decreased caregiver support    Assessment:     Mohamud Patel is a 57 y.o. male admitted with a medical diagnosis of Alcohol withdrawal.  He presents with the following impairments/functional limitations:  weakness, impaired endurance, impaired self care skills, gait instability, impaired functional mobilty, impaired balance, decreased safety awareness, impaired cognition, decreased lower extremity function  Pt Progressing with PT Intervention. Pt Progressing with improving gait distance. Pt would continue to benefit from skilled PT to address overall functional mobility and goals. Goals remain appropriate.      Rehab Prognosis:  good; patient would benefit from acute skilled PT services to address these deficits and reach maximum level of function.      Recent Surgery: * No surgery found *      Plan:     During this hospitalization, patient to be seen 3 x/week to address the above listed problems via gait training, therapeutic activities, therapeutic exercises  · Plan of Care Expires:  11/05/18   Plan of Care Reviewed with: patient    Subjective     Communicated with RN prior to session.  Patient found supine upon PT entry to room, agreeable to treatment.      Chief Complaint: I want the restraints off of me  Patient comments/goals: I want to eat  Pain/Comfort:  · Pain Rating 1: 0/10  · Pain Rating Post-Intervention 1: 0/10    Patients cultural, spiritual, Advent conflicts given the current situation: none    Objective:     Patient found with: telemetry, restraints     General Precautions: Standard, aspiration, fall, seizure   Orthopedic Precautions:N/A   Braces: N/A     Functional Mobility:  Bed Mobility:     · Supine to Sit: supervision   sit to  supine:Supervision  Transfers:     · Sit to Stand:  stand by assistance with rolling walker; vc's for hand placement     Gait: 100ft CGA with RW, pt requrinig vc's for safety and AD management        AM-PAC 6 CLICK MOBILITY  Turning over in bed (including adjusting bedclothes, sheets and blankets)?: 3  Sitting down on and standing up from a chair with arms (e.g., wheelchair, bedside commode, etc.): 3  Moving from lying on back to sitting on the side of the bed?: 3  Moving to and from a bed to a chair (including a wheelchair)?: 3  Need to walk in hospital room?: 3  Climbing 3-5 steps with a railing?: 2  Basic Mobility Total Score: 17       Therapeutic Activities and Exercises:   Discussed/educated patient on progress, safety, importance of OOB mobility for improving outcomes, PT POC   White board updated in patients room to current assistance level   Donned an extra gown     Patient left HOB elevated with all lines intact, call button in reach, bed alarm on, restraints reapplied at end of session, nsg notified and sitter..    GOALS:   Multidisciplinary Problems     Physical Therapy Goals        Problem: Physical Therapy Goal    Goal Priority Disciplines Outcome Goal Variances Interventions   Physical Therapy Goal     PT, PT/OT Ongoing (interventions implemented as appropriate)     Description:  Goals to be met by: 10/16/18     Patient will increase functional independence with mobility by performin. Sit to stand transfer with Stand-by Assistance-met   Revised: sit to stand transfer with Supervision.   2. Bed to chair transfer with Stand-by Assistance using LRD.   3. Gait  x 100 feet with Contact Guard Assistance using Rolling Walker or LRD>   4. Lower extremity exercise program x20  reps per handout, with independence                       Time Tracking:     PT Received On: 10/15/18  PT Start Time: 826     PT Stop Time: 841  PT Total Time (min): 15 min     Billable Minutes: Gait Training 15    Treatment  Type: Treatment  PT/PTA: PTA     PTA Visit Number: 1     Johnie Mooney, PTA  10/15/2018

## 2018-10-15 NOTE — NURSING
Bed alarm sounding. Patient observed by staff at door of bathroom. When staff attempted to help patient he pulled away and fell in sitting position. Patient assessed by RN and assisted back to bed. Vital signs stable. No injury noted. Patient states he wants to check himself out and wants to go home. MD is aware of patients desire to go home. Order received for bilateral wrist restraints. Restraints applied. Patient resting in bed at this time will continue to monitor.

## 2018-10-15 NOTE — PLAN OF CARE
Problem: Patient Care Overview  Goal: Plan of Care Review      Recommendations     Recommendation/Intervention:   1. Order Arginaid TID to aid in wound healing and encourage intake of high protein foods.   2. Continue current regular diet.   3. RD following.     Goals: Intake >/=85% EEN/EPN  Nutrition Goal Status: new

## 2018-10-15 NOTE — PLAN OF CARE
Problem: Fall Risk (Adult)  Goal: Absence of Falls  Patient will demonstrate the desired outcomes by discharge/transition of care.  Outcome: Ongoing (interventions implemented as appropriate)  Patient had fall on 10/14/2018 @ 6564

## 2018-10-15 NOTE — PLAN OF CARE
UNSAFE DISCHARGE PT HAS NO RESPONSIBLE CAREGIVER     10/15/18 6351   Discharge Reassessment   Assessment Type Discharge Planning Reassessment   Provided patient/caregiver education on the expected discharge date and the discharge plan No   Do you have any problems affording any of your prescribed medications? TBD   Discharge Plan A Home   Discharge Plan B New Nursing Home placement - shelter care facility

## 2018-10-16 LAB
ALBUMIN SERPL BCP-MCNC: 3 G/DL
ALP SERPL-CCNC: 71 U/L
ALT SERPL W/O P-5'-P-CCNC: 17 U/L
ANION GAP SERPL CALC-SCNC: 8 MMOL/L
AST SERPL-CCNC: 19 U/L
BASOPHILS # BLD AUTO: 0.16 K/UL
BASOPHILS NFR BLD: 3.4 %
BILIRUB SERPL-MCNC: 0.3 MG/DL
BUN SERPL-MCNC: 20 MG/DL
CALCIUM SERPL-MCNC: 9.9 MG/DL
CHLORIDE SERPL-SCNC: 109 MMOL/L
CO2 SERPL-SCNC: 24 MMOL/L
CREAT SERPL-MCNC: 0.7 MG/DL
DIFFERENTIAL METHOD: ABNORMAL
EOSINOPHIL # BLD AUTO: 0.2 K/UL
EOSINOPHIL NFR BLD: 3.2 %
ERYTHROCYTE [DISTWIDTH] IN BLOOD BY AUTOMATED COUNT: 13.9 %
EST. GFR  (AFRICAN AMERICAN): >60 ML/MIN/1.73 M^2
EST. GFR  (NON AFRICAN AMERICAN): >60 ML/MIN/1.73 M^2
GLUCOSE SERPL-MCNC: 87 MG/DL
HCT VFR BLD AUTO: 32.8 %
HGB BLD-MCNC: 10.9 G/DL
IMM GRANULOCYTES # BLD AUTO: 0.02 K/UL
IMM GRANULOCYTES NFR BLD AUTO: 0.4 %
LYMPHOCYTES # BLD AUTO: 1.1 K/UL
LYMPHOCYTES NFR BLD: 22.8 %
MAGNESIUM SERPL-MCNC: 2 MG/DL
MCH RBC QN AUTO: 33.9 PG
MCHC RBC AUTO-ENTMCNC: 33.2 G/DL
MCV RBC AUTO: 102 FL
MONOCYTES # BLD AUTO: 0.7 K/UL
MONOCYTES NFR BLD: 14.5 %
NEUTROPHILS # BLD AUTO: 2.6 K/UL
NEUTROPHILS NFR BLD: 55.7 %
NRBC BLD-RTO: 0 /100 WBC
PHOSPHATE SERPL-MCNC: 4.8 MG/DL
PLATELET # BLD AUTO: 353 K/UL
PMV BLD AUTO: 9.7 FL
POTASSIUM SERPL-SCNC: 3.7 MMOL/L
PROT SERPL-MCNC: 7 G/DL
RBC # BLD AUTO: 3.22 M/UL
SODIUM SERPL-SCNC: 141 MMOL/L
WBC # BLD AUTO: 4.69 K/UL

## 2018-10-16 PROCEDURE — 84100 ASSAY OF PHOSPHORUS: CPT

## 2018-10-16 PROCEDURE — 25000003 PHARM REV CODE 250: Performed by: HOSPITALIST

## 2018-10-16 PROCEDURE — S4991 NICOTINE PATCH NONLEGEND: HCPCS | Performed by: HOSPITALIST

## 2018-10-16 PROCEDURE — 36415 COLL VENOUS BLD VENIPUNCTURE: CPT

## 2018-10-16 PROCEDURE — 80053 COMPREHEN METABOLIC PANEL: CPT

## 2018-10-16 PROCEDURE — 11000001 HC ACUTE MED/SURG PRIVATE ROOM

## 2018-10-16 PROCEDURE — 83735 ASSAY OF MAGNESIUM: CPT

## 2018-10-16 PROCEDURE — 63600175 PHARM REV CODE 636 W HCPCS: Performed by: HOSPITALIST

## 2018-10-16 PROCEDURE — 85025 COMPLETE CBC W/AUTO DIFF WBC: CPT

## 2018-10-16 PROCEDURE — 99231 SBSQ HOSP IP/OBS SF/LOW 25: CPT | Mod: ,,, | Performed by: HOSPITALIST

## 2018-10-16 RX ADMIN — FOLIC ACID 1 MG: 1 TABLET ORAL at 11:10

## 2018-10-16 RX ADMIN — LORAZEPAM 1 MG: 1 TABLET ORAL at 11:10

## 2018-10-16 RX ADMIN — Medication 400 MG: at 11:10

## 2018-10-16 RX ADMIN — LORAZEPAM 1 MG: 1 TABLET ORAL at 09:10

## 2018-10-16 RX ADMIN — NICOTINE 1 PATCH: 21 PATCH, EXTENDED RELEASE TRANSDERMAL at 11:10

## 2018-10-16 RX ADMIN — THERA TABS 1 TABLET: TAB at 11:10

## 2018-10-16 RX ADMIN — MICONAZOLE NITRATE: 20 OINTMENT TOPICAL at 11:10

## 2018-10-16 RX ADMIN — MICONAZOLE NITRATE: 20 OINTMENT TOPICAL at 09:10

## 2018-10-16 RX ADMIN — ENOXAPARIN SODIUM 40 MG: 100 INJECTION SUBCUTANEOUS at 05:10

## 2018-10-16 RX ADMIN — Medication 400 MG: at 09:10

## 2018-10-16 RX ADMIN — Medication 100 MG: at 11:10

## 2018-10-16 NOTE — PROGRESS NOTES
Hospital Medicine   Progress note      Team: [facility] [place] [facility]OSP MED R Sobia Ortiz MD   Admit Date: 10/4/2018   Hospital Day: 10  YONAS: 10/17/2018   Code status: Full Code   Principal Problem: Alcohol withdrawal     Summary:  Mohamud Patel is a 57 y.o. man with a history of Severe Alcohol Abuse, Chronic Wounds of Bilateral LE's with Onychomycosis, Alcoholic Neuropathy, Hyponatremia, and Tobacco Use Disorder who was brought to the ED by EMS after his girlfriend called 911 while the patient was severely intoxicated. He is admitted to Hospital Medicine for Alcohol Withdrawal, possible Wernicke's Encephalopathy, and Acute Cystitis.      Interval hx:   Pt was seen and examined at bedside. Overnight, no acute events, no new complaints this morning. After multiple unsuccessful attempts to get in tough with Alena (pt's care taker), contacted pt's father. He is the medical POA. He reports alena herself has some psych issues and has been depressed lately and found to be intoxicated. Pt's father is going to try to get in touch with alena's friends to see if she can get help and sober up so that she could continue to be pt's caretaker. Father refused the offer to have son live with him / care for him. Pt has has intermittent episodes of agitation and confusion where he has expressed his desire to leave the hospital, attempted to get out of bed and suffered multiple falls and near falls. Pt is currently placed in restraints and has a bedside sitter to avoid self injury. He has no other complaints other than wanting to go home.     ROS (Positive in Bold, otherwise negative)   Constitutional: fever, chills, night sweats  CV: chest pain, edema, palpitations  Resp: SOB, cough, sputum production  GI: changes in appetite, NVDC, pain, melena, hematochezia, GERD, hematemesis  : Dysuria, hematuria, urinary urgency, frequency  MSK: arthralgia/myalgia, joint swelling  Neuro/Psych: anxiety, depression    PEx   Temp:  [96.5 °F (35.8  °C)-98.8 °F (37.1 °C)]   Pulse:  []   Resp:  [18]   BP: (138-176)/(69-91)   SpO2:  [97 %-99 %]      I & O (Last 24H):   No intake or output data in the 24 hours ending 10/15/18 2050    General:  male  in no acute distress. Resting in bed. Globally weak   HEENT: NCAT. PERRL. EOMI. Sclera Anicteric.  CVS: RRR. Normal S1 S2. No murmurs  Pulm: CTAB. Normal respiratory effort. No wheezes, rhonchi, or crackles.  Abdomen: Soft. Non-distended. No tenderness to palpation. No rebound or guarding. +BS.  Extremities: No edema. No cyanosis. Full ROM.  Skin: multiple scabbed lesions on bilateral feet, thickened skin on bilateral dorsal feet worse at the toes consistent with onychomycosis, no interdigital pits or evidence of scabies, large linear laceration with secondary healing on right foot, no surrounding erythema or induration  Neuro: Alert, oriented x 2, Spont mvt of all extremities with no focal deficits noted.    Recent Results (from the past 24 hour(s))   Comprehensive Metabolic Panel (CMP)    Collection Time: 10/15/18  6:45 AM   Result Value Ref Range    Sodium 139 136 - 145 mmol/L    Potassium 3.8 3.5 - 5.1 mmol/L    Chloride 103 95 - 110 mmol/L    CO2 26 23 - 29 mmol/L    Glucose 94 70 - 110 mg/dL    BUN, Bld 14 6 - 20 mg/dL    Creatinine 0.7 0.5 - 1.4 mg/dL    Calcium 10.2 8.7 - 10.5 mg/dL    Total Protein 7.6 6.0 - 8.4 g/dL    Albumin 3.3 (L) 3.5 - 5.2 g/dL    Total Bilirubin 0.4 0.1 - 1.0 mg/dL    Alkaline Phosphatase 78 55 - 135 U/L    AST 23 10 - 40 U/L    ALT 20 10 - 44 U/L    Anion Gap 10 8 - 16 mmol/L    eGFR if African American >60.0 >60 mL/min/1.73 m^2    eGFR if non African American >60.0 >60 mL/min/1.73 m^2   Magnesium    Collection Time: 10/15/18  6:45 AM   Result Value Ref Range    Magnesium 2.2 1.6 - 2.6 mg/dL   Phosphorus    Collection Time: 10/15/18  6:45 AM   Result Value Ref Range    Phosphorus 3.4 2.7 - 4.5 mg/dL   CBC with Automated Differential    Collection Time: 10/15/18  6:45 AM    Result Value Ref Range    WBC 5.43 3.90 - 12.70 K/uL    RBC 3.38 (L) 4.60 - 6.20 M/uL    Hemoglobin 11.4 (L) 14.0 - 18.0 g/dL    Hematocrit 34.3 (L) 40.0 - 54.0 %     (H) 82 - 98 fL    MCH 33.7 (H) 27.0 - 31.0 pg    MCHC 33.2 32.0 - 36.0 g/dL    RDW 13.8 11.5 - 14.5 %    Platelets 351 (H) 150 - 350 K/uL    MPV 9.7 9.2 - 12.9 fL    Immature Granulocytes 0.4 0.0 - 0.5 %    Gran # (ANC) 3.3 1.8 - 7.7 K/uL    Immature Grans (Abs) 0.02 0.00 - 0.04 K/uL    Lymph # 1.1 1.0 - 4.8 K/uL    Mono # 0.7 0.3 - 1.0 K/uL    Eos # 0.1 0.0 - 0.5 K/uL    Baso # 0.14 0.00 - 0.20 K/uL    nRBC 0 0 /100 WBC    Gran% 61.4 38.0 - 73.0 %    Lymph% 19.9 18.0 - 48.0 %    Mono% 13.3 4.0 - 15.0 %    Eosinophil% 2.4 0.0 - 8.0 %    Basophil% 2.6 (H) 0.0 - 1.9 %    Differential Method Automated        Recent Labs   Lab  10/09/18   0808   POCTGLUCOSE  88       Hemoglobin A1C   Date Value Ref Range Status   10/05/2018 5.3 4.0 - 5.6 % Final     Comment:     ADA Screening Guidelines:  5.7-6.4%  Consistent with prediabetes  >or=6.5%  Consistent with diabetes  High levels of fetal hemoglobin interfere with the HbA1C  assay. Heterozygous hemoglobin variants (HbS, HgC, etc)do  not significantly interfere with this assay.   However, presence of multiple variants may affect accuracy.     08/10/2018 4.4 4.0 - 5.6 % Final     Comment:     ADA Screening Guidelines:  5.7-6.4%  Consistent with prediabetes  >or=6.5%  Consistent with diabetes  High levels of fetal hemoglobin interfere with the HbA1C  assay. Heterozygous hemoglobin variants (HbS, HgC, etc)do  not significantly interfere with this assay.   However, presence of multiple variants may affect accuracy.          Active Hospital Problems    Diagnosis  POA    *Alcohol withdrawal [F10.239]  Yes    Impaired mobility and ADLs [Z74.09]  Unknown    Ulcer of toe of left foot [L97.529]  Yes    Abrasion [T14.8XXA]  Yes    Alcohol withdrawal delirium [F10.231]  Yes    Mental health disorder [F99]  Yes     Wernicke encephalopathy [E51.2]  Yes    Alcoholic peripheral neuropathy [G62.1]  Yes    Wound of lower extremity [S81.419A]  Yes    Alcohol use disorder, severe, dependence [F10.20]  Yes     Chronic    Hyponatremia [E87.1]  Yes      Resolved Hospital Problems   No resolved problems to display.      Assessment and Plan for problems addressed today:      enoxparin  40 mg Subcutaneous Daily    folic acid  1 mg Oral Daily    magnesium oxide  400 mg Oral BID    miconazole nitrate 2%   Topical (Top) BID    multivitamin  1 tablet Oral Daily    nicotine  1 patch Transdermal Daily    thiamine  100 mg Oral Daily     LORazepam, sodium chloride 0.9%    Alcohol Withdrawal  Severe Alcohol Use Disorder  Concern for Wernicke's Encephalopathy (WE)  Hyponatremia, Suspect Beer Potomania (resolved)  Alcoholic Neuropathy  -Patient presents with severe alcohol abuse disorder, last drink morning of admission (10/4). Initial ethanol level elevated (239); UDS positive for marijuana. Denies any history of Alcoholic Hallucinosis, DT's, or Withdrawal Seizures, but showing signs of alcohol withdrawal - CIWA (14) at admission: Tremor, Agitation, Orientation  -Started Valium at 10 mg four times daily > tapered 10/8 to Valium 10 mg TID > further taper to valium 10mg BID on 10/11 >  further taper to valium 10mg daily on 10/13 > stop valium on 10/15  -Ativan 1 mg IV PRN breakthrough symptoms or withdrawal seizures.   -CIWA assessments every 4 hours per nursing  -For Wernicke's Encephalopathy: Thiamine 500 mg IV every 8 hours, infuse over 30 minutes x 2 days > Thiamine 250 mg IV daily x 5 days > Thiamine 100 mg PO daily. Folic acid and MVI daily. Patient confabulating and exam concerning for WE  -pt continues to have gait instability, weakness, recurrent falls when he attempts to get out of bed, has poor insight into his addiction and encephalopathy and poor decision making. Will continue to get in touch with caregiver Chitra to  discuss disposition as patient will require full time supervision to avoid falls and avoid going back to drinking  -encourage PO intake   -Daily CMP, Mg, Phos  -Fall, aspiration, and seizure precautions  -PT/OT consulted  -Homocysteine level wnl's, low suspicion for Vitamin B12 deficiency at this time     Acute Cystitis without Hematuria  -UA again consistent with UTI without significant hematuria, UCx: E.Coli, sensitive to Ciprofloxacin and Augmentin.  -was given Ceftriaxone 1 g IV daily inially,changed to Ciprofloxacin 500 mg PO BID for 6 additional days, end 10/14 Ciprofloxacin      Multiple Bilateral Lower Extremity Wounds  Onychomycosis of Bilateral Feet   -No signs of active infection and no evidence of systemic toxicity. Starting CTX for UTI per above  -Wound Care consulted, appreciate assistance - have debrided wounds, applying medi-honey, no evidence of active infection. Miconazole ointment to toes  -Podiatry was consulted for further assistance     Tobacco Use Disorder  -Nicotine 21 mg patch daily     DVT PPx: Lovenox SubQ Daily    Diet: Regular  Discharge plan and follow up: Plan for possible D/C to SNF vs home with caretaker following medical stabalization    Sobia Ortiz MD  Hospital Medicine Staff  [phone] pager

## 2018-10-16 NOTE — PT/OT/SLP PROGRESS
Occupational Therapy      Patient Name:  Mohamud Patel   MRN:  [id number]    Patient not seen today secondary to pt not appropriate for OT session this date. Per RN report, pt is agitated and attempting to leave. He threatened RN and  this date. Will follow-up as appropriate.    SAM Almaraz  10/16/2018

## 2018-10-16 NOTE — PLAN OF CARE
CM called patient's father, Sony Patel [phone], no answer, left message.    11:50 AM  CM received phone call from Sony, patient's father, concerning discharge disposition.  Sony states he thinks the caregiver, Chitra, taking care of patient prior to admission to hospital is not going to contact us back and that she is giving up on the situation.  Sony states he knows his son needs constant care and that he is not a candidate for rehab so he would like to try to place him skilled nursing in a nursing home.  He hopes his son can get help with his alcohol addiction also.  CM asked Sony if he would like us to send in referrals to places in this area.  He states he would like to investigate facilities first and will get back to CM with choices.

## 2018-10-16 NOTE — PROGRESS NOTES
Hospital Medicine   Progress Note     [place]: [facility] [place] R Prateek Gudino MD   Admit Date: 10/4/2018   YONAS 10/17/2018   Code status: Full Code   Principal Problem: Alcohol withdrawal     Interval hx: Patient seen and examined at bedside today. Overnight: KIRA. This AM, patient continues to attempt to get out of bed, fall risk so requiring restraints and sitter. Today, he was calm on my assessment, oriented to person, city, and state (not year or month). CM made contact with patient's father who wishes to proceed with shelter NH placement.     ROS   Constitutional: negative for fevers and chills  Respiratory: negative for cough, shortness of breath   Cardiovascular: negative for chest discomfort, palpitations   Gastrointestinal: negative for nausea or vomiting, abdominal pain, constipation, diarrhea     PEx   Temp:  [96.5 °F (35.8 °C)-98.7 °F (37.1 °C)]   Pulse:  []   Resp:  [18-20]   BP: (125-195)/(70-91)   SpO2:  [95 %-100 %]      I & O (Last 24H):   No intake or output data in the 24 hours ending 10/16/18 1608    General:  male  in no acute distress. Resting in bed. Globally weak   HEENT: NCAT. PERRL. EOMI. Sclera Anicteric.  CVS: RRR. Normal S1 S2. No murmurs  Pulm: CTAB. Normal respiratory effort. No wheezes, rhonchi, or crackles.  Abdomen: Soft. Non-distended. No tenderness to palpation. No rebound or guarding. +BS.  Extremities: No edema. No cyanosis. Full ROM.  Skin: multiple scabbed lesions on bilateral feet, thickened skin on bilateral dorsal feet worse at the toes consistent with onychomycosis, no interdigital pits or evidence of scabies, large linear laceration with secondary healing on right foot, no surrounding erythema or induration  Neuro: Alert, oriented x 2, Spont mvt of all extremities with no focal deficits noted.    Recent Results (from the past 24 hour(s))   Comprehensive Metabolic Panel (CMP)    Collection Time: 10/16/18  6:30 AM   Result Value Ref Range    Sodium 141 136 - 145  mmol/L    Potassium 3.7 3.5 - 5.1 mmol/L    Chloride 109 95 - 110 mmol/L    CO2 24 23 - 29 mmol/L    Glucose 87 70 - 110 mg/dL    BUN, Bld 20 6 - 20 mg/dL    Creatinine 0.7 0.5 - 1.4 mg/dL    Calcium 9.9 8.7 - 10.5 mg/dL    Total Protein 7.0 6.0 - 8.4 g/dL    Albumin 3.0 (L) 3.5 - 5.2 g/dL    Total Bilirubin 0.3 0.1 - 1.0 mg/dL    Alkaline Phosphatase 71 55 - 135 U/L    AST 19 10 - 40 U/L    ALT 17 10 - 44 U/L    Anion Gap 8 8 - 16 mmol/L    eGFR if African American >60.0 >60 mL/min/1.73 m^2    eGFR if non African American >60.0 >60 mL/min/1.73 m^2   Magnesium    Collection Time: 10/16/18  6:30 AM   Result Value Ref Range    Magnesium 2.0 1.6 - 2.6 mg/dL   Phosphorus    Collection Time: 10/16/18  6:30 AM   Result Value Ref Range    Phosphorus 4.8 (H) 2.7 - 4.5 mg/dL   CBC with Automated Differential    Collection Time: 10/16/18  6:30 AM   Result Value Ref Range    WBC 4.69 3.90 - 12.70 K/uL    RBC 3.22 (L) 4.60 - 6.20 M/uL    Hemoglobin 10.9 (L) 14.0 - 18.0 g/dL    Hematocrit 32.8 (L) 40.0 - 54.0 %     (H) 82 - 98 fL    MCH 33.9 (H) 27.0 - 31.0 pg    MCHC 33.2 32.0 - 36.0 g/dL    RDW 13.9 11.5 - 14.5 %    Platelets 353 (H) 150 - 350 K/uL    MPV 9.7 9.2 - 12.9 fL    Immature Granulocytes 0.4 0.0 - 0.5 %    Gran # (ANC) 2.6 1.8 - 7.7 K/uL    Immature Grans (Abs) 0.02 0.00 - 0.04 K/uL    Lymph # 1.1 1.0 - 4.8 K/uL    Mono # 0.7 0.3 - 1.0 K/uL    Eos # 0.2 0.0 - 0.5 K/uL    Baso # 0.16 0.00 - 0.20 K/uL    nRBC 0 0 /100 WBC    Gran% 55.7 38.0 - 73.0 %    Lymph% 22.8 18.0 - 48.0 %    Mono% 14.5 4.0 - 15.0 %    Eosinophil% 3.2 0.0 - 8.0 %    Basophil% 3.4 (H) 0.0 - 1.9 %    Differential Method Automated        No results for input(s): POCTGLUCOSE in the last 168 hours.     enoxparin  40 mg Subcutaneous Daily    folic acid  1 mg Oral Daily    magnesium oxide  400 mg Oral BID    miconazole nitrate 2%   Topical (Top) BID    multivitamin  1 tablet Oral Daily    nicotine  1 patch Transdermal Daily    thiamine  100  mg Oral Daily       LORazepam, sodium chloride 0.9%    Assessment & Plan:  Mohamud Patel is a 57 y.o. man with a history of Severe Alcohol Abuse, Chronic Wounds of Bilateral LE's with Onychomycosis, Alcoholic Neuropathy, Hyponatremia, and Tobacco Use Disorder who was brought to the ED by EMS after his girlfriend called 911 while the patient was severely intoxicated. He is admitted to Hospital Medicine for Alcohol Withdrawal, possible Wernicke's Encephalopathy, and Acute Cystitis.      Alcohol Withdrawal  Severe Alcohol Use Disorder  Concern for Wernicke's Encephalopathy (WE)  Hyponatremia, Suspect Beer Potomania (resolved)  Alcoholic Neuropathy  -Valium taper completed on 10/15.  Continue Ativan 1 mg IV PRN breakthrough symptoms or withdrawal seizures.   -CIWA assessments every 4 hours per nursing  -For Wernicke's Encephalopathy: Finished course of IV Thiamine, transitioned to Thiamine 100 mg PO daily. Folic acid and MVI daily.   -pt continues to have gait instability, weakness, recurrent falls when he attempts to get out of bed, has poor insight into his addiction and encephalopathy and poor decision making. Patient's father contacted per dispo section  -encourage PO intake   -Daily CMP, Mg, Phos  -Fall, aspiration, and seizure precautions  -PT/OT consulted  -Homocysteine level wnl's, low suspicion for Vitamin B12 deficiency at this time     Multiple Bilateral Lower Extremity Wounds  Onychomycosis of Bilateral Feet   -No signs of active infection and no evidence of systemic toxicity. Starting CTX for UTI per above  -Wound Care consulted, appreciate assistance - have debrided wounds, applying medi-honey, no evidence of active infection. Miconazole ointment to toes  -Podiatry was consulted for further assistance     Tobacco Use Disorder  -Nicotine 21 mg patch daily    Acute Cystitis without Hematuria (Resolved)  -UA again consistent with UTI without significant hematuria, UCx: E.Coli, sensitive to  Ciprofloxacin and Augmentin.  -was given Ceftriaxone 1 g IV daily inially,changed to Ciprofloxacin 500 mg PO BID for 6 additional days, ended 10/14 Ciprofloxacin      DVT PPx: Lovenox SubQ Daily    Diet: Regular  Discharge plan and follow up: Plan for possible D/C to NH with [facility] care per patient's father's wishes. Previous caregiver Chitra not returning calls, likely patient has unsafe home environment.       Prateek Gudino MD  Hospital Medicine Staff  [facility]   Pager: [phone]

## 2018-10-17 LAB
ALBUMIN SERPL BCP-MCNC: 3.1 G/DL
ALP SERPL-CCNC: 76 U/L
ALT SERPL W/O P-5'-P-CCNC: 18 U/L
ANION GAP SERPL CALC-SCNC: 7 MMOL/L
AST SERPL-CCNC: 19 U/L
BASOPHILS # BLD AUTO: 0.18 K/UL
BASOPHILS NFR BLD: 2.8 %
BILIRUB SERPL-MCNC: 0.3 MG/DL
BUN SERPL-MCNC: 16 MG/DL
CALCIUM SERPL-MCNC: 9.9 MG/DL
CHLORIDE SERPL-SCNC: 107 MMOL/L
CO2 SERPL-SCNC: 26 MMOL/L
CREAT SERPL-MCNC: 0.8 MG/DL
DIFFERENTIAL METHOD: ABNORMAL
EOSINOPHIL # BLD AUTO: 0.2 K/UL
EOSINOPHIL NFR BLD: 3.4 %
ERYTHROCYTE [DISTWIDTH] IN BLOOD BY AUTOMATED COUNT: 13.8 %
EST. GFR  (AFRICAN AMERICAN): >60 ML/MIN/1.73 M^2
EST. GFR  (NON AFRICAN AMERICAN): >60 ML/MIN/1.73 M^2
GLUCOSE SERPL-MCNC: 92 MG/DL
HCT VFR BLD AUTO: 33.9 %
HGB BLD-MCNC: 11 G/DL
IMM GRANULOCYTES # BLD AUTO: 0.01 K/UL
IMM GRANULOCYTES NFR BLD AUTO: 0.2 %
LYMPHOCYTES # BLD AUTO: 1.2 K/UL
LYMPHOCYTES NFR BLD: 18.9 %
MAGNESIUM SERPL-MCNC: 2.1 MG/DL
MCH RBC QN AUTO: 33 PG
MCHC RBC AUTO-ENTMCNC: 32.4 G/DL
MCV RBC AUTO: 102 FL
MONOCYTES # BLD AUTO: 0.8 K/UL
MONOCYTES NFR BLD: 11.8 %
NEUTROPHILS # BLD AUTO: 4.1 K/UL
NEUTROPHILS NFR BLD: 62.9 %
NRBC BLD-RTO: 0 /100 WBC
PHOSPHATE SERPL-MCNC: 4.2 MG/DL
PLATELET # BLD AUTO: 381 K/UL
PMV BLD AUTO: 9.9 FL
POTASSIUM SERPL-SCNC: 4.1 MMOL/L
PROT SERPL-MCNC: 7.4 G/DL
RBC # BLD AUTO: 3.33 M/UL
SODIUM SERPL-SCNC: 140 MMOL/L
WBC # BLD AUTO: 6.52 K/UL

## 2018-10-17 PROCEDURE — 85025 COMPLETE CBC W/AUTO DIFF WBC: CPT

## 2018-10-17 PROCEDURE — 25000003 PHARM REV CODE 250: Performed by: HOSPITALIST

## 2018-10-17 PROCEDURE — 63600175 PHARM REV CODE 636 W HCPCS: Performed by: HOSPITALIST

## 2018-10-17 PROCEDURE — 11000001 HC ACUTE MED/SURG PRIVATE ROOM

## 2018-10-17 PROCEDURE — 80053 COMPREHEN METABOLIC PANEL: CPT

## 2018-10-17 PROCEDURE — 99231 SBSQ HOSP IP/OBS SF/LOW 25: CPT | Mod: ,,, | Performed by: HOSPITALIST

## 2018-10-17 PROCEDURE — S4991 NICOTINE PATCH NONLEGEND: HCPCS | Performed by: HOSPITALIST

## 2018-10-17 PROCEDURE — 97530 THERAPEUTIC ACTIVITIES: CPT

## 2018-10-17 PROCEDURE — 84100 ASSAY OF PHOSPHORUS: CPT

## 2018-10-17 PROCEDURE — 36415 COLL VENOUS BLD VENIPUNCTURE: CPT

## 2018-10-17 PROCEDURE — 83735 ASSAY OF MAGNESIUM: CPT

## 2018-10-17 PROCEDURE — 97535 SELF CARE MNGMENT TRAINING: CPT

## 2018-10-17 RX ORDER — AMLODIPINE BESYLATE 5 MG/1
5 TABLET ORAL DAILY
Status: DISCONTINUED | OUTPATIENT
Start: 2018-10-17 | End: 2018-11-10 | Stop reason: HOSPADM

## 2018-10-17 RX ORDER — OLANZAPINE 10 MG/2ML
5 INJECTION, POWDER, FOR SOLUTION INTRAMUSCULAR ONCE AS NEEDED
Status: DISPENSED | OUTPATIENT
Start: 2018-10-17 | End: 2018-10-17

## 2018-10-17 RX ADMIN — MICONAZOLE NITRATE: 20 OINTMENT TOPICAL at 09:10

## 2018-10-17 RX ADMIN — LORAZEPAM 1 MG: 1 TABLET ORAL at 09:10

## 2018-10-17 RX ADMIN — AMLODIPINE BESYLATE 5 MG: 5 TABLET ORAL at 09:10

## 2018-10-17 RX ADMIN — THERA TABS 1 TABLET: TAB at 09:10

## 2018-10-17 RX ADMIN — Medication 400 MG: at 09:10

## 2018-10-17 RX ADMIN — NICOTINE 1 PATCH: 21 PATCH, EXTENDED RELEASE TRANSDERMAL at 09:10

## 2018-10-17 RX ADMIN — Medication 100 MG: at 09:10

## 2018-10-17 RX ADMIN — ENOXAPARIN SODIUM 40 MG: 100 INJECTION SUBCUTANEOUS at 04:10

## 2018-10-17 RX ADMIN — FOLIC ACID 1 MG: 1 TABLET ORAL at 09:10

## 2018-10-17 NOTE — PT/OT/SLP PROGRESS
"Physical Therapy Treatment    Patient Name:  Mohamud Patel   MRN:  [id number]    Recommendations:     Discharge Recommendations:  nursing facility, skilled   Discharge Equipment Recommendations: bedside commode, wheelchair   Barriers to discharge: Decreased caregiver support (lives alone)    Assessment:     Mohamud Patel is a 57 y.o. male admitted with a medical diagnosis of Alcohol withdrawal.  He presents with the following impairments/functional limitations:  weakness, impaired endurance, impaired self care skills, impaired functional mobilty, gait instability, impaired balance, impaired cognition, decreased coordination, decreased upper extremity function, decreased lower extremity function, pain, impaired sensation, impaired skin, impaired fine motor, decreased safety awareness, impaired coordination . Pt brittney session well w/ good participation. He is limited by ataxia and impaired balance complicated w/ impaired cognition and safety awareness. These factors make pt a high fall risk. He requires Beth overall for transfers and short distance ambulation. He will continue PT POC.    Rehab Prognosis:  Good; patient would benefit from acute skilled PT services to address these deficits and reach maximum level of function.      Recent Surgery: * No surgery found *      Plan:     During this hospitalization, patient to be seen 3 x/week to address the above listed problems via gait training, therapeutic activities, therapeutic exercises, neuromuscular re-education  · Plan of Care Expires:  11/05/18   Plan of Care Reviewed with: patient    Subjective     Communicated with nursing prior to session.  Patient found supine upon PT entry to room, agreeable to treatment.      Chief Complaint: neuropathy pain  Patient comments/goals: "I need to fly back to [place]." "I won't lie to you I like to drink a lot and that's my problem."  - pt's cognition/awarenss varied t/o session  Pain/Comfort:  · Pain Rating 1: " (did not rate but reports neuropathy pain in feet)    Patients cultural, spiritual, Mormon conflicts given the current situation: none    Objective:     Patient found with: restraints (nurse cleared for PT to remove during session)    General Precautions: Standard, aspiration, fall, seizure   Orthopedic Precautions:N/A   Braces: N/A     Functional Mobility:  · Bed Mobility:  · Supine<>sit on bed Ovi  · Transfers:    · Sit<>stand to/from EOB w/ RW and Beth   · SPT EOB<>bedside commode w/ RW and Beth  · Posterior lean  · Cueing for hand/foot placement and forward lean  · Gait:   · ~20ft (x2 trials) in room w/ RW and Beth for stability  · Ataxic gait w/ posterior lean  · Cueing to slow down for safety and to remain inside RW  · Balance:   · Static stand w/ RW and Min/CGA for stability due to posterior lean      AM-PAC 6 CLICK MOBILITY  Turning over in bed (including adjusting bedclothes, sheets and blankets)?: 4  Sitting down on and standing up from a chair with arms (e.g., wheelchair, bedside commode, etc.): 3  Moving from lying on back to sitting on the side of the bed?: 4  Moving to and from a bed to a chair (including a wheelchair)?: 3  Need to walk in hospital room?: 3  Climbing 3-5 steps with a railing?: 2  Basic Mobility Total Score: 19       Therapeutic Activities and Exercises:   Pt educated on safety t/o session along w/ high fall risk due to ataxia and neuropathy. Pt verb understanding.     Patient left supine with call button in reach, bed alarm on, restraints reapplied at end of session and [employer] camera present.    GOALS:   Multidisciplinary Problems     Physical Therapy Goals        Problem: Physical Therapy Goal    Goal Priority Disciplines Outcome Goal Variances Interventions   Physical Therapy Goal     PT, PT/OT Ongoing (interventions implemented as appropriate)     Description:  Goals to be met by: 10/16/18     Patient will increase functional independence with mobility by performin. Sit  to stand transfer with Stand-by Assistance-met   Revised: sit to stand transfer with Supervision.   2. Bed to chair transfer with Stand-by Assistance using LRD.   3. Gait  x 100 feet with Contact Guard Assistance using Rolling Walker or LRD>   4. Lower extremity exercise program x20  reps per handout, with independence                       Time Tracking:     PT Received On: 10/17/18  PT Start Time: 1230     PT Stop Time: 1255  PT Total Time (min): 25 min     Billable Minutes: Therapeutic Activity 25 and Total Time 25    Treatment Type: Treatment  PT/PTA: PT     PTA Visit Number: 0     Inez Curry, PT  10/17/2018

## 2018-10-17 NOTE — PLAN OF CARE
Problem: Physical Therapy Goal  Goal: Physical Therapy Goal  Goals to be met by: 10/16/18     Patient will increase functional independence with mobility by performin. Sit to stand transfer with Stand-by Assistance-met   Revised: sit to stand transfer with Supervision.   2. Bed to chair transfer with Stand-by Assistance using LRD.   3. Gait  x 100 feet with Contact Guard Assistance using Rolling Walker or LRD>   4. Lower extremity exercise program x20  reps per handout, with independence      Outcome: Ongoing (interventions implemented as appropriate)  LTGs remain appropriate. Pt will continue PT POC.    Inez Curry, PT  10/17/2018

## 2018-10-17 NOTE — PLAN OF CARE
Problem: Occupational Therapy Goal  Goal: Occupational Therapy Goal  Goals to be met by: 10/20  Patient will increase functional independence with ADLs by performing:    UE Dressing with Modified [place] while seated.  LE Dressing with Modified [place].  Grooming while standing at sink with Supervision.  Toileting from toilet with Modified [place] for hygiene and clothing management.   Supine to sit with Supervision and HOB flat.  Stand pivot transfers with Supervision to chair and/or toilet.  Functional mobility with AD as needed for short household distance with no LOB with supervision.      Outcome: Ongoing (interventions implemented as appropriate)  Goals remain appropriate. SAM Almaraz 10/17/2018

## 2018-10-17 NOTE — PLAN OF CARE
CM called patient's father, Sony Morin [phone] concerning choices for facility placement.  Sony states he has hired [facility] [employer] to help place his son in a facility.  The representative's name is Doreen Wilcox.  CLARENCE tried to call the number for [employer] from [employer], but it is the number for [facility].  Sony did not know Doreen's phone number.

## 2018-10-17 NOTE — PT/OT/SLP PROGRESS
Occupational Therapy   Treatment    Name: Mohamud Patel  MRN: [id number]  Admitting Diagnosis:  Alcohol withdrawal       Recommendations:     Discharge Recommendations: nursing facility, skilled  Discharge Equipment Recommendations:  bedside commode, wheelchair  Barriers to discharge:  Decreased caregiver support    Subjective     Communicated with: RN Lashaun) prior to session. No family present for session.   Pain/Comfort:  · Pain Rating 1: 0/10    Patients cultural, spiritual, Scientology conflicts given the current situation: none reported     Objective:     Patient found with: telemetry, restraints(avasys)    General Precautions: Standard, aspiration, fall, seizure   Orthopedic Precautions:N/A   Braces: N/A     Occupational Performance:    Bed Mobility:    · Patient completed Rolling/Turning to Left with  stand by assistance  · Patient completed Scooting/Bridging with stand by assistance  · Able to bridge BLE to scoot self to HOB while supine  · Patient completed Supine to Sit with stand by assistance and HOB elevated   · Patient completed Sit to Supine with stand by assistance     Functional Mobility/Transfers:  · Patient completed Sit <> Stand Transfer with minimum assistance  with  no assistive device x2 trials  · Patient completed Bed <> Chair Transfer using Stand Pivot and Step Transfer technique with moderate assistance with no assistive device and unilateral UE support x2 trials  · Functional Mobility: mod A and unilateral UE support for ~3 steps x2 trials during t/f with one LOB and mod A to recover    Activities of Daily Living:  · Feeding:  independence after setup to drink coffee from cup and to eat dorothy crackers, including opening package  · Upper Body Dressing: minimum assistance to don gown on front while seated  · Lower Body Dressing: maximal assistance to don socks while seated     Patient left with bed in chair position with all lines intact, call button in reach, bed alarm on, bed alarm  off, chair alarm on, chair alarm off and restraints reapplied at end of session    AMPA 6 Click:  [facility] Total Score: 17    Treatment & Education:  -Edu for OT role and POC  -Edu for importance of OOB activity and impact on healing  -Pt only oriented to self--daily orientation provided verbally  -Whiteboard updated   -Questions and concerns addressed within OT scope of practice   Education:    Assessment:     Mohamud Patel is a 57 y.o. male with a medical diagnosis of Alcohol withdrawal.  He presents with decreased functional independence in various areas of his life. He demo perseveration on being cold and on going to airport this date. He demo decreased functional mobility and requires assist for standing balance. He demo increased ADL skills with feeding, but remains limited by mobility deficits. Pt demo decreased endurance to functional task. Pt would continue to benefit from skilled OT services for maximum functional outcome and safety. Performance deficits affecting function are weakness, impaired endurance, impaired self care skills, impaired balance, gait instability, impaired functional mobilty, decreased coordination, impaired cognition, decreased safety awareness, impaired coordination, impaired cardiopulmonary response to activity, decreased upper extremity function, decreased lower extremity function.      Rehab Prognosis:  good; patient would benefit from acute skilled OT services to address these deficits and reach maximum level of function.       Plan:     Patient to be seen 3 x/week to address the above listed problems via self-care/home management, therapeutic activities, therapeutic exercises  · Plan of Care Expires: 11/04/18  · Plan of Care Reviewed with: patient    This Plan of care has been discussed with the patient who was involved in its development and understands and is in agreement with the identified goals and treatment plan    GOALS:   Multidisciplinary Problems      Occupational Therapy Goals        Problem: Occupational Therapy Goal    Goal Priority Disciplines Outcome Interventions   Occupational Therapy Goal     OT, PT/OT Ongoing (interventions implemented as appropriate)    Description:  Goals to be met by: 10/20  Patient will increase functional independence with ADLs by performing:    UE Dressing with Modified [place] while seated.  LE Dressing with Modified [place].  Grooming while standing at sink with Supervision.  Toileting from toilet with Modified [place] for hygiene and clothing management.   Supine to sit with Supervision and HOB flat.  Stand pivot transfers with Supervision to chair and/or toilet.  Functional mobility with AD as needed for short household distance with no LOB with supervision.                       Time Tracking:     OT Date of Treatment: 10/17/18  OT Start Time: 1133  OT Stop Time: 1202  OT Total Time (min): 29 min    Billable Minutes:Self Care/Home Management 19  Therapeutic Activity 10    SAM Almaraz  10/17/2018

## 2018-10-17 NOTE — PROGRESS NOTES
Patient agitated, removed velcro wrists restraints, yelling, cursing, and trying to kick staff. 4 point soft restraints applied. Restraint order updated via VIC Cabezas NP. Will continue to monitor.

## 2018-10-17 NOTE — PLAN OF CARE
Problem: Patient Care Overview  Goal: Plan of Care Review  Outcome: Ongoing (interventions implemented as appropriate)  Patient AAOx1. Vitals remain stable. Remains free from falls/injury throughout shift; 4 point soft restraints in place; restraint order expires 10/18/18 at 0318. Patient remains free from signs/symptoms of new onset infection. Neuro checks done q4hrs per MD orders. No complaints of pain this shift. Will continue to monitor.

## 2018-10-17 NOTE — NURSING
Bed alarm activated. While entering the room nurse noted  holding pt up while trying to prevent him from falling. Safety place pt back into to back. Restraints applied. Pt place on bed pan to have a BM. Will continue to monitor.

## 2018-10-18 PROCEDURE — S4991 NICOTINE PATCH NONLEGEND: HCPCS | Performed by: HOSPITALIST

## 2018-10-18 PROCEDURE — 25000003 PHARM REV CODE 250: Performed by: HOSPITALIST

## 2018-10-18 PROCEDURE — 63600175 PHARM REV CODE 636 W HCPCS: Performed by: HOSPITALIST

## 2018-10-18 PROCEDURE — 11000001 HC ACUTE MED/SURG PRIVATE ROOM

## 2018-10-18 PROCEDURE — 99231 SBSQ HOSP IP/OBS SF/LOW 25: CPT | Mod: ,,, | Performed by: HOSPITALIST

## 2018-10-18 RX ADMIN — AMLODIPINE BESYLATE 5 MG: 5 TABLET ORAL at 09:10

## 2018-10-18 RX ADMIN — MICONAZOLE NITRATE: 20 OINTMENT TOPICAL at 09:10

## 2018-10-18 RX ADMIN — ENOXAPARIN SODIUM 40 MG: 100 INJECTION SUBCUTANEOUS at 06:10

## 2018-10-18 RX ADMIN — FOLIC ACID 1 MG: 1 TABLET ORAL at 09:10

## 2018-10-18 RX ADMIN — MICONAZOLE NITRATE: 20 OINTMENT TOPICAL at 08:10

## 2018-10-18 RX ADMIN — LORAZEPAM 1 MG: 1 TABLET ORAL at 12:10

## 2018-10-18 RX ADMIN — Medication 400 MG: at 08:10

## 2018-10-18 RX ADMIN — NICOTINE 1 PATCH: 21 PATCH, EXTENDED RELEASE TRANSDERMAL at 09:10

## 2018-10-18 RX ADMIN — Medication 400 MG: at 09:10

## 2018-10-18 RX ADMIN — Medication 100 MG: at 09:10

## 2018-10-18 RX ADMIN — THERA TABS 1 TABLET: TAB at 09:10

## 2018-10-18 RX ADMIN — LORAZEPAM 1 MG: 1 TABLET ORAL at 08:10

## 2018-10-18 NOTE — PLAN OF CARE
Problem: Patient Care Overview  Goal: Plan of Care Review  Outcome: Ongoing (interventions implemented as appropriate)  Patient AAOx1. Vitals remain stable. Remains free from falls/injury throughout shift; 4 point soft restraints in place; restraint order expires 10/19/18 at 0314. Patient remains free from signs/symptoms of new onset infection. Neuro checks done q4hrs per MD orders. No complaints of pain this shift. Will continue to monitor.

## 2018-10-18 NOTE — PLAN OF CARE
CLARENCE received phone call from Doreen Ford from [facility], [phone], concerning patient's placement.  Doreen stated that the patient's father, Sony Patel, is patient's POA and has hired her to try to get patient placed in a memory unit.  Doreen states she will have POA faxed over to CM's office to place on patient's chart.  She states the father requested to have his son placed in a memory unit.  She also states that the problem with having him placed in a memory unit is that he doesn't have a diagnosis of dementia on his chart.  It was also stated that patient would have a problem with placement as long as they are in restraints.  Doreen states she has been to the place he was living and states the living conditions are horrible.  There was feces and urine all over the floor and blood on the wall.  She states that Chitra, the woman with whom he was living, told her that she leaves the blood on the wall as a reminder to him to not drink too much and fall.  Sony, patient's father, doesn't want Chitra to have any information about the patient and he is not to be released to her.  The patient lives on an inherited trust that the father has control over and has put a stop to bank activities to keep Chitra from spending any more money.

## 2018-10-18 NOTE — PROGRESS NOTES
"Hospital Medicine   Progress Note     Team: [facility] [place] Prateek Gudino MD   Admit Date: 10/4/2018   YONAS 10/17/2018   Code status: Full Code   Principal Problem: Alcohol withdrawal     Interval hx: Patient seen and examined at bedside today. Overnight: KIRA. This AM, patient remains confused, claiming that he had to call the police to remove several people from his home and that he is "handling it and changing the locks." No other complaints     ROS   Constitutional: negative for fevers and chills  Respiratory: negative for cough, shortness of breath   Cardiovascular: negative for chest discomfort, palpitations   Gastrointestinal: negative for nausea or vomiting, abdominal pain, constipation, diarrhea     PEx   Temp:  [97.7 °F (36.5 °C)-98.5 °F (36.9 °C)]   Pulse:  [59-82]   Resp:  [17-18]   BP: (118-140)/(61-78)   SpO2:  [96 %-99 %]      I & O (Last 24H):     Intake/Output Summary (Last 24 hours) at 10/18/2018 0840  Last data filed at 10/18/2018 0553  Gross per 24 hour   Intake 1080 ml   Output --   Net 1080 ml       General:  male  in no acute distress. Resting in bed. Globally weak   HEENT: NCAT. PERRL. EOMI. Sclera Anicteric.  CVS: RRR. Normal S1 S2. No murmurs  Pulm: CTAB. Normal respiratory effort. No wheezes, rhonchi, or crackles.  Abdomen: Soft. Non-distended. No tenderness to palpation. No rebound or guarding. +BS.  Extremities: No edema. No cyanosis. Full ROM.  Skin: multiple scabbed lesions on bilateral feet, thickened skin on bilateral dorsal feet worse at the toes consistent with onychomycosis, large linear laceration with secondary healing on right foot, no surrounding erythema or induration  Neuro: Alert, oriented x 2 (person, place), Spont mvt of all extremities with no focal deficits noted.     amLODIPine  5 mg Oral Daily    enoxparin  40 mg Subcutaneous Daily    folic acid  1 mg Oral Daily    magnesium oxide  400 mg Oral BID    miconazole nitrate 2%   Topical (Top) BID    " multivitamin  1 tablet Oral Daily    nicotine  1 patch Transdermal Daily    thiamine  100 mg Oral Daily     LORazepam, sodium chloride 0.9%    Assessment & Plan:  Mohamud Patel is a 57 y.o. man with a history of Severe Alcohol Abuse, Chronic Wounds of Bilateral LE's with Onychomycosis, Alcoholic Neuropathy, Hyponatremia, and Tobacco Use Disorder who was brought to the ED by EMS after his girlfriend called 911 while the patient was severely intoxicated. He is admitted to Hospital Medicine for Alcohol Withdrawal, possible Wernicke's Encephalopathy, and Acute Cystitis.      Alcohol Withdrawal  Severe Alcohol Use Disorder  Concern for Wernicke's Encephalopathy (WE)  Hyponatremia, Suspect Beer Potomania (resolved)  Alcoholic Neuropathy  Fall Risk, Requiring Restraints  -Valium taper completed on 10/15.  Continue Ativan 1 mg IV PRN breakthrough symptoms or withdrawal seizures.   -CIWA assessments every 4 hours per nursing  -For Wernicke's Encephalopathy: Finished course of IV Thiamine, transitioned to Thiamine 100 mg PO daily. Folic acid and MVI daily.   -pt continues to have gait instability, weakness, recurrent falls when he attempts to get out of bed, has poor insight into his addiction and encephalopathy and poor decision making. Patient's father contacted per dispo section. Psychiatry consulted or assistance with medication for chemical restraints and physical restraints is barrier to nursing home placement, appreciate assistance  -encourage PO intake   -CMP, Mg, Phos - spaced to q48 hours  -Fall, aspiration, and seizure precautions  -PT/OT consulted  -Homocysteine level wnl's, low suspicion for Vitamin B12 deficiency at this time     Multiple Bilateral Lower Extremity Wounds  Onychomycosis of Bilateral Feet   -Wound Care consulted, appreciate assistance - have debrided wounds, applying medi-honey, no evidence of active infection. Miconazole ointment to toes  -Podiatry was consulted for further  assistance     Tobacco Use Disorder  -Nicotine 21 mg patch daily      DVT PPx: Lovenox SubQ Daily    Diet: Regular  Discharge plan and follow up: Plan for possible D/C to NH with skilled nursing care per patient's father's wishes. Agency visited patient's previous home and discovered feces, urine and blood on the floor/wals - not safe environment for return.       Prateek Gudino MD  Hospital Medicine Staff  [facility]   Pager: [phone]

## 2018-10-18 NOTE — PROGRESS NOTES
Hospital Medicine   Progress Note     Team: [facility] R Prateek Gudino MD   Admit Date: 10/4/2018   YONAS 10/17/2018   Code status: Full Code   Principal Problem: Alcohol withdrawal     Interval hx: Patient seen and examined at bedside today. Overnight: KIRA. This AM, patient calm, remaining in bed and with mostly linear thoughts. No tremulous on exam or slurred speech    ROS   Constitutional: negative for fevers and chills  Respiratory: negative for cough, shortness of breath   Cardiovascular: negative for chest discomfort, palpitations   Gastrointestinal: negative for nausea or vomiting, abdominal pain, constipation, diarrhea     PEx   Temp:  [97.7 °F (36.5 °C)-98.5 °F (36.9 °C)]   Pulse:  [60-82]   Resp:  [17-18]   BP: (117-166)/(61-85)   SpO2:  [96 %-100 %]      I & O (Last 24H):     Intake/Output Summary (Last 24 hours) at 10/17/2018 2052  Last data filed at 10/17/2018 1853  Gross per 24 hour   Intake 720 ml   Output --   Net 720 ml       General:  male  in no acute distress. Resting in bed. Globally weak   HEENT: NCAT. PERRL. EOMI. Sclera Anicteric.  CVS: RRR. Normal S1 S2. No murmurs  Pulm: CTAB. Normal respiratory effort. No wheezes, rhonchi, or crackles.  Abdomen: Soft. Non-distended. No tenderness to palpation. No rebound or guarding. +BS.  Extremities: No edema. No cyanosis. Full ROM.  Skin: multiple scabbed lesions on bilateral feet, thickened skin on bilateral dorsal feet worse at the toes consistent with onychomycosis, no interdigital pits or evidence of scabies, large linear laceration with secondary healing on right foot, no surrounding erythema or induration  Neuro: Alert, oriented x 2, Spont mvt of all extremities with no focal deficits noted.    Recent Results (from the past 24 hour(s))   Comprehensive Metabolic Panel (CMP)    Collection Time: 10/17/18  6:18 AM   Result Value Ref Range    Sodium 140 136 - 145 mmol/L    Potassium 4.1 3.5 - 5.1 mmol/L    Chloride 107 95 - 110 mmol/L    CO2  26 23 - 29 mmol/L    Glucose 92 70 - 110 mg/dL    BUN, Bld 16 6 - 20 mg/dL    Creatinine 0.8 0.5 - 1.4 mg/dL    Calcium 9.9 8.7 - 10.5 mg/dL    Total Protein 7.4 6.0 - 8.4 g/dL    Albumin 3.1 (L) 3.5 - 5.2 g/dL    Total Bilirubin 0.3 0.1 - 1.0 mg/dL    Alkaline Phosphatase 76 55 - 135 U/L    AST 19 10 - 40 U/L    ALT 18 10 - 44 U/L    Anion Gap 7 (L) 8 - 16 mmol/L    eGFR if African American >60.0 >60 mL/min/1.73 m^2    eGFR if non African American >60.0 >60 mL/min/1.73 m^2   Magnesium    Collection Time: 10/17/18  6:18 AM   Result Value Ref Range    Magnesium 2.1 1.6 - 2.6 mg/dL   Phosphorus    Collection Time: 10/17/18  6:18 AM   Result Value Ref Range    Phosphorus 4.2 2.7 - 4.5 mg/dL   CBC with Automated Differential    Collection Time: 10/17/18  6:18 AM   Result Value Ref Range    WBC 6.52 3.90 - 12.70 K/uL    RBC 3.33 (L) 4.60 - 6.20 M/uL    Hemoglobin 11.0 (L) 14.0 - 18.0 g/dL    Hematocrit 33.9 (L) 40.0 - 54.0 %     (H) 82 - 98 fL    MCH 33.0 (H) 27.0 - 31.0 pg    MCHC 32.4 32.0 - 36.0 g/dL    RDW 13.8 11.5 - 14.5 %    Platelets 381 (H) 150 - 350 K/uL    MPV 9.9 9.2 - 12.9 fL    Immature Granulocytes 0.2 0.0 - 0.5 %    Gran # (ANC) 4.1 1.8 - 7.7 K/uL    Immature Grans (Abs) 0.01 0.00 - 0.04 K/uL    Lymph # 1.2 1.0 - 4.8 K/uL    Mono # 0.8 0.3 - 1.0 K/uL    Eos # 0.2 0.0 - 0.5 K/uL    Baso # 0.18 0.00 - 0.20 K/uL    nRBC 0 0 /100 WBC    Gran% 62.9 38.0 - 73.0 %    Lymph% 18.9 18.0 - 48.0 %    Mono% 11.8 4.0 - 15.0 %    Eosinophil% 3.4 0.0 - 8.0 %    Basophil% 2.8 (H) 0.0 - 1.9 %    Differential Method Automated        No results for input(s): POCTGLUCOSE in the last 168 hours.     amLODIPine  5 mg Oral Daily    enoxparin  40 mg Subcutaneous Daily    folic acid  1 mg Oral Daily    magnesium oxide  400 mg Oral BID    miconazole nitrate 2%   Topical (Top) BID    multivitamin  1 tablet Oral Daily    nicotine  1 patch Transdermal Daily    thiamine  100 mg Oral Daily       LORazepam, OLANZapine,  sodium chloride 0.9%    Assessment & Plan:  Mohamud Patel is a 57 y.o. man with a history of Severe Alcohol Abuse, Chronic Wounds of Bilateral LE's with Onychomycosis, Alcoholic Neuropathy, Hyponatremia, and Tobacco Use Disorder who was brought to the ED by EMS after his girlfriend called 911 while the patient was severely intoxicated. He is admitted to Hospital Medicine for Alcohol Withdrawal, possible Wernicke's Encephalopathy, and Acute Cystitis.      Alcohol Withdrawal  Severe Alcohol Use Disorder  Concern for Wernicke's Encephalopathy (WE)  Hyponatremia, Suspect Beer Potomania (resolved)  Alcoholic Neuropathy  -Valium taper completed on 10/15.  Continue Ativan 1 mg IV PRN breakthrough symptoms or withdrawal seizures.   -CIWA assessments every 4 hours per nursing  -For Wernicke's Encephalopathy: Finished course of IV Thiamine, transitioned to Thiamine 100 mg PO daily. Folic acid and MVI daily.   -pt continues to have gait instability, weakness, recurrent falls when he attempts to get out of bed, has poor insight into his addiction and encephalopathy and poor decision making. Patient's father contacted per dispo section  -encourage PO intake   -Daily CMP, Mg, Phos  -Fall, aspiration, and seizure precautions  -PT/OT consulted  -Homocysteine level wnl's, low suspicion for Vitamin B12 deficiency at this time     Multiple Bilateral Lower Extremity Wounds  Onychomycosis of Bilateral Feet   -Wound Care consulted, appreciate assistance - have debrided wounds, applying medi-honey, no evidence of active infection. Miconazole ointment to toes  -Podiatry was consulted for further assistance     Tobacco Use Disorder  -Nicotine 21 mg patch daily    Acute Cystitis without Hematuria (Resolved)  -UCx: E.Coli, sensitive to Ciprofloxacin and Augmentin.  -was given Ceftriaxone 1 g IV daily inially,changed to Ciprofloxacin 500 mg PO BID for 6 additional days, ended 10/14     DVT PPx: Lovenox SubQ  Daily    Diet: Regular  Discharge plan and follow up: Plan for possible D/C to NH with alf care per patient's father's wishes. Previous caregiver Chitra not returning calls, likely patient has unsafe home environment.       Prateek Gudino MD  Hospital Medicine Staff  [facility]   Pager: [phone]

## 2018-10-19 LAB
ANION GAP SERPL CALC-SCNC: 7 MMOL/L
BASOPHILS # BLD AUTO: 0.18 K/UL
BASOPHILS NFR BLD: 2.9 %
BUN SERPL-MCNC: 14 MG/DL
CALCIUM SERPL-MCNC: 9.8 MG/DL
CHLORIDE SERPL-SCNC: 106 MMOL/L
CO2 SERPL-SCNC: 25 MMOL/L
CREAT SERPL-MCNC: 0.7 MG/DL
DIFFERENTIAL METHOD: ABNORMAL
EOSINOPHIL # BLD AUTO: 0.2 K/UL
EOSINOPHIL NFR BLD: 3.6 %
ERYTHROCYTE [DISTWIDTH] IN BLOOD BY AUTOMATED COUNT: 13.8 %
EST. GFR  (AFRICAN AMERICAN): >60 ML/MIN/1.73 M^2
EST. GFR  (NON AFRICAN AMERICAN): >60 ML/MIN/1.73 M^2
GLUCOSE SERPL-MCNC: 83 MG/DL
HCT VFR BLD AUTO: 33.1 %
HGB BLD-MCNC: 11 G/DL
IMM GRANULOCYTES # BLD AUTO: 0.02 K/UL
IMM GRANULOCYTES NFR BLD AUTO: 0.3 %
LYMPHOCYTES # BLD AUTO: 1.2 K/UL
LYMPHOCYTES NFR BLD: 19.9 %
MAGNESIUM SERPL-MCNC: 2.2 MG/DL
MCH RBC QN AUTO: 33.5 PG
MCHC RBC AUTO-ENTMCNC: 33.2 G/DL
MCV RBC AUTO: 101 FL
MONOCYTES # BLD AUTO: 0.8 K/UL
MONOCYTES NFR BLD: 12.5 %
NEUTROPHILS # BLD AUTO: 3.8 K/UL
NEUTROPHILS NFR BLD: 60.8 %
NRBC BLD-RTO: 0 /100 WBC
PHOSPHATE SERPL-MCNC: 4.3 MG/DL
PLATELET # BLD AUTO: 375 K/UL
PMV BLD AUTO: 9.7 FL
POTASSIUM SERPL-SCNC: 3.7 MMOL/L
RBC # BLD AUTO: 3.28 M/UL
SODIUM SERPL-SCNC: 138 MMOL/L
WBC # BLD AUTO: 6.18 K/UL

## 2018-10-19 PROCEDURE — 99231 SBSQ HOSP IP/OBS SF/LOW 25: CPT | Mod: ,,, | Performed by: HOSPITALIST

## 2018-10-19 PROCEDURE — 63600175 PHARM REV CODE 636 W HCPCS: Performed by: HOSPITALIST

## 2018-10-19 PROCEDURE — 25000003 PHARM REV CODE 250: Performed by: HOSPITALIST

## 2018-10-19 PROCEDURE — 84100 ASSAY OF PHOSPHORUS: CPT

## 2018-10-19 PROCEDURE — 85025 COMPLETE CBC W/AUTO DIFF WBC: CPT

## 2018-10-19 PROCEDURE — 36415 COLL VENOUS BLD VENIPUNCTURE: CPT

## 2018-10-19 PROCEDURE — 97116 GAIT TRAINING THERAPY: CPT

## 2018-10-19 PROCEDURE — 11000001 HC ACUTE MED/SURG PRIVATE ROOM

## 2018-10-19 PROCEDURE — 80048 BASIC METABOLIC PNL TOTAL CA: CPT

## 2018-10-19 PROCEDURE — 83735 ASSAY OF MAGNESIUM: CPT

## 2018-10-19 PROCEDURE — 99233 SBSQ HOSP IP/OBS HIGH 50: CPT | Mod: AF,HB,, | Performed by: PSYCHIATRY & NEUROLOGY

## 2018-10-19 PROCEDURE — S4991 NICOTINE PATCH NONLEGEND: HCPCS | Performed by: HOSPITALIST

## 2018-10-19 RX ORDER — OLANZAPINE 10 MG/2ML
5 INJECTION, POWDER, FOR SOLUTION INTRAMUSCULAR EVERY 8 HOURS PRN
Status: DISCONTINUED | OUTPATIENT
Start: 2018-10-19 | End: 2018-10-31

## 2018-10-19 RX ORDER — OLANZAPINE 5 MG/1
5 TABLET ORAL EVERY 8 HOURS PRN
Status: DISCONTINUED | OUTPATIENT
Start: 2018-10-19 | End: 2018-11-10 | Stop reason: HOSPADM

## 2018-10-19 RX ADMIN — THERA TABS 1 TABLET: TAB at 09:10

## 2018-10-19 RX ADMIN — FOLIC ACID 1 MG: 1 TABLET ORAL at 09:10

## 2018-10-19 RX ADMIN — MICONAZOLE NITRATE: 20 OINTMENT TOPICAL at 09:10

## 2018-10-19 RX ADMIN — Medication 400 MG: at 09:10

## 2018-10-19 RX ADMIN — AMLODIPINE BESYLATE 5 MG: 5 TABLET ORAL at 09:10

## 2018-10-19 RX ADMIN — NICOTINE 1 PATCH: 21 PATCH, EXTENDED RELEASE TRANSDERMAL at 09:10

## 2018-10-19 RX ADMIN — OLANZAPINE 5 MG: 5 TABLET, FILM COATED ORAL at 05:10

## 2018-10-19 RX ADMIN — LORAZEPAM 1 MG: 1 TABLET ORAL at 01:10

## 2018-10-19 RX ADMIN — ENOXAPARIN SODIUM 40 MG: 100 INJECTION SUBCUTANEOUS at 05:10

## 2018-10-19 RX ADMIN — Medication 100 MG: at 09:10

## 2018-10-19 NOTE — PT/OT/SLP PROGRESS
Physical Therapy Treatment    Patient Name:  Mohamud Patel   MRN:  [id number]    Recommendations:     Discharge Recommendations:  nursing facility, skilled   Discharge Equipment Recommendations: bedside commode, wheelchair   Barriers to discharge: Decreased caregiver support    Assessment:     Mohamud Patel is a 57 y.o. male admitted with a medical diagnosis of Alcohol withdrawal.  He presents with the following impairments/functional limitations:  weakness, gait instability, decreased upper extremity function, decreased lower extremity function, impaired balance, impaired endurance, decreased safety awareness, impaired cognition, impaired self care skills, impaired functional mobilty, decreased coordination, abnormal tone, impaired coordination. Pt performed bed mobility and transfers with CGA. Pt amb ~10ft then ~20ft min A with RW, ataxic gait pattern requiring assist with stability and AD management. Pt will continue to benefit from skilled PT to improve deficits and increase overall functional mobility.     Rehab Prognosis:  Good; patient would benefit from acute skilled PT services to address these deficits and reach maximum level of function.      Recent Surgery: * No surgery found *      Plan:     During this hospitalization, patient to be seen 3 x/week to address the above listed problems via gait training, therapeutic exercises, therapeutic activities, neuromuscular re-education  · Plan of Care Expires:  11/05/18   Plan of Care Reviewed with: patient    Subjective     Communicated with RN prior to session.  Patient found supine in bed upon PT entry to room, agreeable to treatment.      Chief Complaint: NA  Pain/Comfort:  · Pain Rating 1: 0/10  · Pain Rating Post-Intervention 1: 0/10    Patients cultural, spiritual, Spiritism conflicts given the current situation: none    Objective:     Patient found with: restraints and sitter    General Precautions: Standard, aspiration, fall, seizure    Orthopedic Precautions:N/A   Braces: N/A     Functional Mobility:  Bed Mobility:     · Supine to Sit: contact guard assistance  · Sit to Supine: contact guard assistance    Transfers:     · Sit to Stand:  contact guard assistance with rolling walker    Gait: ~10ft then ~20ft min A with RW, ataxic gait pattern requiring assist with stability and AD management    AM-PAC 6 CLICK MOBILITY  Turning over in bed (including adjusting bedclothes, sheets and blankets)?: 4  Sitting down on and standing up from a chair with arms (e.g., wheelchair, bedside commode, etc.): 3  Moving from lying on back to sitting on the side of the bed?: 3  Moving to and from a bed to a chair (including a wheelchair)?: 3  Need to walk in hospital room?: 2  Climbing 3-5 steps with a railing?: 1  Basic Mobility Total Score: 16       Therapeutic Activities and Exercises:  Pt sat EOB with S.  Pt educated on:  -safety with mobility  -importance of OOB activity  Pt safe to perform transfers with RN staff with RW.     Patient left supine with all lines intact, call button in reach and RN notified..    GOALS:   Multidisciplinary Problems     Physical Therapy Goals        Problem: Physical Therapy Goal    Goal Priority Disciplines Outcome Goal Variances Interventions   Physical Therapy Goal     PT, PT/OT Ongoing (interventions implemented as appropriate)     Description:  Goals to be met by: 10/29/18     Patient will increase functional independence with mobility by performin. Sit to stand transfer with Stand-by Assistance-met   Revised: sit to stand transfer with Supervision.   2. Bed to chair transfer with Stand-by Assistance using LRD.   3. Gait  x 100 feet with Contact Guard Assistance using Rolling Walker or LRD>   4. Lower extremity exercise program x20  reps per handout, with independence                        Time Tracking:     PT Received On: 10/19/18  PT Start Time: 1304     PT Stop Time: 1322  PT Total Time (min): 18 min     Billable  Minutes: Gait Training 18    Treatment Type: Treatment  PT/PTA: PT     PTA Visit Number: 0     GALINA MAYA, PT  10/19/2018

## 2018-10-19 NOTE — PLAN OF CARE
Problem: Patient Care Overview  Goal: Plan of Care Review  Outcome: Ongoing (interventions implemented as appropriate)  Patient AAOx1. Vitals remain stable. Remains free from falls/injury throughout shift; 4 point soft restraints in place; restraint order expires 10/20/18 at 0327. Sitter at bedside throughout shift. Patient remains free from signs/symptoms of new onset infection. Neuro checks done q4hrs per MD orders. No complaints of pain this shift. Will continue to monitor.

## 2018-10-19 NOTE — CONSULTS
Please see Consult note written by Susy Mukherjee on 10/19       Carol Willis MD  PGY 2 [employer] [facility]  10/19/2018 4:06 PM
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
## 2018-10-19 NOTE — PT/OT/SLP PROGRESS
Occupational Therapy      Patient Name:  Mohamud Patel   MRN:  [id number]    Patient not seen today secondary to pt sleeping soundly after receiving medication for anxiety as pt was becoming agitated/anxious prior to OT session. OT unable to attempt again. Will follow-up as appropriate.    SAM Almaraz  10/19/2018

## 2018-10-19 NOTE — HOSPITAL COURSE
"10/22: Pt received prn zyprexa 10/21 8:08, 11:57, 21:52. On interview, patient is in 4 point restraints. He denies pain though states he's cold, offered a blanket; he is able to answer some interview questions appropriately though too somnolent to tolerate extended interview despite repeated questions. Unsure whether he slept overnight. He follows simple commands fairly well and is able to answer a few questions in an organized fashion. He is oriented to person and hospital, though states he's in [place]. He has obvious memory deficits on exam, both recent and remote, and is unable to recall the events of the prior night or his behavior reported per other hospital staff. He is unable to engage with full CAM-ICU exam 2/2 somnolence; CAM-ICU positive. Patient denies depressed mood or SI; unable to elicit further history.   Discussed his course with nursing and sitter; per nursing patient reported sleeping overnight, but unclear whether patient slept well given note below reporting agitation (though no prn administered overnight).   Restraints removed during exam to assess tone and patient tolerated well, behavior appropriate.     Per RN notes:  10/22 approx. 4:00.   SR up x2, call bell in reach, bed in low locked position, skid proof socks on. Patient confused and agitated, constantly trying to get out from the bad to get cigaret ans beer. Patient in restrains, no S/S of injury noted, order renewed.  Patient remained free of fall and injuries during the shift.  Care plan explained to patient. No concerns, will continue to monitor.   Per primary team note 10/21: This AM, patient more agitated requiring PO Zyprexa for non-redirectable agitation prior to my exam. Per nursing, he threw his breakfast tray across the room, intentionally urinated on the floor several times, and has made verbally threatening remarks. When I arrived in the room patient said "my god, you're in on this too!" Remains oriented to self only, thinks " "he may be at home in the garage but is unsure. Yelling during our exam. Explained to patient that trial of no restraints was attempted yesterday; however, he feel several times into the couch. Noted to be extremely unsteady by nursing. He had no memory of falling yesterday and also does not remember that he has talked to his father several days.     10/23: Patient is pleasant, well-groomed, out of restraints, and calm on exam. Continues to demonstrate obvious deficits in memory, remote and recent. He appears improved from prior interviews, and is alert. Seated in hallway chair, facing window with sitter at his side. Per discussion with nursing, patient slept well until 4:00AM, became agitated, placed in restraints, no prn administered.   Per RN notes: SR up x2, call bell in reach, bed in low locked position, skid proof socks on. Patient confused, constantly getting out of the bad. Patient states " I need to grub a beer and cigaret ". VS stable WNL. Restrains order renewed.  Patient remained free of fall and injuries during the shift.  Care plan explained to patient. No concerns, will continue to monitor.     10/24: Pt received 1 prn po dose of zyprexa 5mg at 21:26 for agitation, no scheduled zyprexa. Patient is much improved, CAM-ICU negative, though memory deficits persist on exam. He admits that alcohol may have been problematic and that he's open to a "chem free life." He is calm throughout interview, at times more animated, reminiscing about wanting to go to art school. Discusses being bored in the hospital, and hoping to have books to read, specifically [place] by Ole Gordon and [employer]. Requests a notebook and pen, provided with paper and pen.     Per RN notes: Pt is free from injury and falls during shift. Pt shows no signs of acute distress. Pt denies pain. Asleep most of the night. Vitals stable. Pt exhibits calm, cooperative behavior throughout shift. . No issues with patient or behavior " during shift. Sitter at bedside throughout shift. AVASYS at bedside. Bed is in low position with breaks locked. Side rails are up x 2. Environment is clutter free. Call light is within reach of the patient. Will continue to monitor.  Pt was seen and examined at bedside. Overnight, no acute events, no new complaints this morning. He was seen sitting up in chair outside his room by the window this morning and in a good mood. He was awake, alert, pleasant and conversant. Pt has no acute complaints. Pt's father is here visiting from [place] to help with placement. Reports that pt has gone to alcohol detox 3 times in the past and relapsed each time. He agrees that pt is not safe to go home at this time. Father was updated at bedside.    Pt is free from injury and falls during shift. Pt shows no signs of acute distress. Pt denies pain. Awake, alert, oriented to self and sometimes place (Pt knows at [facility]). Vitals stable. Pt exhibits calm, cooperative behavior throughout shift. Restraints removed this AM at 0820. No issues with patient or behavior during shift. Sitter at bedside throughout shift. AVASYS at bedside. Bed is in low position with breaks locked. Side rails are up x 2. Environment is clutter free. Call light is within reach of the patient. Will continue to monitor

## 2018-10-19 NOTE — PLAN OF CARE
Problem: Physical Therapy Goal  Goal: Physical Therapy Goal  Goals to be met by: 10/29/18     Patient will increase functional independence with mobility by performin. Sit to stand transfer with Stand-by Assistance-met   Revised: sit to stand transfer with Supervision.   2. Bed to chair transfer with Stand-by Assistance using LRD.   3. Gait  x 100 feet with Contact Guard Assistance using Rolling Walker or LRD>   4. Lower extremity exercise program x20  reps per handout, with independence      Outcome: Ongoing (interventions implemented as appropriate)  Pt goals remain appropriate.     GALINA MAYA, PT  10/19/2018

## 2018-10-19 NOTE — PLAN OF CARE
Problem: Patient Care Overview  Goal: Plan of Care Review  Outcome: Ongoing (interventions implemented as appropriate)  Pt  was free of falls during shift remains in 4 points restraint, pt was intermittently exhibiting agitated cursing and pulling away was not easily redirected, prn ativan  was administered po will continue to monitor

## 2018-10-19 NOTE — SUBJECTIVE & OBJECTIVE
Patient History           Medical as of 10/19/2018     Past Medical History     Diagnosis Date Comments Source    Neuropathy -- -- Provider    Scabies -- -- Provider                  Surgical as of 10/19/2018    Past Surgical History: Patient provided no pertinent surgical history.           Family as of 10/19/2018    None           Tobacco Use as of 10/19/2018     Smoking Status Smoking Start Date Smoking Quit Date Packs/Day Years Used    Current Every Day Smoker -- -- 2.00 --    Types Comments Smokeless Tobacco Status Smokeless Tobacco Quit Date Source     Cigarettes -- Never Used -- Provider            Alcohol Use as of 10/19/2018     Alcohol Use Drinks/Week Alcohol/Week Comments Source    Yes 70 Shots of liquor 42.0 oz -- Provider    Frequency Standard Drinks Binge Drinking Source      -- -- -- Provider             Drug Use as of 10/19/2018     Drug Use Types Frequency Comments Source    No -- -- -- Provider            Sexual Activity as of 10/19/2018     Sexually Active Birth Control Partners Comments Source    Yes -- Female -- Provider            Activities of Daily Living as of 10/19/2018    None           Social Documentation as of 10/19/2018    None           Occupational as of 10/19/2018    None           Socioeconomic as of 10/19/2018     Marital Status Spouse Name Number of Children Years Education Education Level Preferred Language Ethnicity Race Source    Single -- -- -- -- English /White White --    Financial Resource Strain Food Insecurity: Worry Food Insecurity: Inability Transportation Needs: Medical Transportation Needs: Non-medical       -- -- -- -- --             Pertinent History     Question Response Comments    Lives with -- --    Place in Birth Order -- --    Lives in -- --    Number of Siblings -- --    Raised by -- --    Legal Involvement -- --    Childhood Trauma -- --    Criminal History of -- --    Financial Status -- --    Highest Level of Education -- --    Does patient  have access to a firearm? -- --     Service -- --    Primary Leisure Activity -- --    Spirituality -- --        Past Medical History:   Diagnosis Date    Neuropathy     Scabies      History reviewed. No pertinent surgical history.  Family History     None        Tobacco Use    Smoking status: Current Every Day Smoker     Packs/day: 2.00     Types: Cigarettes    Smokeless tobacco: Never Used   Substance and Sexual Activity    Alcohol use: Yes     Alcohol/week: 42.0 oz     Types: 70 Shots of liquor per week    Drug use: No    Sexual activity: Yes     Partners: Female     Review of patient's allergies indicates:  No Known Allergies    No current facility-administered medications on file prior to encounter.      Current Outpatient Medications on File Prior to Encounter   Medication Sig    sulfamethoxazole-trimethoprim 800-160mg (BACTRIM DS) 800-160 mg Tab Take 1 tablet by mouth 2 (two) times daily.     Psychotherapeutics (From admission, onward)    Start     Stop Route Frequency Ordered    10/17/18 0434  OLANZapine injection 5 mg      10/17 2359 IM Once as needed 10/17/18 0334    10/14/18 1702  LORazepam tablet 1 mg      -- Oral Every 8 hours PRN 10/14/18 1603        Review of Systems   Unable to perform ROS: Mental status change   Psychiatric/Behavioral: Positive for confusion, decreased concentration and sleep disturbance. Negative for agitation, behavioral problems, dysphoric mood, hallucinations, self-injury and suicidal ideas. The patient is not nervous/anxious and is not hyperactive.      Strengths and Liabilities: Strength: Patient is stable., Liability: Patient has poor health., Liability: Patient has poor judgment, Liability: Patient has possible cognitive impairment.    Objective:     Vital Signs (Most Recent):  Temp: 98.2 °F (36.8 °C) (10/19/18 0443)  Pulse: 73 (10/19/18 0443)  Resp: 16 (10/19/18 0443)  BP: 122/74 (10/19/18 0443)  SpO2: 98 % (10/19/18 0443) Vital Signs (24h Range):  Temp:   "[98.1 °F (36.7 °C)-98.2 °F (36.8 °C)] 98.2 °F (36.8 °C)  Pulse:  [70-84] 73  Resp:  [16-18] 16  SpO2:  [96 %-99 %] 98 %  BP: (122-175)/(70-77) 122/74     Height: 6' 2" (188 cm)  Weight: 67.8 kg (149 lb 7.6 oz)  Body mass index is 19.19 kg/m².      Intake/Output Summary (Last 24 hours) at 10/19/2018 1116  Last data filed at 10/19/2018 0900  Gross per 24 hour   Intake 750 ml   Output 2 ml   Net 748 ml       Physical Exam   Psychiatric:   Mental Status Exam:  Appearance: lying in bed, thin & gaunt looking, in 4pt restraints   Level of Consciousness: somnolent   Behavior/Cooperation: eye contact minimal, occasionally laughs somewhat inappropriately during interview   Psychomotor: unremarkable   Speech: normal tone, normal pitch, normal volume, slowed  Language: english, fluid  Orientation: oriented to person, disoriented to month and year (july, 2016), disoriented to place "july," and situation   Attention Span/Concentration: unable to spell "WORLD" backwards  Memory: Registers 3/3 and recalls 0/3   Mood: "neutral"  Affect: mood-incongruent, occasionally appears happy   Thought Process: linear, poverty of thought  Associations: unable to assess secondary to poverty or speech and level of consciousness   Thought Content: normal, no suicidality, no homicidality, delusions, or paranoia  Fund of Knowledge: unable to assess  Abstraction: unable to assess   Insight: poor  Judgment: poor          Significant Labs:   Last 24 Hours:   Recent Lab Results       10/19/18  0643   10/19/18  0642        Immature Granulocytes 0.3       Immature Grans (Abs) 0.02  Comment:  Mild elevation in immature granulocytes is non specific and   can be seen in a variety of conditions including stress response,   acute inflammation, trauma and pregnancy. Correlation with other   laboratory and clinical findings is essential.         Anion Gap   7     Baso # 0.18       Basophil% 2.9       BUN, Bld   14     Calcium   9.8     Chloride   106     CO2   " 25     Creatinine   0.7     Differential Method Automated       eGFR if    >60.0     eGFR if non    >60.0  Comment:  Calculation used to obtain the estimated glomerular filtration  rate (eGFR) is the CKD-EPI equation.        Eos # 0.2       Eosinophil% 3.6       Glucose   83     Gran # (ANC) 3.8       Gran% 60.8       Hematocrit 33.1       Hemoglobin 11.0       Lymph # 1.2       Lymph% 19.9       Magnesium   2.2     MCH 33.5       MCHC 33.2              Mono # 0.8       Mono% 12.5       MPV 9.7       nRBC 0       Phosphorus   4.3     Platelets 375       Potassium   3.7     RBC 3.28       RDW 13.8       Sodium   138     WBC 6.18             Significant Imaging: I have reviewed all pertinent imaging results/findings within the past 24 hours.

## 2018-10-19 NOTE — PLAN OF CARE
CM received phone call from Sony, patient's father [phone], concerning patient's discharge disposition.  Sony states he would like for his son to go to an addiction rehab facility.  CM explained that he cannot be placed in an addiction rehab, he has to voluntarily sign himself into the facility.  CM explained it is part of the recovery process to initiate the rehab.  He then reiterated about his POA.  CM explained even with a POA that we would not be able to place him in a addiction rehab facility.  CM asked if he could fax POA to [facility], he stated he doesn't have a fax but can email it.  CM gave her [facility] email address.    10:20 AM  CM received call from representative, Becca, intake at [facility] in [place], [place].  She states that Mr. Cardoza, patient's father, called her and wanted help with his son.  CM inquired about what kind of facility [facility] is and Becca states a Psychiatric facility.  CLARENCE stated that he was inquiring about addiction rehab, Becca stated they are not an addiction rehab.  She stated Sony wants their MD to come evaluate the patient.  CM informed representative that Psychiatry was consulted and is seeing the patient today.  Becca states they will wait until [facility] Psychiatrist sees patient.      1:15 PM  CM received the POA from Sony, patient's father, it is a financial POA and not a healthcare POA.  CM called Sony and informed him it is not the right kind of POA.  Sony then discussed having the Psychiatry MD, Dr. Calixto, from [facility] to come and visit the patient.  CLARENCE explained the MD would have to have privileges at [facility].  He asked how he gets a second opinion from Psych? CM informed him we do not have the first opinion at this time.  He agreed we should wait until we see what they want to do.

## 2018-10-19 NOTE — ASSESSMENT & PLAN NOTE
-Pt has completed detox, recommend motivational interviewing and patient education for cessation, though unable to perform currently due to mental status.

## 2018-10-19 NOTE — ASSESSMENT & PLAN NOTE
· Patient appears at this time that cognitive problems related to a severe alcohol use disorder,  and nutritional deficiency are responsible for patient's inability to care for himself. Patient does not require a PEC for grave disability.  However, he does not have capacity for medical decision making at this time. He appears to be cooperative with medical recommendations, per sitter at bedside she reports he has been redirectable, though concern for agitation overnight and decreased sleep overnight.   · Concerns for continued gait instability, confusion. Anticipate that both may improve over a period of weeks; pt persists with horizontal nystagmus, may be permanent. Patient may have permanent ataxia. As the acute encephalopathy and confusion recedes, deficits in learning and memory may become obvious, and the latter are unlikely to recover completely or substantively, though per literatures may do so in about 20 percent of patients; the remainder had a permanent amnestic syndrome.  · Patient is currently experiencing peripheral neuropathy, encephalopathy, oculomotor dysfunction, and gait ataxia consistent with Wernicke's Encephalopathy.  He would benefit from continued po thiamine supplementation, and multivitamin, as he's completed course of IV treatment. He will likely require further assistance for activities of daily living in a nursing home setting after completion of hospitalization.  · Patient's alcohol use disorder is severe, though has completed extended taper. Patient is unlikely to be in acute withdrawal, anticipate that continued treatment with prn ativan may be confounding clinical picture. Would recommend discontinuation of prn lorazepam, which per nursing notes is being offered for agitation, which may worsen issues with cognition and memory, and may have limited efficacy.   · For non-redirectable agitation recommend treatment with haldol 2mg po/IM q8 hours, and minimize use of physical restratins.  Would avoid benzodiazpines due to concern for potential exacerbation of memory deficits. Daily EKG while on haldol to monitor qtc, would discontinue use if qtc>500.   · Psychiatry will sign off, please reconsult with any concerns; thank you for allowing us to participate in this patient's care.

## 2018-10-19 NOTE — ASSESSMENT & PLAN NOTE
· Patient appears at this time that cognitive problems related to a severe alcohol use disorder,  and nutritional deficiency are responsible for patient's inability to care for himself. Patient does not require a PEC for grave disability.  However, he does not have capacity for medical decision making at this time. He appears to be cooperative intermittently, though concern for continued agitation.   · Concerns for continued gait instability, confusion. Anticipate that both may improve over a period of weeks; pt persists with horizontal nystagmus, may be permanent. Patient may have permanent ataxia. As the acute encephalopathy and confusion recedes, deficits in learning and memory may become obvious, and the latter are unlikely to recover completely or substantively, though per literatures may do so in about 20 percent of patients; the remainder had a permanent amnestic syndrome.  · Patient is currently experiencing peripheral neuropathy, encephalopathy, oculomotor dysfunction, and gait ataxia consistent with Wernicke's Encephalopathy.  He would benefit from continued po thiamine supplementation, and multivitamin, as he's completed course of IV treatment. He will likely require further assistance for activities of daily living in a nursing home setting after completion of hospitalization.  · Patient's alcohol use disorder is severe, though has completed extended taper. Patient is unlikely to be in acute withdrawal, anticipate that continued treatment with prn ativan may be confounding clinical picture. Would recommend continued avoidance of benzodiazepines.   · Patients continues with non-redirectable agitation, recommend treatment with zyprexa 5mg po/IM q8 hours, and minimize use of physical restratins. As patient has required several prn medication administration in the past 24 hours, would recommend scheduled zyprexa 5mg po qHS, with max daily dose 20mg from all sources.   · Would continue monitoring with EKG,  10/20 EKG with ST changes, qtc 413.   · Will continue to follow; thank you for allowing us to participate in this patient's care.
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
## 2018-10-20 PROCEDURE — 93010 ELECTROCARDIOGRAM REPORT: CPT | Mod: ,,, | Performed by: INTERNAL MEDICINE

## 2018-10-20 PROCEDURE — S4991 NICOTINE PATCH NONLEGEND: HCPCS | Performed by: HOSPITALIST

## 2018-10-20 PROCEDURE — 99232 SBSQ HOSP IP/OBS MODERATE 35: CPT | Mod: ,,, | Performed by: HOSPITALIST

## 2018-10-20 PROCEDURE — 25000003 PHARM REV CODE 250: Performed by: HOSPITALIST

## 2018-10-20 PROCEDURE — 63600175 PHARM REV CODE 636 W HCPCS: Performed by: HOSPITALIST

## 2018-10-20 PROCEDURE — 93005 ELECTROCARDIOGRAM TRACING: CPT

## 2018-10-20 PROCEDURE — S0166 INJ OLANZAPINE 2.5MG: HCPCS | Performed by: HOSPITALIST

## 2018-10-20 PROCEDURE — 11000001 HC ACUTE MED/SURG PRIVATE ROOM

## 2018-10-20 RX ADMIN — OLANZAPINE 5 MG: 10 INJECTION, POWDER, FOR SOLUTION INTRAMUSCULAR at 10:10

## 2018-10-20 RX ADMIN — NICOTINE 1 PATCH: 21 PATCH, EXTENDED RELEASE TRANSDERMAL at 10:10

## 2018-10-20 RX ADMIN — Medication 400 MG: at 10:10

## 2018-10-20 RX ADMIN — MICONAZOLE NITRATE: 20 OINTMENT TOPICAL at 08:10

## 2018-10-20 RX ADMIN — THERA TABS 1 TABLET: TAB at 10:10

## 2018-10-20 RX ADMIN — OLANZAPINE 5 MG: 5 TABLET, FILM COATED ORAL at 01:10

## 2018-10-20 RX ADMIN — MICONAZOLE NITRATE: 20 OINTMENT TOPICAL at 10:10

## 2018-10-20 RX ADMIN — AMLODIPINE BESYLATE 5 MG: 5 TABLET ORAL at 10:10

## 2018-10-20 RX ADMIN — FOLIC ACID 1 MG: 1 TABLET ORAL at 10:10

## 2018-10-20 RX ADMIN — Medication 400 MG: at 08:10

## 2018-10-20 RX ADMIN — ENOXAPARIN SODIUM 40 MG: 100 INJECTION SUBCUTANEOUS at 05:10

## 2018-10-20 RX ADMIN — Medication 100 MG: at 10:10

## 2018-10-20 NOTE — PLAN OF CARE
"Problem: Patient Care Overview  Goal: Plan of Care Review  Outcome: Ongoing (interventions implemented as appropriate)  SR up x2, call bell in reach, bed in low locked position, skid proof socks on. Patient is confused, oriented to self only. Patient states it is 2015, patient asking for " corona" every time he wakes up. Patient is easy to reorient and cooperative. VS stable WNL. Restrains order reordered, no S/S of injury noted.  Patient remained free of fall and injuries during the shift.  Care plan explained to patient. No concerns, will continue to monitor.       "

## 2018-10-20 NOTE — PLAN OF CARE
"CLARENCE received request from Dr. Gudino to try and get patient to [facility] per father's request.  CM spoke to patient who asked who would be paying for the [facility] stay, CM informed him his father would.  Patient then said he would sign himself into the facility "if that's what it takes to get me out of [facility]".  CLARENCE then called [facility] in [place] [phone] and spoke to Etienne in intake.  CLARENCE informed Etienne of conversation held yesterday with Becca from intake at [facility] of the father's request to have patient evaluated by Dr. Calixto.  Etienne asked if patient is PEC/CEC'd, CLARENCE informed him no patient is not.  Etienne requested CM send patient's information to him via fax [phone] and he will have Dr. Calixto look at it.    1:25 PM  CM received phone call from Etienne, dragan at [facility], who states they cannot accept the patient.  He meets their exclusion criteria.    1:35 PM  CLARENCE informed Dr. Gudino of patient's denial to [facility].  Dr. Gudino requested that this patient be escalated up the chain of command for placement ideas. CLARENCE will inform  on Monday.  "

## 2018-10-20 NOTE — PLAN OF CARE
Problem: Patient Care Overview  Goal: Plan of Care Review  Outcome: Ongoing (interventions implemented as appropriate)  Patient confused. Patient VSS. Patient denies pain. Patient free from falls or injury during shift. Patient repositioned every two hours. Patient in bed, bed in lowest position, call light in reach, bed alarm set, and personal items at bedside. Will continue to monitor.

## 2018-10-21 LAB
ANION GAP SERPL CALC-SCNC: 9 MMOL/L
BUN SERPL-MCNC: 12 MG/DL
CALCIUM SERPL-MCNC: 10 MG/DL
CHLORIDE SERPL-SCNC: 103 MMOL/L
CO2 SERPL-SCNC: 23 MMOL/L
CREAT SERPL-MCNC: 0.7 MG/DL
EST. GFR  (AFRICAN AMERICAN): >60 ML/MIN/1.73 M^2
EST. GFR  (NON AFRICAN AMERICAN): >60 ML/MIN/1.73 M^2
GLUCOSE SERPL-MCNC: 93 MG/DL
MAGNESIUM SERPL-MCNC: 2.2 MG/DL
PHOSPHATE SERPL-MCNC: 4 MG/DL
POTASSIUM SERPL-SCNC: 3.5 MMOL/L
SODIUM SERPL-SCNC: 135 MMOL/L

## 2018-10-21 PROCEDURE — 11000001 HC ACUTE MED/SURG PRIVATE ROOM

## 2018-10-21 PROCEDURE — S4991 NICOTINE PATCH NONLEGEND: HCPCS | Performed by: HOSPITALIST

## 2018-10-21 PROCEDURE — 99232 SBSQ HOSP IP/OBS MODERATE 35: CPT | Mod: ,,, | Performed by: HOSPITALIST

## 2018-10-21 PROCEDURE — 80048 BASIC METABOLIC PNL TOTAL CA: CPT

## 2018-10-21 PROCEDURE — 25000003 PHARM REV CODE 250: Performed by: HOSPITALIST

## 2018-10-21 PROCEDURE — 84100 ASSAY OF PHOSPHORUS: CPT

## 2018-10-21 PROCEDURE — 36415 COLL VENOUS BLD VENIPUNCTURE: CPT

## 2018-10-21 PROCEDURE — 63600175 PHARM REV CODE 636 W HCPCS: Performed by: HOSPITALIST

## 2018-10-21 PROCEDURE — S0166 INJ OLANZAPINE 2.5MG: HCPCS | Performed by: HOSPITALIST

## 2018-10-21 PROCEDURE — 83735 ASSAY OF MAGNESIUM: CPT

## 2018-10-21 RX ADMIN — MICONAZOLE NITRATE: 20 OINTMENT TOPICAL at 08:10

## 2018-10-21 RX ADMIN — AMLODIPINE BESYLATE 5 MG: 5 TABLET ORAL at 08:10

## 2018-10-21 RX ADMIN — NICOTINE 1 PATCH: 21 PATCH, EXTENDED RELEASE TRANSDERMAL at 08:10

## 2018-10-21 RX ADMIN — THERA TABS 1 TABLET: TAB at 08:10

## 2018-10-21 RX ADMIN — MICONAZOLE NITRATE: 20 OINTMENT TOPICAL at 09:10

## 2018-10-21 RX ADMIN — OLANZAPINE 5 MG: 5 TABLET, FILM COATED ORAL at 09:10

## 2018-10-21 RX ADMIN — Medication 400 MG: at 08:10

## 2018-10-21 RX ADMIN — Medication 400 MG: at 09:10

## 2018-10-21 RX ADMIN — FOLIC ACID 1 MG: 1 TABLET ORAL at 08:10

## 2018-10-21 RX ADMIN — ENOXAPARIN SODIUM 40 MG: 100 INJECTION SUBCUTANEOUS at 04:10

## 2018-10-21 RX ADMIN — OLANZAPINE 5 MG: 10 INJECTION, POWDER, FOR SOLUTION INTRAMUSCULAR at 11:10

## 2018-10-21 RX ADMIN — OLANZAPINE 5 MG: 5 TABLET, FILM COATED ORAL at 08:10

## 2018-10-21 RX ADMIN — Medication 100 MG: at 08:10

## 2018-10-21 NOTE — PLAN OF CARE
Problem: Patient Care Overview  Goal: Plan of Care Review  Outcome: Ongoing (interventions implemented as appropriate)  SR up x2, call bell in reach, bed in low locked position, skid proof socks on. Patient confused, can not tell date states now is 2020, oriented to self only. Patient got agitated , verbally abusive and anxious , trying to cut off restrains, uncooperative. Medication administered per MAR. Restrains order updated. VS stable WNL. Patient remained free of fall and injuries during the shift.  Care plan explained to patient. No concerns, will continue to monitor.

## 2018-10-21 NOTE — NURSING
"Rounding on patient. Restraints checked. No injury noted. Patient asked if he needed to use the bathroom. Patient smiled with no response.     Nurse walked to the end of the simon and the avasys alarmed. Upon entering patient room, noted feces between his legs. Patient verbalized, "Oops with a grin". Incontinence care provided for patient. Restraints checked. Will continue to monitor.  "

## 2018-10-21 NOTE — PROGRESS NOTES
"Hospital Medicine   Progress Note     Team: [facility] Prateek Gudino MD   Admit Date: 10/4/2018   YONAS 10/24/2018   Code status: Full Code   Principal Problem: Alcohol withdrawal     Interval hx: Patient seen and examined at bedside today. Overnight: KIRA. This AM, patient more agitated requiring PO Zyprexa for non-redirectable agitation prior to my exam. Per nursing, he threw his breakfast tray across the room, intentionally urinated on the floor several times, and has made verbally threatening remarks. When I arrived in the room patient said "my god, you're in on this too!" Remains oriented to self only, thinks he may be at home in the garage but is unsure. Yelling during our exam. Explained to patient that trial of no restraints was attempted yesterday; however, he feel several times into the couch. Noted to be extremely unsteady by nursing. He had no memory of falling yesterday and also does not remember that he has talked to his father several days. Psychiatry re-consulted to follow while he remains inpatient    ROS   Unable to assess due to patient agitation and lack of cooperation    PEx   Temp:  [97.7 °F (36.5 °C)-98.2 °F (36.8 °C)]   Pulse:  []   Resp:  [16-18]   BP: (126-134)/(60-73)   SpO2:  [97 %-99 %]      I & O (Last 24H):     Intake/Output Summary (Last 24 hours) at 10/21/2018 1354  Last data filed at 10/20/2018 1546  Gross per 24 hour   Intake --   Output 300 ml   Net -300 ml     General:  male  in no acute distress. Resting in bed. Globally weak   CVS: RRR. Normal S1 S2. No murmurs  Pulm: CTAB. Normal respiratory effort. No wheezes, rhonchi, or crackles.  Abdomen: Soft. Non-distended. No tenderness to palpation. No rebound or guarding. +BS.  Extremities: No edema. No cyanosis. Full ROM.  Skin: multiple scabbed lesions on bilateral feet much improved, thickened skin on bilateral dorsal feet worse at the toes consistent with onychomycosis, large linear laceration with secondary " healing on right foot, no surrounding erythema or induration  Neuro: Alert, oriented x 1 (person), Spont mvt of all extremities with no focal deficits noted.     amLODIPine  5 mg Oral Daily    enoxparin  40 mg Subcutaneous Daily    folic acid  1 mg Oral Daily    magnesium oxide  400 mg Oral BID    miconazole nitrate 2%   Topical (Top) BID    multivitamin  1 tablet Oral Daily    nicotine  1 patch Transdermal Daily    thiamine  100 mg Oral Daily     OLANZapine, OLANZapine, sodium chloride 0.9%    Assessment & Plan:  Mohamud Patel is a 57 y.o. man with a history of Severe Alcohol Abuse, Chronic Wounds of Bilateral LE's with Onychomycosis, Alcoholic Neuropathy, Hyponatremia, and Tobacco Use Disorder who was brought to the ED by EMS after his girlfriend called 911 while the patient was severely intoxicated. He is admitted to Hospital Medicine for Alcohol Withdrawal, possible Wernicke's Encephalopathy, and Acute Cystitis now resolved. Awaiting placement (IPR vs. SNF) as he remains without capacity to make informed medical decisions.     Wernicke's Encephalopathy (WE) with Dementia and Behavioral Disturbance  Severe Alcohol Use Disorder with   Alcohol Withdrawal (Resloved)  Alcoholic Neuropathy - Fall Risk, Requiring Restraints  - Patient now becoming more combative and agitation, not responding to PO Zyprexa PRN. Psychiatry re-consulted for assistance and to follow while patient remains at [facility]. Medically, he is outside the window for Alcohol Withdrawal and he has completed the appropriate course of IV Thiamine for Wernicke's Encephalopathy. Continues to have severe gait instability and has failed several attempts without restrains and sitter at bedside. Would favor eventual transfer of patient to Memory Unit vs. Inpatient Psychiatric Facility as he is now medically clear  - Zyprexa 5 mg PO/IM q8 PRN non-redirectable agitation.  -For Wernicke's Encephalopathy: Finished course of IV Thiamine, transitioned  to Thiamine 100 mg PO daily. Folic acid and MVI daily.   - Valium taper completed on 10/15.   -CMP, Mg, Phos - spaced to q48 hours  -Fall, aspiration, and seizure precautions  -PT/OT consulted     Multiple Bilateral Lower Extremity Wounds  Onychomycosis of Bilateral Feet   -Wound Care consulted, appreciate assistance - have debrided wounds, applying medi-honey, no evidence of active infection. Miconazole ointment to toes  -Podiatry was consulted for further assistance     Tobacco Use Disorder  -Nicotine 21 mg patch daily      DVT PPx: Lovenox SubQ Daily    Diet: Regular  Discharge plan and follow up: Escalating case for additional placement ideas, consider Memory Unit vs. Inpatient psychiatry (possibly [facility]). Home environment has been evaluated and deemed not safe (urine, feces, and blood reportedly on the walls) with no reliable caregiver (father who is medical decision maker via next of kin has requested that previous caregiver Chitra not be allowed to visit patient).       Prateek Gudino MD  Hospital Medicine Staff  [facility]   Pager: [phone]

## 2018-10-21 NOTE — NURSING
"Avasys alarming. Upon entering patient room, patient laughing. Telesitter stated that he was trying to take off restraints. Restraints checked. No injury noted. Patient verbalized, "i will get out of them, and when I do that's your a**." Patient informed that the restraints are for his safety. Will continue to monitor.  "

## 2018-10-22 PROCEDURE — 97112 NEUROMUSCULAR REEDUCATION: CPT

## 2018-10-22 PROCEDURE — 97110 THERAPEUTIC EXERCISES: CPT

## 2018-10-22 PROCEDURE — 25000003 PHARM REV CODE 250: Performed by: HOSPITALIST

## 2018-10-22 PROCEDURE — 11000001 HC ACUTE MED/SURG PRIVATE ROOM

## 2018-10-22 PROCEDURE — 99231 SBSQ HOSP IP/OBS SF/LOW 25: CPT | Mod: ,,, | Performed by: HOSPITALIST

## 2018-10-22 PROCEDURE — S4991 NICOTINE PATCH NONLEGEND: HCPCS | Performed by: HOSPITALIST

## 2018-10-22 PROCEDURE — 63600175 PHARM REV CODE 636 W HCPCS: Performed by: HOSPITALIST

## 2018-10-22 PROCEDURE — 97535 SELF CARE MNGMENT TRAINING: CPT

## 2018-10-22 PROCEDURE — 97530 THERAPEUTIC ACTIVITIES: CPT

## 2018-10-22 PROCEDURE — 99232 SBSQ HOSP IP/OBS MODERATE 35: CPT | Mod: AF,HB,, | Performed by: PSYCHIATRY & NEUROLOGY

## 2018-10-22 RX ADMIN — MICONAZOLE NITRATE: 20 OINTMENT TOPICAL at 08:10

## 2018-10-22 RX ADMIN — Medication 400 MG: at 08:10

## 2018-10-22 RX ADMIN — THERA TABS 1 TABLET: TAB at 09:10

## 2018-10-22 RX ADMIN — Medication 400 MG: at 09:10

## 2018-10-22 RX ADMIN — Medication 100 MG: at 09:10

## 2018-10-22 RX ADMIN — AMLODIPINE BESYLATE 5 MG: 5 TABLET ORAL at 09:10

## 2018-10-22 RX ADMIN — OLANZAPINE 5 MG: 5 TABLET, FILM COATED ORAL at 08:10

## 2018-10-22 RX ADMIN — NICOTINE 1 PATCH: 21 PATCH, EXTENDED RELEASE TRANSDERMAL at 09:10

## 2018-10-22 RX ADMIN — FOLIC ACID 1 MG: 1 TABLET ORAL at 09:10

## 2018-10-22 RX ADMIN — MICONAZOLE NITRATE: 20 OINTMENT TOPICAL at 09:10

## 2018-10-22 RX ADMIN — ENOXAPARIN SODIUM 40 MG: 100 INJECTION SUBCUTANEOUS at 04:10

## 2018-10-22 NOTE — SUBJECTIVE & OBJECTIVE
"Interval History: As per hospital course     Family History     None        Tobacco Use    Smoking status: Current Every Day Smoker     Packs/day: 2.00     Types: Cigarettes    Smokeless tobacco: Never Used   Substance and Sexual Activity    Alcohol use: Yes     Alcohol/week: 42.0 oz     Types: 70 Shots of liquor per week    Drug use: No    Sexual activity: Yes     Partners: Female     Psychotherapeutics (From admission, onward)    Start     Stop Route Frequency Ordered    10/19/18 1724  OLANZapine injection 5 mg      -- IM Every 8 hours PRN 10/19/18 1625    10/19/18 1724  OLANZapine tablet 5 mg      -- Oral Every 8 hours PRN 10/19/18 1625    10/17/18 0434  OLANZapine injection 5 mg      10/17 2359 IM Once as needed 10/17/18 0334           Review of Systems   Unable to perform ROS: Mental status change   Psychiatric/Behavioral: Positive for behavioral problems, confusion, decreased concentration and sleep disturbance. Negative for hallucinations, self-injury and suicidal ideas.     Objective:     Vital Signs (Most Recent):  Temp: 97.8 °F (36.6 °C) (10/22/18 1154)  Pulse: 80 (10/22/18 1154)  Resp: 17 (10/22/18 1154)  BP: 127/62 (10/22/18 1154)  SpO2: 100 % (10/22/18 1154) Vital Signs (24h Range):  Temp:  [96.8 °F (36 °C)-98.5 °F (36.9 °C)] 97.8 °F (36.6 °C)  Pulse:  [80-89] 80  Resp:  [16-18] 17  SpO2:  [96 %-100 %] 100 %  BP: (127-169)/(62-87) 127/62     Height: 6' 2" (188 cm)  Weight: 67.8 kg (149 lb 7.6 oz)  Body mass index is 19.19 kg/m².      Intake/Output Summary (Last 24 hours) at 10/22/2018 1228  Last data filed at 10/22/2018 0904  Gross per 24 hour   Intake 240 ml   Output --   Net 240 ml       Physical Exam   Psychiatric:   Mental Status Exam:  Appearance: lying in bed, thin & gaunt looking, in 4pt restraints   Level of Consciousness: somnolent   Behavior/Cooperation: eye contact minimal, somewhat cooperative  Psychomotor: unremarkable   Speech: normal tone, normal pitch, normal volume, " "slowed  Language: english, fluid  Orientation: oriented to person, [facility], disoriented year, disoriented to place "[place]"   Attention Span/Concentration: unable to assess secondary to somnolence   Memory:  unable to assess secondary to somnolence   Mood: "ok"  Affect: constricted   Thought Process: linear, poverty of thought  Associations: unable to assess secondary to poverty or speech and level of consciousness   Thought Content: normal, no suicidality, no homicidality, delusions, or paranoia elicited though exam limited by poor participation   Fund of Knowledge: unable to assess  Abstraction: unable to assess   Insight: poor  Judgment: poor   Nursing note and vitals reviewed.    NEUROLOGICAL EXAMINATION:     MOTOR EXAM   Overall muscle tone: normal    Significant Labs:   Last 24 Hours:   Recent Lab Results     None          Significant Imaging: I have reviewed all pertinent imaging results/findings within the past 24 hours.  "

## 2018-10-22 NOTE — PROGRESS NOTES
" [facility]  Adult Nutrition  Progress Note    SUMMARY       Recommendations    Recommendation/Intervention:   1. Order Arginaid to aid in wound healing and encourage intake of high protein foods.   2. Continue current regular diet. Pt with good intake documented.  3. RD following.    Goals: Intake >/=85% EEN/EPN  Nutrition Goal Status: goal met  Communication of RD Recs: (POC)    Reason for Assessment    Reason for Assessment: RD follow-up  Diagnosis: (Alcohol withdrawal)  Relevant Medical History: EtOH abuse    General Information Comments: Pt continues with confusion/agitation. Good PO intake per RN documentation. Still unable to complete full NFPE.    Nutrition Discharge Planning: Adequate PO intake    Nutrition Risk Screen    Nutrition Risk Screen: no indicators present    Nutrition/Diet History    Do you have any cultural, spiritual, Protestant conflicts, given your current situation?: none  Factors Affecting Nutritional Intake: None identified at this time    Anthropometrics    Temp: 97.8 °F (36.6 °C)  Height Method: Stated  Height: 6' 2" (188 cm)  Height (inches): 74 in  Weight Method: Bed Scale  Weight: 67.8 kg (149 lb 7.6 oz)  Weight (lb): 149.47 lb  Ideal Body Weight (IBW), Male: 190 lb  % Ideal Body Weight, Male (lb): 78.67 lb  BMI (Calculated): 19.2  BMI Grade: 18.5-24.9 - normal  Usual Body Weight (UBW), k.4 kg(2018 per chart review)  % Usual Body Weight: 95.16  % Weight Change From Usual Weight: -5.04 %       Lab/Procedures/Meds    Pertinent Labs Reviewed: reviewed  Pertinent Labs Comments: Noted  Pertinent Medications Reviewed: reviewed  Pertinent Medications Comments: folic acid, Mg, MVI, thiamine    Physical Findings/Assessment    Overall Physical Appearance: loss of muscle mass, underweight  Oral/Mouth Cavity: WDL  Skin: pressure ulcer(s), non-healing wound(s)    Estimated/Assessed Needs    Weight Used For Calorie Calculations: 67.8 kg (149 lb 7.6 oz)  Energy Calorie " Requirements (kcal): 1966  Energy Need Method: [place]-St Jeor(x 1.25 (PAL))  Protein Requirements: 82-95 gm (1.2-1.4 gm/kg)  Weight Used For Protein Calculations: 67.8 kg (149 lb 7.6 oz)  Fluid Requirements (mL): per MD     RDA Method (mL): 1966       Nutrition Prescription Ordered    Current Diet Order: Regular  Nutrition Order Comments: 1000 ml FR    Evaluation of Received Nutrient/Fluid Intake    Comments: LBM 10/21 x 2  % Intake of Estimated Energy Needs: 75 - 100 %  % Meal Intake: 75 - 100 %    Nutrition Risk    Level of Risk/Frequency of Follow-up: (F/u 1x weekly)     Assessment and Plan    Nutrition Problem  Increased protein needs     Related to (etiology):   Wound healing     Signs and Symptoms (as evidenced by):   Multiple BLE pressure ulcers      Interventions/Recommendations (treatment strategy):  See recs above.     Nutrition Diagnosis Status:   Continues    Monitor and Evaluation    Food and Nutrient Intake: energy intake, food and beverage intake  Food and Nutrient Adminstration: diet order  Physical Activity and Function: nutrition-related ADLs and IADLs  Anthropometric Measurements: weight, weight change, body mass index  Biochemical Data, Medical Tests and Procedures: electrolyte and renal panel, gastrointestinal profile, glucose/endocrine profile, inflammatory profile  Nutrition-Focused Physical Findings: overall appearance     Nutrition Follow-Up    RD Follow-up?: Yes

## 2018-10-22 NOTE — PT/OT/SLP PROGRESS
Occupational Therapy   Treatment    Name: Mohamud Patel  MRN: [id number]  Admitting Diagnosis:  Alcohol withdrawal       Recommendations:     Discharge Recommendations: nursing facility, skilled  Discharge Equipment Recommendations:  bedside commode, wheelchair  Barriers to discharge:  Decreased caregiver support, Inaccessible home environment    Subjective     Communicated with: RN Lashaun) prior to session. No family present for session.   Pain/Comfort:  · Pain Rating 1: 0/10    Patients cultural, spiritual, Orthodoxy conflicts given the current situation: none reported     Objective:     Patient found with: restraints(avasys)    General Precautions: Standard, aspiration, fall, seizure   Orthopedic Precautions:N/A   Braces: N/A     Occupational Performance:    Bed Mobility:    · Patient completed Rolling/Turning to Left with  contact guard assistance  · Patient completed Scooting/Bridging with contact guard assistance  · Patient completed Supine to Sit with minimum assistance  · Patient completed Sit to Supine with minimum assistance     Functional Mobility/Transfers:  · Patient completed Sit <> Stand Transfer with moderate assistance  with  rolling walker   · Functional Mobility: max A x2 persons for ~4 lateral steps to L with RW  · Demo posterior lean, incoordination, decreased motor planning, and ataxia  · Facilitation of thoracic extension, weight shift, and balance     Activities of Daily Living:  · Feeding:  HOHA  due to incoordination and tremors to drink from coffee cup  · Grooming: supervision after set up to wash face with washcloth  · Upper Body Dressing: moderate assistance to don gown on front while seated  · Lower Body Dressing: stand by assistance to don B socks while seated EOB  · Mod A to doff brief soiled in urine     Patient left with bed in chair position with all lines intact, call button in reach, restraints reapplied at end of session and PT present    AMPA 6 Click:  AMPA Total  "Score: 17    Treatment & Education:  -Edu for OT role and POC  -Edu for importance of OOB activity and impact on healing  -Pt oriented to self and month, disoriented to day of week and year   -max reinforcement of orientation to hospital  -Pt +RIS during session   -pt found talking to  he was hallucinating upon OT entry to session   -pt delusional regarding starting business with father and "having a lot to do this week" during session--unable to be redirected    -Pt delusional thinking soiled brief was sack of potatoes   -Whiteboard updated   -Questions and concerns addressed within OT scope of practice   Education:    Assessment:     Mohamud Patel is a 57 y.o. male with a medical diagnosis of Alcohol withdrawal.  He presents with decreased functional independence in various areas of his life. He demo severely decreased cognition as he was disoriented and +RIS during session and unable to be redirected. He demo increased agitation as he was constantly fidgeting with restraint and with gown. He demo decreased functional mobility and balance. Pt demo decreased endurance to functional task. He demo decreased ADL skills overall, but improvement with LB dressing this date. Pt would continue to benefit from skilled OT services for maximum functional outcome and safety. Performance deficits affecting function are weakness, impaired endurance, impaired self care skills, impaired functional mobilty, decreased coordination, impaired cognition, impaired balance, gait instability, decreased lower extremity function, decreased upper extremity function, impaired cardiopulmonary response to activity, impaired coordination, decreased safety awareness, impaired fine motor.      Rehab Prognosis:  good; patient would benefit from acute skilled OT services to address these deficits and reach maximum level of function.       Plan:     Patient to be seen 3 x/week to address the above listed problems via " self-care/home management, therapeutic activities, therapeutic exercises  · Plan of Care Expires: 11/04/18  · Plan of Care Reviewed with: patient    This Plan of care has been discussed with the patient who was involved in its development and understands and is in agreement with the identified goals and treatment plan    GOALS:   Multidisciplinary Problems     Occupational Therapy Goals        Problem: Occupational Therapy Goal    Goal Priority Disciplines Outcome Interventions   Occupational Therapy Goal     OT, PT/OT Ongoing (interventions implemented as appropriate)    Description:  Goals to be met by: 11/5  Patient will increase functional independence with ADLs by performing:    UE Dressing with Modified [place] while seated.  LE Dressing with Modified [place].  Grooming while standing at sink with Min A.  Toileting from toilet with Minimum A for hygiene and clothing management.   Supine to sit with Supervision and HOB flat.  Stand pivot transfers with CGA to chair and/or toilet.  Functional mobility with AD as needed for short household distance with no LOB with min A.                        Time Tracking:     OT Date of Treatment: 10/22/18  OT Start Time: 0208  OT Stop Time: 0240  OT Total Time (min): 32 min    Billable Minutes:Self Care/Home Management 22  Therapeutic Activity 10    SAM Almaraz  10/22/2018

## 2018-10-22 NOTE — PLAN OF CARE
Referrals sent to [facility] via [facility].            Barb Hong LMSW

## 2018-10-22 NOTE — PLAN OF CARE
"   10/22/18 6442   Right Care Assessment   Can the patient answer the patient profile reliably? (covering CM for today only:  per MD/TL pt needs referrals to [facility]. [id number]  Barb updated with this info.)   Per MD, " consider Memory Unit vs. Inpatient psychiatry (possibly [facility]). Home environment has been evaluated and deemed not safe (urine, feces, and blood reportedly on the walls) with no reliable caregiver (father who is medical decision maker via next of kin has requested that previous caregiver Chitra not be allowed to visit patient."  Will update Sw      "

## 2018-10-22 NOTE — PLAN OF CARE
Problem: Occupational Therapy Goal  Goal: Occupational Therapy Goal  Goals to be met by: 11/5  Patient will increase functional independence with ADLs by performing:    UE Dressing with Modified [place] while seated.  LE Dressing with Modified [place].  Grooming while standing at sink with Min A.  Toileting from toilet with Minimum A for hygiene and clothing management.   Supine to sit with Supervision and HOB flat.  Stand pivot transfers with CGA to chair and/or toilet.  Functional mobility with AD as needed for short household distance with no LOB with min A.      Outcome: Ongoing (interventions implemented as appropriate)  Goals not met. Date extended and goals updated to reflect current functional status. SAM Almaraz 10/22/2018

## 2018-10-22 NOTE — PLAN OF CARE
Problem: Physical Therapy Goal  Goal: Physical Therapy Goal  Goals to be met by: 10/29/18     Patient will increase functional independence with mobility by performin. Sit to stand transfer with Stand-by Assistance-met   Revised: sit to stand transfer with Supervision.   2. Bed to chair transfer with Stand-by Assistance using LRD.   3. Gait  x 100 feet with Contact Guard Assistance using Rolling Walker or LRD>   4. Lower extremity exercise program x20  reps per handout, with independence       Outcome: Ongoing (interventions implemented as appropriate)  New onset of confusion has limited progress today.

## 2018-10-22 NOTE — PT/OT/SLP PROGRESS
Physical Therapy Treatment    Patient Name:  Mohamud Patel   MRN:  [id number]    Recommendations:     Discharge Recommendations:  nursing facility, skilled   Discharge Equipment Recommendations: bedside commode, wheelchair   Barriers to discharge: Inaccessible home and Decreased caregiver support    Assessment:     Mohamud Patel is a 57 y.o. male admitted with a medical diagnosis of Alcohol withdrawal.  He presents with the following impairments/functional limitations:  weakness, impaired endurance, impaired self care skills, impaired functional mobilty, impaired balance, decreased lower extremity function, impaired cognition, decreased coordination, decreased safety awareness, impaired cardiopulmonary response to activity, impaired fine motor . Patient very confused, but appeared more calm today that earlier this morning. Patient with difficulty standing and taking steps due to posterior lean and decreased B LE coordination.    Rehab Prognosis:  Fair.; patient would benefit from acute skilled PT services to address these deficits and reach maximum level of function.      Recent Surgery: * No surgery found *      Plan:     During this hospitalization, patient to be seen 3 x/week to address the above listed problems via gait training, therapeutic activities, therapeutic exercises, neuromuscular re-education  · Plan of Care Expires:  11/05/18   Plan of Care Reviewed with: patient    Subjective     Communicated with nsg prior to session.  Patient found with HOB elevated upon PT entry to room, agreeable to treatment.  Patient incoherent on his speech most of the time.      Chief Complaint: none stated  Patient comments/goals: incoherent and none stated.  Pain/Comfort:  · Pain Rating 1: 0/10  · Pain Rating Post-Intervention 1: 0/10    Patients cultural, spiritual, Druze conflicts given the current situation: None stated    Objective:     Patient found with: bed alarm, restraints     General  Precautions: Standard, aspiration, fall, seizure   Orthopedic Precautions:N/A   Braces: N/A     Functional Mobility:  · Bed Mobility:     · Rolling Left:  minimum assistance  · Supine to Sit: minimum assistance  · Sit to Supine: minimum assistance  · Transfers:     · Sit to Stand:  total assistance and of 2 persons with rolling walker  · Balance: fair in sitting and poor in standing.      AM-PAC 6 CLICK MOBILITY  Turning over in bed (including adjusting bedclothes, sheets and blankets)?: 3  Sitting down on and standing up from a chair with arms (e.g., wheelchair, bedside commode, etc.): 2  Moving from lying on back to sitting on the side of the bed?: 2  Moving to and from a bed to a chair (including a wheelchair)?: 2  Need to walk in hospital room?: 1  Climbing 3-5 steps with a railing?: 1  Basic Mobility Total Score: 11       Therapeutic Activities and Exercises:   Patient performed self care activities with O.T. Static standing balance with RW for support x 4 minutes with Total to mod assistance.B LE AROM in supine: HS, SLR, HIP ABD AND AP X 15 REPS EACH.     Patient left HOB elevated with all lines intact, call button in reach, bed alarm on, restraints reapplied at end of session and RN notified..    GOALS:   Multidisciplinary Problems     Physical Therapy Goals        Problem: Physical Therapy Goal    Goal Priority Disciplines Outcome Goal Variances Interventions   Physical Therapy Goal     PT, PT/OT Ongoing (interventions implemented as appropriate)     Description:  Goals to be met by: 10/29/18     Patient will increase functional independence with mobility by performin. Sit to stand transfer with Stand-by Assistance-met   Revised: sit to stand transfer with Supervision.   2. Bed to chair transfer with Stand-by Assistance using LRD.   3. Gait  x 100 feet with Contact Guard Assistance using Rolling Walker or LRD>   4. Lower extremity exercise program x20  reps per handout, with independence                         Time Tracking:     PT Received On: 10/22/18  PT Start Time: 1427     PT Stop Time: 1452  PT Total Time (min): 25 min     Billable Minutes: Therapeutic Exercise 10 and Neuromuscular Re-education 15    Treatment Type: Treatment  PT/PTA: PTA     PTA Visit Number: 1     Sudarshan Mata, PTA  10/22/2018

## 2018-10-22 NOTE — PROGRESS NOTES
"Hospital Medicine   Progress Note     Team: [facility] HOSP MED R Prateek Gudino MD   Admit Date: 10/4/2018   YONAS 10/24/2018   Code status: Full Code   Principal Problem: Alcohol withdrawal     Interval hx: Patient seen and examined at bedside today. Overnight: KIRA. This AM, patient reports that he slept well and remarked "what a difference a little sleep can make!" He is much more calm this morning without receiving any PRN olanzapine. Discussed case with psychiatry team who recommend scheduled olanzapine at night given recent need for multiple PRN doses.    ROS   Const: negative for fevers and chills  CV: negative for chest pain or palpitations  Pulm: negative for shortness of breath or cough  GI: negative for diarrhea, nausea, or vomiting    PEx   Temp:  [96.8 °F (36 °C)-98.5 °F (36.9 °C)]   Pulse:  [82-89]   Resp:  [16-18]   BP: (145-169)/(75-87)   SpO2:  [96 %-100 %]      General:  male  in no acute distress. Resting in bed. Globally weak   CVS: RRR. Normal S1 S2. No murmurs  Pulm: CTAB. Normal respiratory effort. No wheezes, rhonchi, or crackles.  Abdomen: Soft. Non-distended. No tenderness to palpation. No rebound or guarding. +BS.  Extremities: No edema. No cyanosis. Full ROM.  Skin: multiple scabbed lesions on bilateral feet, healing. Thickened skin on bilateral dorsal feet worse at the toes consistent with onychomycosis, large linear laceration with secondary healing on right foot, no surrounding erythema or induration  Neuro: Alert, oriented x 1 (person), Spont mvt of all extremities with no focal deficits noted.     amLODIPine  5 mg Oral Daily    enoxparin  40 mg Subcutaneous Daily    folic acid  1 mg Oral Daily    magnesium oxide  400 mg Oral BID    miconazole nitrate 2%   Topical (Top) BID    multivitamin  1 tablet Oral Daily    nicotine  1 patch Transdermal Daily    thiamine  100 mg Oral Daily     OLANZapine, OLANZapine, sodium chloride 0.9%    Assessment & Plan:  Mohamud Patel is a " 57 y.o. man with a history of Severe Alcohol Abuse, Chronic Wounds of Bilateral LE's with Onychomycosis, Alcoholic Neuropathy, Hyponatremia, and Tobacco Use Disorder who was brought to the ED by EMS after his girlfriend called 911 while the patient was severely intoxicated. He is admitted to Hospital Medicine for Alcohol Withdrawal, possible Wernicke's Encephalopathy, and Acute Cystitis now resolved. Awaiting placement as he does not have capacity to make his own medical decisions. Per discussion with psychiatry team Memory Unit vs. Inpatient Rehab (possibly [facility] if they will accept) with transition to Memory Unit    Wernicke's Encephalopathy (WE) with Dementia and Behavioral Disturbance  Severe Alcohol Use Disorder   Alcohol Withdrawal (Resloved)  Alcoholic Neuropathy - Fall Risk, Requiring Restraints  - Medically, he is outside the window for Alcohol Withdrawal and he has completed the appropriate course of IV Thiamine for Wernicke's Encephalopathy. Continues to have severe gait instability and has failed several attempts without restrains and sitter at bedside. Would favor eventual transfer of patient to Memory Unit vs. Inpatient Rehab capable of taking patient needing restrains ([facility] rehab possibly accepting these types of patients?)  - Zyprexa 10 mg HS scheduled and Zyprexa 5 mg PO/IM q8 PRN non-redirectable agitation. Maximum total daily dose of 30 mg   -For Wernicke's Encephalopathy: Finished course of IV Thiamine, transitioned to Thiamine 100 mg PO daily. Folic acid and MVI daily.   - Valium taper completed on 10/15.   - CMP, Mg, Phos - spaced to q48 hours  - Fall, aspiration, and seizure precautions  - PT/OT consulted     Multiple Bilateral Lower Extremity Wounds  Onychomycosis of Bilateral Feet   - Wound Care consulted, appreciate assistance - have debrided wounds, applying medi-honey, no evidence of active infection. Miconazole ointment to toes  - Podiatry was consulted     Tobacco Use  Disorder  -Nicotine 21 mg patch daily      DVT PPx: Lovenox SubQ Daily    Diet: Regular  Discharge plan and follow up: Awaiting placement as he does not have capacity to make his own medical decisions. Father who is next of kin does not have medical POA (does have financial POA) and is assisting with placement options. Home environment deemed unsafe per recent home assessment (urine, feces, and blood reportedly on the walls) with no reliable caregiver (father has requested that previous caregiver Chitra not be allowed to visit patient). Per discussion with psychiatry team, Memory Unit vs. Inpatient Rehab (possibly [facility] if they will accept) with transition to Memory Unit would be in this patient's best interest. If short stay at a rehabilitation center possible, patient would benefit from additional gait training. Ultimately, he will need long-term [facility] care which his father is helping arrange.      Prateek Gudino MD  Hospital Medicine Staff  [facility]   Pager: [phone]
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
## 2018-10-22 NOTE — PLAN OF CARE
Problem: Patient Care Overview  Goal: Plan of Care Review  Outcome: Ongoing (interventions implemented as appropriate)  SR up x2, call bell in reach, bed in low locked position, skid proof socks on. Patient confused and agitated, constantly trying to get out from the bad to get cigaret ans beer. Patient iin restrains, no S/S of injury noted, order renewed.  Patient remained free of fall and injuries during the shift.  Care plan explained to patient. No concerns, will continue to monitor.

## 2018-10-23 PROBLEM — F03.90 MAJOR NEUROCOGNITIVE DISORDER: Status: ACTIVE | Noted: 2018-10-23

## 2018-10-23 LAB
ANION GAP SERPL CALC-SCNC: 10 MMOL/L
BUN SERPL-MCNC: 17 MG/DL
CALCIUM SERPL-MCNC: 10.4 MG/DL
CHLORIDE SERPL-SCNC: 105 MMOL/L
CO2 SERPL-SCNC: 24 MMOL/L
CREAT SERPL-MCNC: 0.8 MG/DL
EST. GFR  (AFRICAN AMERICAN): >60 ML/MIN/1.73 M^2
EST. GFR  (NON AFRICAN AMERICAN): >60 ML/MIN/1.73 M^2
GLUCOSE SERPL-MCNC: 87 MG/DL
MAGNESIUM SERPL-MCNC: 2.2 MG/DL
PHOSPHATE SERPL-MCNC: 4.1 MG/DL
POTASSIUM SERPL-SCNC: 3.8 MMOL/L
SODIUM SERPL-SCNC: 139 MMOL/L

## 2018-10-23 PROCEDURE — 11000001 HC ACUTE MED/SURG PRIVATE ROOM

## 2018-10-23 PROCEDURE — 99232 SBSQ HOSP IP/OBS MODERATE 35: CPT | Mod: AF,HB,, | Performed by: PSYCHIATRY & NEUROLOGY

## 2018-10-23 PROCEDURE — 83735 ASSAY OF MAGNESIUM: CPT

## 2018-10-23 PROCEDURE — 93010 ELECTROCARDIOGRAM REPORT: CPT | Mod: ,,, | Performed by: INTERNAL MEDICINE

## 2018-10-23 PROCEDURE — 92523 SPEECH SOUND LANG COMPREHEN: CPT

## 2018-10-23 PROCEDURE — 93005 ELECTROCARDIOGRAM TRACING: CPT

## 2018-10-23 PROCEDURE — 97110 THERAPEUTIC EXERCISES: CPT

## 2018-10-23 PROCEDURE — 99232 SBSQ HOSP IP/OBS MODERATE 35: CPT | Mod: ,,, | Performed by: HOSPITALIST

## 2018-10-23 PROCEDURE — 25000003 PHARM REV CODE 250: Performed by: HOSPITALIST

## 2018-10-23 PROCEDURE — 97530 THERAPEUTIC ACTIVITIES: CPT

## 2018-10-23 PROCEDURE — 36415 COLL VENOUS BLD VENIPUNCTURE: CPT

## 2018-10-23 PROCEDURE — S4991 NICOTINE PATCH NONLEGEND: HCPCS | Performed by: HOSPITALIST

## 2018-10-23 PROCEDURE — 84100 ASSAY OF PHOSPHORUS: CPT

## 2018-10-23 PROCEDURE — 80048 BASIC METABOLIC PNL TOTAL CA: CPT

## 2018-10-23 PROCEDURE — 99232 SBSQ HOSP IP/OBS MODERATE 35: CPT | Mod: ,,, | Performed by: NURSE PRACTITIONER

## 2018-10-23 RX ADMIN — Medication 400 MG: at 08:10

## 2018-10-23 RX ADMIN — THERA TABS 1 TABLET: TAB at 08:10

## 2018-10-23 RX ADMIN — FOLIC ACID 1 MG: 1 TABLET ORAL at 08:10

## 2018-10-23 RX ADMIN — NICOTINE 1 PATCH: 21 PATCH, EXTENDED RELEASE TRANSDERMAL at 08:10

## 2018-10-23 RX ADMIN — Medication 100 MG: at 08:10

## 2018-10-23 RX ADMIN — MICONAZOLE NITRATE: 20 OINTMENT TOPICAL at 08:10

## 2018-10-23 RX ADMIN — OLANZAPINE 5 MG: 5 TABLET, FILM COATED ORAL at 09:10

## 2018-10-23 RX ADMIN — AMLODIPINE BESYLATE 5 MG: 5 TABLET ORAL at 08:10

## 2018-10-23 NOTE — PLAN OF CARE
"Problem: Patient Care Overview  Goal: Plan of Care Review  Outcome: Ongoing (interventions implemented as appropriate)  SR up x2, call bell in reach, bed in low locked position, skid proof socks on. Patient confused, constantly getting out of the bad. Patient states " I need to grub a beer and cigaret ". VS stable WNL. Restrains order renewed.  Patient remained free of fall and injuries during the shift.  Care plan explained to patient. No concerns, will continue to monitor.       "

## 2018-10-23 NOTE — CONSULTS
[facility]  Physical Medicine & Rehab  Consult Note    Patient Name: Mohamud Patel  MRN: [id number]  Admission Date: 10/4/2018  Hospital Length of Stay: 18 days  Attending Physician: Sobia Ortiz MD     Inpatient consult to Physical Medicine & Rehabilitation  Consult performed by: Di Nickerson NP  Consult requested by:  Sobia Ortiz MD    Collaborating Physician: Leydi Donald MD  Reason for Consult:  Re-evaluate for inpatient rehab    Consults  Subjective:     Principal Problem: Alcohol withdrawal    HPI: Mohamud Patel is a 57-year-old male with PMHx of chronic BLE wounds with onychomycosis, tobacco use, severe EtOH abuse, and recent admission 8/10/18-8/13/18 for RLE wound/cellulitis, crusted scabies, UTI, EtOH intoxication, and hyponatremia with discharge home.  Patient presented to [facility] on 10/4/18 for severe alcohol intoxication with concern from family that patient is unable to care for himself.  Patient's girlfriend reported patient to have episodes of difficulty breathing and urinating and defecating on himself.  In addition, he is not caring for chronic foot wounds or taking prescribed medications.  On arrival, toxicology presumptive positive for THC with serum alcohol level of 239.  Evaluated by Psych.  Presentation consistent with Wernicke's encephalopathy and severe alcohol use disorder, and IV Thiamine and Valium taper recommended.  Admitted to Hospital Medicine for further management.  Podiatry consulted for chronic LE wounds.  No surgical intervention indicated, wound care following.  Hospital course further complicated by UTI (urine culture +GNR) treated with Ceftriaxone, EtOH withdrawal, hyponatremia with possible beer potomania, alcoholic neuropathy, multiple BLE wounds, and discharge planning.      Functional History: Patient lives in [place] with his girlfriend in a single story home with threshold to enter.  Prior to admission, he required assistance with ADLs  and mobility.  Girlfriend/caregiver provided assistance with all shopping, cooking, cleaning, bill paying, etc.  Frequent falls at home.  DME: [facility].    Hospital Course:   10/6/18:  Evaluated by PT and OT.  Bed mobility CGA-Raul.  Sit to stand CGA and transfers Raul.  Ambulated 24 ft Raul.  LBD CGA-Raul.  10/7/18:  No PT or OT.   10/8/18:  Participated with PT and OT.  Bed mobility CGA-Raul.  EOB SBA-CGA.  Sit to stand Raul.  Ambulated 3-4 side steps Raul.  UBD modA and LBD totalA.   10/9/18:  No PT or OT.   10/10/18:  No PT or OT.  10/11/18:  Participated with PT and OT.  Bed mobility SV.  Sit to stand SBA with RW.  Ambulated 75 ft CGA with RW.  UBD Raul and LBD maxA.   10/15/18:  Participated with PT.  Bed mobility SV.  Sit to stand SBA with RW.  Ambulated 100 ft CGA with RW.   10/16/18:  No OT 2/2 agitation and attempting to leave.   10/17/18:  Participated with PT and OT.  Bed mobility mod(I)-SBA.  Sit to stand and transfers min-modA with RW.  Ambulated 20 ft Raul with RW.  UBD Raul and LBD maxA.    10/19/18:  Participated with PT.  Bed mobility CGA.  Sit to stand CGA with RW.  Ambulated 10 ft + 20 ft Raul with RW.   10/22/18:  Participated with PT and OT.  Bed mobility CGA-Raul.  Sit to stand mod-totalA (max-totalA x 2) with RW.  Ambulated 4 lateral steps totalA (maxA x 2) with RW.  UBD modA and LBD SBA.    Past Medical History:   Diagnosis Date    Neuropathy     Scabies      History reviewed. No pertinent surgical history.  Review of patient's allergies indicates:  No Known Allergies    Scheduled Medications:    amLODIPine  5 mg Oral Daily    enoxparin  40 mg Subcutaneous Daily    folic acid  1 mg Oral Daily    magnesium oxide  400 mg Oral BID    miconazole nitrate 2%   Topical (Top) BID    multivitamin  1 tablet Oral Daily    nicotine  1 patch Transdermal Daily    thiamine  100 mg Oral Daily       PRN Medications: OLANZapine, OLANZapine, sodium chloride 0.9%    Family History     None         Tobacco Use    Smoking status: Current Every Day Smoker     Packs/day: 2.00     Types: Cigarettes    Smokeless tobacco: Never Used   Substance and Sexual Activity    Alcohol use: Yes     Alcohol/week: 42.0 oz     Types: 70 Shots of liquor per week    Drug use: No    Sexual activity: Yes     Partners: Female     Review of Systems   Constitutional: Positive for activity change. Negative for chills, fatigue and fever.   HENT: Negative for drooling, hearing loss, trouble swallowing and voice change.    Eyes: Negative for pain and visual disturbance.   Respiratory: Negative for cough, shortness of breath and wheezing.    Cardiovascular: Negative for chest pain and palpitations.   Gastrointestinal: Negative for abdominal pain, nausea and vomiting.   Genitourinary: Negative for difficulty urinating and flank pain.   Musculoskeletal: Positive for gait problem and myalgias. Negative for back pain and neck pain.   Skin: Positive for wound. Negative for rash.   Neurological: Positive for weakness and numbness. Negative for dizziness and headaches.   Psychiatric/Behavioral: Positive for confusion. Negative for agitation and hallucinations. The patient is not nervous/anxious.      Objective:     Vital Signs (Most Recent):  Temp: 97.9 °F (36.6 °C) (10/23/18 0711)  Pulse: 63 (10/23/18 0711)  Resp: 17 (10/23/18 0711)  BP: 131/67 (10/23/18 0711)  SpO2: 100 % (10/23/18 0711)    Vital Signs (24h Range):  Temp:  [97.4 °F (36.3 °C)-99 °F (37.2 °C)] 97.9 °F (36.6 °C)  Pulse:  [] 63  Resp:  [16-18] 17  SpO2:  [97 %-100 %] 100 %  BP: (121-133)/(67-77) 131/67     Body mass index is 19.19 kg/m².    Physical Exam   Constitutional: No distress.   Appears thin, older than stated age   HENT:   Head: Normocephalic and atraumatic.   Right Ear: External ear normal.   Left Ear: External ear normal.   Nose: Nose normal.   Eyes: Right eye exhibits no discharge. Left eye exhibits no discharge. No scleral icterus.   Neck: Normal range of  "motion.   Cardiovascular: Normal rate, regular rhythm and intact distal pulses.   Pulmonary/Chest: Effort normal. No respiratory distress. He has no wheezes.   Abdominal: Soft. He exhibits no distension. There is no tenderness.   Musculoskeletal: He exhibits no edema or tenderness.        Right shoulder: He exhibits decreased range of motion and decreased strength.        Left shoulder: He exhibits decreased range of motion and decreased strength.   BLE/feet dressing CDI.  General weakness.   Neurological: He is alert. He is disoriented. No sensory deficit. He exhibits normal muscle tone.   -  Mental Status:  Awake and alert.  Follows commands.  Impaired memory and cognition.   -  Speech and language:  no aphasia or dysarthria.    -  Motor:  Moves all extremities spontaneously and to command.  No focal weakness.   -  Tone:  Normal.   -  Sensory:  Intact to light touch and pin prick.   Skin: Skin is warm and dry. Abrasion (BLE) noted. No rash noted.   Psychiatric: He has a normal mood and affect. His behavior is normal. His speech is tangential and slurred. Cognition and memory are impaired.   Sitter at bedside, camera sitter at bedside.  Cooperative.     Vitals reviewed.    Diagnostic Results:   Labs: Reviewed  X-Ray: Reviewed  US: Reviewed  CT: Reviewed    Assessment/Plan:     Impaired mobility and ADLs    -   Required assistance with mobility and ADLs at baseline  Recommendations  -  Encourage mobility, OOB in chair, and early ambulation as appropriate  -  PT/OT evaluate and treat  -  Pain management  -  DVT prophylaxis  -  Monitor for and prevent skin breakdown and pressure ulcers  · Early mobility, repositioning/weight shifting every 20-30 minutes when sitting, turn patient every 2 hours, proper mattress/overlay and chair cushioning, pressure relief/heel protector boots     Wernicke encephalopathy    -  On Thiamine, folic acid, MVI  -  [facility] 10/8/18 without acute pathology  -  Psych following--> "recommend " "treatment with zyprexa 5mg po/IM q8 hours, scheduled zyprexa 10mg po qHS, with max daily dose 30mg from all sources"     Hyponatremia    -  Possibly 2/2 beer potomania  -  Replacing as needed  -  Monitoring     Alcohol use disorder, severe, dependence    -  H/o EtOH use disorder  -  Presented intoxicated with serum alcohol level of 239  -  Psych following     Wound of lower extremity    -  Wound care following  -  Podiatry consulted--> No surgical intervention indicated     Patient with therapy needs.  At this time, behavior management is priority.  Impaired cognition, inattention, fluctuation in behavior/agitation, and impaired learning are large barriers to progress with therapists.  Continue PT and OT.  Will follow progress for final post-acute/rehab recommendation.  Regardless of final recommendation, patient will need long-term placement.    Thank you for your consult.     ZEE Ferrer  Department of Physical Medicine & Rehab  [facility]    "

## 2018-10-23 NOTE — ASSESSMENT & PLAN NOTE
· Patient appears at this time that cognitive problems related to a severe alcohol use disorder,  and nutritional deficiency are responsible for patient's inability to care for himself. Patient does not require a PEC for grave disability.  However, he does not have capacity for medical decision making at this time. He appears to be cooperative intermittently, though concern for continued agitation overnight.   · Concerns for continued gait instability, confusion. Anticipate that both may improve over a period of weeks. Patient may have permanent ataxia. As the acute encephalopathy and confusion recedes, deficits in learning and memory may become obvious, and the latter are unlikely to recover completely or substantively, though per literatures may do so in about 20 percent of patients; the remainder had a permanent amnestic syndrome.  · Patient is currently experiencing peripheral neuropathy, encephalopathy, and gait ataxia consistent with Wernicke's Encephalopathy.  He would benefit from continued po thiamine supplementation, and multivitamin, as he's completed course of IV treatment. He will likely require further assistance for activities of daily living in a nursing home setting after completion of hospitalization.  · Patient's alcohol use disorder is severe, though has completed extended taper. Patient is unlikely to be in acute withdrawal, anticipate that continued treatment with prn ativan may be confounding clinical picture. Would recommend continued avoidance of benzodiazepines.   · Patients continues with non-redirectable agitation, recommend treatment with zyprexa 5mg po/IM q8 hours, and minimize use of physical restratins. As patient has required  prn medication administration in the past 24 hours, would recommend scheduled zyprexa 10mg po qHS, with max daily dose 30mg from all sources.   · Would continue monitoring with EKG   · Will continue to follow; thank you for allowing us to participate in this  patient's care.

## 2018-10-23 NOTE — PROGRESS NOTES
Hospital Medicine   Progress note      Team: [facility] R Sobia Ortiz MD   Admit Date: 10/4/2018   Hospital Day: 18  YONAS: 10/26/2018   Code status: Full Code   Principal Problem: Alcohol withdrawal     Summary:  Mohamud Patel is a 57 y.o. man with a history of Severe Alcohol Abuse, Chronic Wounds of Bilateral LE's with Onychomycosis, Alcoholic Neuropathy, Hyponatremia, and Tobacco Use Disorder who was brought to the ED by EMS after his girlfriend called 911 while the patient was severely intoxicated. He is admitted to Hospital Medicine for Alcohol Withdrawal, possible Wernicke's Encephalopathy, and Acute Cystitis. Pt continues to have gait instability, weakness, recurrent falls when he attempts to get out of bed, has poor insight into his addiction and encephalopathy and poor decision making. Psychiatry consulted, pt determined not to have capacity to make own medical decision at this time.     Interval hx:   Pt was seen and examined at bedside. Overnight, no acute events, no new complaints this morning. He was seen sitting up in chair outside his room by the window this morning and in a good mood. He was awake, alert, pleasant and conversant. Pt has no acute complaints. Pt's father is here visiting from [place] to help with placement. Reports that pt has gone to alcohol detox 3 times in the past and relapsed each time. He agrees that pt is not safe to go home at this time. Father was updated at bedside.      ROS (Positive in Bold, otherwise negative)   Constitutional: fever, chills, night sweats  CV: chest pain, edema, palpitations  Resp: SOB, cough, sputum production  GI: changes in appetite, NVDC, pain, melena, hematochezia, GERD, hematemesis  : Dysuria, hematuria, urinary urgency, frequency  MSK: arthralgia/myalgia, joint swelling  Neuro/Psych: anxiety, depression    PEx   Temp:  [97.4 °F (36.3 °C)-98.4 °F (36.9 °C)]   Pulse:  []   Resp:  [16-18]   BP: (101-131)/(57-75)   SpO2:  [93 %-100  %]      I & O (Last 24H):     Intake/Output Summary (Last 24 hours) at 10/23/2018 1839  Last data filed at 10/23/2018 1422  Gross per 24 hour   Intake 780 ml   Output --   Net 780 ml       General:  male  in no acute distress. Sitting up in chair Globally weak   HEENT: NCAT. PERRL. EOMI. Sclera Anicteric.  CVS: RRR. Normal S1 S2. No murmurs  Pulm: CTAB. Normal respiratory effort. No wheezes, rhonchi, or crackles.  Abdomen: Soft. Non-distended. No tenderness to palpation. No rebound or guarding. +BS.  Extremities: No edema. No cyanosis. Full ROM.  Skin: multiple scabbed lesions on bilateral feet, thickened skin on bilateral dorsal feet worse at the toes consistent with onychomycosis, no interdigital pits or evidence of scabies, large linear laceration with secondary healing on right foot, no surrounding erythema or induration  Neuro: Alert, oriented x 2, Spont mvt of all extremities with no focal deficits noted.    Recent Results (from the past 24 hour(s))   Basic metabolic panel    Collection Time: 10/23/18  6:38 AM   Result Value Ref Range    Sodium 139 136 - 145 mmol/L    Potassium 3.8 3.5 - 5.1 mmol/L    Chloride 105 95 - 110 mmol/L    CO2 24 23 - 29 mmol/L    Glucose 87 70 - 110 mg/dL    BUN, Bld 17 6 - 20 mg/dL    Creatinine 0.8 0.5 - 1.4 mg/dL    Calcium 10.4 8.7 - 10.5 mg/dL    Anion Gap 10 8 - 16 mmol/L    eGFR if African American >60.0 >60 mL/min/1.73 m^2    eGFR if non African American >60.0 >60 mL/min/1.73 m^2   Magnesium    Collection Time: 10/23/18  6:38 AM   Result Value Ref Range    Magnesium 2.2 1.6 - 2.6 mg/dL   Phosphorus    Collection Time: 10/23/18  6:38 AM   Result Value Ref Range    Phosphorus 4.1 2.7 - 4.5 mg/dL       No results for input(s): POCTGLUCOSE in the last 168 hours.    Hemoglobin A1C   Date Value Ref Range Status   10/05/2018 5.3 4.0 - 5.6 % Final     Comment:     ADA Screening Guidelines:  5.7-6.4%  Consistent with prediabetes  >or=6.5%  Consistent with diabetes  High  levels of fetal hemoglobin interfere with the HbA1C  assay. Heterozygous hemoglobin variants (HbS, HgC, etc)do  not significantly interfere with this assay.   However, presence of multiple variants may affect accuracy.     08/10/2018 4.4 4.0 - 5.6 % Final     Comment:     ADA Screening Guidelines:  5.7-6.4%  Consistent with prediabetes  >or=6.5%  Consistent with diabetes  High levels of fetal hemoglobin interfere with the HbA1C  assay. Heterozygous hemoglobin variants (HbS, HgC, etc)do  not significantly interfere with this assay.   However, presence of multiple variants may affect accuracy.          Active Hospital Problems    Diagnosis  POA    *Alcohol withdrawal [F10.239]  Yes    Major neurocognitive disorder [F01.50]  Unknown    Impaired mobility and ADLs [Z74.09]  Unknown    Ulcer of toe of left foot [L97.529]  Yes    Abrasion [T14.8XXA]  Yes    Alcohol withdrawal delirium [F10.231]  Yes    Mental health disorder [F99]  Yes    Wernicke encephalopathy [E51.2]  Yes    Alcoholic peripheral neuropathy [G62.1]  Yes    Wound of lower extremity [S81.809A]  Yes    Alcohol use disorder, severe, dependence [F10.20]  Yes     Chronic    Hyponatremia [E87.1]  Yes      Resolved Hospital Problems   No resolved problems to display.      Assessment and Plan for problems addressed today:      amLODIPine  5 mg Oral Daily    enoxparin  40 mg Subcutaneous Daily    folic acid  1 mg Oral Daily    magnesium oxide  400 mg Oral BID    miconazole nitrate 2%   Topical (Top) BID    multivitamin  1 tablet Oral Daily    nicotine  1 patch Transdermal Daily    thiamine  100 mg Oral Daily     OLANZapine, OLANZapine, sodium chloride 0.9%    Wernicke's Encephalopathy (WE) with Dementia and Behavioral Disturbance  Severe Alcohol Use Disorder   Alcohol Withdrawal (Resloved)  Alcoholic Neuropathy - Fall Risk, Requiring Restraints  - Medically, he is outside the window for Alcohol Withdrawal and he has completed the appropriate  course of IV Thiamine for Wernicke's Encephalopathy. Continues to have severe gait instability and has failed several attempts without restrains and sitter at bedside. Would favor eventual transfer of patient to Memory Unit vs. Inpatient Rehab capable of taking patient needing restrains ([facility] possibly accepting these types of patients?)  -Zyprexa 10 mg HS scheduled and Zyprexa 5 mg PO/IM q8 PRN non-redirectable agitation. Maximum total daily dose of 30 mg   -refrain from using physical restraints as much as possible   -For Wernicke's Encephalopathy: Finished course of IV Thiamine, transitioned to Thiamine 100 mg PO daily. Folic acid and MVI daily.   -Valium taper completed on 10/15.   -CMP, Mg, Phos - spaced to q48 hours  -Fall, aspiration, and seizure precautions  -PT/OT consulted     Multiple Bilateral Lower Extremity Wounds  Onychomycosis of Bilateral Feet   -Wound Care consulted, appreciate assistance - have debrided wounds, applying medi-honey, no evidence of active infection. Miconazole ointment to toes  -Podiatry was consulted     Tobacco Use Disorder  -Nicotine 21 mg patch daily     DVT PPx: Lovenox SubQ Daily    Diet: Regular  Discharge plan and follow up: Plan for possible D/C to SNF vs LTC vs memory unit     Sobia Ortiz MD  Hospital Medicine Staff  [phone] pager

## 2018-10-23 NOTE — PT/OT/SLP EVAL
"Speech Language Pathology Evaluation  Cognitive Communication  Discharge Summary    Patient Name:  Mohamud Patel   MRN:  [id number]  Admitting Diagnosis: Alcohol withdrawal    Recommendations:     Recommendations:                General Recommendations:  Follow-up not indicated  Diet recommendations:   ,     Aspiration Precautions: Standard aspiration precautions   General Precautions: Standard, aspiration, fall, seizure  Communication strategies:  provide increased time to answer and go to room if call light pushed    History:     Past Medical History:   Diagnosis Date    Neuropathy     Scabies        History reviewed. No pertinent surgical history.    Social History: Patient lives with his significant other.    Prior Intubation HX:  None this admission    Modified Barium Swallow: none this admission    Chest X-Rays: none this admission    Prior diet: unknown.    Occupation/hobbies/homemaking: Pt stated that he graduated High School and has an Associates degree in [employer].  He reported that he worked as a  and watches tv for entertainment.  Pt is an unreliable historian.      Subjective     "Can we finish this?"  Pt asked re: evaluation  Patient goals: none expressed     Pain/Comfort:  · Pain Rating 1: 0/10  · Pain Rating Post-Intervention 1: 0/10    Objective:   Cognitive Status:    Arousal/Alertness Appropriate response to stimuli  Attention Sustained attention deficit moderately decreased for tasks and details w/ repetitions needed  Orientation Person  Memory Moderately impaired, Immediate Recall 80% indep and Delayed0% indep up to 66% given max cues  Problem Solving Categories 100%, Sequencing 100% given min cues, Solutions 100% given min cues and Compare/contrast 50% for differences      Receptive Language:   Comprehension:      mild deficits  Questions Simple yes/no 100%  Complex yes/no 75%  Commands  One step 100% acc  two step basic commands 50%  multistep basic commands 50%    Pragmatics:  "   pt presented w/ flat affect, limited eye contact, decreased topic maintenance and turn taking skills    Expressive Language:  Verbal:    WFL  Automatic Speech  Counting 1005  Repetition Sentences 100%  Naming Confrontation 100% and Single word responsive naming 100%  Sentence formulation wfl      Motor Speech:  WFL    Voice:   WFL    Visual-Spatial:  WFL    Reading:   WFL     Written Expression:   WFL    Treatment: Education was provided to pt re: SLP role, eval results, and SLP POC.  He indicated fair understanding.      Assessment:   Mohamud Patel is a 57 y.o. male with an SLP diagnosis of Cognitive-Linguistic Impairment.  He presents with moderate deficits which appear to be present at some level baseline d/t long term ETOH abuse.  Pt also presented w/ significant delusional behavior re: place and situation which he could not be redirected from.  Pt will require significant supervision d/t deficits.  His ability to participate in Skilled Speech services for cognitive skills is limited d/t his poor insight and understanding as well as psych barriers.    Goals:   Multidisciplinary Problems     SLP Goals     Not on file          Multidisciplinary Problems (Resolved)        Problem: SLP Goal    Goal Priority Disciplines Outcome   SLP Goal   (Resolved)     SLP Outcome(s) achieved                   Plan:   · Patient to be seen:      · Plan of Care expires:     · Plan of Care reviewed with:  patient, father   · SLP Follow-Up:  No       Discharge recommendations:  Discharge Facility/Level Of Care Needs: nursing facility, skilled   Barriers to Discharge:  Inaccessible Home Environment and Decreased Care Giver Support    Time Tracking:   SLP Treatment Date:   10/23/18  Speech Start Time:  1407  Speech Stop Time:  1423     Speech Total Time (min):  16 min    Billable Minutes: Eval 16     Vy Ramos CCC-SLP  10/23/2018

## 2018-10-23 NOTE — PT/OT/SLP PROGRESS
Physical Therapy Treatment    Patient Name:  Mohamud Patel   MRN:  [id number]    Recommendations:     Discharge Recommendations:  nursing facility, skilled   Discharge Equipment Recommendations: bedside commode, wheelchair   Barriers to discharge: Inaccessible home and Decreased caregiver support    Assessment:     Mohamud Patel is a 57 y.o. male admitted with a medical diagnosis of Alcohol withdrawal.  He presents with the following impairments/functional limitations:  impaired endurance, decreased lower extremity function, impaired cognition, decreased upper extremity function, impaired balance, decreased coordination, impaired self care skills, weakness, gait instability, impaired functional mobilty, impaired coordination, impaired fine motor, decreased safety awareness, abnormal tone Patient tolerated tx  poorly. Patient limited at this time by increase agitation with tasks presented to pt. Pt able to sit to stand x 5-6 trials with varying levels of A and constant post lean. PT with tactile/verbal cues for terminal hip ext and to facilitate forward lean to midline. Pt with impaired motor coordination B UE/LE during gown donning and attempt to remove sock.  Pt and father constantly disagreeing with PT recommendation stating other factors limited mobility such as socks,floor and other external hinder ence. Pt most successful with sit to stand with PT assist in front with facilaitng at B hips and blocking BLE with min A.  Patient will continue to require skilled PT services to address the above impairments to return to prior level of function as independent as possible. Discharge recommendation  to skilled nursing facility  to maximize functional mobility, safety and decrease caregiver burden prior to returning home.         Rehab Prognosis:  fair; patient would benefit from acute skilled PT services to address these deficits and reach maximum level of function.      Recent Surgery: * No surgery found  "*      Plan:     During this hospitalization, patient to be seen 3 x/week to address the above listed problems via gait training, therapeutic activities, therapeutic exercises, neuromuscular re-education, wheelchair management/training  · Plan of Care Expires:  11/05/18   Plan of Care Reviewed with: patient, father    Subjective     Communicated with RN prior to session.  Patient found supine upon PT entry to room, agreeable to treatment.      Chief Complaint: pt states that the socks (  socks from hosptial ) are too slick and are limiting his mobilty   Patient comments/goals: pt states that he cant move in certain positions  2/2 " 50 years of not being able to move that way"   Pain/Comfort:  · Pain Rating 1: 0/10  · Pain Rating Post-Intervention 1: 0/10    Patients cultural, spiritual, Quaker conflicts given the current situation: none    Objective:     Patient found with: (no active lines; RN removing restraints upon enetering room)     General Precautions: Standard, aspiration, fall, seizure   Orthopedic Precautions:N/A   Braces: N/A     Functional Mobility:  · Bed Mobility:  Rolling Left:  supervision  · Rolling Right: supervision  · Bridging: minimum assistance and PT facilliating WB throuhg BLE x 10 repititons   · Supine to Sit: contact guard assistance  · Sit to Supine: contact guard assistance  · Transfers:  Sit to Stand:  maximal assistance with rolling walker  · Sit to stand with no AD with PT in front, min A ; pt with cont post lean   · UE placed at side to promote WB  · Gait: x 5-6 feet fwd/bwd with max A and RW to maintain midline  · PT with max A to facilitate fwd lean and RW mgmt  · Balance: SBA with static sitting, CGA for dynamic sitting , max for static/dyanmic standing with RW      AM-PAC 6 CLICK MOBILITY  Turning over in bed (including adjusting bedclothes, sheets and blankets)?: 4  Sitting down on and standing up from a chair with arms (e.g., wheelchair, bedside commode, etc.): " 2  Moving from lying on back to sitting on the side of the bed?: 3  Moving to and from a bed to a chair (including a wheelchair)?: 2  Need to walk in hospital room?: 2  Climbing 3-5 steps with a railing?: 1  Basic Mobility Total Score: 14       Therapeutic Activities and Exercises:  · Whiteboard updated in patients room to current assistance level  · All of patients questions were answered within the scope of PT  · briding x 10 in bed with PT facilaite BLE WB  Mini squats with no AD with PT assist x 5 reps at EOB  · Sit to stand x 4 attempts with RW and max A to maintain static standing with tactile/verbal cues for hip ext to promote foward lean and hips anteriorly to displace post extention    Patient education  · Patient educated on the role of PT and POC  · Patient educated on importance  activity while in the hosptial per tolerance for improved endurance and to limit deconditioning   · Patient educated on safe transfers with nursing as appropriate  · Patient educated on energy conservation, pursed lip breathing  · Patient educated on proper transfer mechanics and safety    Lower extremity exercise:    · EOB LE TE  ·  Marching x 15  ·  LAQ  X 15   ·  Hip adduction x 15  · Clamshells in sidling BL  X 15 with AAROM for appropriate positioning and form           Patient left HOB elevated with all lines intact, call button in reach, RN notified and sitter present..    GOALS:   Multidisciplinary Problems     Physical Therapy Goals        Problem: Physical Therapy Goal    Goal Priority Disciplines Outcome Goal Variances Interventions   Physical Therapy Goal     PT, PT/OT Ongoing (interventions implemented as appropriate)     Description:  Goals to be met by: 10/29/18     Patient will increase functional independence with mobility by performin. Sit to stand transfer with Stand-by Assistance-met   Revised: sit to stand transfer with Supervision.   2. Bed to chair transfer with Stand-by Assistance using LRD.   3.  Gait  x 100 feet with Contact Guard Assistance using Rolling Walker or LRD>   4. Lower extremity exercise program x20  reps per handout, with independence                        Time Tracking:     PT Received On: 10/23/18  PT Start Time: 1524     PT Stop Time: 1550  PT Total Time (min): 26 min     Billable Minutes: Therapeutic Activity x15 min and Therapeutic Exercise x10 min    Treatment Type: Treatment  PT/PTA: PT     PTA Visit Number: 0     Tacho Sevilla, PT  10/23/2018

## 2018-10-23 NOTE — SUBJECTIVE & OBJECTIVE
Past Medical History:   Diagnosis Date    Neuropathy     Scabies      History reviewed. No pertinent surgical history.  Review of patient's allergies indicates:  No Known Allergies    Scheduled Medications:    amLODIPine  5 mg Oral Daily    enoxparin  40 mg Subcutaneous Daily    folic acid  1 mg Oral Daily    magnesium oxide  400 mg Oral BID    miconazole nitrate 2%   Topical (Top) BID    multivitamin  1 tablet Oral Daily    nicotine  1 patch Transdermal Daily    thiamine  100 mg Oral Daily       PRN Medications: OLANZapine, OLANZapine, sodium chloride 0.9%    Family History     None        Tobacco Use    Smoking status: Current Every Day Smoker     Packs/day: 2.00     Types: Cigarettes    Smokeless tobacco: Never Used   Substance and Sexual Activity    Alcohol use: Yes     Alcohol/week: 42.0 oz     Types: 70 Shots of liquor per week    Drug use: No    Sexual activity: Yes     Partners: Female     Review of Systems   Constitutional: Positive for activity change. Negative for chills, fatigue and fever.   HENT: Negative for drooling, hearing loss, trouble swallowing and voice change.    Eyes: Negative for pain and visual disturbance.   Respiratory: Negative for cough, shortness of breath and wheezing.    Cardiovascular: Negative for chest pain and palpitations.   Gastrointestinal: Negative for abdominal pain, nausea and vomiting.   Genitourinary: Negative for difficulty urinating and flank pain.   Musculoskeletal: Positive for gait problem and myalgias. Negative for back pain and neck pain.   Skin: Positive for wound. Negative for rash.   Neurological: Positive for weakness and numbness. Negative for dizziness and headaches.   Psychiatric/Behavioral: Positive for confusion. Negative for agitation and hallucinations. The patient is not nervous/anxious.      Objective:     Vital Signs (Most Recent):  Temp: 97.9 °F (36.6 °C) (10/23/18 0711)  Pulse: 63 (10/23/18 0711)  Resp: 17 (10/23/18 0711)  BP: 131/67  (10/23/18 0711)  SpO2: 100 % (10/23/18 0711)    Vital Signs (24h Range):  Temp:  [97.4 °F (36.3 °C)-99 °F (37.2 °C)] 97.9 °F (36.6 °C)  Pulse:  [] 63  Resp:  [16-18] 17  SpO2:  [97 %-100 %] 100 %  BP: (121-133)/(67-77) 131/67     Body mass index is 19.19 kg/m².    Physical Exam   Constitutional: No distress.   Appears thin, older than stated age   HENT:   Head: Normocephalic and atraumatic.   Right Ear: External ear normal.   Left Ear: External ear normal.   Nose: Nose normal.   Eyes: Right eye exhibits no discharge. Left eye exhibits no discharge. No scleral icterus.   Neck: Normal range of motion.   Cardiovascular: Normal rate, regular rhythm and intact distal pulses.   Pulmonary/Chest: Effort normal. No respiratory distress. He has no wheezes.   Abdominal: Soft. He exhibits no distension. There is no tenderness.   Musculoskeletal: He exhibits no edema or tenderness.        Right shoulder: He exhibits decreased range of motion and decreased strength.        Left shoulder: He exhibits decreased range of motion and decreased strength.   BLE/feet dressing CDI.  General weakness.   Neurological: He is alert. He is disoriented. No sensory deficit. He exhibits normal muscle tone.   -  Mental Status:  Awake and alert.  Follows commands.  Impaired memory and cognition.   -  Speech and language:  no aphasia or dysarthria.    -  Motor:  Moves all extremities spontaneously and to command.  No focal weakness.   -  Tone:  Normal.   -  Sensory:  Intact to light touch and pin prick.   Skin: Skin is warm and dry. Abrasion (BLE) noted. No rash noted.   Psychiatric: He has a normal mood and affect. His behavior is normal. His speech is tangential and slurred. Cognition and memory are impaired.   Sitter at bedside, camera sitter at bedside.  Cooperative.     Vitals reviewed.    NEUROLOGICAL EXAMINATION:     MENTAL STATUS   Speech: slurred       Diagnostic Results:   Labs: Reviewed  X-Ray: Reviewed  US: Reviewed  CT: Reviewed

## 2018-10-23 NOTE — PLAN OF CARE
Problem: Patient Care Overview  Goal: Plan of Care Review  Outcome: Ongoing (interventions implemented as appropriate)  Pt is free from injury and falls during shift. Pt shows no signs of acute distress. Pt denies pain. Awake, alert, oriented to self and sometimes place (Pt knows at [facility]). Vitals stable. Pt exhibits calm, cooperative behavior throughout shift. Restraints removed this AM at 0820. No issues with patient or behavior during shift. Sitter at bedside throughout shift. AVASYS at bedside. Bed is in low position with breaks locked. Side rails are up x 2. Environment is clutter free. Call light is within reach of the patient. Will continue to monitor.

## 2018-10-23 NOTE — ASSESSMENT & PLAN NOTE
-Pt has completed detox, recommend motivational interviewing and patient education for cessation, though unable to perform currently due to disorganized thought process

## 2018-10-23 NOTE — ASSESSMENT & PLAN NOTE
-Patient has recent and remote memory deficits on exam, with deficits in cognition, and with signs and symptoms of Wernicke's encephalopathy   -Please see plan for Wernicke's encephalopathy as per prognosis and treatment

## 2018-10-23 NOTE — SUBJECTIVE & OBJECTIVE
"Interval History: As per hospital course     Family History     None        Tobacco Use    Smoking status: Current Every Day Smoker     Packs/day: 2.00     Types: Cigarettes    Smokeless tobacco: Never Used   Substance and Sexual Activity    Alcohol use: Yes     Alcohol/week: 42.0 oz     Types: 70 Shots of liquor per week    Drug use: No    Sexual activity: Yes     Partners: Female     Psychotherapeutics (From admission, onward)    Start     Stop Route Frequency Ordered    10/19/18 1724  OLANZapine injection 5 mg      -- IM Every 8 hours PRN 10/19/18 1625    10/19/18 1724  OLANZapine tablet 5 mg      -- Oral Every 8 hours PRN 10/19/18 1625    10/17/18 0434  OLANZapine injection 5 mg      10/17 2359 IM Once as needed 10/17/18 0334           Review of Systems   Neurological: Positive for numbness.        "I have neuropathy in my hands"   Psychiatric/Behavioral: Positive for agitation, behavioral problems and sleep disturbance. Negative for decreased concentration, hallucinations, self-injury and suicidal ideas.   All other systems reviewed and are negative.    Objective:     Vital Signs (Most Recent):  Temp: 97.9 °F (36.6 °C) (10/23/18 1305)  Pulse: 63 (10/23/18 0711)  Resp: 17 (10/23/18 0711)  BP: 131/67 (10/23/18 0711)  SpO2: (!) 93 % (10/23/18 1305) Vital Signs (24h Range):  Temp:  [97.4 °F (36.3 °C)-99 °F (37.2 °C)] 97.9 °F (36.6 °C)  Pulse:  [] 63  Resp:  [16-18] 17  SpO2:  [93 %-100 %] 93 %  BP: (121-133)/(67-77) 131/67     Height: 6' 2" (188 cm)  Weight: 67.8 kg (149 lb 7.6 oz)  Body mass index is 19.19 kg/m².      Intake/Output Summary (Last 24 hours) at 10/23/2018 1321  Last data filed at 10/23/2018 0900  Gross per 24 hour   Intake 780 ml   Output --   Net 780 ml       Physical Exam   Psychiatric:   Mental Status Exam:  Appearance: in chair, thin & gaunt looking, well groomed,   Level of Consciousness: awake, alert  Behavior/Cooperation: calm, good eye contact   Psychomotor: unremarkable   Speech: " "regular conversational tone, normal pitch, normal volume and rate   Language: english, fluid  Orientation: oriented to person, [facility], disoriented year, disoriented to place   Attention Span/Concentration: able to attend   Memory:  Recent and remote deficits   Mood: "good"  Affect: constricted    Thought Process: tangential, thought blocking   Associations: intact   Thought Content: normal, no suicidality, no homicidality, delusions, or paranoia elicited   Fund of Knowledge: did not assess   Abstraction: did not assess   Insight: poor  Judgment: poor   Nursing note and vitals reviewed.    NEUROLOGICAL EXAMINATION:     MOTOR EXAM   Overall muscle tone: normal    Significant Labs:   Last 24 Hours:   Recent Lab Results       10/23/18  0638        Anion Gap 10     BUN, Bld 17     Calcium 10.4     Chloride 105     CO2 24     Creatinine 0.8     eGFR if African American >60.0     eGFR if non  >60.0  Comment:  Calculation used to obtain the estimated glomerular filtration  rate (eGFR) is the CKD-EPI equation.        Glucose 87     Magnesium 2.2     Phosphorus 4.1     Potassium 3.8     Sodium 139           Significant Imaging: I have reviewed all pertinent imaging results/findings within the past 24 hours.  "

## 2018-10-23 NOTE — PROGRESS NOTES
[facility]  Psychiatry  Progress Note    Patient Name: Mohamud Patel  MRN: [id number]   Code Status: Full Code  Admission Date: 10/4/2018  Hospital Length of Stay: 18 days  Expected Discharge Date: 10/26/2018  Attending Physician: Sobia Ortiz MD  Primary Care Provider: Enmanuel Ferguson MD    Current Legal Status: N/A    Patient information was obtained from patient, past medical records and ER records.     Subjective:     Principal Problem:Alcohol withdrawal    Chief Complaint: Confusion, agitation         HPI:   Mohamud Patel is a 57 y.o. male with a history of Alcohol Use Disorder who presented to [facility] after his girlfriend called 911 with concern that patient was unable to care for himself. Psychiatry was initially consulted to address the patient's symptoms of potential grave disability, and reconsulted due to ongoing confusion with high risk for falls. He has completed extended valium taper, from 10/6-10/14, though remains on prn ativan for anxiety per MAR.     Patient was calm and somnolent during evaluation with writer, occasionally laughing somewhat inappropriately. Required repeated verbal redirection to answer questions, and is disoriented to time and place, though oriented to person. Bedside sitter reported that patient had been awake all night, and sleeping this morning, redirectable under her care. Patient denies HI/SI/AVH, though exam is limited somewhat due to somnolence. He is not able to recall or attend, though follows commands. He is unable to tolerate a prolonged interview or engage with motivational interview.     Per RN notes: Pt  was free of falls during shift remains in 4 points restraint, pt was intermittently exhibiting agitated cursing and pulling away was not easily redirected, prn ativan  was administered po will continue to monitor    Per MD primary notes: Patient seen and examined at bedside today. Overnight: KIRA. This AM, patient remains confused,  "claiming that he had to call the police to remove several people from his home and that he is "handling it and changing the locks." No other complaints.      Medical Review of Systems:  Pertinent items are noted in HPI.      Allergies:  Patient has no known allergies.     Past Medical/Surgical History:       Past Medical History:   Diagnosis Date    Neuropathy      Scabies        History reviewed. No pertinent surgical history.     History per initial consultation with psychiatry on admission:   Past Psychiatric History:  Previous Medication Trials: yes, ativan from PCP  Previous Psychiatric Hospitalizations: "I want to say no.  I'm not sure"   Previous Suicide Attempts: no   History of Violence: no  Outpatient Psychiatrist: no     Social History:  Marital Status:   Children: 1   Employment Status/Info: unemployed  Education: college graduate, Associates degree in BioVascular Science   Special Ed: no  Housing Status: with girlfriend in [facility]   History of phys/sexual abuse: "I want to say no"  Access to gun: yes     Substance Abuse History:  Recreational Drugs: marijuana  Use of Alcohol: heavy  Rehab History:yes   Tobacco Use:yes  Use of OTC: denied     Legal History:  Past Charges/Incarcerations:yes, DUI   Pending charges:no      Family Psychiatric History:   denied          Hospital Course: 10/22: Pt received prn zyprexa 10/21 8:08, 11:57, 21:52. On interview, patient is in 4 point restraints. He denies pain though states he's cold, offered a blanket; he is able to answer some interview questions appropriately though too somnolent to tolerate extended interview despite repeated questions. Unsure whether he slept overnight. He follows simple commands fairly well and is able to answer a few questions in an organized fashion. He is oriented to person and hospital, though states he's in [place]. He has obvious memory deficits on exam, both recent and remote, and is unable to recall the events of the prior night " "or his behavior reported per other hospital staff. He is unable to engage with full CAM-ICU exam 2/2 somnolence; CAM-ICU positive. Patient denies depressed mood or SI; unable to elicit further history.   Discussed his course with nursing and sitter; per nursing patient reported sleeping overnight, but unclear whether patient slept well given note below reporting agitation (though no prn administered overnight).   Restraints removed during exam to assess tone and patient tolerated well, behavior appropriate.     Per RN notes:  10/22 approx. 4:00.   SR up x2, call bell in reach, bed in low locked position, skid proof socks on. Patient confused and agitated, constantly trying to get out from the bad to get cigaret ans beer. Patient in restrains, no S/S of injury noted, order renewed.  Patient remained free of fall and injuries during the shift.  Care plan explained to patient. No concerns, will continue to monitor.   Per primary team note 10/21: This AM, patient more agitated requiring PO Zyprexa for non-redirectable agitation prior to my exam. Per nursing, he threw his breakfast tray across the room, intentionally urinated on the floor several times, and has made verbally threatening remarks. When I arrived in the room patient said "my god, you're in on this too!" Remains oriented to self only, thinks he may be at home in the garage but is unsure. Yelling during our exam. Explained to patient that trial of no restraints was attempted yesterday; however, he feel several times into the couch. Noted to be extremely unsteady by nursing. He had no memory of falling yesterday and also does not remember that he has talked to his father several days.     10/23: Patient is pleasant, well-groomed, out of restraints, and calm on exam. Continues to demonstrate obvious deficits in memory, remote and recent. He appears improved from prior interviews, and is alert. Seated in hallway chair, facing window with sitter at his side. Per " "discussion with nursing, patient slept well until 4:00AM, became agitated, placed in restraints, no prn administered.   Per RN notes: SR up x2, call bell in reach, bed in low locked position, skid proof socks on. Patient confused, constantly getting out of the bad. Patient states " I need to grub a beer and cigaret ". VS stable WNL. Restrains order renewed.  Patient remained free of fall and injuries during the shift.  Care plan explained to patient. No concerns, will continue to monitor.     Interval History: As per hospital course     Family History     None        Tobacco Use    Smoking status: Current Every Day Smoker     Packs/day: 2.00     Types: Cigarettes    Smokeless tobacco: Never Used   Substance and Sexual Activity    Alcohol use: Yes     Alcohol/week: 42.0 oz     Types: 70 Shots of liquor per week    Drug use: No    Sexual activity: Yes     Partners: Female     Psychotherapeutics (From admission, onward)    Start     Stop Route Frequency Ordered    10/19/18 1724  OLANZapine injection 5 mg      -- IM Every 8 hours PRN 10/19/18 1625    10/19/18 1724  OLANZapine tablet 5 mg      -- Oral Every 8 hours PRN 10/19/18 1625    10/17/18 0434  OLANZapine injection 5 mg      10/17 2359 IM Once as needed 10/17/18 0334           Review of Systems   Neurological: Positive for numbness.        "I have neuropathy in my hands"   Psychiatric/Behavioral: Positive for agitation, behavioral problems and sleep disturbance. Negative for decreased concentration, hallucinations, self-injury and suicidal ideas.   All other systems reviewed and are negative.    Objective:     Vital Signs (Most Recent):  Temp: 97.9 °F (36.6 °C) (10/23/18 1305)  Pulse: 63 (10/23/18 0711)  Resp: 17 (10/23/18 0711)  BP: 131/67 (10/23/18 0711)  SpO2: (!) 93 % (10/23/18 1305) Vital Signs (24h Range):  Temp:  [97.4 °F (36.3 °C)-99 °F (37.2 °C)] 97.9 °F (36.6 °C)  Pulse:  [] 63  Resp:  [16-18] 17  SpO2:  [93 %-100 %] 93 %  BP: (121-133)/(67-77) " "131/67     Height: 6' 2" (188 cm)  Weight: 67.8 kg (149 lb 7.6 oz)  Body mass index is 19.19 kg/m².      Intake/Output Summary (Last 24 hours) at 10/23/2018 1321  Last data filed at 10/23/2018 0900  Gross per 24 hour   Intake 780 ml   Output --   Net 780 ml       Physical Exam   Psychiatric:   Mental Status Exam:  Appearance: in chair, thin & gaunt looking, well groomed,   Level of Consciousness: awake, alert  Behavior/Cooperation: calm, good eye contact   Psychomotor: unremarkable   Speech: regular conversational tone, normal pitch, normal volume and rate   Language: english, fluid  Orientation: oriented to person, [facility], disoriented year, disoriented to place   Attention Span/Concentration: able to attend   Memory:  Recent and remote deficits   Mood: "good"  Affect: constricted    Thought Process: tangential, thought blocking   Associations: intact   Thought Content: normal, no suicidality, no homicidality, delusions, or paranoia elicited   Fund of Knowledge: did not assess   Abstraction: did not assess   Insight: poor  Judgment: poor   Nursing note and vitals reviewed.    NEUROLOGICAL EXAMINATION:     MOTOR EXAM   Overall muscle tone: normal    Significant Labs:   Last 24 Hours:   Recent Lab Results       10/23/18  0638        Anion Gap 10     BUN, Bld 17     Calcium 10.4     Chloride 105     CO2 24     Creatinine 0.8     eGFR if African American >60.0     eGFR if non  >60.0  Comment:  Calculation used to obtain the estimated glomerular filtration  rate (eGFR) is the CKD-EPI equation.        Glucose 87     Magnesium 2.2     Phosphorus 4.1     Potassium 3.8     Sodium 139           Significant Imaging: I have reviewed all pertinent imaging results/findings within the past 24 hours.    Assessment/Plan:     Major neurocognitive disorder    -Patient has recent and remote memory deficits on exam, with deficits in cognition, and with signs and symptoms of Wernicke's encephalopathy   -Please " see plan for Wernicke's encephalopathy as per prognosis and treatment      Wernicke encephalopathy    · Patient appears at this time that cognitive problems related to a severe alcohol use disorder,  and nutritional deficiency are responsible for patient's inability to care for himself. Patient does not require a PEC for grave disability.  However, he does not have capacity for medical decision making at this time. He appears to be cooperative intermittently, though concern for continued agitation overnight.   · Concerns for continued gait instability, confusion. Anticipate that both may improve over a period of weeks. Patient may have permanent ataxia. As the acute encephalopathy and confusion recedes, deficits in learning and memory may become obvious, and the latter are unlikely to recover completely or substantively, though per literatures may do so in about 20 percent of patients; the remainder had a permanent amnestic syndrome.  · Patient is currently experiencing peripheral neuropathy, encephalopathy, and gait ataxia consistent with Wernicke's Encephalopathy.  He would benefit from continued po thiamine supplementation, and multivitamin, as he's completed course of IV treatment. He will likely require further assistance for activities of daily living in a nursing home setting after completion of hospitalization.  · Patient's alcohol use disorder is severe, though has completed extended taper. Patient is unlikely to be in acute withdrawal, anticipate that continued treatment with prn ativan may be confounding clinical picture. Would recommend continued avoidance of benzodiazepines.   · Patients continues with non-redirectable agitation, recommend treatment with zyprexa 5mg po/IM q8 hours, and minimize use of physical restratins. As patient has required  prn medication administration in the past 24 hours, would recommend scheduled zyprexa 10mg po qHS, with max daily dose 30mg from all sources.   · Would  continue monitoring with EKG   · Will continue to follow; thank you for allowing us to participate in this patient's care.       Alcohol use disorder, severe, dependence    -Pt has completed detox, recommend motivational interviewing and patient education for cessation, though unable to perform currently due to disorganized thought process           Need for Continued Hospitalization:   No need for inpatient psychiatric hospitalization. Continue medical care as per the primary team.    Anticipated Disposition: Nursing home, Physical therapy      Total time:  25 with greater than 50% of this time spent in counseling and/or coordination of care.       Susy Mukherjee MD   Psychiatry PGY II   [facility]

## 2018-10-23 NOTE — CONSULTS
Inpatient consult to Physical Medicine Rehab  Consult performed by: ZEE Bowen  Consult ordered by: Prateek Gudino MD  Reason for consult: rehab evaluation       Re-consult received.  Reviewed patient history and current admission.  Rehab team following.  Full consult to follow.    NICA Ferrer, YINP-C  Physical Medicine & Rehabilitation   10/23/2018  Spectralink: [id number]

## 2018-10-23 NOTE — ASSESSMENT & PLAN NOTE
-   Required assistance with mobility and ADLs at baseline  Recommendations  -  Encourage mobility, OOB in chair, and early ambulation as appropriate  -  PT/OT evaluate and treat  -  Pain management  -  DVT prophylaxis  -  Monitor for and prevent skin breakdown and pressure ulcers  · Early mobility, repositioning/weight shifting every 20-30 minutes when sitting, turn patient every 2 hours, proper mattress/overlay and chair cushioning, pressure relief/heel protector boots

## 2018-10-23 NOTE — PLAN OF CARE
"Met with pt, pt's father, and insurance rep Liliam Armstrong at [facility] who was present in the room  Dc planning:   PMR Di Nickerson/Khris is in room to eval pt. See her note.    Dc plan: inpatient rehab once pt's medications /mental status improves  Pt's Father asked me for mobile notary. CM called him and left number but I told him it's not [facility] affiliated  Pt's Father is looking into "[facility]" to assist w dc planning after rehab      liliam      10/23/18 1309   Right Care Assessment   Can the patient answer the patient profile reliably? No, cognitively impaired  (slightly confused but oriented to person, place, not exact year)   How often would a person be available to care for the patient? Infrequently   Describe the patient's ability to walk at the present time. Major restrictions/daily assistance from another person   How does the patient rate their overall health at the present time? Good   Number of comorbid conditions (as recorded on the chart) Five or more   During the past month, has the patient often been bothered by feeling down, depressed or hopeless? No   During the past month, has the patient often been bothered by little interest or pleasure in doing things? No    chris brown -[facility] [phone] [phone]    Father's name/number is Sony at   "

## 2018-10-23 NOTE — PLAN OF CARE
Problem: SLP Goal  Goal: SLP Goal  Outcome: Outcome(s) achieved Date Met: 10/23/18  Speech Language Cognitive evaluation completed.  Pt presents w/ moderate cognitive deficits of orientation, attention, memory and problem-solving which appear baseline.  He is not a candidate for Skilled Speech services at this time.  Further Skilled Speech services are not indicated.    Vy Ramos CCC-SLP  10/23/2018

## 2018-10-24 PROCEDURE — 97535 SELF CARE MNGMENT TRAINING: CPT

## 2018-10-24 PROCEDURE — 63600175 PHARM REV CODE 636 W HCPCS: Performed by: HOSPITALIST

## 2018-10-24 PROCEDURE — 25000003 PHARM REV CODE 250: Performed by: HOSPITALIST

## 2018-10-24 PROCEDURE — 99232 SBSQ HOSP IP/OBS MODERATE 35: CPT | Mod: ,,, | Performed by: HOSPITALIST

## 2018-10-24 PROCEDURE — S4991 NICOTINE PATCH NONLEGEND: HCPCS | Performed by: HOSPITALIST

## 2018-10-24 PROCEDURE — 97530 THERAPEUTIC ACTIVITIES: CPT

## 2018-10-24 PROCEDURE — 99232 SBSQ HOSP IP/OBS MODERATE 35: CPT | Mod: ,,, | Performed by: NURSE PRACTITIONER

## 2018-10-24 PROCEDURE — 11000001 HC ACUTE MED/SURG PRIVATE ROOM

## 2018-10-24 RX ORDER — OLANZAPINE 10 MG/1
10 TABLET ORAL NIGHTLY
Status: DISCONTINUED | OUTPATIENT
Start: 2018-10-24 | End: 2018-11-10 | Stop reason: HOSPADM

## 2018-10-24 RX ORDER — GABAPENTIN 300 MG/1
300 CAPSULE ORAL 2 TIMES DAILY
Status: DISCONTINUED | OUTPATIENT
Start: 2018-10-24 | End: 2018-10-26

## 2018-10-24 RX ADMIN — GABAPENTIN 300 MG: 300 CAPSULE ORAL at 12:10

## 2018-10-24 RX ADMIN — AMLODIPINE BESYLATE 5 MG: 5 TABLET ORAL at 09:10

## 2018-10-24 RX ADMIN — Medication 400 MG: at 08:10

## 2018-10-24 RX ADMIN — ENOXAPARIN SODIUM 40 MG: 100 INJECTION SUBCUTANEOUS at 04:10

## 2018-10-24 RX ADMIN — Medication 400 MG: at 09:10

## 2018-10-24 RX ADMIN — MICONAZOLE NITRATE: 20 OINTMENT TOPICAL at 09:10

## 2018-10-24 RX ADMIN — FOLIC ACID 1 MG: 1 TABLET ORAL at 09:10

## 2018-10-24 RX ADMIN — Medication 100 MG: at 09:10

## 2018-10-24 RX ADMIN — NICOTINE 1 PATCH: 21 PATCH, EXTENDED RELEASE TRANSDERMAL at 09:10

## 2018-10-24 RX ADMIN — THERA TABS 1 TABLET: TAB at 09:10

## 2018-10-24 RX ADMIN — OLANZAPINE 10 MG: 10 TABLET, FILM COATED ORAL at 08:10

## 2018-10-24 RX ADMIN — GABAPENTIN 300 MG: 300 CAPSULE ORAL at 08:10

## 2018-10-24 NOTE — ASSESSMENT & PLAN NOTE
"-  On Thiamine, folic acid, MVI  -  CT 10/8/18 without acute pathology  -  Psych following--> "recommend treatment with zyprexa 5mg po/IM q8 hours, scheduled zyprexa 10mg po qHS, with max daily dose 30mg from all sources"  -  Evaluated by SLP 10/23 for cognitive deficits and ability to learn--> Found to have cognitive-linguistic impairment with moderate deficits possibly near baseline d/t long term ETOH abuse, significant delusional behavior, and poor insight.  Will require significant supervision.  His ability to participate in skilled SLP services is limited d/t poor insight and understanding, as well as psych barriers.  "

## 2018-10-24 NOTE — SUBJECTIVE & OBJECTIVE
"Interval History: As per hospital course     Family History     None        Tobacco Use    Smoking status: Current Every Day Smoker     Packs/day: 2.00     Types: Cigarettes    Smokeless tobacco: Never Used   Substance and Sexual Activity    Alcohol use: Yes     Alcohol/week: 42.0 oz     Types: 70 Shots of liquor per week    Drug use: No    Sexual activity: Yes     Partners: Female     Psychotherapeutics (From admission, onward)    Start     Stop Route Frequency Ordered    10/24/18 2100  OLANZapine tablet 10 mg      -- Oral Nightly 10/24/18 1022    10/19/18 1724  OLANZapine injection 5 mg      -- IM Every 8 hours PRN 10/19/18 1625    10/19/18 1724  OLANZapine tablet 5 mg      -- Oral Every 8 hours PRN 10/19/18 1625    10/17/18 0434  OLANZapine injection 5 mg      10/17 2359 IM Once as needed 10/17/18 0334           Review of Systems   Neurological: Positive for numbness.        "I have neuropathy in my hands"   Psychiatric/Behavioral: Positive for agitation. Negative for behavioral problems, decreased concentration, hallucinations, self-injury and suicidal ideas.   All other systems reviewed and are negative.    Objective:     Vital Signs (Most Recent):  Temp: 97.5 °F (36.4 °C) (10/24/18 1622)  Pulse: 81 (10/24/18 1622)  Resp: 18 (10/24/18 1622)  BP: 128/80 (10/24/18 1622)  SpO2: 97 % (10/24/18 1622) Vital Signs (24h Range):  Temp:  [97.5 °F (36.4 °C)-98.2 °F (36.8 °C)] 97.5 °F (36.4 °C)  Pulse:  [79-84] 81  Resp:  [16-20] 18  SpO2:  [96 %-99 %] 97 %  BP: (114-129)/(65-80) 128/80     Height: 6' 2" (188 cm)  Weight: 67.8 kg (149 lb 7.6 oz)  Body mass index is 19.19 kg/m².    No intake or output data in the 24 hours ending 10/24/18 1716    Physical Exam   Psychiatric:   Mental Status Exam:  Appearance: sitting up in bed, thin & gaunt looking, well groomed,   Level of Consciousness: awake, alert  Behavior/Cooperation: engaging, good eye contact, at times laughs loudly   Psychomotor: unremarkable   Speech: regular " "conversational tone, normal pitch, loud volume and rate  Language: english, fluid  Orientation: oriented to person, [facility], disoriented to date   Attention Span/Concentration: able to attend well, [facility]  Memory:  Recent and remote deficits, registers 2/3, on repeat registers 3/3, recalls 0/3, with category prompts 2/3  Mood: "good"  Affect: mood congruent  Thought Process: tangential, circumstantial, with some improvement in loss of goal   Associations: intact   Thought Content: normal, no suicidality, no homicidality, delusions, or paranoia elicited   Fund of Knowledge: poor  Abstraction: unable to abstract    Insight: fair  Judgment: poor   Nursing note and vitals reviewed.       Significant Labs:   Last 24 Hours:   Recent Lab Results     None          Significant Imaging: I have reviewed all pertinent imaging results/findings within the past 24 hours.  "

## 2018-10-24 NOTE — SUBJECTIVE & OBJECTIVE
Interval History 10/24/2018:  Patient is seen for follow-up rehab evaluation and recommendations: No acute events over night. Participated with PT and evaluated by SLP yesterday.  Little to no progress during admission.     HPI, Past Medical, Family, and Social History remains the same as documented in the initial encounter.    Scheduled Medications:    amLODIPine  5 mg Oral Daily    enoxparin  40 mg Subcutaneous Daily    folic acid  1 mg Oral Daily    gabapentin  300 mg Oral BID    magnesium oxide  400 mg Oral BID    miconazole nitrate 2%   Topical (Top) BID    multivitamin  1 tablet Oral Daily    nicotine  1 patch Transdermal Daily    OLANZapine  10 mg Oral QHS    thiamine  100 mg Oral Daily     PRN Medications: OLANZapine, OLANZapine, sodium chloride 0.9%    Review of Systems   Constitutional: Positive for activity change. Negative for chills, fatigue and fever.   HENT: Negative for drooling, hearing loss, trouble swallowing and voice change.    Eyes: Negative for pain and visual disturbance.   Respiratory: Negative for cough, shortness of breath and wheezing.    Cardiovascular: Negative for chest pain and palpitations.   Gastrointestinal: Negative for abdominal pain, nausea and vomiting.   Genitourinary: Negative for difficulty urinating and flank pain.   Musculoskeletal: Positive for gait problem and myalgias. Negative for back pain and neck pain.   Skin: Positive for wound. Negative for rash.   Neurological: Positive for weakness and numbness. Negative for dizziness and headaches.   Psychiatric/Behavioral: Positive for confusion. Negative for agitation and hallucinations. The patient is not nervous/anxious.      Objective:     Vital Signs (Most Recent):  Temp: 98.2 °F (36.8 °C) (10/24/18 0818)  Pulse: 79 (10/24/18 0818)  Resp: 20 (10/24/18 0818)  BP: 129/74 (10/24/18 0818)  SpO2: 99 % (10/24/18 1101)    Vital Signs (24h Range):  Temp:  [97.6 °F (36.4 °C)-98.2 °F (36.8 °C)] 98.2 °F (36.8 °C)  Pulse:   [79-87] 79  Resp:  [16-20] 20  SpO2:  [93 %-99 %] 99 %  BP: (101-129)/(57-74) 129/74     Physical Exam   Constitutional: No distress.   Appears thin, older than stated age   HENT:   Head: Normocephalic and atraumatic.   Right Ear: External ear normal.   Left Ear: External ear normal.   Nose: Nose normal.   Eyes: Right eye exhibits no discharge. Left eye exhibits no discharge. No scleral icterus.   Neck: Normal range of motion.   Cardiovascular: Normal rate, regular rhythm and intact distal pulses.   Pulmonary/Chest: Effort normal. No respiratory distress. He has no wheezes.   Abdominal: Soft. He exhibits no distension. There is no tenderness.   Musculoskeletal: He exhibits no edema or tenderness.        Right shoulder: He exhibits decreased range of motion and decreased strength.        Left shoulder: He exhibits decreased range of motion and decreased strength.   BLE/feet dressing CDI.  General weakness.   Neurological: He is alert. He is disoriented. No sensory deficit. He exhibits normal muscle tone.   Skin: Skin is warm and dry. Abrasion (BLE) noted. No rash noted.   Psychiatric: He has a normal mood and affect. His behavior is normal. His speech is tangential and slurred. Cognition and memory are impaired.   Camera sitter at bedside.  Cooperative.   He is inattentive.   Vitals reviewed.    Diagnostic Results:   Labs: Reviewed  X-Ray: Reviewed  US: Reviewed  CT: Reviewed      NEUROLOGICAL EXAMINATION:     MENTAL STATUS   Speech: slurred

## 2018-10-24 NOTE — ASSESSMENT & PLAN NOTE
· Patient appears at this time that cognitive problems related to a severe alcohol use disorder,  and nutritional deficiency are responsible for patient's inability to care for himself. Patient does not require a PEC for grave disability.  However, he does not have capacity for medical decision making at this time. He appears to be cooperative intermittently, though concern for continued agitation overnight.   · Concerns for continued gait instability, confusion. Anticipate that both may improve over a period of weeks. Patient may have permanent ataxia. As the acute encephalopathy and confusion recedes, deficits in learning and memory may become obvious, and the latter are unlikely to recover completely or substantively, though per literatures may do so in about 20 percent of patients; the remainder had a permanent amnestic syndrome.  · Patient is currently experiencing peripheral neuropathy, encephalopathy, and gait ataxia consistent with Wernicke's Encephalopathy.  He would benefit from continued po thiamine supplementation, and multivitamin, as he's completed course of IV treatment. He will likely require further assistance for activities of daily living in a nursing home setting after completion of hospitalization.  · Reasonable to tx neuropathy per primary with gabapentin, though little evidence base to support utility for efficacy in relapse prevention or control of cravings   · Patient's alcohol use disorder is severe, though has completed extended taper. Patient is unlikely to be in acute withdrawal, anticipate that continued treatment with prn ativan may be confounding clinical picture. Would recommend continued avoidance of benzodiazepines.   · Patients continues receiving prn zyprexa agitation, recommend treatment with zyprexa 5mg po/IM q8 hours, and minimize use of physical restratins. As patient has required  prn medication administration in the past 24 hours, would recommend scheduled zyprexa 10mg po  qHS, with max daily dose 30mg from all sources.   · Would continue monitoring with EKG   · Will continue to follow; thank you for allowing us to participate in this patient's care.

## 2018-10-24 NOTE — PT/OT/SLP PROGRESS
Occupational Therapy   Treatment    Name: Mohamud Patel  MRN: [id number]  Admitting Diagnosis:  Alcohol withdrawal       Recommendations:     Discharge Recommendations: nursing facility, skilled(possibly rehab pending progress as pt's cognition was improved this date))  Discharge Equipment Recommendations:  bedside commode, wheelchair  Barriers to discharge:  Decreased caregiver support, Inaccessible home environment    Subjective     Communicated with: RN Samantha) prior to session. No family present for session.   Pain/Comfort:  · Pain Rating 1: 0/10    Patients cultural, spiritual, Advent conflicts given the current situation: none reported    Objective:     Patient found with: bed alarm    General Precautions: Standard, aspiration, fall, seizure   Orthopedic Precautions:N/A   Braces: N/A     Occupational Performance:    Bed Mobility:    · Patient completed Rolling/Turning to Left with  supervision  · Patient completed Rolling/Turning to Right with supervision x2 trials  · Patient completed Scooting/Bridging with supervision in forward plane to EOB   · Supervision to scoot to HOB while supine  · Supervision to bridge with BLE for toileting task  · Patient completed Supine to Sit with supervision  · Patient completed Sit to Supine with supervision     Functional Mobility/Transfers:  · Patient completed Sit <> Stand Transfer with moderate assistance  with  no assistive device    · Mod A and unilateral UE support for standing balance, demo posterior lean  · Facilitation of thoracic extension  · Performed weight shifting with mod A and unilateral UE support x6 trials  · Functional Mobility: 2 lateral steps to L with max A and no AD and unilateral UE support     Activities of Daily Living:  · Feeding:  independence after setup to each lunch tray, including using utensils  · Poured water from pitcher to cup and able to drink from cup with supervision.   · Grooming: independence after setup for hand hygiene while  "seated EOB   · Upper Body Dressing: minimum assistance to don gown on front while seated  · Toileting: maximal assistance for perineal care after BM on bedpan    Patient left with bed in chair position with all lines intact, call button in reach, bed alarm on and RN notified    [facility] 6 Click:  [facility] Total Score: 18    Treatment & Education:  -Edu for OT role and POC  -Edu for importance of OOB activity and impact on healing  -Pt notably more alert this date. Oriented to day of week, month, date, and year with cueing for communication board. Pt oriented to being in hospital, and demo good knowledge of city when told he was at [facility] main campus. Pt eran slight memory of his father's presence at hospital this morning, and he requested to speak to his father for an update on the situation. Pt eran significantly increased awareness to situation. He asked appropriate questions about how he got to the hospital. He was aware of his alcoholism and the impact on his life. He stated he thought he moved out of the apt with Chitra and lived with someone else prior to admit, but he was cloudy regarding the details. He was aware of Chitra's impact on his life and stated he didn't want to be involved with her. OT spoke with pt at length about calling for help with call button rather than getting out of bed in order to remain out of restraints. Pt demo good carryover as he called  stating he was hungry shortly after OT exit. OT encouraged pt to focus on the goal of remaining out of restraints, calling for assistance, and staying oriented by looking at communication board. Pt stated "I'm not trying to get out of here until I'm ready. I know I need to get out of this fog and work on the physical stuff." Pt also stated "I tend to push things, and I guess that's what I did this time. I was doing better (regarding the alcoholism). I was down to (drinking) about 50% of the amount as when  I started." Pt continued to demo " decreased memory as OT had to remind pt of various things already discussed; however, he demo increased awareness and acceptance each time. He did not demo any delusions, hallucinations, or confabulations this date.    -OT communicated pt's significant progress in cognition to CM. CM present in room briefly to note pt's improvement and assure pt she would call his father for an update and return later.  OT also communicated with MD, PT, and Rehab NP (Di Pinedo) about pt's possible appropriateness for rehab if he continues to improve with cognition and is able to participate  enough to improve with functional skills and mobility. Significant time was spent this date for care coordination and interdisciplinary communication.   -Whiteboard updated   -Questions and concerns addressed within OT scope of practice   Education:    Assessment:     Mohamud Patel is a 57 y.o. male with a medical diagnosis of Alcohol withdrawal.  He presents with decreased functional independence in various areas of his life. He demo drastic improvement in cognition and alertness this date, improving his ability to actively participate in session. However, he demo decreased memory. He demo increased ability to stand, but continues to demo ataxia when attempting steps. He demo increased ADL skills due to increased cognition. ADL skills remain limited by ataxia and decreased functional mobility. Pt demo decreased endurance to functional task. Pt would continue to benefit from skilled OT services for maximum functional outcome and safety. Performance deficits affecting function are weakness, impaired endurance, impaired self care skills, impaired functional mobilty, decreased coordination, impaired cognition, impaired balance, gait instability, decreased upper extremity function, decreased lower extremity function, impaired coordination, impaired cardiopulmonary response to activity, decreased safety awareness.      Rehab  Prognosis:  good; patient would benefit from acute skilled OT services to address these deficits and reach maximum level of function.       Plan:     Patient to be seen 3 x/week to address the above listed problems via self-care/home management, therapeutic activities, therapeutic exercises  · Plan of Care Expires: 11/04/18  · Plan of Care Reviewed with: patient    This Plan of care has been discussed with the patient who was involved in its development and understands and is in agreement with the identified goals and treatment plan    GOALS:   Multidisciplinary Problems     Occupational Therapy Goals        Problem: Occupational Therapy Goal    Goal Priority Disciplines Outcome Interventions   Occupational Therapy Goal     OT, PT/OT Ongoing (interventions implemented as appropriate)    Description:  Goals to be met by: 11/5  Patient will increase functional independence with ADLs by performing:    UE Dressing with Modified [place] while seated.  LE Dressing with Modified [place].  Grooming while standing at sink with Min A.  Toileting from toilet with Minimum A for hygiene and clothing management.   Supine to sit with Supervision and HOB flat. MET 10/24  *revised: with independence  Stand pivot transfers with CGA to chair and/or toilet.  Functional mobility with AD as needed for short household distance with no LOB with min A.                         Time Tracking:     OT Date of Treatment: 10/24/18  OT Start Time: 0108(first entry at 12:44)  OT Stop Time: 0206(first exit at 12:50 (total time: 64))  OT Total Time (min): 58 min    Billable Minutes:Self Care/Home Management 30  Therapeutic Activity 34    SAM Almaraz  10/24/2018

## 2018-10-24 NOTE — PLAN OF CARE
"CM updated by OT that pt was much improved w/ today's session and his cognition was clearer. CM to bedside w/ OT to speak w/ pt. Pt asked if his dad had been here - father has been to bedside yesterday and this AM but not present w/ CM. Pt wanted a msg relayed to father inquiring when he will return and is he staying locally. CM contacted pt's father, Sony - he is local and will be to bedside in th AM. CM relayed info about improvement w/ pt and progress w/ therapy - father very skeptical and stayed that pt wasn't ready to go anywhere b/c of his lack of strength. CM informed father that d/c planning was always in progress and w/ therapy participation improvement, pt would continue w/ therapy and possible referrals to rehab could be entertained. Father stated that didn't seem possible since pt is weak from "lying on the couch the past 3 yrs". 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  "

## 2018-10-24 NOTE — PROGRESS NOTES
[facility]  Physical Medicine & Rehab  Progress Note    Patient Name: Mohamud Patel  MRN: [id number]  Admission Date: 10/4/2018  Length of Stay: 19 days  Attending Physician: Sobia Ortiz MD    Subjective:     Principal Problem:Alcohol withdrawal    Hospital Course:   10/6/18:  Evaluated by PT and OT.  Bed mobility CGA-Raul.  Sit to stand CGA and transfers Raul.  Ambulated 24 ft Raul.  LBD CGA-Raul.  10/7/18:  No PT or OT.   10/8/18:  Participated with PT and OT.  Bed mobility CGA-Raul.  EOB SBA-CGA.  Sit to stand Raul.  Ambulated 3-4 side steps Raul.  UBD modA and LBD totalA.   10/9/18:  No PT or OT.   10/10/18:  No PT or OT.  10/11/18:  Participated with PT and OT.  Bed mobility SV.  Sit to stand SBA with RW.  Ambulated 75 ft CGA with RW.  UBD Raul and LBD maxA.   10/15/18:  Participated with PT.  Bed mobility SV.  Sit to stand SBA with RW.  Ambulated 100 ft CGA with RW.   10/16/18:  No OT 2/2 agitation and attempting to leave.   10/17/18:  Participated with PT and OT.  Bed mobility mod(I)-SBA.  Sit to stand and transfers min-modA with RW.  Ambulated 20 ft Raul with RW.  UBD Raul and LBD maxA.    10/19/18:  Participated with PT.  Bed mobility CGA.  Sit to stand CGA with RW.  Ambulated 10 ft + 20 ft Raul with RW.   10/22/18:  Participated with PT and OT.  Bed mobility CGA-Raul.  Sit to stand mod-totalA (max-totalA x 2) with RW.  Ambulated 4 lateral steps totalA (maxA x 2) with RW.  UBD modA and LBD SBA.  10/23/18:  Participated with PT.  Evaluated by SLP.  Found to have cognitive-linguistic impairment with moderate deficits possibly near baseline d/t long term ETOH abuse, significant delusional behavior, and poor insight.  Will require significant supervision.  His ability to participate in skilled SLP services is limited d/t poor insight and understanding, as well as psych barriers.  Bed mobility SV-Raul.  Sit to stand min-maxA with RW.  Ambulated 5-6 ft forward and backwards maxA with  GARDEN[place].     Interval History 10/24/2018:  Patient is seen for follow-up rehab evaluation and recommendations: No acute events over night. Participated with PT and evaluated by SLP yesterday.  Little to no progress during admission.     HPI, Past Medical, Family, and Social History remains the same as documented in the initial encounter.    Scheduled Medications:    amLODIPine  5 mg Oral Daily    enoxparin  40 mg Subcutaneous Daily    folic acid  1 mg Oral Daily    gabapentin  300 mg Oral BID    magnesium oxide  400 mg Oral BID    miconazole nitrate 2%   Topical (Top) BID    multivitamin  1 tablet Oral Daily    nicotine  1 patch Transdermal Daily    OLANZapine  10 mg Oral QHS    thiamine  100 mg Oral Daily     PRN Medications: OLANZapine, OLANZapine, sodium chloride 0.9%    Review of Systems   Constitutional: Positive for activity change. Negative for chills, fatigue and fever.   HENT: Negative for drooling, hearing loss, trouble swallowing and voice change.    Eyes: Negative for pain and visual disturbance.   Respiratory: Negative for cough, shortness of breath and wheezing.    Cardiovascular: Negative for chest pain and palpitations.   Gastrointestinal: Negative for abdominal pain, nausea and vomiting.   Genitourinary: Negative for difficulty urinating and flank pain.   Musculoskeletal: Positive for gait problem and myalgias. Negative for back pain and neck pain.   Skin: Positive for wound. Negative for rash.   Neurological: Positive for weakness and numbness. Negative for dizziness and headaches.   Psychiatric/Behavioral: Positive for confusion. Negative for agitation and hallucinations. The patient is not nervous/anxious.      Objective:     Vital Signs (Most Recent):  Temp: 98.2 °F (36.8 °C) (10/24/18 0818)  Pulse: 79 (10/24/18 0818)  Resp: 20 (10/24/18 0818)  BP: 129/74 (10/24/18 0818)  SpO2: 99 % (10/24/18 1101)    Vital Signs (24h Range):  Temp:  [97.6 °F (36.4 °C)-98.2 °F (36.8 °C)] 98.2 °F (36.8  °C)  Pulse:  [79-87] 79  Resp:  [16-20] 20  SpO2:  [93 %-99 %] 99 %  BP: (101-129)/(57-74) 129/74     Physical Exam   Constitutional: No distress.   Appears thin, older than stated age   HENT:   Head: Normocephalic and atraumatic.   Right Ear: External ear normal.   Left Ear: External ear normal.   Nose: Nose normal.   Eyes: Right eye exhibits no discharge. Left eye exhibits no discharge. No scleral icterus.   Neck: Normal range of motion.   Cardiovascular: Normal rate, regular rhythm and intact distal pulses.   Pulmonary/Chest: Effort normal. No respiratory distress. He has no wheezes.   Abdominal: Soft. He exhibits no distension. There is no tenderness.   Musculoskeletal: He exhibits no edema or tenderness.        Right shoulder: He exhibits decreased range of motion and decreased strength.        Left shoulder: He exhibits decreased range of motion and decreased strength.   BLE/feet dressing CDI.  General weakness.   Neurological: He is alert. He is disoriented. No sensory deficit. He exhibits normal muscle tone.   Skin: Skin is warm and dry. Abrasion (BLE) noted. No rash noted.   Psychiatric: He has a normal mood and affect. His behavior is normal. His speech is tangential and slurred. Cognition and memory are impaired.   Camera sitter at bedside.  Cooperative.   He is inattentive.   Vitals reviewed.    Diagnostic Results:   Labs: Reviewed  X-Ray: Reviewed  US: Reviewed  CT: Reviewed    Assessment/Plan:      Impaired mobility and ADLs    -   Required assistance with mobility and ADLs at baseline  Recommendations  -  Encourage mobility, OOB in chair, and early ambulation as appropriate  -  PT/OT evaluate and treat  -  Pain management  -  DVT prophylaxis  -  Monitor for and prevent skin breakdown and pressure ulcers  · Early mobility, repositioning/weight shifting every 20-30 minutes when sitting, turn patient every 2 hours, proper mattress/overlay and chair cushioning, pressure relief/heel protector boots    "  Wernicke encephalopathy    -  On Thiamine, folic acid, MVI  -  CTH 10/8/18 without acute pathology  -  Psych following--> "recommend treatment with zyprexa 5mg po/IM q8 hours, scheduled zyprexa 10mg po qHS, with max daily dose 30mg from all sources"  -  Evaluated by SLP 10/23 for cognitive deficits and ability to learn--> Found to have cognitive-linguistic impairment with moderate deficits possibly near baseline d/t long term ETOH abuse, significant delusional behavior, and poor insight.  Will require significant supervision.  His ability to participate in skilled SLP services is limited d/t poor insight and understanding, as well as psych barriers.     Hyponatremia    -  Possibly 2/2 beer potomania  -  Replacing as needed  -  Monitoring     Alcohol use disorder, severe, dependence    -  H/o EtOH use disorder  -  Presented intoxicated with serum alcohol level of 239  -  Psych following     Wound of lower extremity    -  Wound care following  -  Podiatry consulted--> No surgical intervention indicated     Evaluated by SLP yesterday (see above).  Showing little to no progress with PT/OT during admission.  Rehab goals are restorative at this point.   Recommend long-term placement at this time.  Will follow for additional rehab needs.     Di Nickerson, ZEE  Department of Physical Medicine & Rehab   [facility]  "

## 2018-10-24 NOTE — PLAN OF CARE
Problem: Patient Care Overview  Goal: Plan of Care Review  Outcome: Ongoing (interventions implemented as appropriate)  Pt is free from injury and falls during shift. Pt shows no signs of acute distress. Pt denies pain. Asleep most of the night. Vitals stable. Pt exhibits calm, cooperative behavior throughout shift. . No issues with patient or behavior during shift. Sitter at bedside throughout shift. AVASYS at bedside. Bed is in low position with breaks locked. Side rails are up x 2. Environment is clutter free. Call light is within reach of the patient. Will continue to monitor.
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
## 2018-10-24 NOTE — PLAN OF CARE
Problem: Occupational Therapy Goal  Goal: Occupational Therapy Goal  Goals to be met by: 11/5  Patient will increase functional independence with ADLs by performing:    UE Dressing with Modified [place] while seated.  LE Dressing with Modified [place].  Grooming while standing at sink with Min A.  Toileting from toilet with Minimum A for hygiene and clothing management.   Supine to sit with Supervision and HOB flat. MET 10/24  *revised: with independence  Stand pivot transfers with CGA to chair and/or toilet.  Functional mobility with AD as needed for short household distance with no LOB with min A.       Outcome: Ongoing (interventions implemented as appropriate)  Goals remain appropriate. SAM Almaraz 10/24/2018

## 2018-10-24 NOTE — ASSESSMENT & PLAN NOTE
-Pt has completed detox  -Provided motivational interviewing, patient contemplative of cessation  -counseling provided, though complicated by memory deficits and disorganization

## 2018-10-25 LAB
ANION GAP SERPL CALC-SCNC: 7 MMOL/L
BUN SERPL-MCNC: 19 MG/DL
CALCIUM SERPL-MCNC: 10.3 MG/DL
CHLORIDE SERPL-SCNC: 106 MMOL/L
CO2 SERPL-SCNC: 26 MMOL/L
CREAT SERPL-MCNC: 0.8 MG/DL
EST. GFR  (AFRICAN AMERICAN): >60 ML/MIN/1.73 M^2
EST. GFR  (NON AFRICAN AMERICAN): >60 ML/MIN/1.73 M^2
GLUCOSE SERPL-MCNC: 96 MG/DL
MAGNESIUM SERPL-MCNC: 2.1 MG/DL
PHOSPHATE SERPL-MCNC: 3.6 MG/DL
POTASSIUM SERPL-SCNC: 4.8 MMOL/L
SODIUM SERPL-SCNC: 139 MMOL/L

## 2018-10-25 PROCEDURE — 93005 ELECTROCARDIOGRAM TRACING: CPT

## 2018-10-25 PROCEDURE — 83735 ASSAY OF MAGNESIUM: CPT

## 2018-10-25 PROCEDURE — 36415 COLL VENOUS BLD VENIPUNCTURE: CPT

## 2018-10-25 PROCEDURE — 97530 THERAPEUTIC ACTIVITIES: CPT

## 2018-10-25 PROCEDURE — 99232 SBSQ HOSP IP/OBS MODERATE 35: CPT | Mod: ,,, | Performed by: HOSPITALIST

## 2018-10-25 PROCEDURE — 84100 ASSAY OF PHOSPHORUS: CPT

## 2018-10-25 PROCEDURE — 11000001 HC ACUTE MED/SURG PRIVATE ROOM

## 2018-10-25 PROCEDURE — 25000003 PHARM REV CODE 250: Performed by: HOSPITALIST

## 2018-10-25 PROCEDURE — 97116 GAIT TRAINING THERAPY: CPT

## 2018-10-25 PROCEDURE — 97535 SELF CARE MNGMENT TRAINING: CPT

## 2018-10-25 PROCEDURE — 80048 BASIC METABOLIC PNL TOTAL CA: CPT

## 2018-10-25 PROCEDURE — 93010 ELECTROCARDIOGRAM REPORT: CPT | Mod: ,,, | Performed by: INTERNAL MEDICINE

## 2018-10-25 PROCEDURE — S4991 NICOTINE PATCH NONLEGEND: HCPCS | Performed by: HOSPITALIST

## 2018-10-25 PROCEDURE — 63600175 PHARM REV CODE 636 W HCPCS: Performed by: HOSPITALIST

## 2018-10-25 RX ADMIN — FOLIC ACID 1 MG: 1 TABLET ORAL at 09:10

## 2018-10-25 RX ADMIN — Medication 400 MG: at 09:10

## 2018-10-25 RX ADMIN — OLANZAPINE 10 MG: 10 TABLET, FILM COATED ORAL at 09:10

## 2018-10-25 RX ADMIN — MICONAZOLE NITRATE: 20 OINTMENT TOPICAL at 09:10

## 2018-10-25 RX ADMIN — NICOTINE 1 PATCH: 21 PATCH, EXTENDED RELEASE TRANSDERMAL at 09:10

## 2018-10-25 RX ADMIN — Medication 100 MG: at 09:10

## 2018-10-25 RX ADMIN — GABAPENTIN 300 MG: 300 CAPSULE ORAL at 09:10

## 2018-10-25 RX ADMIN — AMLODIPINE BESYLATE 5 MG: 5 TABLET ORAL at 09:10

## 2018-10-25 RX ADMIN — THERA TABS 1 TABLET: TAB at 09:10

## 2018-10-25 RX ADMIN — ENOXAPARIN SODIUM 40 MG: 100 INJECTION SUBCUTANEOUS at 05:10

## 2018-10-25 NOTE — PROGRESS NOTES
Hospital Medicine   Progress note      Team: [facility] HOSP MED R Sobia Ortiz MD   Admit Date: 10/4/2018   Hospital Day: 20  YONAS: 10/26/2018   Code status: Full Code   Principal Problem: Alcohol withdrawal     Summary:  Mohamud Patel is a 57 y.o. man with a history of Severe Alcohol Abuse, Chronic Wounds of Bilateral LE's with Onychomycosis, Alcoholic Neuropathy, Hyponatremia, and Tobacco Use Disorder who was brought to the ED by EMS after his girlfriend called 911 while the patient was severely intoxicated. He is admitted to Hospital Medicine for Alcohol Withdrawal, possible Wernicke's Encephalopathy, and Acute Cystitis. Pt continues to have gait instability, weakness, recurrent falls when he attempts to get out of bed, has poor insight into his addiction and encephalopathy and poor decision making. Psychiatry consulted, pt determined not to have capacity to make own medical decision at this time.     Interval hx:   Pt was seen and examined at bedside. Pt reports he slept well overnight and is feeling well today. He denies any n/v/d/c/blood in stools, trouble urinating. No new complaints this morning. He is again able to hold a conversation well and sounds more clear.     ROS (Positive in Bold, otherwise negative)   Constitutional: fever, chills, night sweats  CV: chest pain, edema, palpitations  Resp: SOB, cough, sputum production  GI: changes in appetite, NVDC, pain, melena, hematochezia, GERD, hematemesis  : Dysuria, hematuria, urinary urgency, frequency  MSK: arthralgia/myalgia, joint swelling  Neuro/Psych: anxiety, depression    PEx   Temp:  [97.5 °F (36.4 °C)-98 °F (36.7 °C)]   Pulse:  [81-85]   Resp:  [16-18]   BP: (114-157)/(73-85)   SpO2:  [97 %-99 %]      I & O (Last 24H):   No intake or output data in the 24 hours ending 10/25/18 0855    General:  male  in no acute distress. Sitting up in bed   HEENT: NCAT. PERRL. EOMI. Sclera Anicteric.  CVS: RRR. Normal S1 S2. No murmurs  Pulm: CTAB.  Normal respiratory effort. No wheezes, rhonchi, or crackles.  Abdomen: Soft. Non-distended. No tenderness to palpation. No rebound or guarding. +BS.  Extremities: No edema. No cyanosis. Full ROM.  Skin: multiple scabbed lesions on bilateral feet, thickened skin on bilateral dorsal feet worse at the toes consistent with onychomycosis, no interdigital pits or evidence of scabies, large linear laceration with secondary healing on right foot, no surrounding erythema or induration  Neuro: Alert, oriented x 2, Spont mvt of all extremities with no focal deficits noted.    Recent Results (from the past 24 hour(s))   Basic metabolic panel    Collection Time: 10/25/18  6:39 AM   Result Value Ref Range    Sodium 139 136 - 145 mmol/L    Potassium 4.8 3.5 - 5.1 mmol/L    Chloride 106 95 - 110 mmol/L    CO2 26 23 - 29 mmol/L    Glucose 96 70 - 110 mg/dL    BUN, Bld 19 6 - 20 mg/dL    Creatinine 0.8 0.5 - 1.4 mg/dL    Calcium 10.3 8.7 - 10.5 mg/dL    Anion Gap 7 (L) 8 - 16 mmol/L    eGFR if African American >60.0 >60 mL/min/1.73 m^2    eGFR if non African American >60.0 >60 mL/min/1.73 m^2   Magnesium    Collection Time: 10/25/18  6:39 AM   Result Value Ref Range    Magnesium 2.1 1.6 - 2.6 mg/dL   Phosphorus    Collection Time: 10/25/18  6:39 AM   Result Value Ref Range    Phosphorus 3.6 2.7 - 4.5 mg/dL       No results for input(s): POCTGLUCOSE in the last 168 hours.    Hemoglobin A1C   Date Value Ref Range Status   10/05/2018 5.3 4.0 - 5.6 % Final     Comment:     ADA Screening Guidelines:  5.7-6.4%  Consistent with prediabetes  >or=6.5%  Consistent with diabetes  High levels of fetal hemoglobin interfere with the HbA1C  assay. Heterozygous hemoglobin variants (HbS, HgC, etc)do  not significantly interfere with this assay.   However, presence of multiple variants may affect accuracy.     08/10/2018 4.4 4.0 - 5.6 % Final     Comment:     ADA Screening Guidelines:  5.7-6.4%  Consistent with prediabetes  >or=6.5%  Consistent with  diabetes  High levels of fetal hemoglobin interfere with the HbA1C  assay. Heterozygous hemoglobin variants (HbS, HgC, etc)do  not significantly interfere with this assay.   However, presence of multiple variants may affect accuracy.          Active Hospital Problems    Diagnosis  POA    *Alcohol withdrawal [F10.239]  Yes    Major neurocognitive disorder [F01.50]  Unknown    Impaired mobility and ADLs [Z74.09]  Unknown    Ulcer of toe of left foot [L97.529]  Yes    Abrasion [T14.8XXA]  Yes    Alcohol withdrawal delirium [F10.231]  Yes    Mental health disorder [F99]  Yes    Wernicke encephalopathy [E51.2]  Yes    Alcoholic peripheral neuropathy [G62.1]  Yes    Wound of lower extremity [S81.809A]  Yes    Alcohol use disorder, severe, dependence [F10.20]  Yes     Chronic    Hyponatremia [E87.1]  Yes      Resolved Hospital Problems   No resolved problems to display.      Assessment and Plan for problems addressed today:      amLODIPine  5 mg Oral Daily    enoxparin  40 mg Subcutaneous Daily    folic acid  1 mg Oral Daily    gabapentin  300 mg Oral BID    magnesium oxide  400 mg Oral BID    miconazole nitrate 2%   Topical (Top) BID    multivitamin  1 tablet Oral Daily    nicotine  1 patch Transdermal Daily    OLANZapine  10 mg Oral QHS    thiamine  100 mg Oral Daily     OLANZapine, OLANZapine, sodium chloride 0.9%    Wernicke's Encephalopathy (WE) with Dementia and Behavioral Disturbance  Severe Alcohol Use Disorder   Alcohol Withdrawal (Resloved)  Alcoholic Neuropathy - Fall Risk, Requiring Restraints  - Medically, he is outside the window for Alcohol Withdrawal and he has completed the appropriate course of IV Thiamine for Wernicke's Encephalopathy. Continues to have severe gait instability and has failed several attempts without restrains and sitter at bedside. Would favor eventual transfer of patient to Memory Unit vs. Inpatient Rehab capable of taking patient needing restrains ([facility]  possibly accepting these types of patients?)  -Zyprexa 10 mg HS scheduled and Zyprexa 5 mg PO/IM q8 PRN non-redirectable agitation. Maximum total daily dose of 30 mg   -obtain EKG to assess QTc  -continue gabapentin 300mg BID for neuropathy and cravings   -psychiatry consulted, appreciate recs  -refrain from using physical restraints as much as possible   -For Wernicke's Encephalopathy: Finished course of IV Thiamine, transitioned to Thiamine 100 mg PO daily. Folic acid and MVI daily.   -Valium taper completed on 10/15.   -CMP, Mg, Phos - spaced to q48 hours  -Fall, aspiration, and seizure precautions  -PT/OT consulted, performed well with OT yesterday  -IRP consult placed for ongoing rehab      Multiple Bilateral Lower Extremity Wounds  Onychomycosis of Bilateral Feet   -Wound Care consulted, appreciate assistance - have debrided wounds, applying medi-honey, no evidence of active infection. Miconazole ointment to toes  -Podiatry was consulted, no other acute interventions at this time      Tobacco Use Disorder  -Nicotine 21 mg patch daily     DVT PPx: Lovenox SubQ Daily    Diet: Regular  Discharge plan and follow up: Plan for possible D/C to SNF vs LTC vs memory unit     Sobia Ortiz MD  Hospital Medicine Staff  [phone] pager

## 2018-10-25 NOTE — PLAN OF CARE
"Plan of care note:     Chart reviewed. NAEON. No prns administered.     Patient is adherent to tx and calm throughout night, per review of RN notes: "Pt is free from injury and falls during shift. Pt shows no signs of acute distress. Pt denies pain. Asleep most of the night. Vitals stable. Pt exhibits calm, cooperative behavior throughout shift. . No issues with patient or behavior during shift. Sitter at bedside throughout shift. AVASYS at bedside. Bed is in low position with breaks locked. Side rails are up x 2. Environment is clutter free. Call light is within reach of the patient. Will continue to monitor."     Will recommend continued treatment as per plan:     Major neurocognitive disorder     -Patient has had recent and remote memory deficits on serial exams with psychiatry, with deficits in cognition, and with signs and symptoms of Wernicke's encephalopathy   -Please see plan for Wernicke's encephalopathy as per prognosis and treatment       Wernicke encephalopathy     · Patient has cognitive deficits secondary to severe alcohol use disorder and nutritional deficiency, with inability to care for self due to the associated neurocognitive disorder. Patient does not require a PEC for grave disability due to psychiatric illness.   · Concerns for continued gait instability, PT on board, anticipate that both may improve over a period of weeks. Patient may have permanent ataxia. As the acute encephalopathy and confusion recedes, deficits in learning and memory may become more obvious, and the latter are unlikely to recover completely or substantively, though per literatures may do so in about 20 percent of patients; the remainder had a permanent amnestic syndrome.  · Patient is currently experiencing peripheral neuropathy, encephalopathy, and gait ataxia consistent with Wernicke's Encephalopathy.  He would benefit from continued po thiamine supplementation, and multivitamin, as he's completed course of IV " treatment. He will likely require further assistance for activities of daily living in a nursing home setting after completion of hospitalization.  · Reasonable to tx neuropathy per primary with gabapentin, though little evidence base to support utility for efficacy in relapse prevention or control of cravings; patient doesn't endorse overt cravings on prior exams with psychiatry on 10/24.   · Patient's alcohol use disorder is severe, though has completed extended taper for detox period. Patient is unlikely to be in acute withdrawal, anticipate that continued treatment with prn ativan may be confounding clinical picture. Would recommend continued avoidance of benzodiazepines.   · Patients has not required prn zyprexa for acute agitation. He has responded well to scheduled treatment with zyprexa 10mg po qhs. As patient has not required  prn medication administration in the past 24 hours, would recommend continuing scheduled zyprexa, with max daily dose 30mg from all sources. Can continue zyprexa 5mg po/IM [id number] for acute agitation during hospital stay and on discharge.   · Would continue monitoring with EKG, 10/23 (poor data quality) qtc 468  · Will sign off, thank you for allowing us to participate in the care of this patient.   · Recommend follow up with out-patient psychiatry to consider taper of zyprexa in future, would continue at current dose if patient is discharged.       Alcohol use disorder, severe, dependence     -Pt has completed detox  -Provided motivational interviewing, patient contemplative of cessation  -counseling provided, though complicated by memory deficits and some disorganization on exam 10/24.          Susy Mukherjee MD   Psychiatry PGY II   [facility]

## 2018-10-25 NOTE — PLAN OF CARE
Problem: Physical Therapy Goal  Goal: Physical Therapy Goal  Goals to be met by: 18     Patient will increase functional independence with mobility by performin. Sit to stand transfer with Stand-by Assistance-met   Revised: sit to stand transfer with Supervision.   2. Bed to chair transfer with Stand-by Assistance using LRD.   3. Gait  x 100 feet with Contact Guard Assistance using Rolling Walker or LRD>   4. Lower extremity exercise program x20  reps per handout, with independence       Outcome: Ongoing (interventions implemented as appropriate)  Pt progressing towards goals.     GALINA MAYA, PT  10/25/2018

## 2018-10-25 NOTE — PROGRESS NOTES
Hospital Medicine   Progress note      Team: [facility] HOSP MED R Sobia Ortiz MD   Admit Date: 10/4/2018   Hospital Day: 19  YONAS: 10/26/2018   Code status: Full Code   Principal Problem: Alcohol withdrawal     Summary:  Mohamud Patel is a 57 y.o. man with a history of Severe Alcohol Abuse, Chronic Wounds of Bilateral LE's with Onychomycosis, Alcoholic Neuropathy, Hyponatremia, and Tobacco Use Disorder who was brought to the ED by EMS after his girlfriend called 911 while the patient was severely intoxicated. He is admitted to Hospital Medicine for Alcohol Withdrawal, possible Wernicke's Encephalopathy, and Acute Cystitis. Pt continues to have gait instability, weakness, recurrent falls when he attempts to get out of bed, has poor insight into his addiction and encephalopathy and poor decision making. Psychiatry consulted, pt determined not to have capacity to make own medical decision at this time.     Interval hx:   Pt was seen and examined at bedside. Overnight, no acute events, no new complaints this morning. He was sitting up in bed, awake, alert and conversant. He was able to partake in a discussion about his health today. He was noted to be more clear minded today than any other time during this admission.     ROS (Positive in Bold, otherwise negative)   Constitutional: fever, chills, night sweats  CV: chest pain, edema, palpitations  Resp: SOB, cough, sputum production  GI: changes in appetite, NVDC, pain, melena, hematochezia, GERD, hematemesis  : Dysuria, hematuria, urinary urgency, frequency  MSK: arthralgia/myalgia, joint swelling  Neuro/Psych: anxiety, depression    PEx   Temp:  [97.5 °F (36.4 °C)-98.2 °F (36.8 °C)]   Pulse:  [79-83]   Resp:  [16-20]   BP: (114-144)/(65-82)   SpO2:  [97 %-99 %]      I & O (Last 24H):   No intake or output data in the 24 hours ending 10/24/18 2043    General:  male  in no acute distress. Sitting up in bed   HEENT: NCAT. PERRL. EOMI. Sclera  Anicteric.  CVS: RRR. Normal S1 S2. No murmurs  Pulm: CTAB. Normal respiratory effort. No wheezes, rhonchi, or crackles.  Abdomen: Soft. Non-distended. No tenderness to palpation. No rebound or guarding. +BS.  Extremities: No edema. No cyanosis. Full ROM.  Skin: multiple scabbed lesions on bilateral feet, thickened skin on bilateral dorsal feet worse at the toes consistent with onychomycosis, no interdigital pits or evidence of scabies, large linear laceration with secondary healing on right foot, no surrounding erythema or induration  Neuro: Alert, oriented x 2, Spont mvt of all extremities with no focal deficits noted.    No results found for this or any previous visit (from the past 24 hour(s)).    No results for input(s): POCTGLUCOSE in the last 168 hours.    Hemoglobin A1C   Date Value Ref Range Status   10/05/2018 5.3 4.0 - 5.6 % Final     Comment:     ADA Screening Guidelines:  5.7-6.4%  Consistent with prediabetes  >or=6.5%  Consistent with diabetes  High levels of fetal hemoglobin interfere with the HbA1C  assay. Heterozygous hemoglobin variants (HbS, HgC, etc)do  not significantly interfere with this assay.   However, presence of multiple variants may affect accuracy.     08/10/2018 4.4 4.0 - 5.6 % Final     Comment:     ADA Screening Guidelines:  5.7-6.4%  Consistent with prediabetes  >or=6.5%  Consistent with diabetes  High levels of fetal hemoglobin interfere with the HbA1C  assay. Heterozygous hemoglobin variants (HbS, HgC, etc)do  not significantly interfere with this assay.   However, presence of multiple variants may affect accuracy.          Active Hospital Problems    Diagnosis  POA    *Alcohol withdrawal [F10.239]  Yes    Major neurocognitive disorder [F01.50]  Unknown    Impaired mobility and ADLs [Z74.09]  Unknown    Ulcer of toe of left foot [L97.529]  Yes    Abrasion [T14.8XXA]  Yes    Alcohol withdrawal delirium [F10.231]  Yes    Mental health disorder [F99]  Yes    Wernicke  encephalopathy [E51.2]  Yes    Alcoholic peripheral neuropathy [G62.1]  Yes    Wound of lower extremity [S81.169A]  Yes    Alcohol use disorder, severe, dependence [F10.20]  Yes     Chronic    Hyponatremia [E87.1]  Yes      Resolved Hospital Problems   No resolved problems to display.      Assessment and Plan for problems addressed today:      amLODIPine  5 mg Oral Daily    enoxparin  40 mg Subcutaneous Daily    folic acid  1 mg Oral Daily    gabapentin  300 mg Oral BID    magnesium oxide  400 mg Oral BID    miconazole nitrate 2%   Topical (Top) BID    multivitamin  1 tablet Oral Daily    nicotine  1 patch Transdermal Daily    OLANZapine  10 mg Oral QHS    thiamine  100 mg Oral Daily     OLANZapine, OLANZapine, sodium chloride 0.9%    Wernicke's Encephalopathy (WE) with Dementia and Behavioral Disturbance  Severe Alcohol Use Disorder   Alcohol Withdrawal (Resloved)  Alcoholic Neuropathy - Fall Risk, Requiring Restraints  - Medically, he is outside the window for Alcohol Withdrawal and he has completed the appropriate course of IV Thiamine for Wernicke's Encephalopathy. Continues to have severe gait instability and has failed several attempts without restrains and sitter at bedside. Would favor eventual transfer of patient to Memory Unit vs. Inpatient Rehab capable of taking patient needing restrains ([facility] possibly accepting these types of patients?)  -Zyprexa 10 mg HS scheduled and Zyprexa 5 mg PO/IM q8 PRN non-redirectable agitation. Maximum total daily dose of 30 mg   -start gabapentin 300mg BID for neuropathy and cravings   -psychiatry consulted, appreciate recs  -refrain from using physical restraints as much as possible   -For Wernicke's Encephalopathy: Finished course of IV Thiamine, transitioned to Thiamine 100 mg PO daily. Folic acid and MVI daily.   -Valium taper completed on 10/15.   -CMP, Mg, Phos - spaced to q48 hours  -Fall, aspiration, and seizure precautions  -PT/OT  consulted     Multiple Bilateral Lower Extremity Wounds  Onychomycosis of Bilateral Feet   -Wound Care consulted, appreciate assistance - have debrided wounds, applying medi-honey, no evidence of active infection. Miconazole ointment to toes  -Podiatry was consulted     Tobacco Use Disorder  -Nicotine 21 mg patch daily     DVT PPx: Lovenox SubQ Daily    Diet: Regular  Discharge plan and follow up: Plan for possible D/C to SNF vs LTC vs memory unit     Sobia Ortiz MD  Hospital Medicine Staff  [phone] pager

## 2018-10-25 NOTE — PLAN OF CARE
Problem: Occupational Therapy Goal  Goal: Occupational Therapy Goal  Goals to be met by: 11/5  Patient will increase functional independence with ADLs by performing:    UE Dressing with Modified [place] while seated.  LE Dressing with Modified [place].  Grooming while standing at sink with Min A.  Toileting from toilet with Minimum A for hygiene and clothing management.   Supine to sit with Supervision and HOB flat. MET 10/24  *revised: with independence  Stand pivot transfers with CGA to chair and/or toilet.  Functional mobility with AD as needed for short household distance with no LOB with min A.        Outcome: Ongoing (interventions implemented as appropriate)  Goals remain appropriate. SAM Almaraz 10/25/2018

## 2018-10-25 NOTE — PT/OT/SLP PROGRESS
Physical Therapy Treatment    Patient Name:  Mohamud Patel   MRN:  [id number]    Recommendations:     Discharge Recommendations:  rehabilitation facility   Discharge Equipment Recommendations: other (see comments)(TBD)   Barriers to discharge: Decreased caregiver support    Assessment:     Mohamud Patel is a 57 y.o. male admitted with a medical diagnosis of Alcohol withdrawal.  He presents with the following impairments/functional limitations:  weakness, gait instability, decreased lower extremity function, impaired balance, impaired endurance, decreased safety awareness, impaired functional mobilty, decreased coordination, impaired coordination, impaired cardiopulmonary response to activity, impaired cognition, impaired self care skills. Pt performed bed mobility S and transfers CGA with RW. Pt amb ~20ft min/mod A with RW, ataxia requiring assist with stability, AD management and WS; x2 trials with seated rest break. Pt will continue to benefit from skilled PT to improve deficits and increase overall functional mobility.     Rehab Prognosis:  Good; patient would benefit from acute skilled PT services to address these deficits and reach maximum level of function.      Recent Surgery: * No surgery found *      Plan:     During this hospitalization, patient to be seen 3 x/week to address the above listed problems via gait training, therapeutic exercises, therapeutic activities, neuromuscular re-education  · Plan of Care Expires:  11/05/18   Plan of Care Reviewed with: patient    Subjective     Communicated with RN prior to session.  Patient found supine in bed upon PT entry to room, agreeable to treatment.      Chief Complaint: weakness  Patient comments/goals: return home  Pain/Comfort:  · Pain Rating 1: 0/10  · Pain Rating Post-Intervention 1: 0/10    Patients cultural, spiritual, Taoism conflicts given the current situation: none    Objective:     General Precautions: Standard, aspiration, fall,  seizure   Orthopedic Precautions:N/A   Braces: N/A     Functional Mobility:  Bed Mobility:     · Supine to Sit: supervision  · Sit to Supine: supervision    Transfers:     · Sit to Stand:  contact guard assistance with rolling walker; from EOB and chair x5 trials    Gait: ~20ft min/mod A with RW, ataxia requiring assist with stability, AD management and WS; x2 trials with seated rest break      AM-PAC 6 CLICK MOBILITY  Turning over in bed (including adjusting bedclothes, sheets and blankets)?: 4  Sitting down on and standing up from a chair with arms (e.g., wheelchair, bedside commode, etc.): 3  Moving from lying on back to sitting on the side of the bed?: 3  Moving to and from a bed to a chair (including a wheelchair)?: 3  Need to walk in hospital room?: 2  Climbing 3-5 steps with a railing?: 1  Basic Mobility Total Score: 16       Therapeutic Activities and Exercises:  Pt sat EOB with S.  Pt performed standing marching min A with RW x10 reps, focusing on symmetry and coordination; x2 trials.  Pt educated on:  -safety with mobility  -importance of OOB activity  Pt safe to perform transfers with RN staff with RW.     Patient left HOB elevated with all lines intact, call button in reach and RN notified..    GOALS:   Multidisciplinary Problems     Physical Therapy Goals        Problem: Physical Therapy Goal    Goal Priority Disciplines Outcome Goal Variances Interventions   Physical Therapy Goal     PT, PT/OT Ongoing (interventions implemented as appropriate)     Description:  Goals to be met by: 18     Patient will increase functional independence with mobility by performin. Sit to stand transfer with Stand-by Assistance-met   Revised: sit to stand transfer with Supervision.   2. Bed to chair transfer with Stand-by Assistance using LRD.   3. Gait  x 100 feet with Contact Guard Assistance using Rolling Walker or LRD>   4. Lower extremity exercise program x20  reps per handout, with independence                          Time Tracking:     PT Received On: 10/25/18  PT Start Time: 1232     PT Stop Time: 1257  PT Total Time (min): 25 min     Billable Minutes: Gait Training 15 and Therapeutic Exercise 10    Treatment Type: Treatment  PT/PTA: PT     PTA Visit Number: 0     GALINA MAYA, PT  10/25/2018

## 2018-10-25 NOTE — PT/OT/SLP PROGRESS
"Occupational Therapy   Treatment    Name: Mohamud Patel  MRN: [id number]  Admitting Diagnosis:  Alcohol withdrawal       Recommendations:     Discharge Recommendations: rehabilitation facility  Discharge Equipment Recommendations:  (TBD at next level of care)  Barriers to discharge:  Inaccessible home environment, Decreased caregiver support    Subjective   "I need to be self sufficient."    Communicated with: RN (Kailyn) prior to session. Pt's father and step mother present for session. Bedside sitter present for session.   Pain/Comfort:  · Pain Rating 1: 0/10    Patients cultural, spiritual, Church conflicts given the current situation: none reported     Objective:     Patient found with: (no active lines)    General Precautions: Standard, aspiration, fall, seizure   Orthopedic Precautions:N/A   Braces: N/A     Occupational Performance:    Bed Mobility:    · Patient completed Rolling/Turning to Right with supervision  · Patient completed Scooting/Bridging with supervision  · Patient completed Supine to Sit with supervision  · Patient completed Sit to Supine with supervision     Functional Mobility/Transfers:  · Patient completed Sit <> Stand Transfer with minimum assistance  with  no assistive device x3 trials  · Min A for balance with unilateral UE support, mod-max A for balance with no UE support  · Min A and unilateral UE support for weight shifting x10 trials   · Min A and unilateral UE support for alternating legs for stepping foot forward then backward into place x10 trials   · Functional Mobility: Pt completed one step forward and one step backward with unilateral UE support and max A for balance   · Pt stated he believes his neuropathy is impacting his ability to walk. Edu provided for ataxia and impact on functional mobility.     Activities of Daily Living:  · Upper Body Dressing: stand by assistance to don gown like jacket while seated EOB    · Independently completed 2/4 steps for tie closure; " "increased time and max A to complete last 2/4     Patient left with bed in chair position with all lines intact, call button in reach and CM notified of discussion with pt and family. Pt's father, step mother, and bedside sitter present.     [facility] 6 Click:  [facility] Total Score: 20    Treatment & Education:  -Edu for OT role and POC  -Edu for importance of OOB activity and impact on healing  -Pt stated correct day of week, month, and date by looking at communication board, max cueing for year. Pt oriented to place independently. Pt independently and accurately recalling circumstances and experiences with his  wife--father and step mother confirmed accuracy.   -Delayed recall: unable to recall year after ~20min; recalled 2/3 items he ate for breakfast with ~2hr delay  -Detailed and lengthy discussion with pt and family re: ot role/domain of practice and poc; role of ot at rehab facility vs acute care and frequency of sessions; OT vs PT; progression with therapy throughout hospital stay and significant improvement in cognition; appropriateness of rehab due to increased cognition and ability to participate over the past 2 days; possibilities for long term planning after discharge and goals for therapy; barriers to rehab along with recent successes    -called CM to let her know that pt and family agreeable to rehab. Pt's father stated he has a SW from [facility]" assisting him with placement and planning; this was relayed to CM in order to get team and  family in agreement with discharge plan.   -Edu to family and sitter for pt safe to sit EOB for meals, utilize BSC, or go in chair to window in hallway all with staff assist   -Whiteboard updated   -Questions and concerns addressed within OT scope of practice   Education:    Assessment:     Mohamud Greg Patel is a 57 y.o. male with a medical diagnosis of Alcohol withdrawal.  He presents with decreased functional independence in various areas of his life. " He demo good motivation and willingness to participate. He demo significantly improved cognition this date; this has carried over from session yesterday. Pt remains disoriented over all, but this is much improved. He demo increased safety awareness and increased insight to his deficits. He continues to demo decreased memory, but this seems to be improving. He demo decreased balance overall, but improved as compared to previous sessions. He demo increased ADL skills, but is limited by impaired balance and functional mobility. Pt demo decreased endurance to functional task. Due to pt's overall significant improvement in cognition and functional skills, pt is now appropriate for inpatient rehab in order to return to his prior roles and routines. Pt would continue to benefit from skilled OT services for maximum functional outcome and safety. Performance deficits affecting function are weakness, impaired endurance, impaired self care skills, impaired functional mobilty, decreased coordination, impaired cognition, impaired balance, gait instability, decreased upper extremity function, decreased lower extremity function, impaired cardiopulmonary response to activity, impaired coordination, decreased safety awareness, impaired fine motor.      Rehab Prognosis:  good; patient would benefit from acute skilled OT services to address these deficits and reach maximum level of function.       Plan:     Patient to be seen 3 x/week to address the above listed problems via self-care/home management, therapeutic activities, therapeutic exercises  · Plan of Care Expires: 11/04/18  · Plan of Care Reviewed with: patient    This Plan of care has been discussed with the patient who was involved in its development and understands and is in agreement with the identified goals and treatment plan    GOALS:   Multidisciplinary Problems     Occupational Therapy Goals        Problem: Occupational Therapy Goal    Goal Priority Disciplines Outcome  Interventions   Occupational Therapy Goal     OT, PT/OT Ongoing (interventions implemented as appropriate)    Description:  Goals to be met by: 11/5  Patient will increase functional independence with ADLs by performing:    UE Dressing with Modified [place] while seated.  LE Dressing with Modified [place].  Grooming while standing at sink with Min A.  Toileting from toilet with Minimum A for hygiene and clothing management.   Supine to sit with Supervision and HOB flat. MET 10/24  *revised: with independence  Stand pivot transfers with CGA to chair and/or toilet.  Functional mobility with AD as needed for short household distance with no LOB with min A.                         Time Tracking:     OT Date of Treatment: 10/25/18  OT Start Time: 0949  OT Stop Time: 1042  OT Total Time (min): 53 min    Billable Minutes:Self Care/Home Management 23  Therapeutic Activity 30    SAM Almaraz  10/25/2018

## 2018-10-26 PROCEDURE — 63600175 PHARM REV CODE 636 W HCPCS: Performed by: HOSPITALIST

## 2018-10-26 PROCEDURE — 99232 SBSQ HOSP IP/OBS MODERATE 35: CPT | Mod: ,,, | Performed by: HOSPITALIST

## 2018-10-26 PROCEDURE — 11000001 HC ACUTE MED/SURG PRIVATE ROOM

## 2018-10-26 PROCEDURE — 25000003 PHARM REV CODE 250: Performed by: HOSPITALIST

## 2018-10-26 PROCEDURE — S4991 NICOTINE PATCH NONLEGEND: HCPCS | Performed by: HOSPITALIST

## 2018-10-26 RX ORDER — GABAPENTIN 300 MG/1
300 CAPSULE ORAL 3 TIMES DAILY
Status: DISCONTINUED | OUTPATIENT
Start: 2018-10-26 | End: 2018-10-31

## 2018-10-26 RX ADMIN — GABAPENTIN 300 MG: 300 CAPSULE ORAL at 04:10

## 2018-10-26 RX ADMIN — Medication 400 MG: at 09:10

## 2018-10-26 RX ADMIN — MICONAZOLE NITRATE: 20 OINTMENT TOPICAL at 08:10

## 2018-10-26 RX ADMIN — ENOXAPARIN SODIUM 40 MG: 100 INJECTION SUBCUTANEOUS at 04:10

## 2018-10-26 RX ADMIN — MICONAZOLE NITRATE: 20 OINTMENT TOPICAL at 09:10

## 2018-10-26 RX ADMIN — Medication 400 MG: at 08:10

## 2018-10-26 RX ADMIN — THERA TABS 1 TABLET: TAB at 09:10

## 2018-10-26 RX ADMIN — OLANZAPINE 10 MG: 10 TABLET, FILM COATED ORAL at 08:10

## 2018-10-26 RX ADMIN — OLANZAPINE 5 MG: 5 TABLET, FILM COATED ORAL at 09:10

## 2018-10-26 RX ADMIN — NICOTINE 1 PATCH: 21 PATCH, EXTENDED RELEASE TRANSDERMAL at 08:10

## 2018-10-26 RX ADMIN — AMLODIPINE BESYLATE 5 MG: 5 TABLET ORAL at 09:10

## 2018-10-26 RX ADMIN — GABAPENTIN 300 MG: 300 CAPSULE ORAL at 09:10

## 2018-10-26 RX ADMIN — OLANZAPINE 5 MG: 5 TABLET, FILM COATED ORAL at 04:10

## 2018-10-26 RX ADMIN — GABAPENTIN 300 MG: 300 CAPSULE ORAL at 08:10

## 2018-10-26 RX ADMIN — OLANZAPINE 5 MG: 5 TABLET, FILM COATED ORAL at 12:10

## 2018-10-26 RX ADMIN — FOLIC ACID 1 MG: 1 TABLET ORAL at 09:10

## 2018-10-26 RX ADMIN — Medication 100 MG: at 09:10

## 2018-10-26 NOTE — PLAN OF CARE
Pt w/ updated notes sent to [facility] and [facility]. [facility] noted that [facility] declined pt in [facility]. [facility] is following - of note, no beds at [facility] until Monday 10/29.    Update: [facility] provided a rehab list to pt for pt/father to review tmw. CM placed in windowsill and will f/u w/ pt's father in the AM as sitter reports he is usually here approx 10am.

## 2018-10-26 NOTE — PLAN OF CARE
Problem: Fall Risk (Adult)  Goal: Identify Related Risk Factors and Signs and Symptoms  Related risk factors and signs and symptoms are identified upon initiation of Human Response Clinical Practice Guideline (CPG)  Outcome: Ongoing (interventions implemented as appropriate)   10/26/18 1626   Fall Risk   Related Risk Factors (Fall Risk) confusion/agitation;culprit medication(s);gait/mobility problems;history of falls;polypharmacy   Signs and Symptoms (Fall Risk) presence of risk factors     Goal: Absence of Falls  Patient will demonstrate the desired outcomes by discharge/transition of care.  Outcome: Ongoing (interventions implemented as appropriate)   10/26/18 1626   Fall Risk (Adult)   Absence of Falls making progress toward outcome       Problem: Patient Care Overview  Goal: Plan of Care Review  Outcome: Ongoing (interventions implemented as appropriate)  Pt turns and repositions self independently. No skin breakdown noted. Pt pain and safety monitored q 1-2 hrs this shift. t. Dressing changed to left great toe, wound care completed to other areas.  Bed locked and in lowest position. Rails elevated x 3. Brakes on. Call light and personal belongings in reach. Will continue to monitor.    Problem: Pressure Ulcer Risk (Dereje Scale) (Adult,Obstetrics,Pediatric)  Goal: Identify Related Risk Factors and Signs and Symptoms  Related risk factors and signs and symptoms are identified upon initiation of Human Response Clinical Practice Guideline (CPG)  Outcome: Ongoing (interventions implemented as appropriate)   10/26/18 1626   Pressure Ulcer Risk (Dereje Scale)   Related Risk Factors (Pressure Ulcer Risk (Dereje Scale)) hospitalization prolonged;mental impairment;mobility impaired;cognitive impairment     Goal: Skin Integrity  Patient will demonstrate the desired outcomes by discharge/transition of care.  Outcome: Ongoing (interventions implemented as appropriate)   10/26/18 1626   Pressure Ulcer Risk (Dereje Scale)  (Adult,Obstetrics,Pediatric)   Skin Integrity making progress toward outcome       Problem: Skin Integrity Impairment, Risk/Actual (Adult)  Goal: Identify Related Risk Factors and Signs and Symptoms  Related risk factors and signs and symptoms are identified upon initiation of Human Response Clinical Practice Guideline (CPG)  Outcome: Ongoing (interventions implemented as appropriate)   10/26/18 1626   Skin Integrity Impairment, Risk/Actual   Related Risk Factors (Skin Integrity Impairment, Risk/Actual) cognitive impairment;medication effects     Goal: Skin Integrity/Wound Healing  Patient will demonstrate the desired outcomes by discharge/transition of care.  Outcome: Ongoing (interventions implemented as appropriate)   10/26/18 1626   Skin Integrity Impairment, Risk/Actual (Adult)   Skin Integrity/Wound Healing making progress toward outcome

## 2018-10-26 NOTE — PLAN OF CARE
Problem: Patient Care Overview  Goal: Plan of Care Review  Outcome: Ongoing (interventions implemented as appropriate)  VS and assessment performed per orders. Pt remains confused, constantly attempting to climb out of bed requiring constant reorientation and redirection. Pt compliant with all meds. Pt free of falls or injury. Telesitter and bed alarm in use.

## 2018-10-26 NOTE — PROGRESS NOTES
"Hospital Medicine   Progress note      [place] [facility] [place] [place] Sobia Ortiz MD   Admit Date: 10/4/2018   Hospital Day: 21  YONAS: 10/30/2018   Code status: Full Code   Principal Problem: Alcohol withdrawal     Summary:  Mohamud Patel is a 57 y.o. man with a history of Severe Alcohol Abuse, Chronic Wounds of Bilateral LE's with Onychomycosis, Alcoholic Neuropathy, Hyponatremia, and Tobacco Use Disorder who was brought to the ED by EMS after his girlfriend called 911 while the patient was severely intoxicated. He is admitted to Hospital Medicine for Alcohol Withdrawal, possible Wernicke's Encephalopathy, and Acute Cystitis. Pt continues to have gait instability, weakness, recurrent falls when he attempts to get out of bed, has poor insight into his addiction and encephalopathy and poor decision making. Psychiatry consulted, pt determined not to have capacity to make own medical decision at this time.     Interval hx:   Pt was seen and examined at bedside. Pt was more confused overnight and attemted to get out of bed. This morning, he was seen sitting up in the chair. He was only oriented x1 (did not know place or time). Pt was confused and continued to attempt to get up to "go to the car and get my phone". Pt was given PRN zyprexa x1.      ROS (Positive in Bold, otherwise negative)   Constitutional: fever, chills, night sweats  CV: chest pain, edema, palpitations  Resp: SOB, cough, sputum production  GI: changes in appetite, NVDC, pain, melena, hematochezia, GERD, hematemesis  : Dysuria, hematuria, urinary urgency, frequency  MSK: arthralgia/myalgia, joint swelling  Neuro/Psych: anxiety, depression    PEx   Temp:  [98 °F (36.7 °C)-98.7 °F (37.1 °C)]   Pulse:  [74-90]   Resp:  [16-18]   BP: (128-141)/(76-83)   SpO2:  [96 %-100 %]      I & O (Last 24H):     Intake/Output Summary (Last 24 hours) at 10/26/2018 1130  Last data filed at 10/26/2018 0600  Gross per 24 hour   Intake 720 ml   Output 400 ml   Net 320 ml "       General:  male  in no acute distress. Sitting up in chair, slightly confused and agitated  HEENT: NCAT. PERRL. EOMI. Sclera Anicteric.  CVS: RRR. Normal S1 S2. No murmurs  Pulm: CTAB. Normal respiratory effort. No wheezes, rhonchi, or crackles.  Abdomen: Soft. Non-distended. No tenderness to palpation. No rebound or guarding. +BS.  Extremities: No edema. No cyanosis. Full ROM.  Skin: multiple scabbed lesions on bilateral feet, thickened skin on bilateral dorsal feet worse at the toes consistent with onychomycosis, no interdigital pits or evidence of scabies, large linear laceration with secondary healing on right foot, no surrounding erythema or induration  Neuro: Alert, oriented x 1, Spont mvt of all extremities with no focal deficits noted.    No results found for this or any previous visit (from the past 24 hour(s)).    No results for input(s): POCTGLUCOSE in the last 168 hours.    Hemoglobin A1C   Date Value Ref Range Status   10/05/2018 5.3 4.0 - 5.6 % Final     Comment:     ADA Screening Guidelines:  5.7-6.4%  Consistent with prediabetes  >or=6.5%  Consistent with diabetes  High levels of fetal hemoglobin interfere with the HbA1C  assay. Heterozygous hemoglobin variants (HbS, HgC, etc)do  not significantly interfere with this assay.   However, presence of multiple variants may affect accuracy.     08/10/2018 4.4 4.0 - 5.6 % Final     Comment:     ADA Screening Guidelines:  5.7-6.4%  Consistent with prediabetes  >or=6.5%  Consistent with diabetes  High levels of fetal hemoglobin interfere with the HbA1C  assay. Heterozygous hemoglobin variants (HbS, HgC, etc)do  not significantly interfere with this assay.   However, presence of multiple variants may affect accuracy.          Active Hospital Problems    Diagnosis  POA    *Alcohol withdrawal [F10.239]  Yes    Major neurocognitive disorder [F01.50]  Unknown    Impaired mobility and ADLs [Z74.09]  Unknown    Ulcer of toe of left foot [L97.529]   Yes    Abrasion [T14.8XXA]  Yes    Alcohol withdrawal delirium [F10.231]  Yes    Mental health disorder [F99]  Yes    Wernicke encephalopathy [E51.2]  Yes    Alcoholic peripheral neuropathy [G62.1]  Yes    Wound of lower extremity [S81.809A]  Yes    Alcohol use disorder, severe, dependence [F10.20]  Yes     Chronic    Hyponatremia [E87.1]  Yes      Resolved Hospital Problems   No resolved problems to display.      Assessment and Plan for problems addressed today:      amLODIPine  5 mg Oral Daily    enoxparin  40 mg Subcutaneous Daily    folic acid  1 mg Oral Daily    gabapentin  300 mg Oral BID    magnesium oxide  400 mg Oral BID    miconazole nitrate 2%   Topical (Top) BID    multivitamin  1 tablet Oral Daily    nicotine  1 patch Transdermal Daily    OLANZapine  10 mg Oral QHS    thiamine  100 mg Oral Daily     OLANZapine, OLANZapine, sodium chloride 0.9%    Wernicke's Encephalopathy (WE) with Dementia and Behavioral Disturbance  Severe Alcohol Use Disorder   Alcohol Withdrawal (Resloved)  Alcoholic Neuropathy - Fall Risk, Requiring Restraints  - Medically, he is outside the window for Alcohol Withdrawal and he has completed the appropriate course of IV Thiamine for Wernicke's Encephalopathy. Continues to have severe gait instability and has failed several attempts without restrains and sitter at bedside. Would favor eventual transfer of patient to Memory Unit vs. Inpatient Rehab capable of taking patient needing restrains ([facility] possibly accepting these types of patients?)  -Zyprexa 10 mg HS scheduled and Zyprexa 5 mg PO/IM q8 PRN non-redirectable agitation. Maximum total daily dose of 30 mg   -obtain EKG to assess QTc  -increase gabapentin to 300mg TID for neuropathy and cravings   -psychiatry consulted, appreciate recs  -refrain from using physical restraints as much as possible   -For Wernicke's Encephalopathy: Finished course of IV Thiamine, transitioned to Thiamine 100 mg PO daily.  Folic acid and MVI daily.   -Valium taper completed on 10/15.   -CMP, Mg, Phos - spaced to q48 hours  -Fall, aspiration, and seizure precautions  -PT/OT consulted, performed well with OT yesterday  -IRP consult placed for ongoing rehab      Multiple Bilateral Lower Extremity Wounds  Onychomycosis of Bilateral Feet   -Wound Care consulted, appreciate assistance - have debrided wounds, applying medi-honey, no evidence of active infection. Miconazole ointment to toes  -Podiatry was consulted, no other acute interventions at this time      Tobacco Use Disorder  -Nicotine 21 mg patch daily     DVT PPx: Lovenox SubQ Daily    Diet: Regular  Discharge plan and follow up: Plan for possible D/C to SNF vs LTC vs memory unit     Sobia Ortiz MD  Hospital Medicine Staff  [phone] pager

## 2018-10-27 LAB
ANION GAP SERPL CALC-SCNC: 7 MMOL/L
BUN SERPL-MCNC: 10 MG/DL
CALCIUM SERPL-MCNC: 10 MG/DL
CHLORIDE SERPL-SCNC: 106 MMOL/L
CO2 SERPL-SCNC: 25 MMOL/L
CREAT SERPL-MCNC: 0.7 MG/DL
EST. GFR  (AFRICAN AMERICAN): >60 ML/MIN/1.73 M^2
EST. GFR  (NON AFRICAN AMERICAN): >60 ML/MIN/1.73 M^2
GLUCOSE SERPL-MCNC: 102 MG/DL
MAGNESIUM SERPL-MCNC: 1.6 MG/DL
PHOSPHATE SERPL-MCNC: 4 MG/DL
POTASSIUM SERPL-SCNC: 3.7 MMOL/L
SODIUM SERPL-SCNC: 138 MMOL/L

## 2018-10-27 PROCEDURE — 99232 SBSQ HOSP IP/OBS MODERATE 35: CPT | Mod: ,,, | Performed by: HOSPITALIST

## 2018-10-27 PROCEDURE — S4991 NICOTINE PATCH NONLEGEND: HCPCS | Performed by: HOSPITALIST

## 2018-10-27 PROCEDURE — 36415 COLL VENOUS BLD VENIPUNCTURE: CPT

## 2018-10-27 PROCEDURE — 80048 BASIC METABOLIC PNL TOTAL CA: CPT

## 2018-10-27 PROCEDURE — 11000001 HC ACUTE MED/SURG PRIVATE ROOM

## 2018-10-27 PROCEDURE — 25000003 PHARM REV CODE 250: Performed by: HOSPITALIST

## 2018-10-27 PROCEDURE — 83735 ASSAY OF MAGNESIUM: CPT

## 2018-10-27 PROCEDURE — 84100 ASSAY OF PHOSPHORUS: CPT

## 2018-10-27 PROCEDURE — 63600175 PHARM REV CODE 636 W HCPCS: Performed by: HOSPITALIST

## 2018-10-27 PROCEDURE — 93005 ELECTROCARDIOGRAM TRACING: CPT

## 2018-10-27 RX ADMIN — MICONAZOLE NITRATE: 20 OINTMENT TOPICAL at 08:10

## 2018-10-27 RX ADMIN — NICOTINE 1 PATCH: 21 PATCH, EXTENDED RELEASE TRANSDERMAL at 08:10

## 2018-10-27 RX ADMIN — ENOXAPARIN SODIUM 40 MG: 100 INJECTION SUBCUTANEOUS at 04:10

## 2018-10-27 RX ADMIN — OLANZAPINE 10 MG: 10 TABLET, FILM COATED ORAL at 08:10

## 2018-10-27 RX ADMIN — Medication 100 MG: at 08:10

## 2018-10-27 RX ADMIN — Medication 400 MG: at 08:10

## 2018-10-27 RX ADMIN — AMLODIPINE BESYLATE 5 MG: 5 TABLET ORAL at 08:10

## 2018-10-27 RX ADMIN — GABAPENTIN 300 MG: 300 CAPSULE ORAL at 08:10

## 2018-10-27 RX ADMIN — FOLIC ACID 1 MG: 1 TABLET ORAL at 08:10

## 2018-10-27 RX ADMIN — GABAPENTIN 300 MG: 300 CAPSULE ORAL at 04:10

## 2018-10-27 RX ADMIN — THERA TABS 1 TABLET: TAB at 08:10

## 2018-10-27 NOTE — PROGRESS NOTES
Wounds on both first toes on bilateral feet and abrasion on bilateral lower leg cleaned and new dressing applied as order for pt. Pt denied any pain.

## 2018-10-27 NOTE — PROGRESS NOTES
"Hospital Medicine   Progress note      Team: [facility] [place] [facility] R Sobia Ortiz MD   Admit Date: 10/4/2018   Hospital Day: 22  YONAS: 10/30/2018   Code status: Full Code   Principal Problem: Alcohol withdrawal     Summary:  Mohamud Patel is a 57 y.o. man with a history of Severe Alcohol Abuse, Chronic Wounds of Bilateral LE's with Onychomycosis, Alcoholic Neuropathy, Hyponatremia, and Tobacco Use Disorder who was brought to the ED by EMS after his girlfriend called 911 while the patient was severely intoxicated. He is admitted to Hospital Medicine for Alcohol Withdrawal, possible Wernicke's Encephalopathy, and Acute Cystitis. Pt continues to have gait instability, weakness, recurrent falls when he attempts to get out of bed, has poor insight into his addiction and encephalopathy and poor decision making. Psychiatry consulted, pt determined not to have capacity to make own medical decision at this time.     Interval hx:   Pt was seen and examined at bedside. Pt continues to be confused today. Pt has a bedside sitter and mentions that "i'm just staying at my friends house for now". Complains about BLE neuropathy but improving with the gabapentin.       ROS (Positive in Bold, otherwise negative)   Constitutional: fever, chills, night sweats  CV: chest pain, edema, palpitations  Resp: SOB, cough, sputum production  GI: changes in appetite, NVDC, pain, melena, hematochezia, GERD, hematemesis  : Dysuria, hematuria, urinary urgency, frequency  MSK: arthralgia/myalgia, joint swelling  Neuro/Psych: anxiety, depression, confusion    PEx   Temp:  [97.4 °F (36.3 °C)-98 °F (36.7 °C)]   Pulse:  [76-88]   Resp:  [16-19]   BP: (143-158)/(76-79)   SpO2:  [97 %-100 %]      I & O (Last 24H):     Intake/Output Summary (Last 24 hours) at 10/27/2018 1606  Last data filed at 10/27/2018 1147  Gross per 24 hour   Intake --   Output 650 ml   Net -650 ml       General:  male  in no acute distress. Sitting up in bed, slightly " confused   HEENT: NCAT. PERRL. EOMI. Sclera Anicteric.  CVS: RRR. Normal S1 S2. No murmurs  Pulm: CTAB. Normal respiratory effort. No wheezes, rhonchi, or crackles.  Abdomen: Soft. Non-distended. No tenderness to palpation. No rebound or guarding. +BS.  Extremities: No edema. No cyanosis. Full ROM.  Skin: multiple scabbed lesions on bilateral feet, thickened skin on bilateral dorsal feet worse at the toes consistent with onychomycosis, no interdigital pits or evidence of scabies, large linear laceration with secondary healing on right foot, no surrounding erythema or induration  Neuro: Alert, oriented x 1, Spont mvt of all extremities with no focal deficits noted.    Recent Results (from the past 24 hour(s))   Basic metabolic panel    Collection Time: 10/27/18  7:02 AM   Result Value Ref Range    Sodium 138 136 - 145 mmol/L    Potassium 3.7 3.5 - 5.1 mmol/L    Chloride 106 95 - 110 mmol/L    CO2 25 23 - 29 mmol/L    Glucose 102 70 - 110 mg/dL    BUN, Bld 10 6 - 20 mg/dL    Creatinine 0.7 0.5 - 1.4 mg/dL    Calcium 10.0 8.7 - 10.5 mg/dL    Anion Gap 7 (L) 8 - 16 mmol/L    eGFR if African American >60.0 >60 mL/min/1.73 m^2    eGFR if non African American >60.0 >60 mL/min/1.73 m^2   Magnesium    Collection Time: 10/27/18  7:02 AM   Result Value Ref Range    Magnesium 1.6 1.6 - 2.6 mg/dL   Phosphorus    Collection Time: 10/27/18  7:02 AM   Result Value Ref Range    Phosphorus 4.0 2.7 - 4.5 mg/dL       No results for input(s): POCTGLUCOSE in the last 168 hours.    Hemoglobin A1C   Date Value Ref Range Status   10/05/2018 5.3 4.0 - 5.6 % Final     Comment:     ADA Screening Guidelines:  5.7-6.4%  Consistent with prediabetes  >or=6.5%  Consistent with diabetes  High levels of fetal hemoglobin interfere with the HbA1C  assay. Heterozygous hemoglobin variants (HbS, HgC, etc)do  not significantly interfere with this assay.   However, presence of multiple variants may affect accuracy.     08/10/2018 4.4 4.0 - 5.6 % Final      Comment:     ADA Screening Guidelines:  5.7-6.4%  Consistent with prediabetes  >or=6.5%  Consistent with diabetes  High levels of fetal hemoglobin interfere with the HbA1C  assay. Heterozygous hemoglobin variants (HbS, HgC, etc)do  not significantly interfere with this assay.   However, presence of multiple variants may affect accuracy.          Active Hospital Problems    Diagnosis  POA    *Alcohol withdrawal [F10.239]  Yes    Major neurocognitive disorder [F01.50]  Unknown    Impaired mobility and ADLs [Z74.09]  Unknown    Ulcer of toe of left foot [L97.529]  Yes    Abrasion [T14.8XXA]  Yes    Alcohol withdrawal delirium [F10.231]  Yes    Mental health disorder [F99]  Yes    Wernicke encephalopathy [E51.2]  Yes    Alcoholic peripheral neuropathy [G62.1]  Yes    Wound of lower extremity [S81.809A]  Yes    Alcohol use disorder, severe, dependence [F10.20]  Yes     Chronic    Hyponatremia [E87.1]  Yes      Resolved Hospital Problems   No resolved problems to display.      Assessment and Plan for problems addressed today:      amLODIPine  5 mg Oral Daily    enoxparin  40 mg Subcutaneous Daily    folic acid  1 mg Oral Daily    gabapentin  300 mg Oral TID    magnesium oxide  400 mg Oral BID    miconazole nitrate 2%   Topical (Top) BID    multivitamin  1 tablet Oral Daily    nicotine  1 patch Transdermal Daily    OLANZapine  10 mg Oral QHS    thiamine  100 mg Oral Daily     OLANZapine, OLANZapine, sodium chloride 0.9%    Wernicke's Encephalopathy (WE) with Dementia and Behavioral Disturbance  Severe Alcohol Use Disorder   Alcohol Withdrawal (Resloved)  Alcoholic Neuropathy - Fall Risk, Requiring Restraints  - Medically, he is outside the window for Alcohol Withdrawal and he has completed the appropriate course of IV Thiamine for Wernicke's Encephalopathy. Continues to have severe gait instability and has failed several attempts without restrains and sitter at bedside. Would favor eventual transfer  of patient to Memory Unit vs. Inpatient Rehab capable of taking patient needing restrains ([facility] possibly accepting these types of patients?)  -Zyprexa 10 mg HS scheduled and Zyprexa 5 mg PO/IM q8 PRN non-redirectable agitation. Maximum total daily dose of 30 mg   -obtain EKG to assess QTc  -gabapentin to 300mg TID for neuropathy and cravings   -psychiatry consulted, appreciate recs  -refrain from using physical restraints as much as possible   -For Wernicke's Encephalopathy: Finished course of IV Thiamine, transitioned to Thiamine 100 mg PO daily. Folic acid and MVI daily.   -Valium taper completed on 10/15.   -CMP, Mg, Phos - spaced to q48 hours  -Fall, aspiration, and seizure precautions  -PT/OT consulted, performed well with OT yesterday  -IRP consult placed for ongoing rehab      Multiple Bilateral Lower Extremity Wounds  Onychomycosis of Bilateral Feet   -Wound Care consulted, appreciate assistance - have debrided wounds, applying medi-honey, no evidence of active infection. Miconazole ointment to toes  -Podiatry was consulted, no other acute interventions at this time      Tobacco Use Disorder  -Nicotine 21 mg patch daily     DVT PPx: Lovenox SubQ Daily    Diet: Regular  Discharge plan and follow up: Plan for possible D/C to SNF vs LTC vs memory unit     Sobia Ortiz MD  Hospital Medicine Staff  [phone] pager

## 2018-10-27 NOTE — PLAN OF CARE
Problem: Patient Care Overview  Goal: Plan of Care Review  Outcome: Ongoing (interventions implemented as appropriate)  Pt free of fall this shift. Wound care done per order. Pt denied pain. No skin breakdown noted. Pt confused and was intermittently agitated throughout the shift but was redirectable. Vss. Afebrile. Will continue to monitor pt.

## 2018-10-28 PROCEDURE — 63600175 PHARM REV CODE 636 W HCPCS: Performed by: HOSPITALIST

## 2018-10-28 PROCEDURE — 25000003 PHARM REV CODE 250: Performed by: PHYSICIAN ASSISTANT

## 2018-10-28 PROCEDURE — 11000001 HC ACUTE MED/SURG PRIVATE ROOM

## 2018-10-28 PROCEDURE — 25000003 PHARM REV CODE 250: Performed by: HOSPITALIST

## 2018-10-28 PROCEDURE — 97530 THERAPEUTIC ACTIVITIES: CPT

## 2018-10-28 PROCEDURE — 99232 SBSQ HOSP IP/OBS MODERATE 35: CPT | Mod: ,,, | Performed by: HOSPITALIST

## 2018-10-28 PROCEDURE — S4991 NICOTINE PATCH NONLEGEND: HCPCS | Performed by: HOSPITALIST

## 2018-10-28 RX ORDER — MAGNESIUM SULFATE HEPTAHYDRATE 40 MG/ML
2 INJECTION, SOLUTION INTRAVENOUS ONCE
Status: DISCONTINUED | OUTPATIENT
Start: 2018-10-28 | End: 2018-10-28

## 2018-10-28 RX ORDER — DOCUSATE SODIUM 100 MG/1
100 CAPSULE, LIQUID FILLED ORAL DAILY
Status: DISPENSED | OUTPATIENT
Start: 2018-10-28 | End: 2018-10-31

## 2018-10-28 RX ORDER — POLYETHYLENE GLYCOL 3350 17 G/17G
17 POWDER, FOR SOLUTION ORAL DAILY
Status: DISCONTINUED | OUTPATIENT
Start: 2018-10-28 | End: 2018-10-30

## 2018-10-28 RX ADMIN — OLANZAPINE 10 MG: 10 TABLET, FILM COATED ORAL at 08:10

## 2018-10-28 RX ADMIN — MAGNESIUM SULFATE HEPTAHYDRATE 1 G: 500 INJECTION, SOLUTION INTRAMUSCULAR; INTRAVENOUS at 11:10

## 2018-10-28 RX ADMIN — DOCUSATE SODIUM 100 MG: 100 CAPSULE, LIQUID FILLED ORAL at 08:10

## 2018-10-28 RX ADMIN — MICONAZOLE NITRATE: 20 OINTMENT TOPICAL at 08:10

## 2018-10-28 RX ADMIN — AMLODIPINE BESYLATE 5 MG: 5 TABLET ORAL at 08:10

## 2018-10-28 RX ADMIN — FOLIC ACID 1 MG: 1 TABLET ORAL at 08:10

## 2018-10-28 RX ADMIN — GABAPENTIN 300 MG: 300 CAPSULE ORAL at 08:10

## 2018-10-28 RX ADMIN — NICOTINE 1 PATCH: 21 PATCH, EXTENDED RELEASE TRANSDERMAL at 08:10

## 2018-10-28 RX ADMIN — Medication 400 MG: at 08:10

## 2018-10-28 RX ADMIN — GABAPENTIN 300 MG: 300 CAPSULE ORAL at 02:10

## 2018-10-28 RX ADMIN — THERA TABS 1 TABLET: TAB at 08:10

## 2018-10-28 RX ADMIN — POLYETHYLENE GLYCOL 3350 17 G: 17 POWDER, FOR SOLUTION ORAL at 08:10

## 2018-10-28 RX ADMIN — Medication 100 MG: at 08:10

## 2018-10-28 RX ADMIN — ENOXAPARIN SODIUM 40 MG: 100 INJECTION SUBCUTANEOUS at 04:10

## 2018-10-28 RX ADMIN — MICONAZOLE NITRATE: 20 OINTMENT TOPICAL at 09:10

## 2018-10-28 NOTE — PLAN OF CARE
Problem: Patient Care Overview  Goal: Plan of Care Review  Outcome: Ongoing (interventions implemented as appropriate)  Pt is free from injury and falls during shift. Pt shows no signs of acute distress. Pt denies pain. Awake, alert, oriented to self, time, and place, however, pt remains disoriented to situation. Pt educated on situation, but continues to repeat questions during shift. Vitals stable. Wound care provided as ordered- pt tolerated well. Calm, cooperative, pleasant behavior during shift. Sitter at bedside throughout shift. AVASYS at bedside. Bed is in low position with breaks locked. Side rails are up x 2. Environment is clutter free. Call light is within reach of the patient. Will continue to monitor.

## 2018-10-28 NOTE — PROGRESS NOTES
"Hospital Medicine   Progress note      Team: [facility] R Sobia Ortiz MD   Admit Date: 10/4/2018   Hospital Day: 23  YONAS: 10/30/2018   Code status: Full Code   Principal Problem: Alcohol withdrawal     Summary:  Mohamud Patel is a 57 y.o. man with a history of Severe Alcohol Abuse, Chronic Wounds of Bilateral LE's with Onychomycosis, Alcoholic Neuropathy, Hyponatremia, and Tobacco Use Disorder who was brought to the ED by EMS after his girlfriend called 911 while the patient was severely intoxicated. He is admitted to Hospital Medicine for Alcohol Withdrawal, possible Wernicke's Encephalopathy, and Acute Cystitis. Pt continues to have gait instability, weakness, recurrent falls when he attempts to get out of bed, has poor insight into his addiction and encephalopathy and poor decision making. Psychiatry consulted, pt determined not to have capacity to make own medical decision at this time.     Interval hx:   Pt was seen and examined at bedside. No acute events overnight, no new complaints this morning. Pt has been sitting up in the chair all morning without any distress. He continues to bring up that he wants to go home and get out of this "hotel".       ROS (Positive in Bold, otherwise negative)   Constitutional: fever, chills, night sweats  CV: chest pain, edema, palpitations  Resp: SOB, cough, sputum production  GI: changes in appetite, NVDC, pain, melena, hematochezia, GERD, hematemesis  : Dysuria, hematuria, urinary urgency, frequency  MSK: arthralgia/myalgia, joint swelling  Neuro/Psych: anxiety, depression, confusion    PEx   Temp:  [97.4 °F (36.3 °C)-99 °F (37.2 °C)]   Pulse:  []   Resp:  [16-18]   BP: (114-143)/(71-76)   SpO2:  [96 %-99 %]      I & O (Last 24H):     Intake/Output Summary (Last 24 hours) at 10/28/2018 0909  Last data filed at 10/28/2018 0800  Gross per 24 hour   Intake 865 ml   Output 1850 ml   Net -985 ml       General:  male  in no acute distress. Sitting " up in chair, slightly confused   HEENT: NCAT. PERRL. EOMI. Sclera Anicteric.  CVS: RRR. Normal S1 S2. No murmurs  Pulm: CTAB. Normal respiratory effort. No wheezes, rhonchi, or crackles.  Abdomen: Soft. Non-distended. No tenderness to palpation. No rebound or guarding. +BS.  Extremities: No edema. No cyanosis. Full ROM.  Skin: multiple scabbed lesions on bilateral feet, thickened skin on bilateral dorsal feet worse at the toes consistent with onychomycosis, no interdigital pits or evidence of scabies, large linear laceration with secondary healing on right foot, no surrounding erythema or induration  Neuro: Alert, oriented x 1, Spont mvt of all extremities with no focal deficits noted.    No results found for this or any previous visit (from the past 24 hour(s)).    No results for input(s): POCTGLUCOSE in the last 168 hours.    Hemoglobin A1C   Date Value Ref Range Status   10/05/2018 5.3 4.0 - 5.6 % Final     Comment:     ADA Screening Guidelines:  5.7-6.4%  Consistent with prediabetes  >or=6.5%  Consistent with diabetes  High levels of fetal hemoglobin interfere with the HbA1C  assay. Heterozygous hemoglobin variants (HbS, HgC, etc)do  not significantly interfere with this assay.   However, presence of multiple variants may affect accuracy.     08/10/2018 4.4 4.0 - 5.6 % Final     Comment:     ADA Screening Guidelines:  5.7-6.4%  Consistent with prediabetes  >or=6.5%  Consistent with diabetes  High levels of fetal hemoglobin interfere with the HbA1C  assay. Heterozygous hemoglobin variants (HbS, HgC, etc)do  not significantly interfere with this assay.   However, presence of multiple variants may affect accuracy.          Active Hospital Problems    Diagnosis  POA    *Alcohol withdrawal [F10.239]  Yes    Major neurocognitive disorder [F01.50]  Unknown    Impaired mobility and ADLs [Z74.09]  Unknown    Ulcer of toe of left foot [L97.529]  Yes    Abrasion [T14.8XXA]  Yes    Alcohol withdrawal delirium  [F10.231]  Yes    Mental health disorder [F99]  Yes    Wernicke encephalopathy [E51.2]  Yes    Alcoholic peripheral neuropathy [G62.1]  Yes    Wound of lower extremity [S81.809A]  Yes    Alcohol use disorder, severe, dependence [F10.20]  Yes     Chronic    Hyponatremia [E87.1]  Yes      Resolved Hospital Problems   No resolved problems to display.      Assessment and Plan for problems addressed today:      amLODIPine  5 mg Oral Daily    enoxparin  40 mg Subcutaneous Daily    folic acid  1 mg Oral Daily    gabapentin  300 mg Oral TID    magnesium oxide  400 mg Oral BID    miconazole nitrate 2%   Topical (Top) BID    multivitamin  1 tablet Oral Daily    nicotine  1 patch Transdermal Daily    OLANZapine  10 mg Oral QHS    thiamine  100 mg Oral Daily     OLANZapine, OLANZapine, sodium chloride 0.9%    Wernicke's Encephalopathy (WE) with Dementia and Behavioral Disturbance  Severe Alcohol Use Disorder   Alcohol Withdrawal (Resloved)  Alcoholic Neuropathy - Fall Risk, Requiring Restraints  - Medically, he is outside the window for Alcohol Withdrawal and he has completed the appropriate course of IV Thiamine for Wernicke's Encephalopathy. Continues to have severe gait instability and has failed several attempts without restrains and sitter at bedside. Would favor eventual transfer of patient to Memory Unit vs. Inpatient Rehab capable of taking patient needing restrains ([facility] possibly accepting these types of patients?)  -Zyprexa 10 mg HS scheduled and Zyprexa 5 mg PO/IM q8 PRN non-redirectable agitation. Maximum total daily dose of 30 mg   -obtain EKG to assess QTc tomorrow   -gabapentin to 300mg TID for neuropathy and cravings   -psychiatry consulted, appreciate recs  -refrain from using physical restraints as much as possible   -For Wernicke's Encephalopathy: Finished course of IV Thiamine, transitioned to Thiamine 100 mg PO daily. Folic acid and MVI daily.   -Valium taper completed on 10/15.    -CMP, Mg, Phos - spaced to q48 hours  -Fall, aspiration, and seizure precautions  -PT/OT consulted, performed well with OT yesterday  -IRP consult placed for ongoing rehab      Multiple Bilateral Lower Extremity Wounds  Onychomycosis of Bilateral Feet   -Wound Care consulted, appreciate assistance - have debrided wounds, applying medi-honey, no evidence of active infection. Miconazole ointment to toes  -Podiatry was consulted, no other acute interventions at this time      Tobacco Use Disorder  -Nicotine 21 mg patch daily     DVT PPx: Lovenox SubQ Daily    Diet: Regular  Discharge plan and follow up: Plan for possible D/C to SNF vs LTC vs memory unit     Sobia Ortiz MD  Hospital Medicine Staff  [phone] pager

## 2018-10-28 NOTE — PLAN OF CARE
Problem: Occupational Therapy Goal  Goal: Occupational Therapy Goal  Goals to be met by: 11/5  Patient will increase functional independence with ADLs by performing:    UE Dressing with Modified [place] while seated.  LE Dressing with Modified [place].  Grooming while standing at sink with Min A.  Toileting from toilet with Minimum A for hygiene and clothing management.   Supine to sit with [place]  Stand pivot transfers with CGA to chair and/or toilet.  Functional mobility with AD as needed for short household distance with no LOB with min A.          POC remains appropriate.    SAM Bishop  10/28/2018

## 2018-10-28 NOTE — PLAN OF CARE
Problem: Patient Care Overview  Goal: Plan of Care Review  Outcome: Ongoing (interventions implemented as appropriate)  . Pt remains Alert to self throughout shift. Pt remains free from falls or other injuries throughout shift. Bed locked in lowest position, call light within reach, side rails up x 3, will continue to monitor. Neuro checks done q4 hrs. Pt refused vitals. Med team notified.

## 2018-10-28 NOTE — PT/OT/SLP PROGRESS
"Occupational Therapy   Treatment    Name: Mohamud Patel  MRN: [id number]  Admitting Diagnosis:  Alcohol withdrawal       Recommendations:     Discharge Recommendations: rehabilitation facility  Discharge Equipment Recommendations:  (TBD pending progress)  Barriers to discharge:  Inaccessible home environment, Decreased caregiver support    Subjective     "My main problem is I don't get out that much."    Communicated with: RN prior to and after session.  Pain/Comfort:  · Pain Rating 1: 0/10  · Pain Rating Post-Intervention 1: 0/10    Patients cultural, spiritual, Rastafari conflicts given the current situation: none reported     Objective:     Patient found with: (no active lines; sitter and security staff memeber present)    General Precautions: Standard, fall, aspiration, seizure   Orthopedic Precautions:N/A   Braces: N/A     Occupational Performance:    Bed Mobility:    · Patient completed Rolling/Turning to Right with stand by assistance  · Patient completed Scooting/Bridging with stand by assistance  · Patient completed Supine to Sit with stand by assistance  · Patient completed Sit to Supine with stand by assistance   · Some instances of LOB noted with pt able to recover     Functional Mobility/Transfers:  · Patient completed Sit <> Stand Transfer with contact guard assistance  with  rolling walker x 1 trial from EOB  · Functional Mobility: Pt ambulated 15 ft within room with Min A and RW.  Severe postural sway noted; however, no instances of LOB observed.   · Pt highly impulsive during task and elevated voice towards therapist.  OT provided guidance and attempted to redirect pt and have him walk back towards bed.  Once seated pt stated, "This isn't going to F-ing work when you're pushing me.  The only way this is going to work is if I'm the one moving and pushing the walker, but if your hands are on me then that is not going to work.  That's why it didn't work because of what you were doing.  You were " "pushing me"  OT explained that she had her hands on pt for safety and brought the RW for increased stability.  OT stated she was not pushing the pt, but apologized if that is what it felt like.  OT acknowledged pt's feelings and stated that in upcoming sessions they would work on task again.  Reinforcement needed for safety awareness.  After discussion pt remained calm and participated in rest of session.      Activities of Daily Living:  · Lower Body Dressing: stand by assistance for doffing/donning socks while seated EOB.    Patient left HOB elevated with all lines intact, call button in reach, bed alarm on and sitter present    [facility] 6 Click:  [facility] Total Score: 19    Treatment & Education:  *Pt ambulated short distance within room; see details above.  *Pt performed LB dressing task.  *Pt performed 3 UB stretches incorporating all joints: 2 sets x 15 reps per stretch  *Orientation questions asked to assess cognition of pt.  Pt unaware he was in hospital, and stated year was 2087.  Increased time required for pt to identify current month.  *POC reviewed with pt     Education:    Assessment:     Mohamud Patel is a 57 y.o. male with a medical diagnosis of Alcohol withdrawal.  He presents with the following performance deficits affecting function are weakness, impaired endurance, impaired cognition, impaired self care skills, impaired functional mobilty, impaired balance, gait instability, decreased ROM, impaired coordination, decreased safety awareness.  Pt agreeable to working with therapy, but highly impulsive with very poor safety awareness during mobility portion of session.  Pt tolerated UB exercises well and engaged in conversation by answering questions asked by therapist.  Pt is impacted during ADLs and mobility most by decreased balance both in sitting and standing, and decreased safety awareness.  Pt is making progress towards goals and would continue to benefit from skilled OT services to address " problems listed below and increase independence with ADLs.  He remains appropriate candidate for rehab.     Rehab Prognosis:  Good; patient would benefit from acute skilled OT services to address these deficits and reach maximum level of function.       Plan:     Patient to be seen 3 x/week to address the above listed problems via self-care/home management, therapeutic activities, therapeutic exercises  · Plan of Care Expires: 11/04/18  · Plan of Care Reviewed with: patient    This Plan of care has been discussed with the patient who was involved in its development and understands and is in agreement with the identified goals and treatment plan    GOALS:   Multidisciplinary Problems     Occupational Therapy Goals        Problem: Occupational Therapy Goal    Goal Priority Disciplines Outcome Interventions   Occupational Therapy Goal     OT, PT/OT Ongoing (interventions implemented as appropriate)    Description:  Goals to be met by: 11/5  Patient will increase functional independence with ADLs by performing:    UE Dressing with Modified [place] while seated.  LE Dressing with Modified [place].  Grooming while standing at sink with Min A.  Toileting from toilet with Minimum A for hygiene and clothing management.   Supine to sit with [place]  Stand pivot transfers with CGA to chair and/or toilet.  Functional mobility with AD as needed for short household distance with no LOB with min A.                          Time Tracking:     OT Date of Treatment: 10/28/18  OT Start Time: 1351  OT Stop Time: 1410  OT Total Time (min): 19 min    Billable Minutes:Therapeutic Activity 19    SAM Bishop  10/28/2018

## 2018-10-29 LAB
ANION GAP SERPL CALC-SCNC: 8 MMOL/L
BUN SERPL-MCNC: 19 MG/DL
CALCIUM SERPL-MCNC: 10.3 MG/DL
CHLORIDE SERPL-SCNC: 107 MMOL/L
CO2 SERPL-SCNC: 25 MMOL/L
CREAT SERPL-MCNC: 0.7 MG/DL
EST. GFR  (AFRICAN AMERICAN): >60 ML/MIN/1.73 M^2
EST. GFR  (NON AFRICAN AMERICAN): >60 ML/MIN/1.73 M^2
GLUCOSE SERPL-MCNC: 91 MG/DL
MAGNESIUM SERPL-MCNC: 1.9 MG/DL
PHOSPHATE SERPL-MCNC: 3.6 MG/DL
POTASSIUM SERPL-SCNC: 4.2 MMOL/L
SODIUM SERPL-SCNC: 140 MMOL/L

## 2018-10-29 PROCEDURE — 99232 SBSQ HOSP IP/OBS MODERATE 35: CPT | Mod: ,,, | Performed by: HOSPITALIST

## 2018-10-29 PROCEDURE — 97530 THERAPEUTIC ACTIVITIES: CPT

## 2018-10-29 PROCEDURE — 83735 ASSAY OF MAGNESIUM: CPT

## 2018-10-29 PROCEDURE — 93010 ELECTROCARDIOGRAM REPORT: CPT | Mod: ,,, | Performed by: INTERNAL MEDICINE

## 2018-10-29 PROCEDURE — 25000003 PHARM REV CODE 250: Performed by: HOSPITALIST

## 2018-10-29 PROCEDURE — 97535 SELF CARE MNGMENT TRAINING: CPT

## 2018-10-29 PROCEDURE — 93005 ELECTROCARDIOGRAM TRACING: CPT

## 2018-10-29 PROCEDURE — 63600175 PHARM REV CODE 636 W HCPCS: Performed by: HOSPITALIST

## 2018-10-29 PROCEDURE — S4991 NICOTINE PATCH NONLEGEND: HCPCS | Performed by: HOSPITALIST

## 2018-10-29 PROCEDURE — 36415 COLL VENOUS BLD VENIPUNCTURE: CPT

## 2018-10-29 PROCEDURE — S0166 INJ OLANZAPINE 2.5MG: HCPCS | Performed by: HOSPITALIST

## 2018-10-29 PROCEDURE — 11000001 HC ACUTE MED/SURG PRIVATE ROOM

## 2018-10-29 PROCEDURE — 99232 SBSQ HOSP IP/OBS MODERATE 35: CPT | Mod: ,,, | Performed by: NURSE PRACTITIONER

## 2018-10-29 PROCEDURE — 80048 BASIC METABOLIC PNL TOTAL CA: CPT

## 2018-10-29 PROCEDURE — 84100 ASSAY OF PHOSPHORUS: CPT

## 2018-10-29 RX ADMIN — GABAPENTIN 300 MG: 300 CAPSULE ORAL at 08:10

## 2018-10-29 RX ADMIN — OLANZAPINE 5 MG: 10 INJECTION, POWDER, FOR SOLUTION INTRAMUSCULAR at 04:10

## 2018-10-29 RX ADMIN — THERA TABS 1 TABLET: TAB at 08:10

## 2018-10-29 RX ADMIN — NICOTINE 1 PATCH: 21 PATCH, EXTENDED RELEASE TRANSDERMAL at 08:10

## 2018-10-29 RX ADMIN — MICONAZOLE NITRATE: 20 OINTMENT TOPICAL at 08:10

## 2018-10-29 RX ADMIN — GABAPENTIN 300 MG: 300 CAPSULE ORAL at 02:10

## 2018-10-29 RX ADMIN — Medication 400 MG: at 08:10

## 2018-10-29 RX ADMIN — AMLODIPINE BESYLATE 5 MG: 5 TABLET ORAL at 08:10

## 2018-10-29 RX ADMIN — ENOXAPARIN SODIUM 40 MG: 100 INJECTION SUBCUTANEOUS at 04:10

## 2018-10-29 RX ADMIN — OLANZAPINE 10 MG: 10 TABLET, FILM COATED ORAL at 08:10

## 2018-10-29 RX ADMIN — MICONAZOLE NITRATE: 20 OINTMENT TOPICAL at 09:10

## 2018-10-29 RX ADMIN — FOLIC ACID 1 MG: 1 TABLET ORAL at 08:10

## 2018-10-29 RX ADMIN — Medication 100 MG: at 08:10

## 2018-10-29 NOTE — PLAN OF CARE
SW received message from Jose Luis at [facility], drs recommending long term placement, even after review of recent improvements with therapy.    Ayala Peres, [facility] [phone]

## 2018-10-29 NOTE — PROGRESS NOTES
[facility]  Physical Medicine & Rehab  Progress Note    Patient Name: Mohamud Patel  MRN: [id number]  Admission Date: 10/4/2018  Length of Stay: 24 days  Attending Physician: Sobia Ortiz MD    Subjective:     Principal Problem:Alcohol withdrawal    Hospital Course:   10/6/18:  Evaluated by PT and OT.  Bed mobility CGA-Raul.  Sit to stand CGA and transfers Raul.  Ambulated 24 ft Raul.  LBD CGA-Raul.  10/7/18:  No PT or OT.   10/8/18:  Participated with PT and OT.  Bed mobility CGA-Raul.  EOB SBA-CGA.  Sit to stand Raul.  Ambulated 3-4 side steps Raul.  UBD modA and LBD totalA.   10/9/18:  No PT or OT.   10/10/18:  No PT or OT.  10/11/18:  Participated with PT and OT.  Bed mobility SV.  Sit to stand SBA with RW.  Ambulated 75 ft CGA with RW.  UBD Raul and LBD maxA.   10/15/18:  Participated with PT.  Bed mobility SV.  Sit to stand SBA with RW.  Ambulated 100 ft CGA with RW.   10/16/18:  No OT 2/2 agitation and attempting to leave.   10/17/18:  Participated with PT and OT.  Bed mobility mod(I)-SBA.  Sit to stand and transfers min-modA with RW.  Ambulated 20 ft Raul with RW.  UBD Raul and LBD maxA.    10/19/18:  Participated with PT.  Bed mobility CGA.  Sit to stand CGA with RW.  Ambulated 10 ft + 20 ft Raul with RW.   10/22/18:  Participated with PT and OT.  Bed mobility CGA-Raul.  Sit to stand mod-totalA (max-totalA x 2) with RW.  Ambulated 4 lateral steps totalA (maxA x 2) with RW.  UBD modA and LBD SBA.  10/23/18:  Participated with PT.  Evaluated by SLP.  Found to have cognitive-linguistic impairment with moderate deficits possibly near baseline d/t long term ETOH abuse, significant delusional behavior, and poor insight.  Will require significant supervision.  His ability to participate in skilled SLP services is limited d/t poor insight and understanding, as well as psych barriers.  Bed mobility SV-Raul.  Sit to stand min-maxA with RW.  Ambulated 5-6 ft forward and backwards maxA with  RW.   10/24/18:  Participated with OT.  Showed improvement in cognition.  Bed mobility SV.  Sit to stand modA.  Ambulated 2 lateral steps maxA.  UBD Raul, grooming set-up assist, and toileting maxA.    10/25/18:  Participated with PT and OT.  Bed mobility SV.  Sit to stand CGA-Raul with RW.  Ambulated 20 ft min-modA with RW.  EOB SV.  UBD SBA.      10/28/18:  Participated with OT.  Bed mobility SBA.  Sit to stand CGA with RW.  Ambulated 15 ft Raul with RW.  LBD SBA.  Highly impulsive during session.    Interval History 10/29/2018:  Patient is seen for follow-up rehab evaluation and recommendations: No acute events over night.  Behavior better controlled.  Sitter and camera sitter at bedside.  Improving with therapy.    HPI, Past Medical, Family, and Social History remains the same as documented in the initial encounter.    Scheduled Medications:    amLODIPine  5 mg Oral Daily    docusate sodium  100 mg Oral Daily    enoxparin  40 mg Subcutaneous Daily    folic acid  1 mg Oral Daily    gabapentin  300 mg Oral TID    magnesium oxide  400 mg Oral BID    miconazole nitrate 2%   Topical (Top) BID    multivitamin  1 tablet Oral Daily    nicotine  1 patch Transdermal Daily    OLANZapine  10 mg Oral QHS    polyethylene glycol  17 g Oral Daily    thiamine  100 mg Oral Daily     PRN Medications: OLANZapine, OLANZapine, sodium chloride 0.9%    Review of Systems   Constitutional: Positive for activity change. Negative for chills, fatigue and fever.   HENT: Negative for drooling, hearing loss, trouble swallowing and voice change.    Eyes: Negative for pain and visual disturbance.   Respiratory: Negative for cough, shortness of breath and wheezing.    Cardiovascular: Negative for chest pain and palpitations.   Gastrointestinal: Negative for abdominal pain, nausea and vomiting.   Genitourinary: Negative for difficulty urinating and flank pain.   Musculoskeletal: Positive for gait problem and myalgias. Negative for back  pain and neck pain.   Skin: Positive for wound. Negative for rash.   Neurological: Positive for weakness and numbness. Negative for dizziness and headaches.   Psychiatric/Behavioral: Positive for confusion. Negative for agitation and hallucinations. The patient is not nervous/anxious.      Objective:     Vital Signs (Most Recent):  Temp: 98 °F (36.7 °C) (10/29/18 0715)  Pulse: 74 (10/29/18 0715)  Resp: 18 (10/29/18 0715)  BP: 136/66 (10/29/18 0715)  SpO2: 100 % (10/29/18 0715)    Vital Signs (24h Range):  Temp:  [98 °F (36.7 °C)-98.9 °F (37.2 °C)] 98 °F (36.7 °C)  Pulse:  [] 74  Resp:  [16-18] 18  SpO2:  [96 %-100 %] 100 %  BP: (123-142)/(66-81) 136/66     Physical Exam   Constitutional: No distress.   Appears thin, older than stated age   HENT:   Head: Normocephalic and atraumatic.   Right Ear: External ear normal.   Left Ear: External ear normal.   Nose: Nose normal.   Eyes: Right eye exhibits no discharge. Left eye exhibits no discharge. No scleral icterus.   Neck: Normal range of motion.   Cardiovascular: Normal rate, regular rhythm and intact distal pulses.   Pulmonary/Chest: Effort normal. No respiratory distress. He has no wheezes.   Abdominal: Soft. He exhibits no distension. There is no tenderness.   Musculoskeletal: He exhibits no edema or tenderness.   Neurological: He is alert. He has normal strength. He is disoriented. No sensory deficit. He exhibits normal muscle tone.   Skin: Skin is warm and dry. Abrasion (BLE) noted. No rash noted.   Psychiatric: He has a normal mood and affect. His behavior is normal. Cognition and memory are impaired.   Sitter and camera sitter at bedside.  Cooperative.     Vitals reviewed.    Diagnostic Results:   Labs: Reviewed  X-Ray: Reviewed  US: Reviewed  CT: Reviewed    Assessment/Plan:      Impaired mobility and ADLs    -   Required assistance with mobility and ADLs at baseline  Recommendations  -  Encourage mobility, OOB in chair, and early ambulation as  "appropriate  -  PT/OT evaluate and treat  -  Pain management  -  DVT prophylaxis  -  Monitor for and prevent skin breakdown and pressure ulcers  · Early mobility, repositioning/weight shifting every 20-30 minutes when sitting, turn patient every 2 hours, proper mattress/overlay and chair cushioning, pressure relief/heel protector boots     Wernicke encephalopathy    -  On Thiamine, folic acid, MVI  -  CTH 10/8/18 without acute pathology  -  Psych following--> "recommend treatment with zyprexa 5mg po/IM q8 hours, scheduled zyprexa 10mg po qHS, with max daily dose 30mg from all sources"  -  Evaluated by SLP 10/23 for cognitive deficits and ability to learn--> Found to have cognitive-linguistic impairment with moderate deficits possibly near baseline d/t long term ETOH abuse, significant delusional behavior, and poor insight.  Will require significant supervision.  His ability to participate in skilled SLP services is limited d/t poor insight and understanding, as well as psych barriers.     Hyponatremia    -  Possibly 2/2 beer potomania  -  Replacing as needed  -  Monitoring     Alcohol use disorder, severe, dependence    -  H/o EtOH use disorder  -  Presented intoxicated with serum alcohol level of 239  -  Psych following     Wound of lower extremity    -  Wound care following  -  Podiatry consulted--> No surgical intervention indicated     Patient with therapy needs.  Limited by cognitive deficits/EtOH dementia.  Continue to recommend long-term placement at this time.     Di Nickerson, ZEE  Department of Physical Medicine & Rehab   [facility]  "

## 2018-10-29 NOTE — PLAN OF CARE
Problem: Patient Care Overview  Goal: Plan of Care Review       Recommendations     Recommendation/Intervention:   1. Continue current regular diet. Pt with good intake.  2. RD following.     Goals: Intake >/=85% EEN/EPN  Nutrition Goal Status: goal met

## 2018-10-29 NOTE — PLAN OF CARE
SW met with pt, discussed inpatient rehab as d/c plan. Pt concerned about getting in touch with his father. SW agreed to try to contact father, let him know pt is eager to speak with him.    SW spoke with Sony Weeksan; informed him of denials by denita and Peter. Sony said he received rehab list and will review with liaison from [facility] today. SW provided name and number for Sony to call back once he decides on additional choices.     Ayala Peres, [facility] [phone]

## 2018-10-29 NOTE — PROGRESS NOTES
" [facility]  Adult Nutrition  Progress Note    SUMMARY       Recommendations    Recommendation/Intervention:   1. Continue current regular diet. Pt with good intake.  2. RD following.    Goals: Intake >/=85% EEN/EPN  Nutrition Goal Status: goal met  Communication of RD Recs: (POC)    Reason for Assessment    Reason for Assessment: RD follow-up  Diagnosis: (Alcohol withdrawal)  Relevant Medical History: EtOH abuse    General Information Comments: Pt remains intermittently confused. Good PO intake per RN documentation. Still unable to complete full NFPE.    Nutrition Discharge Planning: Adequate PO intake    Nutrition Risk Screen    Nutrition Risk Screen: no indicators present    Nutrition/Diet History    Do you have any cultural, spiritual, Gnosticism conflicts, given your current situation?: none reported   Factors Affecting Nutritional Intake: None identified at this time    Anthropometrics    Temp: 98 °F (36.7 °C)  Height Method: Stated  Height: 6' 2" (188 cm)  Height (inches): 74 in  Weight Method: Bed Scale  Weight: 67.8 kg (149 lb 7.6 oz)  Weight (lb): 149.47 lb  Ideal Body Weight (IBW), Male: 190 lb  % Ideal Body Weight, Male (lb): 78.67 lb  BMI (Calculated): 19.2  BMI Grade: 18.5-24.9 - normal  Usual Body Weight (UBW), k.4 kg(2018 per chart review)  % Usual Body Weight: 95.16  % Weight Change From Usual Weight: -5.04 %       Lab/Procedures/Meds    Pertinent Labs Reviewed: reviewed  Pertinent Labs Comments: Noted  Pertinent Medications Reviewed: reviewed  Pertinent Medications Comments: docusate, folic acid, Mg, MVI, polyethylene glycol, thiamine    Physical Findings/Assessment    Overall Physical Appearance: loss of muscle mass, underweight  Oral/Mouth Cavity: WDL  Skin: pressure ulcer(s), non-healing wound(s)    Estimated/Assessed Needs    Weight Used For Calorie Calculations: 67.8 kg (149 lb 7.6 oz)  Energy Calorie Requirements (kcal):   Energy Need Method: [place](x " 1.25 (PAL))  Protein Requirements: 82-95 gm (1.2-1.4 gm/kg)  Weight Used For Protein Calculations: 67.8 kg (149 lb 7.6 oz)  Fluid Requirements (mL): per MD     RDA Method (mL): 1966       Nutrition Prescription Ordered    Current Diet Order: Regular, double portions  Nutrition Order Comments: 1000 ml FR    Evaluation of Received Nutrient/Fluid Intake    Oral Fluid (mL): 1470  Comments: LBM 10/28  % Intake of Estimated Energy Needs: 75 - 100 %  % Meal Intake: 75 - 100 %    Nutrition Risk    Level of Risk/Frequency of Follow-up: (F/u 1x weekly)     Assessment and Plan    Nutrition Problem  Increased protein needs     Related to (etiology):   Wound healing     Signs and Symptoms (as evidenced by):   Multiple BLE pressure ulcers      Interventions/Recommendations (treatment strategy):  Increased protein diet     Nutrition Diagnosis Status:   Continues      Monitor and Evaluation    Food and Nutrient Intake: energy intake, food and beverage intake  Food and Nutrient Adminstration: diet order  Physical Activity and Function: nutrition-related ADLs and IADLs  Anthropometric Measurements: weight, weight change, body mass index  Biochemical Data, Medical Tests and Procedures: electrolyte and renal panel, gastrointestinal profile, glucose/endocrine profile, inflammatory profile  Nutrition-Focused Physical Findings: overall appearance     Nutrition Follow-Up    RD Follow-up?: Yes

## 2018-10-29 NOTE — PLAN OF CARE
Problem: Patient Care Overview  Goal: Plan of Care Review  Outcome: Ongoing (interventions implemented as appropriate)  Pt VSS. Pt remains intermittently confused throughout shift. Pt remains free from falls or other injuries throughout shift. Bed locked in lowest position, call light within reach, side rails up x 3, will continue to monitor.

## 2018-10-29 NOTE — PLAN OF CARE
PT WAITING ON PLACEMENT     10/29/18 6192   Discharge Reassessment   Assessment Type Discharge Planning Reassessment   Provided patient/caregiver education on the expected discharge date and the discharge plan No   Do you have any problems affording any of your prescribed medications? No   Discharge Plan A Rehab   Discharge Plan B New Nursing Home placement - long-term care facility

## 2018-10-29 NOTE — SUBJECTIVE & OBJECTIVE
Interval History 10/29/2018:  Patient is seen for follow-up rehab evaluation and recommendations: No acute events over night.  Behavior better controlled.  Sitter and camera sitter at bedside.  Improving with therapy.    HPI, Past Medical, Family, and Social History remains the same as documented in the initial encounter.    Scheduled Medications:    amLODIPine  5 mg Oral Daily    docusate sodium  100 mg Oral Daily    enoxparin  40 mg Subcutaneous Daily    folic acid  1 mg Oral Daily    gabapentin  300 mg Oral TID    magnesium oxide  400 mg Oral BID    miconazole nitrate 2%   Topical (Top) BID    multivitamin  1 tablet Oral Daily    nicotine  1 patch Transdermal Daily    OLANZapine  10 mg Oral QHS    polyethylene glycol  17 g Oral Daily    thiamine  100 mg Oral Daily     PRN Medications: OLANZapine, OLANZapine, sodium chloride 0.9%    Review of Systems   Constitutional: Positive for activity change. Negative for chills, fatigue and fever.   HENT: Negative for drooling, hearing loss, trouble swallowing and voice change.    Eyes: Negative for pain and visual disturbance.   Respiratory: Negative for cough, shortness of breath and wheezing.    Cardiovascular: Negative for chest pain and palpitations.   Gastrointestinal: Negative for abdominal pain, nausea and vomiting.   Genitourinary: Negative for difficulty urinating and flank pain.   Musculoskeletal: Positive for gait problem and myalgias. Negative for back pain and neck pain.   Skin: Positive for wound. Negative for rash.   Neurological: Positive for weakness and numbness. Negative for dizziness and headaches.   Psychiatric/Behavioral: Positive for confusion. Negative for agitation and hallucinations. The patient is not nervous/anxious.      Objective:     Vital Signs (Most Recent):  Temp: 98 °F (36.7 °C) (10/29/18 0715)  Pulse: 74 (10/29/18 0715)  Resp: 18 (10/29/18 0715)  BP: 136/66 (10/29/18 0715)  SpO2: 100 % (10/29/18 0715)    Vital Signs (24h  Range):  Temp:  [98 °F (36.7 °C)-98.9 °F (37.2 °C)] 98 °F (36.7 °C)  Pulse:  [] 74  Resp:  [16-18] 18  SpO2:  [96 %-100 %] 100 %  BP: (123-142)/(66-81) 136/66     Physical Exam   Constitutional: No distress.   Appears thin, older than stated age   HENT:   Head: Normocephalic and atraumatic.   Right Ear: External ear normal.   Left Ear: External ear normal.   Nose: Nose normal.   Eyes: Right eye exhibits no discharge. Left eye exhibits no discharge. No scleral icterus.   Neck: Normal range of motion.   Cardiovascular: Normal rate, regular rhythm and intact distal pulses.   Pulmonary/Chest: Effort normal. No respiratory distress. He has no wheezes.   Abdominal: Soft. He exhibits no distension. There is no tenderness.   Musculoskeletal: He exhibits no edema or tenderness.   Neurological: He is alert. He has normal strength. He is disoriented. No sensory deficit. He exhibits normal muscle tone.   Skin: Skin is warm and dry. Abrasion (BLE) noted. No rash noted.   Psychiatric: He has a normal mood and affect. His behavior is normal. Cognition and memory are impaired.   Sitter and camera sitter at bedside.  Cooperative.     Vitals reviewed.    Diagnostic Results:   Labs: Reviewed  X-Ray: Reviewed  US: Reviewed  CT: Reviewed      NEUROLOGICAL EXAMINATION:     MOTOR EXAM     Strength   Strength 5/5 throughout.

## 2018-10-29 NOTE — PLAN OF CARE
10/29/18 1401   Post-Acute Status   Post-Acute Authorization Placement   Post-Acute Placement Status Patient List Provided   Inpatient rehab has been the d/c plan since last week. Pt denied by [facility] and [facility]. Rehab List was left over the weekend for pts father, which he is now reviewing today. Additional rehab choices are needed.

## 2018-10-29 NOTE — PT/OT/SLP PROGRESS
Occupational Therapy   Treatment    Name: Mohamud Patel  MRN: [id number]  Admitting Diagnosis:  Alcohol withdrawal       Recommendations:     Discharge Recommendations: rehabilitation facility(pending progress with cognitive ability to participate)  Discharge Equipment Recommendations:  (TBD pending progress)  Barriers to discharge:  Inaccessible home environment, Decreased caregiver support    Subjective     Communicated with: RN Amador) prior to session. No family present for session.   Pain/Comfort:  · Pain Rating 1: 0/10    Patients cultural, spiritual, Voodoo conflicts given the current situation: none reported     Objective:     Patient found with: (sitter)    General Precautions: Standard, fall, seizure   Orthopedic Precautions:N/A   Braces: N/A     Occupational Performance:    Bed Mobility:    · Patient completed Rolling/Turning to Left with  independence  · Patient completed Scooting/Bridging with independence  · Patient completed Supine to Sit with independence  · Patient completed Sit to Supine with independence     Functional Mobility/Transfers:  · Patient completed Sit <> Stand Transfer with contact guard assistance  with  no assistive device x6 trials from EOB and x3 trials from BSC  · Patient completed Bed <> BSC Transfer using Stand Pivot and Step Transfer technique with contact guard assistance with no assistive device x6 trials   · Functional Mobility: ~4 steps during each t/f with CGA and no AD   · Cues for safety with hand placement and fall prevention  · Demo ataxia and incoordination  · Cues for pace of task as pt demo impulsivity     Activities of Daily Living:  · Upper Body Dressing: modified independence to don gown like jacket while seated EOB    · Sitter reported he was able to perform showering and sink ADL tasks while seated in bathroom yesterday with supervision and set up assist     Patient left with bed in chair position with all lines intact, call button in reach and  juan present    [facility] 6 Click:  [facility] Total Score: 22    Treatment & Education:  -Edu for OT role and POC  -Edu for importance of OOB activity and impact on healing  -Performed 2x10 trials of chest press with B  on rolled towel while standing with CGA for endurance and balance training  -Cognition:   -Pt oriented to month and date after looking at communication board for cueing   -max A for day of week   -max A for year (stated 2025)    -unable to recall after ~15 min delay   -after stating date, pt able to independently problem solve that his birthday is [birth year] and stated his birth year    -max A to state age he will be based on birth year and current year (stated 52 or 53)     -unable to recall after ~15 min   -required several reiterations of orientation to place of hospital--pt unable to recall, but demo understanding after cueing  -Whiteboard updated   -Questions and concerns addressed within OT scope of practice   Education:    Assessment:     Mohamud Patel is a 57 y.o. male with a medical diagnosis of Alcohol withdrawal.  He presents with decreased functional independence in various areas of his life. He demo decreased balance and functional mobility. He demo impulsivity and decreased safety awareness. He demo ataxia and incoordination, making him a high fall risk. He demo decreased cognition and orientation this date as compared to recent sessions. He demo perseveration on balance this date and unable to be redirected. He demo increased ADL skills while seated, but decreased while standing. Pt demo decreased endurance to functional task. Pt would continue to benefit from skilled OT services for maximum functional outcome and safety. Pt's ability to participate in a rehabilitation program will depend upon his progress cognitively. Performance deficits affecting function are weakness, impaired endurance, impaired self care skills, impaired functional mobilty, decreased coordination, impaired  cognition, impaired balance, gait instability, decreased lower extremity function, decreased upper extremity function, impaired cardiopulmonary response to activity, impaired coordination, decreased safety awareness.      Rehab Prognosis:  good; patient would benefit from acute skilled OT services to address these deficits and reach maximum level of function.       Plan:     Patient to be seen 3 x/week to address the above listed problems via self-care/home management, therapeutic activities, therapeutic exercises  · Plan of Care Expires: 11/04/18  · Plan of Care Reviewed with: patient    This Plan of care has been discussed with the patient who was involved in its development and understands and is in agreement with the identified goals and treatment plan    GOALS:   Multidisciplinary Problems     Occupational Therapy Goals        Problem: Occupational Therapy Goal    Goal Priority Disciplines Outcome Interventions   Occupational Therapy Goal     OT, PT/OT Ongoing (interventions implemented as appropriate)    Description:  Goals to be met by: 11/5  Patient will increase functional independence with ADLs by performing:    UE Dressing with Modified [place] while seated. MET 10/29  LE Dressing with Modified [place].  Grooming while standing at sink with Min A.  Toileting from toilet with Minimum A for hygiene and clothing management.   Supine to sit with [place]. MET 10/29  Stand pivot transfers with CGA to chair and/or toilet. MET 10/29 to BSC  *revised: to chair, BSC, and/or toilet with supervision and AD as needed  Functional mobility with AD as needed for short household distance with no LOB with min A.                           Time Tracking:     OT Date of Treatment: 10/29/18  OT Start Time: 0155  OT Stop Time: 0221  OT Total Time (min): 26 min    Billable Minutes:Self Care/Home Management 10  Therapeutic Activity 16    SAM Almaraz  10/29/2018

## 2018-10-29 NOTE — PLAN OF CARE
Problem: Occupational Therapy Goal  Goal: Occupational Therapy Goal  Goals to be met by: 11/5  Patient will increase functional independence with ADLs by performing:    UE Dressing with Modified [place] while seated. MET 10/29  LE Dressing with Modified [place].  Grooming while standing at sink with Min A.  Toileting from toilet with Minimum A for hygiene and clothing management.   Supine to sit with [place]. MET 10/29  Stand pivot transfers with CGA to chair and/or toilet. MET 10/29 to BSC  *revised: to chair, BSC, and/or toilet with supervision and AD as needed  Functional mobility with AD as needed for short household distance with no LOB with min A.         Outcome: Ongoing (interventions implemented as appropriate)  Goals remain appropriate. SAM Almaraz 10/29/2018

## 2018-10-30 PROCEDURE — 25000003 PHARM REV CODE 250: Performed by: HOSPITALIST

## 2018-10-30 PROCEDURE — S4991 NICOTINE PATCH NONLEGEND: HCPCS | Performed by: HOSPITALIST

## 2018-10-30 PROCEDURE — 11000001 HC ACUTE MED/SURG PRIVATE ROOM

## 2018-10-30 PROCEDURE — 25000003 PHARM REV CODE 250: Performed by: PHYSICIAN ASSISTANT

## 2018-10-30 PROCEDURE — 97116 GAIT TRAINING THERAPY: CPT

## 2018-10-30 PROCEDURE — 99231 SBSQ HOSP IP/OBS SF/LOW 25: CPT | Mod: ,,, | Performed by: HOSPITALIST

## 2018-10-30 PROCEDURE — 63600175 PHARM REV CODE 636 W HCPCS: Performed by: HOSPITALIST

## 2018-10-30 RX ORDER — DOCUSATE SODIUM 100 MG/1
100 CAPSULE, LIQUID FILLED ORAL DAILY
Status: COMPLETED | OUTPATIENT
Start: 2018-10-31 | End: 2018-11-02

## 2018-10-30 RX ADMIN — DOCUSATE SODIUM 100 MG: 100 CAPSULE, LIQUID FILLED ORAL at 09:10

## 2018-10-30 RX ADMIN — AMLODIPINE BESYLATE 5 MG: 5 TABLET ORAL at 09:10

## 2018-10-30 RX ADMIN — MICONAZOLE NITRATE: 20 OINTMENT TOPICAL at 10:10

## 2018-10-30 RX ADMIN — NICOTINE 1 PATCH: 21 PATCH, EXTENDED RELEASE TRANSDERMAL at 09:10

## 2018-10-30 RX ADMIN — POLYETHYLENE GLYCOL 3350 17 G: 17 POWDER, FOR SOLUTION ORAL at 09:10

## 2018-10-30 RX ADMIN — Medication 100 MG: at 09:10

## 2018-10-30 RX ADMIN — GABAPENTIN 300 MG: 300 CAPSULE ORAL at 08:10

## 2018-10-30 RX ADMIN — THERA TABS 1 TABLET: TAB at 09:10

## 2018-10-30 RX ADMIN — FOLIC ACID 1 MG: 1 TABLET ORAL at 09:10

## 2018-10-30 RX ADMIN — MICONAZOLE NITRATE: 20 OINTMENT TOPICAL at 09:10

## 2018-10-30 RX ADMIN — GABAPENTIN 300 MG: 300 CAPSULE ORAL at 09:10

## 2018-10-30 RX ADMIN — Medication 400 MG: at 08:10

## 2018-10-30 RX ADMIN — Medication 400 MG: at 09:10

## 2018-10-30 RX ADMIN — GABAPENTIN 300 MG: 300 CAPSULE ORAL at 03:10

## 2018-10-30 RX ADMIN — ENOXAPARIN SODIUM 40 MG: 100 INJECTION SUBCUTANEOUS at 04:10

## 2018-10-30 RX ADMIN — OLANZAPINE 10 MG: 10 TABLET, FILM COATED ORAL at 08:10

## 2018-10-30 NOTE — PLAN OF CARE
CM spoke with Doreen Wilcox with [facility] [employer] who is assisting patient's father with ultimate dispo. Father would like patient to get addiction rehab but agrees that patient needs physical rehab also. Father aware that [facility] has accepted patient and Doreen works with them also. Will follow.    Doreen's contact info - (c) [phone]

## 2018-10-30 NOTE — PLAN OF CARE
Problem: Physical Therapy Goal  Goal: Physical Therapy Goal  Goals to be met by: 18     Patient will increase functional independence with mobility by performin. Sit to stand transfer with Stand-by Assistance-met   Revised: sit to stand transfer with Supervision.   2. Bed to chair transfer with Stand-by Assistance using LRD.   3. Gait  x 100 feet with Contact Guard Assistance using Rolling Walker or LRD>   4. Lower extremity exercise program x20  reps per handout, with independence        Pt progressing towards goals. continue with PT POC.Goals remain appropriate.  Johnie Mooney PTA  10/30/2018

## 2018-10-30 NOTE — PLAN OF CARE
Problem: Patient Care Overview  Goal: Plan of Care Review  Outcome: Ongoing (interventions implemented as appropriate)  No acute events throughout shift.  Vitals and assessment completed as ordered. Pt calm and cooperative. No complains of pain. Pt free from injury or falls

## 2018-10-30 NOTE — PLAN OF CARE
CLARENCE spoke with Marlyn at [facility]; updated Marlyn on patient status and plan of care. Referral sent via [facility] Cline to review. Will follow.

## 2018-10-30 NOTE — PROGRESS NOTES
Hospital Medicine   Progress note      Team: [facility] HOSP MED R Sobia Ortiz MD   Admit Date: 10/4/2018   Hospital Day: 24  YONAS: 10/30/2018   Code status: Full Code   Principal Problem: Alcohol withdrawal     Summary:  Mohamud Patel is a 57 y.o. man with a history of Severe Alcohol Abuse, Chronic Wounds of Bilateral LE's with Onychomycosis, Alcoholic Neuropathy, Hyponatremia, and Tobacco Use Disorder who was brought to the ED by EMS after his girlfriend called 911 while the patient was severely intoxicated. He is admitted to Hospital Medicine for Alcohol Withdrawal, possible Wernicke's Encephalopathy, and Acute Cystitis. Pt continues to have gait instability, weakness, recurrent falls when he attempts to get out of bed, has poor insight into his addiction and encephalopathy and poor decision making. Psychiatry consulted, pt determined not to have capacity to make own medical decision at this time.     Interval hx:   Pt was seen and examined at bedside. No acute events overnight, no new complaints this morning. Pt continues to be confabulate and mentions he is staying at a friends place right now. He is able to answer some questions appropriately. In a pleasant mood this morning.     ROS (Positive in Bold, otherwise negative)   Constitutional: fever, chills, night sweats  CV: chest pain, edema, palpitations  Resp: SOB, cough, sputum production  GI: changes in appetite, NVDC, pain, melena, hematochezia, GERD, hematemesis  : Dysuria, hematuria, urinary urgency, frequency  MSK: arthralgia/myalgia, joint swelling  Neuro/Psych: anxiety, depression, confusion    PEx   Temp:  [98 °F (36.7 °C)-98.5 °F (36.9 °C)]   Pulse:  []   Resp:  [18-19]   BP: (130-138)/(66-92)   SpO2:  [96 %-100 %]      I & O (Last 24H):     Intake/Output Summary (Last 24 hours) at 10/29/2018 2103  Last data filed at 10/29/2018 1900  Gross per 24 hour   Intake 540 ml   Output 775 ml   Net -235 ml       General:  male  in no  acute distress. Sitting up in chair, slightly confused   HEENT: NCAT. PERRL. EOMI. Sclera Anicteric.  CVS: RRR. Normal S1 S2. No murmurs  Pulm: CTAB. Normal respiratory effort. No wheezes, rhonchi, or crackles.  Abdomen: Soft. Non-distended. No tenderness to palpation. No rebound or guarding. +BS.  Extremities: No edema. No cyanosis. Full ROM.  Skin: multiple scabbed lesions on bilateral feet, thickened skin on bilateral dorsal feet worse at the toes consistent with onychomycosis, no interdigital pits or evidence of scabies, large linear laceration with secondary healing on right foot, no surrounding erythema or induration  Neuro: Alert, oriented x 1, Spont mvt of all extremities with no focal deficits noted.    Recent Results (from the past 24 hour(s))   Basic metabolic panel    Collection Time: 10/29/18  6:38 AM   Result Value Ref Range    Sodium 140 136 - 145 mmol/L    Potassium 4.2 3.5 - 5.1 mmol/L    Chloride 107 95 - 110 mmol/L    CO2 25 23 - 29 mmol/L    Glucose 91 70 - 110 mg/dL    BUN, Bld 19 6 - 20 mg/dL    Creatinine 0.7 0.5 - 1.4 mg/dL    Calcium 10.3 8.7 - 10.5 mg/dL    Anion Gap 8 8 - 16 mmol/L    eGFR if African American >60.0 >60 mL/min/1.73 m^2    eGFR if non African American >60.0 >60 mL/min/1.73 m^2   Magnesium    Collection Time: 10/29/18  6:38 AM   Result Value Ref Range    Magnesium 1.9 1.6 - 2.6 mg/dL   Phosphorus    Collection Time: 10/29/18  6:38 AM   Result Value Ref Range    Phosphorus 3.6 2.7 - 4.5 mg/dL       No results for input(s): POCTGLUCOSE in the last 168 hours.    Hemoglobin A1C   Date Value Ref Range Status   10/05/2018 5.3 4.0 - 5.6 % Final     Comment:     ADA Screening Guidelines:  5.7-6.4%  Consistent with prediabetes  >or=6.5%  Consistent with diabetes  High levels of fetal hemoglobin interfere with the HbA1C  assay. Heterozygous hemoglobin variants (HbS, HgC, etc)do  not significantly interfere with this assay.   However, presence of multiple variants may affect accuracy.      08/10/2018 4.4 4.0 - 5.6 % Final     Comment:     ADA Screening Guidelines:  5.7-6.4%  Consistent with prediabetes  >or=6.5%  Consistent with diabetes  High levels of fetal hemoglobin interfere with the HbA1C  assay. Heterozygous hemoglobin variants (HbS, HgC, etc)do  not significantly interfere with this assay.   However, presence of multiple variants may affect accuracy.          Active Hospital Problems    Diagnosis  POA    *Alcohol withdrawal [F10.239]  Yes    Major neurocognitive disorder [F01.50]  Unknown    Impaired mobility and ADLs [Z74.09]  Unknown    Ulcer of toe of left foot [L97.529]  Yes    Abrasion [T14.8XXA]  Yes    Alcohol withdrawal delirium [F10.231]  Yes    Mental health disorder [F99]  Yes    Wernicke encephalopathy [E51.2]  Yes    Alcoholic peripheral neuropathy [G62.1]  Yes    Wound of lower extremity [S81.809A]  Yes    Alcohol use disorder, severe, dependence [F10.20]  Yes     Chronic    Hyponatremia [E87.1]  Yes      Resolved Hospital Problems   No resolved problems to display.      Assessment and Plan for problems addressed today:      amLODIPine  5 mg Oral Daily    docusate sodium  100 mg Oral Daily    enoxparin  40 mg Subcutaneous Daily    folic acid  1 mg Oral Daily    gabapentin  300 mg Oral TID    magnesium oxide  400 mg Oral BID    miconazole nitrate 2%   Topical (Top) BID    multivitamin  1 tablet Oral Daily    nicotine  1 patch Transdermal Daily    OLANZapine  10 mg Oral QHS    polyethylene glycol  17 g Oral Daily    thiamine  100 mg Oral Daily     OLANZapine, OLANZapine, sodium chloride 0.9%    Wernicke's Encephalopathy (WE) with Dementia and Behavioral Disturbance  Severe Alcohol Use Disorder   Alcohol Withdrawal (Resloved)  Alcoholic Neuropathy - Fall Risk, Requiring Restraints  - Medically, he is outside the window for Alcohol Withdrawal and he has completed the appropriate course of IV Thiamine for Wernicke's Encephalopathy. Continues to have severe  gait instability and has failed several attempts without restrains and sitter at bedside. Would favor eventual transfer of patient to Memory Unit vs. Inpatient Rehab capable of taking patient needing restrains ([facility] re[facility] possibly accepting these types of patients?)  -Zyprexa 10 mg HS scheduled and Zyprexa 5 mg PO/IM q8 PRN non-redirectable agitation. Maximum total daily dose of 30 mg   -obtained EKG to assess QTc today, Qtc noted to be 451  -gabapentin to 300mg TID for neuropathy and cravings   -psychiatry consulted, appreciate recs  -refrain from using physical restraints as much as possible   -For Wernicke's Encephalopathy: Finished course of IV Thiamine, transitioned to Thiamine 100 mg PO daily. Folic acid and MVI daily.   -Valium taper completed on 10/15.   -CMP, Mg, Phos - spaced to q48 hours  -Fall, aspiration, and seizure precautions  -PT/OT consulted, performed well with OT yesterday  -IRP consult placed for ongoing rehab      Multiple Bilateral Lower Extremity Wounds  Onychomycosis of Bilateral Feet   -Wound Care consulted, appreciate assistance - have debrided wounds, applying medi-honey, no evidence of active infection. Miconazole ointment to toes  -Podiatry was consulted, no other acute interventions at this time      Tobacco Use Disorder  -Nicotine 21 mg patch daily     DVT PPx: Lovenox SubQ Daily    Diet: Regular  Discharge plan and follow up: Plan for possible D/C to SNF vs LTC vs memory unit     Sobia Ortiz MD  Hospital Medicine Staff  [phone] pager

## 2018-10-30 NOTE — PT/OT/SLP PROGRESS
Physical Therapy Treatment    Patient Name:  Mohamud Patel   MRN:  [id number]    Recommendations:     Discharge Recommendations:  rehabilitation facility   Discharge Equipment Recommendations: (TBD)   Barriers to discharge: Inaccessible home and Decreased caregiver support    Assessment:     Mohamud Patel is a 57 y.o. male admitted with a medical diagnosis of Alcohol withdrawal.  He presents with the following impairments/functional limitations:  weakness, impaired endurance, impaired self care skills, gait instability, impaired functional mobilty, impaired balance, impaired cardiopulmonary response to activity, impaired coordination, decreased safety awareness, impaired cognition . Pt Progressing with PT Intervention. Pt Progressing with improving gait distance. Pt would continue to benefit from skilled PT to address overall functional mobility and goals. Goals remain appropriate      Rehab Prognosis:  good; patient would benefit from acute skilled PT services to address these deficits and reach maximum level of function.      Recent Surgery: * No surgery found *      Plan:     During this hospitalization, patient to be seen 3 x/week to address the above listed problems via gait training, therapeutic activities, therapeutic exercises, neuromuscular re-education  · Plan of Care Expires:  11/05/18   Plan of Care Reviewed with: patient    Subjective     Communicated with RN prior to session.  Patient found seated at EOB upon PT entry to room, agreeable to treatment.      Chief Complaint: I am not going to stay in the hospital for the rest of my life  Patient comments/goals: to go home  Pain/Comfort:  · Pain Rating 1: 0/10  · Pain Rating Post-Intervention 1: 0/10    Patients cultural, spiritual, Confucianist conflicts given the current situation: none noted    Objective:     Patient found with: (sitter)     General Precautions: Standard, fall, seizure   Orthopedic Precautions:N/A   Braces: N/A      Functional Mobility:  · Transfers:     · Sit to Stand:  contact guard assistance with rolling walker  · Gait: pt ambulated with RW x 125 ft with CGA. pt required vcs for safety,upright posture , placement of AD and to decrease stacey.      AM-PAC 6 CLICK MOBILITY  Turning over in bed (including adjusting bedclothes, sheets and blankets)?: 4  Sitting down on and standing up from a chair with arms (e.g., wheelchair, bedside commode, etc.): 3  Moving from lying on back to sitting on the side of the bed?: 3  Moving to and from a bed to a chair (including a wheelchair)?: 3  Need to walk in hospital room?: 3  Climbing 3-5 steps with a railing?: 1  Basic Mobility Total Score: 17       Therapeutic Activities and Exercises:   Discussed/educated patient on progress, safety, d/c, PT POC   White board updated in patients room to current assistance level   Donned  gripping socks   Bedside table in front of patient and area set up for function, convenience, and safety. RN aware of patient's mobility needs and status. Questions/concerns addressed within PTA scope of practice; patient with no further questions.       Patient left seated at EOB with all lines intact, call button in reach and nsg notified..  Sitter present  GOALS:   Multidisciplinary Problems     Physical Therapy Goals        Problem: Physical Therapy Goal    Goal Priority Disciplines Outcome Goal Variances Interventions   Physical Therapy Goal     PT, PT/OT Ongoing (interventions implemented as appropriate)     Description:  Goals to be met by: 18     Patient will increase functional independence with mobility by performin. Sit to stand transfer with Stand-by Assistance-met   Revised: sit to stand transfer with Supervision.   2. Bed to chair transfer with Stand-by Assistance using LRD.   3. Gait  x 100 feet with Contact Guard Assistance using Rolling Walker or LRD>   4. Lower extremity exercise program x20  reps per handout, with independence                          Time Tracking:     PT Received On: 10/30/18  PT Total Time (min): 15 min     Billable Minutes: Gait Training 15    Treatment Type: Treatment  PT/PTA: PTA     PTA Visit Number: 1     Johnie Mooney PTA  10/30/2018

## 2018-10-31 LAB
ANION GAP SERPL CALC-SCNC: 6 MMOL/L
BUN SERPL-MCNC: 22 MG/DL
CALCIUM SERPL-MCNC: 10 MG/DL
CHLORIDE SERPL-SCNC: 107 MMOL/L
CO2 SERPL-SCNC: 26 MMOL/L
CREAT SERPL-MCNC: 0.8 MG/DL
EST. GFR  (AFRICAN AMERICAN): >60 ML/MIN/1.73 M^2
EST. GFR  (NON AFRICAN AMERICAN): >60 ML/MIN/1.73 M^2
GLUCOSE SERPL-MCNC: 95 MG/DL
MAGNESIUM SERPL-MCNC: 1.9 MG/DL
PHOSPHATE SERPL-MCNC: 4.2 MG/DL
POTASSIUM SERPL-SCNC: 3.9 MMOL/L
SODIUM SERPL-SCNC: 139 MMOL/L

## 2018-10-31 PROCEDURE — 84100 ASSAY OF PHOSPHORUS: CPT

## 2018-10-31 PROCEDURE — S4991 NICOTINE PATCH NONLEGEND: HCPCS | Performed by: HOSPITALIST

## 2018-10-31 PROCEDURE — 25000003 PHARM REV CODE 250: Performed by: HOSPITALIST

## 2018-10-31 PROCEDURE — 36415 COLL VENOUS BLD VENIPUNCTURE: CPT

## 2018-10-31 PROCEDURE — 99231 SBSQ HOSP IP/OBS SF/LOW 25: CPT | Mod: ,,, | Performed by: HOSPITALIST

## 2018-10-31 PROCEDURE — 63600175 PHARM REV CODE 636 W HCPCS: Performed by: HOSPITALIST

## 2018-10-31 PROCEDURE — 80048 BASIC METABOLIC PNL TOTAL CA: CPT

## 2018-10-31 PROCEDURE — 97116 GAIT TRAINING THERAPY: CPT

## 2018-10-31 PROCEDURE — 11000001 HC ACUTE MED/SURG PRIVATE ROOM

## 2018-10-31 PROCEDURE — 83735 ASSAY OF MAGNESIUM: CPT

## 2018-10-31 PROCEDURE — S0166 INJ OLANZAPINE 2.5MG: HCPCS | Performed by: HOSPITALIST

## 2018-10-31 RX ORDER — OLANZAPINE 5 MG/1
5 TABLET ORAL DAILY
Status: DISCONTINUED | OUTPATIENT
Start: 2018-11-01 | End: 2018-11-10 | Stop reason: HOSPADM

## 2018-10-31 RX ORDER — GABAPENTIN 400 MG/1
400 CAPSULE ORAL 3 TIMES DAILY
Status: DISCONTINUED | OUTPATIENT
Start: 2018-10-31 | End: 2018-11-10 | Stop reason: HOSPADM

## 2018-10-31 RX ADMIN — DOCUSATE SODIUM 100 MG: 100 CAPSULE, LIQUID FILLED ORAL at 08:10

## 2018-10-31 RX ADMIN — MICONAZOLE NITRATE: 20 OINTMENT TOPICAL at 09:10

## 2018-10-31 RX ADMIN — OLANZAPINE 10 MG: 10 TABLET, FILM COATED ORAL at 08:10

## 2018-10-31 RX ADMIN — FOLIC ACID 1 MG: 1 TABLET ORAL at 08:10

## 2018-10-31 RX ADMIN — GABAPENTIN 400 MG: 400 CAPSULE ORAL at 04:10

## 2018-10-31 RX ADMIN — GABAPENTIN 300 MG: 300 CAPSULE ORAL at 08:10

## 2018-10-31 RX ADMIN — GABAPENTIN 400 MG: 400 CAPSULE ORAL at 08:10

## 2018-10-31 RX ADMIN — AMLODIPINE BESYLATE 5 MG: 5 TABLET ORAL at 08:10

## 2018-10-31 RX ADMIN — NICOTINE 1 PATCH: 21 PATCH, EXTENDED RELEASE TRANSDERMAL at 08:10

## 2018-10-31 RX ADMIN — ENOXAPARIN SODIUM 40 MG: 100 INJECTION SUBCUTANEOUS at 05:10

## 2018-10-31 RX ADMIN — THERA TABS 1 TABLET: TAB at 08:10

## 2018-10-31 RX ADMIN — Medication 400 MG: at 08:10

## 2018-10-31 RX ADMIN — Medication 100 MG: at 08:10

## 2018-10-31 RX ADMIN — OLANZAPINE 5 MG: 5 TABLET, FILM COATED ORAL at 12:10

## 2018-10-31 RX ADMIN — Medication 400 MG: at 09:10

## 2018-10-31 NOTE — PT/OT/SLP PROGRESS
Physical Therapy Treatment    Patient Name:  Mohamud Patel   MRN:  [id number]    Recommendations:     Discharge Recommendations:  rehabilitation facility   Discharge Equipment Recommendations: other (see comments)(TBD)   Barriers to discharge: Decreased caregiver support    Assessment:     Mohamud Patel is a 57 y.o. male admitted with a medical diagnosis of Alcohol withdrawal.  He presents with the following impairments/functional limitations:  weakness, gait instability, impaired endurance, impaired balance, decreased lower extremity function, decreased safety awareness, impaired cognition, impaired functional mobilty, decreased coordination. Pt performed bed mobility S and transfers CGA. Pt amb ~75ft CGA/min A without AD, occasional assist with WS and vc's for safety; x3 trials with seated rest break. Pt will continue to benefit from skilled PT to improve deficits and increase overall functional mobility.     Rehab Prognosis:  Good; patient would benefit from acute skilled PT services to address these deficits and reach maximum level of function.      Recent Surgery: * No surgery found *      Plan:     During this hospitalization, patient to be seen 3 x/week to address the above listed problems via gait training, therapeutic activities, therapeutic exercises, neuromuscular re-education  · Plan of Care Expires:  11/05/18   Plan of Care Reviewed with: patient, father    Subjective     Communicated with RN prior to session.  Patient found supine in bed upon PT entry to room, agreeable to treatment.      Chief Complaint: NA  Patient comments/goals: return home  Pain/Comfort:  · Pain Rating 1: 0/10  · Pain Rating Post-Intervention 1: 0/10    Patients cultural, spiritual, Jewish conflicts given the current situation: none noted    Objective:     Patient found with: restraints     General Precautions: Standard, fall, seizure   Orthopedic Precautions:N/A   Braces: N/A     Functional Mobility:  Bed  Mobility:     · Supine to Sit: supervision  · Sit to Supine: supervision    Transfers:     · Sit to Stand:  contact guard assistance with no AD    Gait: ~75ft CGA/min A without AD, occasional assist with WS and vc's for safety; x3 trials with seated rest break    AM-PAC 6 CLICK MOBILITY  Turning over in bed (including adjusting bedclothes, sheets and blankets)?: 4  Sitting down on and standing up from a chair with arms (e.g., wheelchair, bedside commode, etc.): 3  Moving from lying on back to sitting on the side of the bed?: 3  Moving to and from a bed to a chair (including a wheelchair)?: 3  Need to walk in hospital room?: 3  Climbing 3-5 steps with a railing?: 3  Basic Mobility Total Score: 19       Therapeutic Activities and Exercises:  Pt sat EOB with S.  Pt educated on:  -safety with mobility  -importance of OOB activity  Pt safe to amb with RN staff.     Patient left HOB elevated with all lines intact, call button in reach and RN notified..    GOALS:   Multidisciplinary Problems     Physical Therapy Goals        Problem: Physical Therapy Goal    Goal Priority Disciplines Outcome Goal Variances Interventions   Physical Therapy Goal     PT, PT/OT Ongoing (interventions implemented as appropriate)     Description:  Goals to be met by: 18     Patient will increase functional independence with mobility by performin. Sit to stand transfer with Stand-by Assistance-met   Revised: sit to stand transfer with Supervision.   2. Bed to chair transfer with Stand-by Assistance using LRD.   3. Gait  x 100 feet with Contact Guard Assistance using Rolling Walker or LRD>   4. Lower extremity exercise program x20  reps per handout, with independence                         Time Tracking:     PT Received On: 10/31/18  PT Start Time: 1019     PT Stop Time: 1044  PT Total Time (min): 25 min     Billable Minutes: Gait Training 25    Treatment Type: Treatment  PT/PTA: PT     PTA Visit Number: 0     GALINA MAYA  PT  10/31/2018

## 2018-10-31 NOTE — PLAN OF CARE
Problem: Physical Therapy Goal  Goal: Physical Therapy Goal  Goals to be met by: 18     Patient will increase functional independence with mobility by performin. Sit to stand transfer with Stand-by Assistance-met   Revised: sit to stand transfer with Supervision.   2. Bed to chair transfer with Stand-by Assistance using LRD.   3. Gait  x 100 feet with Contact Guard Assistance using Rolling Walker or LRD>   4. Lower extremity exercise program x20  reps per handout, with independence        Outcome: Ongoing (interventions implemented as appropriate)  Pt progressing towards goals.     GALINA MAYA, PT  10/31/2018

## 2018-10-31 NOTE — PROGRESS NOTES
Hospital Medicine   Progress Note     [place]: [facility] [place] Prateek Gudino MD   Admit Date: 10/4/2018   YONAS 10/31/2018   Code status: Full Code   Principal Problem: Alcohol withdrawal     Interval hx: Patient seen and examined at bedside today. Overnight: KIRA. This AM, patient doing very well - marked improvement since my last assessment. He is out of restraints, able to feed himself, and is much more goal-oriented in his speech and thought process. Remains oriented to person and place alone (not year or month). Discussed plans to discharge to [facility] and patient in agreement but anxious to get out of the hospital.    ROS   Constitutional: negative for fevers and chills  Respiratory: negative for cough, shortness of breath   Cardiovascular: negative for chest discomfort, palpitations   Gastrointestinal: negative for nausea or vomiting, abdominal pain, constipation, diarrhea     PEx   Temp:  [97.8 °F (36.6 °C)-98.6 °F (37 °C)]   Pulse:  [85-95]   Resp:  [16-20]   BP: (114-139)/(70-77)   SpO2:  [98 %-100 %]      I & O (Last 24H):     Intake/Output Summary (Last 24 hours) at 10/30/2018 1945  Last data filed at 10/30/2018 1800  Gross per 24 hour   Intake 2216 ml   Output --   Net 2216 ml     General:  male  in no acute distress. Sitting up in chair, slightly confused   HEENT: NCAT. PERRL. EOMI. Sclera Anicteric.  CVS: RRR. Normal S1 S2. No murmurs  Pulm: CTAB. Normal respiratory effort. No wheezes, rhonchi, or crackles.  Abdomen: Soft. Non-distended. No tenderness to palpation. No rebound or guarding. +BS.  Extremities: No edema. No cyanosis. Full ROM.  Skin: multiple scabbed lesions on bilateral feet, thickened skin on bilateral dorsal feet worse at the toes consistent with onychomycosis, no interdigital pits or evidence of scabies, large linear laceration with secondary healing on right foot, no surrounding erythema or induration  Neuro: Alert, oriented x 1, Spont mvt of all extremities with no focal deficits  noted    No results found for this or any previous visit (from the past 24 hour(s)).    No results for input(s): POCTGLUCOSE in the last 168 hours.     amLODIPine  5 mg Oral Daily    docusate sodium  100 mg Oral Daily    enoxparin  40 mg Subcutaneous Daily    folic acid  1 mg Oral Daily    gabapentin  300 mg Oral TID    magnesium oxide  400 mg Oral BID    miconazole nitrate 2%   Topical (Top) BID    multivitamin  1 tablet Oral Daily    nicotine  1 patch Transdermal Daily    OLANZapine  10 mg Oral QHS    polyethylene glycol  17 g Oral Daily    thiamine  100 mg Oral Daily       OLANZapine, OLANZapine, sodium chloride 0.9%    Assessment & Plan:  Mohamud Patel is a 57 y.o. man with a history of Severe Alcohol Abuse, Chronic Wounds of Bilateral LE's with Onychomycosis, Alcoholic Neuropathy, Hyponatremia, and Tobacco Use Disorder who was brought to the ED by EMS after his girlfriend called 911 while the patient was severely intoxicated. He is admitted to Hospital Medicine for Alcohol Withdrawal, possible Wernicke's Encephalopathy, and Acute Cystitis. Pt continues to have gait instability, weakness, recurrent falls when he attempts to get out of bed, has poor insight into his addiction and encephalopathy and poor decision making. Psychiatry consulted, pt determined not to have capacity to make own medical decision at this time. Awaiting placement at [facility] for continued gait training as he has made significant strides with PT and no longer requires restraints.    Wernicke's Encephalopathy (WE) with Dementia and Behavioral Disturbance  Severe Alcohol Use Disorder   Alcohol Withdrawal (Resloved)  Alcoholic Neuropathy - Fall Risk, Requiring Restraints  - Medically, he is outside the window for Alcohol Withdrawal and he has completed the appropriate course of IV Thiamine for Wernicke's Encephalopathy. Continues to have severe gait instability but has made strides with working with PT. With psychiatry  assistance, patient now much more alert and participatory in his care - able to feed himself and no longer requires restrains with sitter at bedside  -Zyprexa 10 mg HS scheduled and Zyprexa 5 mg PO/IM q8 PRN non-redirectable agitation. Maximum total daily dose of 30 mg   -obtained EKG 10/29, Qtc noted to be 451  -gabapentin to 300mg TID for neuropathy and cravings   -psychiatry consulted, appreciate recs  -refrain from using physical restraints as much as possible   -For Wernicke's Encephalopathy: Finished course of IV Thiamine, transitioned to Thiamine 100 mg PO daily. Folic acid and MVI daily.   -Valium taper completed on 10/15.   -CMP, Mg, Phos - spaced to q48 hours  -Fall, aspiration, and seizure precautions  -PT/OT consulted, performing well with OT and PT  -IRP consult placed for ongoing rehab      Multiple Bilateral Lower Extremity Wounds  Onychomycosis of Bilateral Feet   -Wound Care consulted, appreciate assistance - have debrided wounds, applying medi-honey, no evidence of active infection. Miconazole ointment to toes  -Podiatry was consulted, no other acute interventions at this time      Tobacco Use Disorder  -Nicotine 21 mg patch daily     DVT PPx: Lovenox SubQ Daily    Diet: Regular  Discharge plan and follow up: Plan for discharge to [facility], has been tentatively accepted to [facility] in [place], appreciate CM assistance. Hope for discharge between 10/31-11/1      Prateek Gudino MD  Hospital Medicine Staff  [facility]   Pager: [phone]

## 2018-10-31 NOTE — PLAN OF CARE
CM met with patient, his father, and Doreen Wilcox with [facility] at bedside. Plan remains rehab for the short term, patient should be able to improve quickly. Patient could definitely benefit from SLP for cognitive therapy and then would be more appropriate for addiction rehab. CM waiting to hear from [facility] on insurance auth. Will follow.

## 2018-11-01 PROCEDURE — 99231 SBSQ HOSP IP/OBS SF/LOW 25: CPT | Mod: ,,, | Performed by: HOSPITALIST

## 2018-11-01 PROCEDURE — 11000001 HC ACUTE MED/SURG PRIVATE ROOM

## 2018-11-01 PROCEDURE — A4216 STERILE WATER/SALINE, 10 ML: HCPCS | Performed by: HOSPITALIST

## 2018-11-01 PROCEDURE — 25000003 PHARM REV CODE 250: Performed by: HOSPITALIST

## 2018-11-01 PROCEDURE — 97535 SELF CARE MNGMENT TRAINING: CPT

## 2018-11-01 PROCEDURE — 63600175 PHARM REV CODE 636 W HCPCS: Performed by: HOSPITALIST

## 2018-11-01 PROCEDURE — S4991 NICOTINE PATCH NONLEGEND: HCPCS | Performed by: HOSPITALIST

## 2018-11-01 RX ADMIN — Medication 400 MG: at 09:11

## 2018-11-01 RX ADMIN — GABAPENTIN 400 MG: 400 CAPSULE ORAL at 02:11

## 2018-11-01 RX ADMIN — Medication 400 MG: at 10:11

## 2018-11-01 RX ADMIN — OLANZAPINE 5 MG: 5 TABLET, FILM COATED ORAL at 10:11

## 2018-11-01 RX ADMIN — ENOXAPARIN SODIUM 40 MG: 100 INJECTION SUBCUTANEOUS at 05:11

## 2018-11-01 RX ADMIN — Medication 5 ML: at 09:11

## 2018-11-01 RX ADMIN — GABAPENTIN 400 MG: 400 CAPSULE ORAL at 10:11

## 2018-11-01 RX ADMIN — THERA TABS 1 TABLET: TAB at 10:11

## 2018-11-01 RX ADMIN — OLANZAPINE 10 MG: 10 TABLET, FILM COATED ORAL at 09:11

## 2018-11-01 RX ADMIN — DOCUSATE SODIUM 100 MG: 100 CAPSULE, LIQUID FILLED ORAL at 10:11

## 2018-11-01 RX ADMIN — MICONAZOLE NITRATE: 20 OINTMENT TOPICAL at 10:11

## 2018-11-01 RX ADMIN — NICOTINE 1 PATCH: 21 PATCH, EXTENDED RELEASE TRANSDERMAL at 10:11

## 2018-11-01 RX ADMIN — OLANZAPINE 5 MG: 5 TABLET, FILM COATED ORAL at 01:11

## 2018-11-01 RX ADMIN — GABAPENTIN 400 MG: 400 CAPSULE ORAL at 09:11

## 2018-11-01 RX ADMIN — Medication 100 MG: at 10:11

## 2018-11-01 RX ADMIN — FOLIC ACID 1 MG: 1 TABLET ORAL at 10:11

## 2018-11-01 RX ADMIN — AMLODIPINE BESYLATE 5 MG: 5 TABLET ORAL at 10:11

## 2018-11-01 RX ADMIN — MICONAZOLE NITRATE: 20 OINTMENT TOPICAL at 09:11

## 2018-11-01 NOTE — PLAN OF CARE
CM rec'd call from Doreen Wilcox with [facility]. Discussed insurance denial of IRF which will probably be upheld. Patient needs continued therapy to assist with memory, unsure how much SLP can help. Will discuss with therapy in am. CM asked Doreen to assist with suggestion for assisted living with memory care units vs home care with 24 hr assist. Will follow with Doreen in am.

## 2018-11-01 NOTE — PLAN OF CARE
Problem: Occupational Therapy Goal  Goal: Occupational Therapy Goal  Goals to be met by: 11/5  Patient will increase functional independence with ADLs by performing:    UE Dressing with Modified [place] while seated. MET 10/29  LE Dressing with Modified [place].  Grooming while standing at sink with Min A. MET 11/1  *revised: with modified independence.   Toileting from toilet with Minimum A for hygiene and clothing management.   Supine to sit with [place]. MET 10/29  Stand pivot transfers with CGA to chair and/or toilet. MET 10/29 to BSC  *revised: to chair, BSC, and/or toilet with supervision and AD as needed  Functional mobility with AD as needed for short household distance with no LOB with min A. MET 11/1  *revised: with modified independence.          Outcome: Ongoing (interventions implemented as appropriate)  Goals remain appropriate. SAM Almaraz 11/1/2018

## 2018-11-01 NOTE — PT/OT/SLP PROGRESS
Occupational Therapy   Treatment    Name: Mohamud Patel  MRN: [id number]  Admitting Diagnosis:  Alcohol withdrawal       Recommendations:     Discharge Recommendations: rehabilitation facility  Discharge Equipment Recommendations:  (TBD )  Barriers to discharge:  Inaccessible home environment, Decreased caregiver support    Subjective     Communicated with: RN Rey) prior to session. No family present for session.   Pain/Comfort:  · Pain Rating 1: 0/10    Patients cultural, spiritual, Confucianism conflicts given the current situation: none reported     Objective:     Patient found with: (sitter)    General Precautions: Standard, fall, seizure   Orthopedic Precautions:N/A   Braces: N/A     Occupational Performance:    Bed Mobility:    · Patient completed Rolling/Turning to Right with modified independence  · Patient completed Scooting/Bridging with modified independence  · Patient completed Supine to Sit with modified independence     Functional Mobility/Transfers:  · Patient completed Sit <> Stand Transfer with stand by assistance  with  no assistive device x2 trials from EOB   · Completed x5 additional consecutive trials with SBA for endurance and balance training  · Patient completed Bed <> Chair Transfer using Stand Pivot and Step Transfer technique with contact guard assistance with no assistive device  · Functional Mobility: CGA for short household distance x2 trials with no AD    Activities of Daily Living:  · Grooming: stand by assistance to complete oral hygiene, washing face, and apply deodorant while standing at the sink  · Independently sequenced all tasks, maintained attention, and opened all containers   · Upper Body Dressing: stand by assistance to don gown like jacket while seated    · Able to complete 5 snap buttons with fair accuracy and increased time allotted--followed directions and demo improved sequencing of task    Patient left up in chair with all lines intact, call button in reach and  juan present    [facility] 6 Click:  [facility] Total Score: 22    Treatment & Education:  -Edu for OT role and POC  -Edu for importance of OOB activity and impact on healing  -Pt oriented to today being his birthday; however, unable to state age (stated 53 rather than 58)  -max A for month, day of week, and year  -Whiteboard updated   -Questions and concerns addressed within OT scope of practice   Education:    Assessment:     Mohamud Patel is a 58 y.o. male with a medical diagnosis of Alcohol withdrawal.  He presents with decreased functional independence in various areas of his life. He demo good motivation and willingness to participate. He demo improved cognition this date as he is following directions and sequencing tasks better; however, he continues to demo decreased memory and orientation. He demo no delusional reports, but perseverated on going home. He demo increased standing balance, but remains a high fall risk. He demo ataxia with functional mobility, but this was improved this date as compared to previous sessions. He demo increased ADL skills as compared to previous sessions. Pt demo decreased endurance to functional task. Pt would continue to benefit from skilled OT services for maximum functional outcome and safety. Performance deficits affecting function are weakness, impaired endurance, impaired self care skills, impaired functional mobilty, decreased coordination, impaired cognition, impaired balance, gait instability, decreased upper extremity function, impaired cardiopulmonary response to activity, decreased lower extremity function, impaired coordination, decreased safety awareness, impaired fine motor.      Rehab Prognosis:  good; patient would benefit from acute skilled OT services to address these deficits and reach maximum level of function.       Plan:     Patient to be seen 3 x/week to address the above listed problems via self-care/home management, therapeutic activities, therapeutic  exercises  · Plan of Care Expires: 11/04/18  · Plan of Care Reviewed with: patient    This Plan of care has been discussed with the patient who was involved in its development and understands and is in agreement with the identified goals and treatment plan    GOALS:   Multidisciplinary Problems     Occupational Therapy Goals        Problem: Occupational Therapy Goal    Goal Priority Disciplines Outcome Interventions   Occupational Therapy Goal     OT, PT/OT Ongoing (interventions implemented as appropriate)    Description:  Goals to be met by: 11/5  Patient will increase functional independence with ADLs by performing:    UE Dressing with Modified [place] while seated. MET 10/29  LE Dressing with Modified [place].  Grooming while standing at sink with Min A. MET 11/1  *revised: with modified independence.   Toileting from toilet with Minimum A for hygiene and clothing management.   Supine to sit with [place]. MET 10/29  Stand pivot transfers with CGA to chair and/or toilet. MET 10/29 to BSC  *revised: to chair, BSC, and/or toilet with supervision and AD as needed  Functional mobility with AD as needed for short household distance with no LOB with min A. MET 11/1  *revised: with modified independence.                            Time Tracking:     OT Date of Treatment: 11/01/18  OT Start Time: 1055  OT Stop Time: 1119  OT Total Time (min): 24 min    Billable Minutes:Self Care/Home Management 24    SAM Almaraz  11/1/2018

## 2018-11-01 NOTE — PLAN OF CARE
Covering SW  Received a call from Eleonora BERRY ([facility] [phone]) pt. Was denied by insurance due they feel Pt. Can get services at a lower level of care.      Aubrey Lundberg LMSW

## 2018-11-01 NOTE — PLAN OF CARE
CM left message for Doreen Wilcox ([phone]) to update on patient status. UMR states that [employer] denied; [place] also attempting. Will follow.

## 2018-11-01 NOTE — PROGRESS NOTES
Hospital Medicine   Progress Note     Team: [facility] HOSP MED R Prateek Gudino MD   Admit Date: 10/4/2018   YONAS 11/1/2018   Code status: Full Code   Principal Problem: Alcohol withdrawal     Interval hx: Patient seen and examined at bedside today. Overnight: KIRA. This AM, patient doing very well. Extensive discussion with patient and father who at bedside along with patient advocate. Discussed plan to discharge patient to [facility] to afford him maximum chance of recovery of gait and balance. All in agreement on plan.     ROS   Constitutional: negative for fevers and chills  Respiratory: negative for cough, shortness of breath   Cardiovascular: negative for chest discomfort, palpitations   Gastrointestinal: negative for nausea or vomiting, abdominal pain, constipation, diarrhea     PEx   Temp:  [97.9 °F (36.6 °C)-98.8 °F (37.1 °C)]   Pulse:  [76-93]   Resp:  [16-17]   BP: (123-137)/(71-87)   SpO2:  [97 %-100 %]      I & O (Last 24H):     Intake/Output Summary (Last 24 hours) at 10/31/2018 2003  Last data filed at 10/31/2018 0848  Gross per 24 hour   Intake --   Output 925 ml   Net -925 ml     General:  male  in no acute distress. Sitting up in chair, slightly confused   HEENT: NCAT. PERRL. EOMI. Sclera Anicteric.  CVS: RRR. Normal S1 S2. No murmurs  Pulm: CTAB. Normal respiratory effort. No wheezes, rhonchi, or crackles.  Abdomen: Soft. Non-distended. No tenderness to palpation. No rebound or guarding. +BS.  Extremities: No edema. No cyanosis. Full ROM.  Skin: multiple scabbed lesions on bilateral feet, thickened skin on bilateral dorsal feet worse at the toes consistent with onychomycosis, no interdigital pits or evidence of scabies, large linear laceration with secondary healing on right foot, no surrounding erythema or induration  Neuro: Alert, oriented x 1, Spont mvt of all extremities with no focal deficits noted    Recent Results (from the past 24 hour(s))   Basic metabolic panel    Collection Time: 10/31/18   6:36 AM   Result Value Ref Range    Sodium 139 136 - 145 mmol/L    Potassium 3.9 3.5 - 5.1 mmol/L    Chloride 107 95 - 110 mmol/L    CO2 26 23 - 29 mmol/L    Glucose 95 70 - 110 mg/dL    BUN, Bld 22 (H) 6 - 20 mg/dL    Creatinine 0.8 0.5 - 1.4 mg/dL    Calcium 10.0 8.7 - 10.5 mg/dL    Anion Gap 6 (L) 8 - 16 mmol/L    eGFR if African American >60.0 >60 mL/min/1.73 m^2    eGFR if non African American >60.0 >60 mL/min/1.73 m^2   Magnesium    Collection Time: 10/31/18  6:36 AM   Result Value Ref Range    Magnesium 1.9 1.6 - 2.6 mg/dL   Phosphorus    Collection Time: 10/31/18  6:36 AM   Result Value Ref Range    Phosphorus 4.2 2.7 - 4.5 mg/dL       No results for input(s): POCTGLUCOSE in the last 168 hours.     amLODIPine  5 mg Oral Daily    docusate sodium  100 mg Oral Daily    enoxparin  40 mg Subcutaneous Daily    folic acid  1 mg Oral Daily    gabapentin  400 mg Oral TID    magnesium oxide  400 mg Oral BID    miconazole nitrate 2%   Topical (Top) BID    multivitamin  1 tablet Oral Daily    nicotine  1 patch Transdermal Daily    OLANZapine  10 mg Oral QHS    [START ON 11/1/2018] OLANZapine  5 mg Oral Daily    thiamine  100 mg Oral Daily       OLANZapine, sodium chloride 0.9%    Assessment & Plan:  Mohamud Patel is a 57 y.o. man with a history of Severe Alcohol Abuse, Chronic Wounds of Bilateral LE's with Onychomycosis, Alcoholic Neuropathy, Hyponatremia, and Tobacco Use Disorder who was brought to the ED by EMS after his girlfriend called 911 while the patient was severely intoxicated. He is admitted to Hospital Medicine for Alcohol Withdrawal, possible Wernicke's Encephalopathy, and Acute Cystitis. Pt continues to have gait instability, weakness, recurrent falls when he attempts to get out of bed, has poor insight into his addiction and encephalopathy and poor decision making. Psychiatry consulted, pt determined not to have capacity to make own medical decision at this time. Awaiting placement at  [facility] for continued gait training as he has made significant strides with PT and no longer requires restraints.    Wernicke's Encephalopathy (WE) with Dementia and Behavioral Disturbance  Severe Alcohol Use Disorder   Alcohol Withdrawal (Resloved)  Alcoholic Neuropathy - Fall Risk, Requiring Restraints  - Medically, he is outside the window for Alcohol Withdrawal and he has completed the appropriate course of IV Thiamine for Wernicke's Encephalopathy. Continues to have severe gait instability but has made strides with working with PT. With psychiatry assistance, patient now much more alert and participatory in his care - able to feed himself and no longer requires restrains with sitter at bedside  -Zyprexa 5 mg daily and 10 mg HS scheduled and Zyprexa 5 mg PO/IM q8 PRN non-redirectable agitation. Maximum total daily dose of 30 mg   -obtained EKG 10/29, Qtc noted to be 451  -gabapentin to 300mg TID for neuropathy and cravings   -psychiatry consulted, appreciate recs  -refrain from using physical restraints as much as possible   -For Wernicke's Encephalopathy: Finished course of IV Thiamine, transitioned to Thiamine 100 mg PO daily. Folic acid and MVI daily.   -Valium taper completed on 10/15.   -CMP, Mg, Phos - spaced to q48 hours  -Fall, aspiration, and seizure precautions  -PT/OT consulted, performing well with OT and PT  -IRP consult placed for ongoing rehab      Multiple Bilateral Lower Extremity Wounds  Onychomycosis of Bilateral Feet   -Wound Care consulted, appreciate assistance - have debrided wounds, applying medi-honey, no evidence of active infection. Miconazole ointment to toes  -Podiatry was consulted, no other acute interventions at this time      Tobacco Use Disorder  -Nicotine 21 mg patch daily     DVT PPx: Lovenox SubQ Daily    Diet: Regular  Discharge plan and follow up: Plan for discharge to [facility], has been [facility] to [facility] in [place], appreciate CM assistance. Hope for  discharge between 10/31-11/1      Prateek Gudino MD  Hospital Medicine Staff  [facility]   Pager: [phone]

## 2018-11-02 LAB
ANION GAP SERPL CALC-SCNC: 8 MMOL/L
BUN SERPL-MCNC: 15 MG/DL
CALCIUM SERPL-MCNC: 9.9 MG/DL
CHLORIDE SERPL-SCNC: 107 MMOL/L
CO2 SERPL-SCNC: 24 MMOL/L
CREAT SERPL-MCNC: 0.8 MG/DL
EST. GFR  (AFRICAN AMERICAN): >60 ML/MIN/1.73 M^2
EST. GFR  (NON AFRICAN AMERICAN): >60 ML/MIN/1.73 M^2
GLUCOSE SERPL-MCNC: 98 MG/DL
MAGNESIUM SERPL-MCNC: 1.8 MG/DL
PHOSPHATE SERPL-MCNC: 4.8 MG/DL
POTASSIUM SERPL-SCNC: 4 MMOL/L
SODIUM SERPL-SCNC: 139 MMOL/L

## 2018-11-02 PROCEDURE — 25000003 PHARM REV CODE 250: Performed by: HOSPITALIST

## 2018-11-02 PROCEDURE — 84100 ASSAY OF PHOSPHORUS: CPT

## 2018-11-02 PROCEDURE — A4216 STERILE WATER/SALINE, 10 ML: HCPCS | Performed by: HOSPITALIST

## 2018-11-02 PROCEDURE — 99231 SBSQ HOSP IP/OBS SF/LOW 25: CPT | Mod: ,,, | Performed by: HOSPITALIST

## 2018-11-02 PROCEDURE — 97116 GAIT TRAINING THERAPY: CPT

## 2018-11-02 PROCEDURE — 93005 ELECTROCARDIOGRAM TRACING: CPT

## 2018-11-02 PROCEDURE — 36415 COLL VENOUS BLD VENIPUNCTURE: CPT

## 2018-11-02 PROCEDURE — 63600175 PHARM REV CODE 636 W HCPCS: Performed by: HOSPITALIST

## 2018-11-02 PROCEDURE — S4991 NICOTINE PATCH NONLEGEND: HCPCS | Performed by: HOSPITALIST

## 2018-11-02 PROCEDURE — 80048 BASIC METABOLIC PNL TOTAL CA: CPT

## 2018-11-02 PROCEDURE — 93010 ELECTROCARDIOGRAM REPORT: CPT | Mod: ,,, | Performed by: INTERNAL MEDICINE

## 2018-11-02 PROCEDURE — 11000001 HC ACUTE MED/SURG PRIVATE ROOM

## 2018-11-02 PROCEDURE — 83735 ASSAY OF MAGNESIUM: CPT

## 2018-11-02 RX ADMIN — GABAPENTIN 400 MG: 400 CAPSULE ORAL at 09:11

## 2018-11-02 RX ADMIN — Medication 400 MG: at 09:11

## 2018-11-02 RX ADMIN — THERA TABS 1 TABLET: TAB at 09:11

## 2018-11-02 RX ADMIN — MICONAZOLE NITRATE: 20 OINTMENT TOPICAL at 09:11

## 2018-11-02 RX ADMIN — FOLIC ACID 1 MG: 1 TABLET ORAL at 09:11

## 2018-11-02 RX ADMIN — ENOXAPARIN SODIUM 40 MG: 100 INJECTION SUBCUTANEOUS at 05:11

## 2018-11-02 RX ADMIN — OLANZAPINE 10 MG: 10 TABLET, FILM COATED ORAL at 09:11

## 2018-11-02 RX ADMIN — DOCUSATE SODIUM 100 MG: 100 CAPSULE, LIQUID FILLED ORAL at 09:11

## 2018-11-02 RX ADMIN — Medication 100 MG: at 09:11

## 2018-11-02 RX ADMIN — GABAPENTIN 400 MG: 400 CAPSULE ORAL at 03:11

## 2018-11-02 RX ADMIN — AMLODIPINE BESYLATE 5 MG: 5 TABLET ORAL at 09:11

## 2018-11-02 RX ADMIN — NICOTINE 1 PATCH: 21 PATCH, EXTENDED RELEASE TRANSDERMAL at 09:11

## 2018-11-02 RX ADMIN — Medication 5 ML: at 09:11

## 2018-11-02 RX ADMIN — OLANZAPINE 5 MG: 5 TABLET, FILM COATED ORAL at 09:11

## 2018-11-02 NOTE — PLAN OF CARE
CM met with Sharon with [facility]. H&P, progress and psych note given. CM contact info given. Will follow.

## 2018-11-02 NOTE — PROGRESS NOTES
Hospital Medicine   Progress Note     Team: [facility]OS[facility] MED R Prateek Gudino MD   Admit Date: 10/4/2018   YONAS 11/1/2018   Code status: Full Code   Principal Problem: Alcohol withdrawal     Interval hx: Patient seen and examined at bedside today. Overnight: KIRA. This AM, patient doing well, father at bedside. No new complaints, awaiting placement.       ROS   Constitutional: negative for fevers and chills  Respiratory: negative for cough, shortness of breath   Cardiovascular: negative for chest discomfort, palpitations   Gastrointestinal: negative for nausea or vomiting, abdominal pain, constipation, diarrhea     PEx   Temp:  [98.1 °F (36.7 °C)-98.7 °F (37.1 °C)]   Pulse:  [87-90]   Resp:  [17-18]   BP: (111-145)/(69-81)   SpO2:  [95 %-98 %]      I & O (Last 24H):     Intake/Output Summary (Last 24 hours) at 11/1/2018 2019  Last data filed at 11/1/2018 0500  Gross per 24 hour   Intake 480 ml   Output 400 ml   Net 80 ml     General:  male  in no acute distress. Sitting up in chair, slightly confused   HEENT: NCAT. PERRL. EOMI. Sclera Anicteric.  CVS: RRR. Normal S1 S2. No murmurs  Pulm: CTAB. Normal respiratory effort. No wheezes, rhonchi, or crackles.  Abdomen: Soft. Non-distended. No tenderness to palpation. No rebound or guarding. +BS.  Extremities: No edema. No cyanosis. Full ROM.  Skin: multiple scabbed lesions on bilateral feet, thickened skin on bilateral dorsal feet worse at the toes consistent with onychomycosis, no interdigital pits or evidence of scabies, large linear laceration with secondary healing on right foot, no surrounding erythema or induration     amLODIPine  5 mg Oral Daily    docusate sodium  100 mg Oral Daily    enoxparin  40 mg Subcutaneous Daily    folic acid  1 mg Oral Daily    gabapentin  400 mg Oral TID    magnesium oxide  400 mg Oral BID    miconazole nitrate 2%   Topical (Top) BID    multivitamin  1 tablet Oral Daily    nicotine  1 patch Transdermal Daily    OLANZapine   10 mg Oral QHS    OLANZapine  5 mg Oral Daily    thiamine  100 mg Oral Daily       OLANZapine, sodium chloride 0.9%    Assessment & Plan:  Mohamud Patel is a 57 y.o. man with a history of Severe Alcohol Abuse, Chronic Wounds of Bilateral LE's with Onychomycosis, Alcoholic Neuropathy, Hyponatremia, and Tobacco Use Disorder who was brought to the ED by EMS after his girlfriend called 911 while the patient was severely intoxicated. He is admitted to Hospital Medicine for Alcohol Withdrawal, possible Wernicke's Encephalopathy, and Acute Cystitis. Pt continues to have gait instability, weakness, recurrent falls when he attempts to get out of bed, has poor insight into his addiction and encephalopathy and poor decision making. Psychiatry consulted, pt determined not to have capacity to make own medical decision at this time. Awaiting placement at [facility] for continued gait training as he has made significant strides with PT and no longer requires restraints.    Wernicke's Encephalopathy (WE) with Dementia and Behavioral Disturbance  Severe Alcohol Use Disorder   Alcohol Withdrawal (Resloved)  Alcoholic Neuropathy - Fall Risk, Requiring Restraints  - Medically, he is outside the window for Alcohol Withdrawal and he has completed the appropriate course of IV Thiamine for Wernicke's Encephalopathy. Continues to have severe gait instability but has made strides with working with PT. With psychiatry assistance, patient now much more alert and participatory in his care - able to feed himself and no longer requires restrains with sitter at bedside  -Zyprexa 5 mg daily and 10 mg HS scheduled and Zyprexa 5 mg PO/IM q8 PRN non-redirectable agitation. Maximum total daily dose of 30 mg   -obtained EKG 10/29, Qtc noted to be 451, f/u AM EKG (11/2)  -gabapentin to 300mg TID for neuropathy and cravings   -psychiatry consulted, appreciate recs  -refrain from using physical restraints as much as possible   -For Wernicke's  Encephalopathy: Finished course of IV Thiamine, transitioned to Thiamine 100 mg PO daily. Folic acid and MVI daily.   -Valium taper completed on 10/15.   -CMP, Mg, Phos - spaced to q48 hours  -Fall, aspiration, and seizure precautions  -PT/OT consulted, performing well with OT and PT  -IRP consult placed for ongoing rehab      Multiple Bilateral Lower Extremity Wounds  Onychomycosis of Bilateral Feet   -Wound Care consulted, appreciate assistance - have debrided wounds, applying medi-honey, no evidence of active infection. Miconazole ointment to toes  -Podiatry was consulted, no other acute interventions at this time      Tobacco Use Disorder  -Nicotine 21 mg patch daily       DVT PPx: Lovenox SubQ Daily    Diet: Regular  Discharge plan and follow up: Anticipate discharge to [facility] and SW as well as outside patient advocate assisting with placement options. Insurance approval appears to be rate limiting factor      Prateek Gudino MD  Hospital Medicine Staff  [facility]   Pager: [phone]

## 2018-11-02 NOTE — PLAN OF CARE
H&P, progress notes and Med rec given to Nataliya ([phone]) with [facility]

## 2018-11-02 NOTE — PLAN OF CARE
Problem: Physical Therapy Goal  Goal: Physical Therapy Goal  Goals to be met by: 18     Patient will increase functional independence with mobility by performin. Sit to stand transfer with Stand-by Assistance-met   Revised: sit to stand transfer with Supervision.   2. Bed to chair transfer with Stand-by Assistance using LRD.   3. Gait  x 100 feet with Contact Guard Assistance using Rolling Walker or LRD>   4. Lower extremity exercise program x20  reps per handout, with independence        Pt progressing towards goals. continue with PT POC.Goals remain appropriate.  Johnie Mooney PTA  2018

## 2018-11-02 NOTE — PLAN OF CARE
[facility] presented to Pt's bedside to evaluate for placement.  [facility] determined Pt. Is not appropriate for IRF due to Pt. Was combative and unable to redirect when she was at Pt. Bedside.    Peter Amaya rec long term placement with 24 hour monitoring.    Aubrey Lundberg LMSW

## 2018-11-02 NOTE — PLAN OF CARE
[employer] [facility] denied inpatient rehab insurance auth. CLARENCE called to schedule peer to peer; left voicemail for Anton at [phone], option 2, then option 1. CLARENCE and Dr Gudino's contact info given.

## 2018-11-02 NOTE — PROGRESS NOTES
Hospital Medicine   Progress Note     Team: [facility] HOSP MED R Prateek Gudino MD   Admit Date: 10/4/2018   YONAS 11/1/2018   Code status: Full Code   Principal Problem: Alcohol withdrawal     Interval hx: Patient seen and examined at bedside today. Overnight: KIRA. This AM, patient in good spirits had just finished walking with PT. Again asking about his plan of discharge demonstrating short term memory impairment as we have discussed plan for IPR discharge every AM for several days. Had some insight into need for continued gait training and more agreeable to placement.    ROS   Constitutional: negative for fevers and chills  Respiratory: negative for cough, shortness of breath   Cardiovascular: negative for chest discomfort, palpitations   Gastrointestinal: negative for nausea or vomiting, abdominal pain, constipation, diarrhea     PEx   Temp:  [97.5 °F (36.4 °C)-98.2 °F (36.8 °C)]   Pulse:  [75-86]   Resp:  [16-20]   BP: (125-140)/(67-90)   SpO2:  [95 %-100 %]      I & O (Last 24H):     Intake/Output Summary (Last 24 hours) at 11/2/2018 1343  Last data filed at 11/2/2018 0600  Gross per 24 hour   Intake 840 ml   Output 1300 ml   Net -460 ml     General:  male  in no acute distress. Sitting up in chair, slightly confused   HEENT: NCAT. PERRL. EOMI. Sclera Anicteric.  CVS: RRR. Normal S1 S2. No murmurs  Pulm: CTAB. Normal respiratory effort. No wheezes, rhonchi, or crackles.  Abdomen: Soft. Non-distended. No tenderness to palpation. No rebound or guarding. +BS.  Extremities: No edema. No cyanosis. Full ROM.  Skin: multiple scabbed lesions on bilateral feet, thickened skin on bilateral dorsal feet worse at the toes consistent with onychomycosis, no interdigital pits or evidence of scabies, large linear laceration with secondary healing on right foot, no surrounding erythema or induration     amLODIPine  5 mg Oral Daily    enoxparin  40 mg Subcutaneous Daily    folic acid  1 mg Oral Daily    gabapentin  400 mg  Oral TID    magnesium oxide  400 mg Oral BID    miconazole nitrate 2%   Topical (Top) BID    multivitamin  1 tablet Oral Daily    nicotine  1 patch Transdermal Daily    OLANZapine  10 mg Oral QHS    OLANZapine  5 mg Oral Daily    thiamine  100 mg Oral Daily       OLANZapine, sodium chloride 0.9%    Assessment & Plan:  Mohamud Patel is a 57 y.o. man with a history of Severe Alcohol Abuse, Chronic Wounds of Bilateral LE's with Onychomycosis, Alcoholic Neuropathy, Hyponatremia, and Tobacco Use Disorder who was brought to the ED by EMS after his girlfriend called 911 while the patient was severely intoxicated. He is admitted to Hospital Medicine for Alcohol Withdrawal, possible Wernicke's Encephalopathy, and Acute Cystitis. Pt continues to have gait instability, weakness, recurrent falls when he attempts to get out of bed, has poor insight into his addiction and encephalopathy and poor decision making. Psychiatry consulted, pt determined not to have capacity to make own medical decision at this time. Awaiting placement at [facility] for continued gait training as he has made significant strides with PT and no longer requires restraints.    Wernicke's Encephalopathy (WE) with Dementia and Behavioral Disturbance  Severe Alcohol Use Disorder   Alcohol Withdrawal (Resloved)  Alcoholic Neuropathy - Fall Risk, Requiring Restraints  - Medically, he is outside the window for Alcohol Withdrawal and he has completed the appropriate course of IV Thiamine for Wernicke's Encephalopathy. Continues to have severe gait instability but has made strides with working with PT. With psychiatry assistance, patient now much more alert and participatory in his care - able to feed himself and no longer requires restrains with sitter at bedside  -Zyprexa 5 mg daily and 10 mg HS scheduled and Zyprexa 5 mg PO/IM q8 PRN non-redirectable agitation. Maximum total daily dose of 30 mg   -obtained EKG 10/29, Qtc noted to be 451, f/u AM EKG  (11/2)  -gabapentin to 300mg TID for neuropathy and cravings   -psychiatry consulted, appreciate recs  -refrain from using physical restraints as much as possible   -For Wernicke's Encephalopathy: Finished course of IV Thiamine, transitioned to Thiamine 100 mg PO daily. Folic acid and MVI daily.   -Valium taper completed on 10/15.   -CMP, Mg, Phos - spaced to q48 hours  -Fall, aspiration, and seizure precautions  -PT/OT consulted, performing well with OT and PT  -IRP consult placed for ongoing rehab      Multiple Bilateral Lower Extremity Wounds  Onychomycosis of Bilateral Feet   -Wound Care consulted, appreciate assistance - have debrided wounds, applying medi-honey, no evidence of active infection. Miconazole ointment to toes  -Podiatry was consulted, no other acute interventions at this time      Tobacco Use Disorder  -Nicotine 21 mg patch daily       DVT PPx: Lovenox SubQ Daily    Diet: Regular  Discharge plan and follow up: Medically ready for discharge. Anticipate discharge to [facility], [facility] CM and SW as well as outside patient advocate assisting with placement options. Insurance approval appears to be rate limiting factor, will attempt peer to peer for IPR placement      Prateek Gudino MD  Hospital Medicine Staff  [facility]   Pager: [phone]

## 2018-11-02 NOTE — PLAN OF CARE
Geovanni with [facility] and this CM met with patient at bedside to discuss possible placement. Patient answered most questions appropriately though memory issues were obvious AEB repeating same questions and explanations for his answers. H&P, most recent progress note and Medication summary given to Geovanni ([phone]). Will follow.     1:18pm - Sharon with [facility] currently at bedside.

## 2018-11-02 NOTE — PT/OT/SLP PROGRESS
Physical Therapy Treatment    Patient Name:  Mohamud Patel   MRN:  [id number]    Recommendations:     Discharge Recommendations:  rehabilitation facility   Discharge Equipment Recommendations: (TBd)   Barriers to discharge: Inaccessible home and Decreased caregiver support    Assessment:     Mohamud Patel is a 58 y.o. male admitted with a medical diagnosis of Alcohol withdrawal.  He presents with the following impairments/functional limitations:  weakness, impaired endurance, impaired self care skills, gait instability, impaired functional mobilty, impaired balance, decreased coordination, decreased safety awareness, impaired fine motor, impaired cardiopulmonary response to activity, impaired cognition, decreased upper extremity function, decreased lower extremity function .Pt tolerated treatment fairly well and is progressing slowly with mobility. Pt remains a high fall risk, pt with  Ataxia and decreased safety awareness with functional mobility. Pt would continue to benefit from skilled PT to address overall functional mobility and goals. Goals remain appropriate      Rehab Prognosis:  good; patient would benefit from acute skilled PT services to address these deficits and reach maximum level of function.      Recent Surgery: * No surgery found *      Plan:     During this hospitalization, patient to be seen 3 x/week to address the above listed problems via gait training, therapeutic activities, therapeutic exercises, neuromuscular re-education  · Plan of Care Expires:  11/05/18   Plan of Care Reviewed with: patient    Subjective     Communicated with RN prior to session.  Patient found supine upon PT entry to room, agreeable to treatment.      Patient comments/goals: I will take care of this  Pain/Comfort:  · Pain Rating 1: 0/10  · Pain Rating Post-Intervention 1: 0/10    Patients cultural, spiritual, Christian conflicts given the current situation: none noted    Objective:     Patient found  with: (sitter)     General Precautions: Standard, fall, seizure   Orthopedic Precautions:N/A   Braces: N/A     Functional Mobility:  Bed Mobility:     · Supine to Sit: supervision     Transfers:     · Sit to Stand:  contact guard assistance with no AD x multiple trials      Gait: 75ft x 2 trials CGA/min A without AD,  vc's for safety;  with rest break between trials  AM-PAC 6 CLICK MOBILITY  Turning over in bed (including adjusting bedclothes, sheets and blankets)?: 4  Sitting down on and standing up from a chair with arms (e.g., wheelchair, bedside commode, etc.): 3  Moving from lying on back to sitting on the side of the bed?: 3  Moving to and from a bed to a chair (including a wheelchair)?: 3  Need to walk in hospital room?: 3  Climbing 3-5 steps with a railing?: 3  Basic Mobility Total Score: 19       Therapeutic Activities and Exercises:   Discussed/educated patient on progress, safety, PT POC   White board updated in patients room to current assistance level   Donned pants       Patient left up in chair with all lines intact, call button in reach, nsg notified and sitter present..    GOALS:   Multidisciplinary Problems     Physical Therapy Goals        Problem: Physical Therapy Goal    Goal Priority Disciplines Outcome Goal Variances Interventions   Physical Therapy Goal     PT, PT/OT Ongoing (interventions implemented as appropriate)     Description:  Goals to be met by: 18     Patient will increase functional independence with mobility by performin. Sit to stand transfer with Stand-by Assistance-met   Revised: sit to stand transfer with Supervision.   2. Bed to chair transfer with Stand-by Assistance using LRD.   3. Gait  x 100 feet with Contact Guard Assistance using Rolling Walker or LRD>   4. Lower extremity exercise program x20  reps per handout, with independence                         Time Tracking:     PT Received On: 18  PT Start Time: 1145     PT Stop Time: 1158  PT Total Time  (min): 13 min     Billable Minutes: Gait Training 13    Treatment Type: Treatment  PT/PTA: PTA     PTA Visit Number: 1     Johnie Mooney PTA  11/02/2018

## 2018-11-02 NOTE — PLAN OF CARE
Problem: Patient Care Overview  Goal: Plan of Care Review  Outcome: Ongoing (interventions implemented as appropriate)  Pt slept for the majority of the night. V/S wnl. Neuro checks wnl. AVASYS in place. Set off Avasys about 3 X.  Usually it's due to pt going to bathroom without calling. Otherwise uneventful night. Will continue to monitor.

## 2018-11-02 NOTE — PLAN OF CARE
Betsy CIFUENTES sent referrals to [facility] ([phone]).      [facility] followed up on referral with Peter, a message was left with Liya she will forward [facility] contact information to one of their coordinators.  [facility] will continue to follow and assist.    Aubrey Lundberg LMSW

## 2018-11-03 PROCEDURE — 63600175 PHARM REV CODE 636 W HCPCS: Performed by: HOSPITALIST

## 2018-11-03 PROCEDURE — 25000003 PHARM REV CODE 250: Performed by: HOSPITALIST

## 2018-11-03 PROCEDURE — S4991 NICOTINE PATCH NONLEGEND: HCPCS | Performed by: HOSPITALIST

## 2018-11-03 PROCEDURE — 11000001 HC ACUTE MED/SURG PRIVATE ROOM

## 2018-11-03 PROCEDURE — 99231 SBSQ HOSP IP/OBS SF/LOW 25: CPT | Mod: ,,, | Performed by: HOSPITALIST

## 2018-11-03 RX ADMIN — ENOXAPARIN SODIUM 40 MG: 100 INJECTION SUBCUTANEOUS at 04:11

## 2018-11-03 RX ADMIN — Medication 100 MG: at 09:11

## 2018-11-03 RX ADMIN — OLANZAPINE 5 MG: 5 TABLET, FILM COATED ORAL at 09:11

## 2018-11-03 RX ADMIN — GABAPENTIN 400 MG: 400 CAPSULE ORAL at 02:11

## 2018-11-03 RX ADMIN — AMLODIPINE BESYLATE 5 MG: 5 TABLET ORAL at 09:11

## 2018-11-03 RX ADMIN — GABAPENTIN 400 MG: 400 CAPSULE ORAL at 09:11

## 2018-11-03 RX ADMIN — FOLIC ACID 1 MG: 1 TABLET ORAL at 09:11

## 2018-11-03 RX ADMIN — Medication 400 MG: at 09:11

## 2018-11-03 RX ADMIN — MICONAZOLE NITRATE: 20 OINTMENT TOPICAL at 09:11

## 2018-11-03 RX ADMIN — OLANZAPINE 10 MG: 10 TABLET, FILM COATED ORAL at 09:11

## 2018-11-03 RX ADMIN — NICOTINE 1 PATCH: 21 PATCH, EXTENDED RELEASE TRANSDERMAL at 09:11

## 2018-11-03 RX ADMIN — THERA TABS 1 TABLET: TAB at 09:11

## 2018-11-03 NOTE — PLAN OF CARE
CM received call from Dr. Gudino stating the patient's father states he would like to send patient to [facility].  CM then called [facility] [phone], charge nurse not available.  They took CM's name and phone number to have her call CM back.      12:00 PM  CM received phone call from Ray, charge nurse.  Ray states CL[facility] can fax referral to [phone].  CM faxed referral as requested.    1:30 PM  CM received phone call from Ray requesting that the patient's nurse call Loretta at [phone].  Jesse, patient's nurse, called [facility]oret[facility] to answer her questions.  Loretta informed Jesse that they would not be able to take the patient without a PEC.

## 2018-11-03 NOTE — PLAN OF CARE
Problem: Patient Care Overview  Goal: Plan of Care Review  Outcome: Ongoing (interventions implemented as appropriate)  The pt was pleasant and cooperative throughout the shift. He slept most of the night. His sitter was beside him and states he rested well. Uneventful shift.

## 2018-11-03 NOTE — PROGRESS NOTES
Hospital Medicine   Progress Note     Team: [facility] HOSP MED R Prateek Gudino MD   Admit Date: 10/4/2018   YONAS 11/1/2018   Code status: Full Code   Principal Problem: Alcohol withdrawal     Interval hx: Patient seen and examined at bedside today. Overnight: KIRA. This AM, patient doing well. Interviewed at beside with father present. Father expressed interest in trying to place patient at [facility] for further mental health care. Unfortunately, per CM discussions with facility they will not accept any patient who is not currently under PEC.     ROS   Constitutional: negative for fevers and chills  Respiratory: negative for cough, shortness of breath   Cardiovascular: negative for chest discomfort, palpitations   Gastrointestinal: negative for nausea or vomiting, abdominal pain, constipation, diarrhea     PEx   Temp:  [98.2 °F (36.8 °C)-98.6 °F (37 °C)]   Pulse:  [75-95]   Resp:  [16-20]   BP: (103-140)/(53-72)   SpO2:  [96 %-97 %]      I & O (Last 24H): General:  male  in no acute distress. Sitting up in chair, slightly confused   HEENT: NCAT. PERRL. EOMI. Sclera Anicteric.  CVS: RRR. Normal S1 S2. No murmurs  Pulm: CTAB. Normal respiratory effort. No wheezes, rhonchi, or crackles.  Abdomen: Soft. Non-distended. No tenderness to palpation. No rebound or guarding. +BS.  Extremities: No edema. No cyanosis. Full ROM.  Skin: multiple healing wounds on bilateral feet and LE's     amLODIPine  5 mg Oral Daily    enoxparin  40 mg Subcutaneous Daily    folic acid  1 mg Oral Daily    gabapentin  400 mg Oral TID    magnesium oxide  400 mg Oral BID    miconazole nitrate 2%   Topical (Top) BID    multivitamin  1 tablet Oral Daily    nicotine  1 patch Transdermal Daily    OLANZapine  10 mg Oral QHS    OLANZapine  5 mg Oral Daily    thiamine  100 mg Oral Daily       OLANZapine, sodium chloride 0.9%    Assessment & Plan:  Mohamud Patel is a 57 y.o. man with a history of Severe Alcohol Abuse,  Chronic Wounds of Bilateral LE's with Onychomycosis, Alcoholic Neuropathy, Hyponatremia, and Tobacco Use Disorder who was brought to the ED by EMS after his girlfriend called 911 while the patient was severely intoxicated. He is admitted to Hospital Medicine for Alcohol Withdrawal, possible Wernicke's Encephalopathy, and Acute Cystitis. Pt continues to have gait instability, weakness, recurrent falls when he attempts to get out of bed, has poor insight into his addiction and encephalopathy and poor decision making. Psychiatry consulted, pt determined not to have capacity to make own medical decision at this time. Awaiting placement at [facility] for continued gait training as he has made significant strides with PT and no longer requires restraints.    Wernicke's Encephalopathy (WE) with Dementia and Behavioral Disturbance  Severe Alcohol Use Disorder   Alcohol Withdrawal (Resloved)  Alcoholic Neuropathy - Fall Risk, Requiring Restraints  - Medically, he is outside the window for Alcohol Withdrawal and he has completed the appropriate course of IV Thiamine for Wernicke's Encephalopathy. Continues to have severe gait instability but has made strides with working with PT. With psychiatry assistance, patient now much more alert and participatory in his care - able to feed himself and no longer requires restrains with sitter at bedside  -Zyprexa 5 mg daily and 10 mg HS scheduled and Zyprexa 5 mg PO/IM q8 PRN non-redirectable agitation. Maximum total daily dose of 30 mg   -Qtc noted to be 436 (11/2)  -gabapentin to 300mg TID for neuropathy and cravings   -psychiatry consulted, appreciate recs  -refrain from using physical restraints as much as possible   -For Wernicke's Encephalopathy: Finished course of IV Thiamine, transitioned to Thiamine 100 mg PO daily. Folic acid and MVI daily.   -Valium taper completed on 10/15.   -CMP, Mg, Phos - spaced to q48 hours  -Fall, aspiration, and seizure precautions  -PT/OT consulted,  performing well with OT and PT  -IRP consult placed for ongoing rehab      Multiple Bilateral Lower Extremity Wounds  Onychomycosis of Bilateral Feet   -Wound Care consulted, appreciate assistance - have debrided wounds, applying medi-honey, no evidence of active infection. Miconazole ointment to toes  -Podiatry was consulted, no other acute interventions at this time      Tobacco Use Disorder  -Nicotine 21 mg patch daily       DVT PPx: Lovenox SubQ Daily    Diet: Regular  Discharge plan and follow up: Medically ready for discharge. Hoping for discharge to [facility] CM and SW as well as outside patient advocate assisting with placement options. Insurance approval appears to be rate limiting factor. Also contacted [facility] per father's request but they will not take patient's who are not PEC'd. Anticipate placement will be an ongoing issue and may require escalation at this point next Monday      Prateek Gudino MD  Hospital Medicine Staff  [facility]   Pager: [phone]

## 2018-11-03 NOTE — PLAN OF CARE
Problem: Patient Care Overview  Goal: Plan of Care Review  Outcome: Ongoing (interventions implemented as appropriate)  No major changes since previous.   Skin: Wound care completed.   VSS. Avasys in use for elopement precaution.   Safety and pt. care rounds maintained. Wctm.

## 2018-11-04 PROCEDURE — S4991 NICOTINE PATCH NONLEGEND: HCPCS | Performed by: HOSPITALIST

## 2018-11-04 PROCEDURE — 99231 SBSQ HOSP IP/OBS SF/LOW 25: CPT | Mod: ,,, | Performed by: HOSPITALIST

## 2018-11-04 PROCEDURE — 25000003 PHARM REV CODE 250: Performed by: HOSPITALIST

## 2018-11-04 PROCEDURE — 11000001 HC ACUTE MED/SURG PRIVATE ROOM

## 2018-11-04 RX ADMIN — GABAPENTIN 400 MG: 400 CAPSULE ORAL at 09:11

## 2018-11-04 RX ADMIN — THERA TABS 1 TABLET: TAB at 09:11

## 2018-11-04 RX ADMIN — AMLODIPINE BESYLATE 5 MG: 5 TABLET ORAL at 09:11

## 2018-11-04 RX ADMIN — Medication 400 MG: at 09:11

## 2018-11-04 RX ADMIN — OLANZAPINE 5 MG: 5 TABLET, FILM COATED ORAL at 05:11

## 2018-11-04 RX ADMIN — Medication 100 MG: at 09:11

## 2018-11-04 RX ADMIN — OLANZAPINE 5 MG: 5 TABLET, FILM COATED ORAL at 09:11

## 2018-11-04 RX ADMIN — FOLIC ACID 1 MG: 1 TABLET ORAL at 09:11

## 2018-11-04 RX ADMIN — NICOTINE 1 PATCH: 21 PATCH, EXTENDED RELEASE TRANSDERMAL at 09:11

## 2018-11-04 RX ADMIN — OLANZAPINE 10 MG: 10 TABLET, FILM COATED ORAL at 09:11

## 2018-11-04 RX ADMIN — GABAPENTIN 400 MG: 400 CAPSULE ORAL at 02:11

## 2018-11-04 RX ADMIN — MICONAZOLE NITRATE: 20 OINTMENT TOPICAL at 09:11

## 2018-11-04 NOTE — PLAN OF CARE
Problem: Patient Care Overview  Goal: Plan of Care Review  Outcome: Ongoing (interventions implemented as appropriate)  No acute events throughout shift. VS and assessment performed per orders. Pt reoriented and redirected as needed. Skin integrity unchanged. Pt free of injury or falls.

## 2018-11-04 NOTE — PROGRESS NOTES
Hospital Medicine   Progress Note     Team: [facility] HOSP MED R Prateek Gudino MD   Admit Date: 10/4/2018   YONAS 11/1/2018   Code status: Full Code   Principal Problem: Alcohol withdrawal     Interval hx: Patient seen and examined at bedside today. Overnight: KIRA. This AM, patient doing well and in good spirits. Again asked what the plan is for discharge, demonstrating poor short term memory, as we discuss this every morning. No new complaints.    ROS   Constitutional: negative for fevers and chills  Respiratory: negative for cough, shortness of breath   Cardiovascular: negative for chest discomfort, palpitations   Gastrointestinal: negative for nausea or vomiting, abdominal pain, constipation, diarrhea     PEx   Temp:  [97.7 °F (36.5 °C)-98.6 °F (37 °C)]   Pulse:  []   Resp:  [18-19]   BP: (115-163)/(58-88)   SpO2:  [93 %-100 %]      General:  male  in no acute distress. Sitting up in chair, slightly confused, poor short term memory   HEENT: NCAT. PERRL. EOMI. Sclera Anicteric.  CVS: RRR. Normal S1 S2. No murmurs  Pulm: CTAB. Normal respiratory effort. No wheezes, rhonchi, or crackles.  Abdomen: Soft. Non-distended. No tenderness to palpation. No rebound or guarding. +BS.  Extremities: No edema. No cyanosis. Full ROM.  Skin: multiple healing wounds on bilateral feet and LE's     amLODIPine  5 mg Oral Daily    enoxparin  40 mg Subcutaneous Daily    folic acid  1 mg Oral Daily    gabapentin  400 mg Oral TID    magnesium oxide  400 mg Oral BID    miconazole nitrate 2%   Topical (Top) BID    multivitamin  1 tablet Oral Daily    nicotine  1 patch Transdermal Daily    OLANZapine  10 mg Oral QHS    OLANZapine  5 mg Oral Daily    thiamine  100 mg Oral Daily       OLANZapine, sodium chloride 0.9%    Assessment & Plan:  Mohamud Patel is a 57 y.o. man with a history of Severe Alcohol Abuse, Chronic Wounds of Bilateral LE's with Onychomycosis, Alcoholic Neuropathy, Hyponatremia, and Tobacco Use  Disorder who was brought to the ED by EMS after his girlfriend called 911 while the patient was severely intoxicated. He is admitted to Hospital Medicine for Alcohol Withdrawal, possible Wernicke's Encephalopathy, and Acute Cystitis. Pt continues to have gait instability, weakness, recurrent falls when he attempts to get out of bed, has poor insight into his addiction and encephalopathy and poor decision making. Psychiatry consulted, pt determined not to have capacity to make own medical decision at this time. Awaiting placement at [facility] for continued gait training as he has made significant strides with PT and no longer requires restraints.    Wernicke's Encephalopathy (WE) with Dementia and Behavioral Disturbance  Severe Alcohol Use Disorder   Alcohol Withdrawal (Resloved)  Alcoholic Neuropathy - Fall Risk, Requiring Restraints  - Medically, he is outside the window for Alcohol Withdrawal and he has completed the appropriate course of IV Thiamine for Wernicke's Encephalopathy. Continues to have severe gait instability but has made strides with working with PT. With psychiatry assistance, patient now much more alert and participatory in his care - able to feed himself and no longer requires restrains with sitter at bedside  -Zyprexa 5 mg daily and 10 mg HS scheduled and Zyprexa 5 mg PO/IM q8 PRN non-redirectable agitation. Maximum total daily dose of 30 mg (has not needed PRN doses for several days)   -Qtc noted to be 436 (11/2)  -gabapentin to 300mg TID for neuropathy and cravings   -psychiatry consulted, appreciate recs  -refrain from using physical restraints as much as possible   -For Wernicke's Encephalopathy: Finished course of IV Thiamine, transitioned to Thiamine 100 mg PO daily. Folic acid and MVI daily.   -Valium taper completed on 10/15.   -CMP, Mg, Phos - spaced to q48 hours  -Fall, aspiration, and seizure precautions  -PT/OT consulted, performing well with OT and PT  -IRP consult placed for ongoing rehab       Multiple Bilateral Lower Extremity Wounds  Onychomycosis of Bilateral Feet   -Wound Care consulted, appreciate assistance - have debrided wounds, applying medi-honey, no evidence of active infection. Miconazole ointment to toes  -Podiatry was consulted, no other acute interventions at this time      Tobacco Use Disorder  -Nicotine 21 mg patch daily       DVT PPx: Lovenox SubQ Daily    Diet: Regular  Discharge plan and follow up: Medically ready for discharge. Hoping for discharge to [facility] CM and SW as well as outside patient advocate assisting with placement options. Insurance approval appears to be rate limiting factor. Contacted [facility] per father's request but they will not take patient's who are not PEC'd. Anticipate placement will be an ongoing issue and may require escalation at this point next Monday (11/5)      Prateek Gudino MD  [facility] Medicine Staff  [facility]   Pager: [phone]

## 2018-11-05 LAB
ANION GAP SERPL CALC-SCNC: 7 MMOL/L
BUN SERPL-MCNC: 17 MG/DL
CALCIUM SERPL-MCNC: 10.4 MG/DL
CHLORIDE SERPL-SCNC: 105 MMOL/L
CO2 SERPL-SCNC: 26 MMOL/L
CREAT SERPL-MCNC: 0.8 MG/DL
EST. GFR  (AFRICAN AMERICAN): >60 ML/MIN/1.73 M^2
EST. GFR  (NON AFRICAN AMERICAN): >60 ML/MIN/1.73 M^2
GLUCOSE SERPL-MCNC: 89 MG/DL
MAGNESIUM SERPL-MCNC: 2 MG/DL
POTASSIUM SERPL-SCNC: 3.9 MMOL/L
SODIUM SERPL-SCNC: 138 MMOL/L

## 2018-11-05 PROCEDURE — 25000003 PHARM REV CODE 250: Performed by: HOSPITALIST

## 2018-11-05 PROCEDURE — 80048 BASIC METABOLIC PNL TOTAL CA: CPT

## 2018-11-05 PROCEDURE — 11000001 HC ACUTE MED/SURG PRIVATE ROOM

## 2018-11-05 PROCEDURE — 63600175 PHARM REV CODE 636 W HCPCS: Performed by: HOSPITALIST

## 2018-11-05 PROCEDURE — 99231 SBSQ HOSP IP/OBS SF/LOW 25: CPT | Mod: ,,, | Performed by: HOSPITALIST

## 2018-11-05 PROCEDURE — G8989 SELF CARE D/C STATUS: HCPCS | Mod: CI

## 2018-11-05 PROCEDURE — 97535 SELF CARE MNGMENT TRAINING: CPT

## 2018-11-05 PROCEDURE — 36415 COLL VENOUS BLD VENIPUNCTURE: CPT

## 2018-11-05 PROCEDURE — 83735 ASSAY OF MAGNESIUM: CPT

## 2018-11-05 PROCEDURE — G8987 SELF CARE CURRENT STATUS: HCPCS | Mod: CI

## 2018-11-05 PROCEDURE — S4991 NICOTINE PATCH NONLEGEND: HCPCS | Performed by: HOSPITALIST

## 2018-11-05 PROCEDURE — G8988 SELF CARE GOAL STATUS: HCPCS | Mod: CI

## 2018-11-05 RX ADMIN — THERA TABS 1 TABLET: TAB at 09:11

## 2018-11-05 RX ADMIN — GABAPENTIN 400 MG: 400 CAPSULE ORAL at 03:11

## 2018-11-05 RX ADMIN — Medication 400 MG: at 08:11

## 2018-11-05 RX ADMIN — NICOTINE 1 PATCH: 21 PATCH, EXTENDED RELEASE TRANSDERMAL at 09:11

## 2018-11-05 RX ADMIN — GABAPENTIN 400 MG: 400 CAPSULE ORAL at 09:11

## 2018-11-05 RX ADMIN — GABAPENTIN 400 MG: 400 CAPSULE ORAL at 08:11

## 2018-11-05 RX ADMIN — ENOXAPARIN SODIUM 40 MG: 100 INJECTION SUBCUTANEOUS at 05:11

## 2018-11-05 RX ADMIN — OLANZAPINE 10 MG: 10 TABLET, FILM COATED ORAL at 08:11

## 2018-11-05 RX ADMIN — Medication 100 MG: at 09:11

## 2018-11-05 RX ADMIN — Medication 400 MG: at 09:11

## 2018-11-05 RX ADMIN — FOLIC ACID 1 MG: 1 TABLET ORAL at 09:11

## 2018-11-05 RX ADMIN — OLANZAPINE 5 MG: 5 TABLET, FILM COATED ORAL at 09:11

## 2018-11-05 RX ADMIN — AMLODIPINE BESYLATE 5 MG: 5 TABLET ORAL at 09:11

## 2018-11-05 RX ADMIN — MICONAZOLE NITRATE: 20 OINTMENT TOPICAL at 09:11

## 2018-11-05 NOTE — PLAN OF CARE
11/05/18 0843   Discharge Reassessment   Assessment Type Discharge Planning Reassessment   Provided patient/caregiver education on the expected discharge date and the discharge plan No   Do you have any problems affording any of your prescribed medications? No   Discharge Plan A Rehab   Discharge Plan B New Nursing Home placement - half-way care facility

## 2018-11-05 NOTE — PT/OT/SLP PROGRESS
Occupational Therapy   Treatment and Discharge Note.    Name: Mohamud Patel  MRN: [id number]  Admitting Diagnosis:  Alcohol withdrawal       Recommendations:     Discharge Recommendations: rehabilitation facility  Discharge Equipment Recommendations:  shower chair, grab bar  Barriers to discharge:  Inaccessible home environment, Decreased caregiver support    Subjective     Communicated with: Nursing prior to session.  Pain/Comfort:  · Pain Rating 1: 0/10  · Pain Rating Post-Intervention 1: 0/10    Patients cultural, spiritual, Muslim conflicts given the current situation: None noted     Objective:     Patient found with: (Sitter)    General Precautions: Standard, fall, seizure   Orthopedic Precautions:N/A   Braces: N/A     Occupational Performance:    Bed Mobility:    · Patient completed Scooting/Bridging with modified independence     Functional Mobility/Transfers:  · Patient completed Sit <> Stand Transfer with modified independence  with  no assistive device   · Patient completed Toilet Transfer Step Transfer technique with modified independence with  no AD  · Functional Mobility: Ambulated from beside to toilet to sink, the walked to the nurses station to the end of the wing back to the room w/Mod I ~< 175'.    Activities of Daily Living:  · Grooming: modified independence for bracing on the sink while brushing.   · Bathing: modified independence bracing while washing face.   · Lower Body Dressing: modified independence don/doff both socks while seated.   · Toileting: modified independence sat on and off the toilet and simulated a wipe.     Patient left seated on EOB with call button in reach, Nursing notified and sitter present    [facility] 6 Click:  [facility] Total Score: 24    Treatment & Education:  Reviewed POC, discussed D/C plan and recs, and asked for concerns.  Pt verbally acknowledged and did not have any concerns for OT.  White board updated and Pt verbally acknowledged that he is not supposed to  get out of the bed on his own (Pt v/a).   Education:    Assessment:     Mohamud Patel is a 58 y.o. male with a medical diagnosis of Alcohol withdrawal.  He presents with baseline ability to complete all ADLs that can be assessed in this setting.  Recommend D/C from OT.   New orders are required for future visits.  Performance deficits affecting function are weakness, impaired endurance, gait instability, impaired balance, decreased safety awareness, decreased coordination, decreased lower extremity function.      Rehab Prognosis:  Good; D/C Pt from OT services.       Plan:     Patient to be seen 3 x/week to address the above listed problems via self-care/home management, therapeutic activities, therapeutic exercises, neuromuscular re-education  · Plan of Care Expires: 11/04/18  · Plan of Care Reviewed with: patient    This Plan of care has been discussed with the patient who was involved in its development and understands and is in agreement with the identified goals and treatment plan    GOALS:   Multidisciplinary Problems     Occupational Therapy Goals     Not on file          Multidisciplinary Problems (Resolved)        Problem: Occupational Therapy Goal    Goal Priority Disciplines Outcome Interventions   Occupational Therapy Goal   (Resolved)     OT, PT/OT Outcome(s) achieved    Description:  7/7 Goals Met.  Recommend D/C to rehab pending placement.       Goals to be met by: 11/5  Patient will increase functional independence with ADLs by performing:    UE Dressing with Modified [place] while seated. MET 10/29  LE Dressing with Modified [place]. MET 11/05  Grooming while standing at sink with Min A. MET 11/1  *revised: with modified independence. MET 11/05  Toileting from toilet with Minimum A for hygiene and clothing management.  MET 11/05  Supine to sit with [place]. MET 10/29  Stand pivot transfers with CGA to chair and/or toilet. MET 10/29 to BSC  *revised: to chair, BSC, and/or  toilet with supervision and AD as needed MET 11/05  Functional mobility with AD as needed for short household distance with no LOB with min A. MET 11/1  *revised: with modified independence. 11/05                          Time Tracking:     OT Date of Treatment: 11/05/18  OT Start Time: 1018  OT Stop Time: 1041  OT Total Time (min): 23 min    Billable Minutes:Self Care/Home Management 23 mins    Angelito Jhaveri, OT  11/5/2018

## 2018-11-05 NOTE — PROGRESS NOTES
" [facility]  Adult Nutrition  Progress Note    SUMMARY       Recommendations    Recommendation/Intervention:   1. Continue current regular diet with double portions. Pt with good intake.   2. RD following.    Goals: Intake >/=85% EEN/EPN  Nutrition Goal Status: goal met  Communication of RD Recs: (POC)    Reason for Assessment    Reason for Assessment: RD follow-up  Diagnosis: (Alcohol withdrawal)  Relevant Medical History: EtOH abuse    General Information Comments: Pt continues with good PO intake, 100% of meals. Awaiting placement.    Nutrition Discharge Planning: Adequate PO intake    Nutrition Risk Screen    Nutrition Risk Screen: no indicators present    Nutrition/Diet History    Do you have any cultural, spiritual, Moravian conflicts, given your current situation?: None noted   Factors Affecting Nutritional Intake: None identified at this time    Anthropometrics    Temp: 98.3 °F (36.8 °C)  Height Method: Stated  Height: 6' 2" (188 cm)  Height (inches): 74 in  Weight Method: Bed Scale  Weight: 67.8 kg (149 lb 7.6 oz)  Weight (lb): 149.47 lb  Ideal Body Weight (IBW), Male: 190 lb  % Ideal Body Weight, Male (lb): 78.67 lb  BMI (Calculated): 19.2  BMI Grade: 18.5-24.9 - normal  Usual Body Weight (UBW), k.4 kg(2018 per chart review)  % Usual Body Weight: 95.16  % Weight Change From Usual Weight: -5.04 %       Lab/Procedures/Meds    Pertinent Labs Reviewed: reviewed  Pertinent Labs Comments: Noted  Pertinent Medications Reviewed: reviewed  Pertinent Medications Comments: folic acid, Mg, MVI, thiamine    Physical Findings/Assessment    Overall Physical Appearance: loss of muscle mass, underweight  Oral/Mouth Cavity: WDL  Skin: pressure ulcer(s), non-healing wound(s)    Estimated/Assessed Needs    Weight Used For Calorie Calculations: 67.8 kg (149 lb 7.6 oz)  Energy Calorie Requirements (kcal): 1966  Energy Need Method: [place](x 1.25 (PAL))  Protein Requirements: 82-95 gm (1.2-1.4 " gm/kg)  Weight Used For Protein Calculations: 67.8 kg (149 lb 7.6 oz)  Fluid Requirements (mL): per MD     RDA Method (mL): 1966       Nutrition Prescription Ordered    Current Diet Order: Regular, double portions  Nutrition Order Comments: 1000 ml FR    Evaluation of Received Nutrient/Fluid Intake    Oral Fluid (mL): 800  Comments: LBM 11/3  % Intake of Estimated Energy Needs: 75 - 100 %  % Meal Intake: 75 - 100 %    Nutrition Risk    Level of Risk/Frequency of Follow-up: (F/u 1x weekly)     Assessment and Plan    Nutrition Problem  Increased protein needs     Related to (etiology):   Wound healing     Signs and Symptoms (as evidenced by):   Multiple BLE pressure ulcers      Interventions/Recommendations (treatment strategy):  Increased protein diet     Nutrition Diagnosis Status:   Continues      Monitor and Evaluation    Food and Nutrient Intake: energy intake, food and beverage intake  Food and Nutrient Adminstration: diet order  Physical Activity and Function: nutrition-related ADLs and IADLs  Anthropometric Measurements: weight, weight change, body mass index  Biochemical Data, Medical Tests and Procedures: electrolyte and renal panel, gastrointestinal profile, glucose/endocrine profile, inflammatory profile  Nutrition-Focused Physical Findings: overall appearance     Nutrition Follow-Up    RD Follow-up?: Yes

## 2018-11-05 NOTE — PLAN OF CARE
Problem: Occupational Therapy Goal  Goal: Occupational Therapy Goal  7/7 Goals Met.  Recommend D/C to rehab pending placement.       Goals to be met by: 11/5  Patient will increase functional independence with ADLs by performing:    UE Dressing with Modified [place] while seated. MET 10/29  LE Dressing with Modified [place]. MET 11/05  Grooming while standing at sink with Min A. MET 11/1  *revised: with modified independence. MET 11/05  Toileting from toilet with Minimum A for hygiene and clothing management.  MET 11/05  Supine to sit with [place]. MET 10/29  Stand pivot transfers with CGA to chair and/or toilet. MET 10/29 to BSC  *revised: to chair, BSC, and/or toilet with supervision and AD as needed MET 11/05  Functional mobility with AD as needed for short household distance with no LOB with min A. MET 11/1  *revised: with modified independence. 11/05        Outcome: Outcome(s) achieved Date Met: 11/05/18  D/C from OT to Rehab.

## 2018-11-05 NOTE — PLAN OF CARE
Problem: Patient Care Overview  Goal: Plan of Care Review  Outcome: Ongoing (interventions implemented as appropriate)  Neuro: No major changes since previous. However, pt. is more agitated this afternoon.   Pt. was repeatedly asking for beer. Coming out of the RM-wants to leave.   Relaxation techniques utilized-failed. Zyprexa PRN given.   Avasys maintained. Pt. plugging out occasionally. Instructed not to touch.   Skin: wound care completed.   Safety and pt. care rounds maintained. Wctm.

## 2018-11-06 PROCEDURE — 99221 1ST HOSP IP/OBS SF/LOW 40: CPT | Mod: ,,, | Performed by: INTERNAL MEDICINE

## 2018-11-06 PROCEDURE — 93010 ELECTROCARDIOGRAM REPORT: CPT | Mod: ,,, | Performed by: INTERNAL MEDICINE

## 2018-11-06 PROCEDURE — 63600175 PHARM REV CODE 636 W HCPCS: Performed by: HOSPITALIST

## 2018-11-06 PROCEDURE — 25000003 PHARM REV CODE 250: Performed by: HOSPITALIST

## 2018-11-06 PROCEDURE — S4991 NICOTINE PATCH NONLEGEND: HCPCS | Performed by: HOSPITALIST

## 2018-11-06 PROCEDURE — 93005 ELECTROCARDIOGRAM TRACING: CPT

## 2018-11-06 PROCEDURE — 11000001 HC ACUTE MED/SURG PRIVATE ROOM

## 2018-11-06 PROCEDURE — 97116 GAIT TRAINING THERAPY: CPT

## 2018-11-06 RX ADMIN — GABAPENTIN 400 MG: 400 CAPSULE ORAL at 09:11

## 2018-11-06 RX ADMIN — MICONAZOLE NITRATE: 20 OINTMENT TOPICAL at 09:11

## 2018-11-06 RX ADMIN — OLANZAPINE 5 MG: 5 TABLET, FILM COATED ORAL at 03:11

## 2018-11-06 RX ADMIN — Medication 400 MG: at 09:11

## 2018-11-06 RX ADMIN — Medication 100 MG: at 09:11

## 2018-11-06 RX ADMIN — THERA TABS 1 TABLET: TAB at 09:11

## 2018-11-06 RX ADMIN — AMLODIPINE BESYLATE 5 MG: 5 TABLET ORAL at 09:11

## 2018-11-06 RX ADMIN — NICOTINE 1 PATCH: 21 PATCH, EXTENDED RELEASE TRANSDERMAL at 09:11

## 2018-11-06 RX ADMIN — GABAPENTIN 400 MG: 400 CAPSULE ORAL at 03:11

## 2018-11-06 RX ADMIN — ENOXAPARIN SODIUM 40 MG: 100 INJECTION SUBCUTANEOUS at 05:11

## 2018-11-06 RX ADMIN — OLANZAPINE 10 MG: 10 TABLET, FILM COATED ORAL at 09:11

## 2018-11-06 RX ADMIN — FOLIC ACID 1 MG: 1 TABLET ORAL at 09:11

## 2018-11-06 RX ADMIN — OLANZAPINE 5 MG: 5 TABLET, FILM COATED ORAL at 09:11

## 2018-11-06 NOTE — PLAN OF CARE
CLARENCE received a VM from Shannon Armstrong at [facility] ([phone]) offering to assist with SNF referrals. CM did call this AM and left VM and called again at this time. CLARENCE updated SW, [place] [id number].

## 2018-11-06 NOTE — PT/OT/SLP PROGRESS
Physical Therapy Treatment    Patient Name:  Mohamud Patel   MRN:  [id number]    Recommendations:     Discharge Recommendations:  rehabilitation facility   Discharge Equipment Recommendations: shower chair, grab bar   Barriers to discharge: Inaccessible home and Decreased caregiver support    Assessment:     Mohamud Patel is a 58 y.o. male admitted with a medical diagnosis of Alcohol withdrawal.  He presents with the following impairments/functional limitations:  weakness, impaired endurance, impaired self care skills, impaired balance, gait instability, impaired functional mobilty, decreased safety awareness .Pt  motivated and cooperative with treatment session. Pt Progressing with PT Intervention. Pt Progressing with improving gait distance with vcs for safety. Pt would continue to benefit from skilled PT to address overall functional mobility and goals. Goals remain appropriate.      Rehab Prognosis:  good; patient would benefit from acute skilled PT services to address these deficits and reach maximum level of function.      Recent Surgery: * No surgery found *      Plan:     During this hospitalization, patient to be seen 3 x/week to address the above listed problems via gait training, therapeutic activities, therapeutic exercises, neuromuscular re-education  · Plan of Care Expires:  11/05/18   Plan of Care Reviewed with: patient    Subjective     Communicated with RN prior to session.  Patient found supine upon PT entry to room, agreeable to treatment.      Patient comments/goals: I just got to get up and move and I? Will be fine  Pain/Comfort:  · Pain Rating 1: 0/10  · Pain Rating Post-Intervention 1: 0/10    Patients cultural, spiritual, [employer] conflicts given the current situation: none noted    Objective:     Patient found with: (sitter)     General Precautions: Standard, fall, seizure   Orthopedic Precautions:N/A   Braces: N/A     Functional Mobility:  Bed Mobility:     · Supine to  Sit: supervision   sit to supine: (S)  Transfers:     · Sit to Stand:  SBA/contact guard assistance with no AD       Gait: 200ft x 2 trials CGA without AD,  vc's for safety      AM-PAC 6 CLICK MOBILITY  Turning over in bed (including adjusting bedclothes, sheets and blankets)?: 4  Sitting down on and standing up from a chair with arms (e.g., wheelchair, bedside commode, etc.): 3  Moving from lying on back to sitting on the side of the bed?: 3  Moving to and from a bed to a chair (including a wheelchair)?: 3  Need to walk in hospital room?: 3  Climbing 3-5 steps with a railing?: 3  Basic Mobility Total Score: 19       Therapeutic Activities and Exercises:   Discussed/educated patient on progress, safety, PT POC   White board updated in patients room to current assistance level            Patient supine with all lines intact, call button in reach, nsg notified and sitter present    GOALS:   Multidisciplinary Problems     Physical Therapy Goals        Problem: Physical Therapy Goal    Goal Priority Disciplines Outcome Goal Variances Interventions   Physical Therapy Goal     PT, PT/OT Ongoing (interventions implemented as appropriate)     Description:  Goals to be met by: 18     Patient will increase functional independence with mobility by performin. Sit to stand transfer with Stand-by Assistance-met   Revised: sit to stand transfer with Supervision.   2. Bed to chair transfer with Stand-by Assistance using LRD.   3. Gait  x 100 feet with Contact Guard Assistance using Rolling Walker or LRD> -met  4. Lower extremity exercise program x20  reps per handout, with independence                          Time Tracking:     PT Received On: 18  PT Start Time: 1018     PT Stop Time: 1028  PT Total Time (min): 10 min     Billable Minutes: Gait Training 10    Treatment Type: Treatment  PT/PTA: PTA     PTA Visit Number: 2     Johnie Mooney, PTA  2018

## 2018-11-06 NOTE — PLAN OF CARE
CM rec'd call from Doreen Wilcox with [facility]. Patient has been accepted to the [facility] if needed. Patient's peer to peer was denied yesterday, CM will update current CM/SW team.

## 2018-11-06 NOTE — PLAN OF CARE
Problem: Patient Care Overview  Goal: Plan of Care Review  Outcome: Ongoing (interventions implemented as appropriate)   Pt Alert/short term memory.  ; able to express needs.  Very upset about 10:30 stating that he was being set up with the young girl in the room. And something bad would happen.  (The assigned sitter).. Charge notified and in room.  Safety maintained. Switched sitters .  Patient very happy and able to rest with new sitter in the room.    Bed in low position,call  light in reach.    Will continue to monitor

## 2018-11-06 NOTE — PLAN OF CARE
Discharge Planning:    Sw sent SNF referral to [facility], [facility], [facility], [facility], [facility], [facility].    Sw will continue to follow.  SHAISTA Mojica,LCSW

## 2018-11-06 NOTE — PLAN OF CARE
Problem: Physical Therapy Goal  Goal: Physical Therapy Goal  Goals to be met by: 18     Patient will increase functional independence with mobility by performin. Sit to stand transfer with Stand-by Assistance-met   Revised: sit to stand transfer with Supervision.   2. Bed to chair transfer with Stand-by Assistance using LRD.   3. Gait  x 100 feet with Contact Guard Assistance using Rolling Walker or LRD> -met  4. Lower extremity exercise program x20  reps per handout, with independence        Pt progressing towards goals. continue with PT POC.Goals remain appropriate.  Johnie Mooney PTA  2018

## 2018-11-06 NOTE — PROGRESS NOTES
Hospital Medicine   Progress Note     [place] [facility] [place] Prateek Gudino MD   Admit Date: 10/4/2018   YONAS 11/6/2018   Code status: Full Code   Principal Problem: Alcohol withdrawal     Interval hx: Patient seen and examined at bedside today. Overnight: KIRA. This AM, patient doing well with no new complaints. Asked again about plan for discharge and did not recall previous discussions about hopes to sent to [facility], demonstrating poor short term memory.    ROS   Constitutional: negative for fevers and chills  Respiratory: negative for cough, shortness of breath   Cardiovascular: negative for chest discomfort, palpitations   Gastrointestinal: negative for nausea or vomiting, abdominal pain, constipation, diarrhea     PEx   Temp:  [98.1 °F (36.7 °C)-98.7 °F (37.1 °C)]   Pulse:  []   Resp:  [17-20]   BP: (109-137)/(59-80)   SpO2:  [94 %-99 %]      General:  male  in no acute distress. Sitting up in chair, slightly confused, poor short term memory   HEENT: NCAT. PERRL. EOMI. Sclera Anicteric.  CVS: RRR. Normal S1 S2. No murmurs  Pulm: CTAB. Normal respiratory effort. No wheezes, rhonchi, or crackles.  Abdomen: Soft. Non-distended. No tenderness to palpation. No rebound or guarding. +BS.  Extremities: No edema. No cyanosis. Full ROM.  Skin: multiple healing wounds on bilateral feet and LE's     amLODIPine  5 mg Oral Daily    enoxparin  40 mg Subcutaneous Daily    folic acid  1 mg Oral Daily    gabapentin  400 mg Oral TID    magnesium oxide  400 mg Oral BID    miconazole nitrate 2%   Topical (Top) BID    multivitamin  1 tablet Oral Daily    nicotine  1 patch Transdermal Daily    OLANZapine  10 mg Oral QHS    OLANZapine  5 mg Oral Daily    thiamine  100 mg Oral Daily       OLANZapine, sodium chloride 0.9%    Assessment & Plan:  Mohamud Patel is a 57 y.o. man with a history of Severe Alcohol Abuse, Chronic Wounds of Bilateral LE's with Onychomycosis, Alcoholic Neuropathy, Hyponatremia, and  Tobacco Use Disorder who was brought to the ED by EMS after his girlfriend called 911 while the patient was severely intoxicated. He is admitted to Hospital Medicine for Alcohol Withdrawal, possible Wernicke's Encephalopathy, and Acute Cystitis. Pt continues to have gait instability, weakness, recurrent falls when he attempts to get out of bed, has poor insight into his addiction and encephalopathy and poor decision making. Psychiatry consulted, pt determined not to have capacity to make own medical decision at this time. Awaiting placement at [facility] for continued gait training as he has made significant strides with PT and no longer requires restraints.    Wernicke's Encephalopathy (WE) with Dementia and Behavioral Disturbance  Severe Alcohol Use Disorder   Alcohol Withdrawal (Resloved)  Alcoholic Neuropathy - Fall Risk, Requiring Restraints  - Medically, he is outside the window for Alcohol Withdrawal and he has completed the appropriate course of IV Thiamine for Wernicke's Encephalopathy. Continues to have severe gait instability but has made strides with working with PT. With psychiatry assistance, patient now much more alert and participatory in his care - able to feed himself and no longer requires restrains with sitter at bedside  -Zyprexa 5 mg daily and 10 mg HS scheduled and Zyprexa 5 mg PO/IM q8 PRN non-redirectable agitation. Maximum total daily dose of 30 mg (has not needed PRN doses for several days)   -Qtc noted to be 436 (11/2), f/u repeat EKG AM 11/6  -gabapentin to 300mg TID for neuropathy and cravings   -psychiatry consulted, appreciate recs  -refrain from using physical restraints as much as possible   -For Wernicke's Encephalopathy: Finished course of IV Thiamine, transitioned to Thiamine 100 mg PO daily. Folic acid and MVI daily.   -Valium taper completed on 10/15.   -CMP, Mg, Phos - spaced to q48 hours  -Fall, aspiration, and seizure precautions  -PT/OT consulted, performing well with OT and  PT  -IRP consult placed for ongoing rehab      Multiple Bilateral Lower Extremity Wounds  Onychomycosis of Bilateral Feet   -Wound Care consulted, appreciate assistance - have debrided wounds, applying medi-honey, no evidence of active infection. Miconazole ointment to toes  -Podiatry was consulted, no other acute interventions at this time      Tobacco Use Disorder  -Nicotine 21 mg patch daily       DVT PPx: Lovenox SubQ Daily    Diet: Regular  Discharge plan and follow up: Medically ready for discharge. Initially hoped for discharge to [facility]; however following peer to peer discussion on 11/5 between myself and Dr. Rebollar, patient will not qualify for IPR stay. Dr. Rebollar will work [place] [facility] [place] SW to explore all SNF options over the next several days. If patient denied SNF's then Dr. Rebollar will facilitate a discharge to [facility]; however, likely will only be approved by any IPR facility for 5-7 days. Therefore, will need plan for next step (ie long term care vs. assisted living) prior to any IPR discharge.  Father had previously wanted patient discharged to [facility]; however, the do not accept any patient's who are not currently PEC'd      Prateek Gudino MD  Hospital Medicine Staff  [facility]   Pager: [phone]

## 2018-11-06 NOTE — PROGRESS NOTES
Hospital Medicine             Progress Note     Team: [facility] R Matty Tapia MD   Admit Date: 10/4/2018   YONAS 11/6/2018   Code status: Full Code   Principal Problem: Alcohol withdrawal     Interval hx: Patient seen and examined at bedside today. Hemodynamically stable and afebrile overnight. RN note review (Apparently POA (father) called and POA doesn't want patient friend (alena) to visit patient).     This morning patient appears well and conversing well. He was asking about d/c planning and that he wants to go home. Denies any fevers, chills, nausea, emesis, abdominal pain, BM or urinary issues.       ROS   Constitutional: negative for fevers and chills  Respiratory: negative for cough, shortness of breath   Cardiovascular: negative for chest discomfort, palpitations   Gastrointestinal: negative for nausea or vomiting, abdominal pain, constipation, diarrhea     PEx   Temp:  [97.2 °F (36.2 °C)-98.5 °F (36.9 °C)]   Pulse:  []   Resp:  [17-20]   BP: (109-137)/(61-80)   SpO2:  [96 %-99 %]      General:  male  in no acute distress. Laying in bed.  HEENT: NCAT. PERRL. EOMI. Sclera Anicteric.  CVS: RRR. Normal S1 S2. No murmurs  Pulm: CTAB. Normal respiratory effort. No wheezes, rhonchi, or crackles.  Abdomen: Soft. Non-distended. No tenderness to palpation. No rebound or guarding. +BS.  Extremities: No edema. No cyanosis. Full ROM.  Skin: multiple healing wounds on bilateral feet and LE's       amLODIPine  5 mg Oral Daily    enoxparin  40 mg Subcutaneous Daily    folic acid  1 mg Oral Daily    gabapentin  400 mg Oral TID    magnesium oxide  400 mg Oral BID    miconazole nitrate 2%   Topical (Top) BID    multivitamin  1 tablet Oral Daily    nicotine  1 patch Transdermal Daily    OLANZapine  10 mg Oral QHS    OLANZapine  5 mg Oral Daily    thiamine  100 mg Oral Daily       OLANZapine, sodium chloride 0.9%    Assessment & Plan:  Mohamud Patel is a 57 y.o. man with a  history of Severe Alcohol Abuse, Chronic Wounds of Bilateral LE's with Onychomycosis, Alcoholic Neuropathy, Hyponatremia, and Tobacco Use Disorder who was brought to the ED by EMS after his girlfriend called 911 while the patient was severely intoxicated. He is admitted to Hospital Medicine for Alcohol Withdrawal, possible Wernicke's Encephalopathy, and Acute Cystitis. Pt continues to have gait instability, weakness, recurrent falls when he attempts to get out of bed, has poor insight into his addiction and encephalopathy and poor decision making. Psychiatry consulted, pt determined not to have capacity to make own medical decision at this time. Awaiting placement at [facility] for continued gait training as he has made significant strides with PT and no longer requires restraints.    Wernicke's Encephalopathy (WE) with Dementia and Behavioral Disturbance  Severe Alcohol Use Disorder   Alcohol Withdrawal (Resolved)  Alcoholic Neuropathy - Fall Risk, Requiring Restraints  Medically, he is outside the window for Alcohol Withdrawal and he has completed the appropriate course of IV Thiamine for Wernicke's Encephalopathy. Continues to have severe gait instability but has made strides with working with PT. With psychiatry assistance, patient now much more alert and participatory in his care - able to feed himself and no longer requires restrains with sitter at bedside  -Zyprexa 5 mg daily and 10 mg HS scheduled and Zyprexa 5 mg PO/IM q8 PRN non-redirectable agitation. Maximum total daily dose of 30 mg (has not needed PRN doses for several days)   -Qtc noted to be 436 (11/2), f/u repeat EKG AM 11/6 (452), will repeat another one in few days  -gabapentin to 300mg TID for neuropathy and cravings   -psychiatry consulted, appreciate recs  -refrain from using physical restraints as much as possible   -For Wernicke's Encephalopathy: Finished course of IV Thiamine, transitioned to Thiamine 100 mg PO daily. Folic acid and MVI daily.    -Valium taper completed on 10/15.   -CMP, Mg, Phos - spaced to q48 hours  -Fall, aspiration, and seizure precautions  -PT/OT consulted, performing well with OT and PT  -IRP consult placed for ongoing rehab      Multiple Bilateral Lower Extremity Wounds  Onychomycosis of Bilateral Feet   -Wound Care consulted, appreciate assistance - have debrided wounds, applying medi-honey, no evidence of active infection. Miconazole ointment to toes  -Podiatry was consulted, no other acute interventions at this time      Tobacco Use Disorder  -Nicotine 21 mg patch daily       Nutrition Recommendations:  1. Continue current regular diet with double portions. Pt with good intake.   Goals: Intake >/=85% EEN/EPN  Nutrition Goal Status: goal met        DVT PPx: Lovenox SubQ Daily    Diet: Regular  Discharge plan and follow up: Medically ready for discharge. Initially hoped for discharge to [facility]; however following peer to peer discussion on 11/5 between Dr. Gudino and Dr. Rebollar, patient will not qualify for IPR stay. Dr. Rebollar will work with [facility] CM & SW to explore all SNF options over the next several days. If patient denied SNF's then Dr. Rebollar will facilitate a discharge to [facility]; however, likely will only be approved by any IPR facility for 5-7 days. Therefore, will need plan for next step (ie penitentiary care vs. assisted living) prior to any IPR discharge.  Father had previously wanted patient discharged to [facility]; however, the do not accept any patient's who are not currently PEC'd      Matty Tapia MD  Hospital Medicine Staff  [facility]   Pager: [phone]

## 2018-11-06 NOTE — PLAN OF CARE
CM called [facility] again and left . CM also spoke to Jabari CIFUENTES and requested she send referrals to SNF in the area.

## 2018-11-07 PROCEDURE — 25000003 PHARM REV CODE 250: Performed by: HOSPITALIST

## 2018-11-07 PROCEDURE — 63600175 PHARM REV CODE 636 W HCPCS: Performed by: HOSPITALIST

## 2018-11-07 PROCEDURE — 97116 GAIT TRAINING THERAPY: CPT

## 2018-11-07 PROCEDURE — S4991 NICOTINE PATCH NONLEGEND: HCPCS | Performed by: HOSPITALIST

## 2018-11-07 PROCEDURE — 11000001 HC ACUTE MED/SURG PRIVATE ROOM

## 2018-11-07 PROCEDURE — 99231 SBSQ HOSP IP/OBS SF/LOW 25: CPT | Mod: ,,, | Performed by: INTERNAL MEDICINE

## 2018-11-07 RX ADMIN — AMLODIPINE BESYLATE 5 MG: 5 TABLET ORAL at 09:11

## 2018-11-07 RX ADMIN — MICONAZOLE NITRATE: 20 OINTMENT TOPICAL at 09:11

## 2018-11-07 RX ADMIN — NICOTINE 1 PATCH: 21 PATCH, EXTENDED RELEASE TRANSDERMAL at 09:11

## 2018-11-07 RX ADMIN — Medication 400 MG: at 09:11

## 2018-11-07 RX ADMIN — GABAPENTIN 400 MG: 400 CAPSULE ORAL at 09:11

## 2018-11-07 RX ADMIN — ENOXAPARIN SODIUM 40 MG: 100 INJECTION SUBCUTANEOUS at 04:11

## 2018-11-07 RX ADMIN — FOLIC ACID 1 MG: 1 TABLET ORAL at 09:11

## 2018-11-07 RX ADMIN — THERA TABS 1 TABLET: TAB at 09:11

## 2018-11-07 RX ADMIN — OLANZAPINE 5 MG: 5 TABLET, FILM COATED ORAL at 09:11

## 2018-11-07 RX ADMIN — OLANZAPINE 10 MG: 10 TABLET, FILM COATED ORAL at 10:11

## 2018-11-07 RX ADMIN — Medication 400 MG: at 10:11

## 2018-11-07 RX ADMIN — GABAPENTIN 400 MG: 400 CAPSULE ORAL at 10:11

## 2018-11-07 RX ADMIN — MICONAZOLE NITRATE: 20 OINTMENT TOPICAL at 10:11

## 2018-11-07 RX ADMIN — Medication 100 MG: at 09:11

## 2018-11-07 RX ADMIN — GABAPENTIN 400 MG: 400 CAPSULE ORAL at 03:11

## 2018-11-07 NOTE — PLAN OF CARE
Problem: Patient Care Overview  Goal: Plan of Care Review  Outcome: Ongoing (interventions implemented as appropriate)  Patient confused. Patient VSS. Patient denies pain. Patient free from falls or injury during shift. Sitter at bedside.Patient repositioned independently. Patient in bed, bed in lowest position, call light in reach, bed alarm set, and personal items at bedside. Will continue to monitor.

## 2018-11-07 NOTE — PROGRESS NOTES
Hospital Medicine             Progress Note     Team: [facility] R Matty Tapia MD   Admit Date: 10/4/2018   YONAS: 11/9/2018  Code status: Full Code   Principal Problem: Alcohol withdrawal     Interval hx: Patient seen and examined at bedside today. Hemodynamically stable and afebrile overnight. No significant issues as per nursing staff.     Patient denies any complaints including fevers, chills, cp, dyspnea, abdominal pain, N/V/C/D. Patient is Alert and Oriented. Wants to go home, and asked why he is kept here. Explained and discussed with family, updated family.     POA / Father at bedside. Had extensive conversation with POA, CM, SW regarding dispo. Please refer to their notes.       ROS   Constitutional: negative for fevers and chills  Respiratory: negative for cough, shortness of breath   Cardiovascular: negative for chest discomfort, palpitations   Gastrointestinal: negative for nausea or vomiting, abdominal pain, constipation, diarrhea     PEx   Temp:  [97.6 °F (36.4 °C)-98.3 °F (36.8 °C)]   Pulse:  [81-95]   Resp:  [17-18]   BP: (123-133)/(56-71)   SpO2:  [96 %-99 %]      General:  male  in no acute distress.   HEENT: NCAT. PERRL. EOMI. Sclera Anicteric.  CVS: RRR. Normal S1 S2. No murmurs  Pulm: CTAB. Normal respiratory effort. No wheezes, rhonchi, or crackles.  Abdomen: Soft. Non-distended. No tenderness to palpation. No rebound or guarding. +BS.  Extremities: No edema. No cyanosis. Full ROM.  Skin: multiple healing wounds on bilateral feet and LE's       amLODIPine  5 mg Oral Daily    enoxparin  40 mg Subcutaneous Daily    folic acid  1 mg Oral Daily    gabapentin  400 mg Oral TID    magnesium oxide  400 mg Oral BID    miconazole nitrate 2%   Topical (Top) BID    multivitamin  1 tablet Oral Daily    nicotine  1 patch Transdermal Daily    OLANZapine  10 mg Oral QHS    OLANZapine  5 mg Oral Daily    thiamine  100 mg Oral Daily       OLANZapine, sodium chloride  0.9%    Assessment & Plan:  Mohamud Patel is a 57 y.o. man with a history of Severe Alcohol Abuse, Chronic Wounds of Bilateral LE's with Onychomycosis, Alcoholic Neuropathy, Hyponatremia, and Tobacco Use Disorder who was brought to the ED by EMS after his girlfriend called 911 while the patient was severely intoxicated. He is admitted to Hospital Medicine for Alcohol Withdrawal, possible Wernicke's Encephalopathy, and Acute Cystitis. Pt continues to have gait instability, weakness, recurrent falls when he attempts to get out of bed, has poor insight into his addiction and encephalopathy and poor decision making. Psychiatry consulted, pt determined not to have capacity to make own medical decision at this time. Awaiting placement at [facility] for continued gait training as he has made significant strides with PT and no longer requires restraints.    Wernicke's Encephalopathy (WE) with Dementia and Behavioral Disturbance  Severe Alcohol Use Disorder   Alcohol Withdrawal (Resolved)  Alcoholic Neuropathy - Fall Risk  Medically, he is outside the window for Alcohol Withdrawal and he has completed the appropriate course of IV Thiamine for Wernicke's Encephalopathy. Continues to have severe gait instability but has made strides with working with PT. With psychiatry assistance, patient now much more alert and participatory in his care - able to feed himself and no longer requires restrains with sitter at bedside  -Zyprexa 5 mg daily and 10 mg HS scheduled and Zyprexa 5 mg PO/IM q8 PRN non-redirectable agitation. Maximum total daily dose of 30 mg.  -Qtc noted to be 436 (11/2), f/u repeat EKG AM 11/6 (452), will repeat another one in few days  -gabapentin to 300mg TID for neuropathy and alcohol cravings   -consider naltrexone if craving continues   -psychiatry consulted, appreciate recs  -refrain from using physical restraints as much as possible   -For Wernicke's Encephalopathy: Finished course of IV Thiamine,  transitioned to Thiamine 100 mg PO daily. Folic acid and MVI daily.   -Valium taper completed on 10/15.   -CMP, Mg, Phos - spaced to q48 hours  -Fall precautions  -PT/OT consulted, performing well with OT and PT  -IRP consult placed for ongoing rehab but denied as patient improved gait and balance training     Multiple Bilateral Lower Extremity Wounds  Onychomycosis of Bilateral Feet   -Wound Care consulted, appreciate assistance - have debrided wounds, applying medi-honey, no evidence of active infection. Miconazole ointment to toes  -Podiatry was consulted, no other acute interventions at this time      Tobacco Use Disorder  -Nicotine 21 mg patch daily       Nutrition Recommendations:  1. Continue current regular diet with double portions. Pt with good intake.   Goals: Intake >/=85% EEN/EPN  Nutrition Goal Status: goal met        DVT PPx: Lovenox SubQ Daily    Diet: Regular  Discharge plan and follow up: Medically ready for discharge. Initially hoped for discharge to [facility]; however following peer to peer discussion on 11/5 between Dr. Gudino and Dr. Rebollar, patient will not qualify for IPR stay. Dr. Rebollar will work with [facility] CM & SW to explore all SNF options over the next several days. If patient denied SNF's then Dr. Rebollar will facilitate a discharge to [facility]; however, likely will only be approved by any IPR facility for 5-7 days. Therefore, will need plan for next step (ie detention care vs. assisted living) prior to any IPR discharge. Father had previously wanted patient discharged to [facility]; however, the do not accept any patient's who are not currently PEC'd    Please refer to [facility] and [facility] for disposition updates.    Matty Tapia MD  Hospital Medicine Staff  [facility]   Pager: [phone]

## 2018-11-07 NOTE — PLAN OF CARE
Problem: Physical Therapy Goal  Goal: Physical Therapy Goal  Goals to be met by: 18     Patient will increase functional independence with mobility by performin. Sit to stand transfer with Stand-by Assistance-met   Revised: sit to stand transfer with Supervision.   2. Bed to chair transfer with Stand-by Assistance using LRD.   3. Gait  x 100 feet with Contact Guard Assistance using Rolling Walker or LRD> -met   Revised:Gait x 200 ft S without AD  4. Lower extremity exercise program x20  reps per handout, with independence         Outcome: Ongoing (interventions implemented as appropriate)  Pt goals revised.     GALINA MAYA, PT  2018

## 2018-11-07 NOTE — PLAN OF CARE
Problem: Patient Care Overview  Goal: Individualization & Mutuality  Patient stated that he was ready to leave and wondered what was holding up his discharge.  Left a message with case management on answering service.  No return call.

## 2018-11-07 NOTE — PLAN OF CARE
CM received call from Pt's nurse, Johnie requesting visit with the pt to update his plan of care. CM did meet with the pt ad advised that the original plan of Rehab placement was denied by insurance. Advised the pt that we are trying to get him to a skilled unit and explained the difference. Pt repeatedly asked what the plan was and when it would happen. Answered numerous times. Ended with assurance that we are trying multiple uniots and are working with his goal of returning to the home his mother left him. CM updated his primary nurse.

## 2018-11-07 NOTE — PLAN OF CARE
Problem: Patient Care Overview  Goal: Plan of Care Review  Outcome: Ongoing (interventions implemented as appropriate)  No acute events throughout night. Pt  able to express needs and wants to go home today.   No c/o pain.   Safety maintained.   Bed in low position, call  light in reach.  Non-compliant with 1000cc fluid restriction daily.    Will continue to monitor

## 2018-11-07 NOTE — PLAN OF CARE
CM also had call from Antoinette at [facility]  ([phone])  following up on Post DC plan. Updated her on SNF referrals, denials and pending reviews.

## 2018-11-07 NOTE — PT/OT/SLP PROGRESS
Physical Therapy Treatment    Patient Name:  Mohamud Patel   MRN:  [id number]    Recommendations:     Discharge Recommendations:  outpatient PT(supervision for safety)   Discharge Equipment Recommendations: shower chair   Barriers to discharge: None    Assessment:     Mohamud Patel is a 58 y.o. male admitted with a medical diagnosis of Alcohol withdrawal.  He presents with the following impairments/functional limitations:  weakness, gait instability, impaired balance, impaired endurance, decreased safety awareness, impaired functional mobilty. Pt performed bed mobility and transfers S. Pt amb ~200ft CGA/SBA without AD, vc's for safety; no LOB and mild SOB. Pt performed Lovett Balance Scale scoring 42/56 which is low fall risk. Pt will continue to benefit from skilled PT to improve deficits and increase overall functional mobility. Pt will benefit from OP PT for higher level balance and safety.     Rehab Prognosis:  Good; patient would benefit from acute skilled PT services to address these deficits and reach maximum level of function.      Recent Surgery: * No surgery found *      Plan:     During this hospitalization, patient to be seen 2 x/week to address the above listed problems via therapeutic activities, therapeutic exercises, neuromuscular re-education  · Plan of Care Expires:  12/06/18   Plan of Care Reviewed with: patient, father    Subjective     Communicated with RN prior to session.  Patient found supine in bed and father present upon PT entry to room, agreeable to treatment.      Chief Complaint: NA  Patient comments/goals: d/c from hospital   Pain/Comfort:  Pain Rating 1: 0/10  Pain Rating Post-Intervention 1: 0/10    Patients cultural, spiritual, Zoroastrian conflicts given the current situation: none noted    Objective:     Patient found with: restraints     General Precautions: Standard, fall, seizure   Orthopedic Precautions:N/A   Braces: N/A     Functional Mobility:  Bed Mobility:      · Supine to Sit: supervision    Transfers:     · Sit to Stand:  supervision with no AD    Gait: ~200ft CGA/SBA without AD, vc's for safety; no LOB and mild SOB      AM-PAC 6 CLICK MOBILITY  Turning over in bed (including adjusting bedclothes, sheets and blankets)?: 4  Sitting down on and standing up from a chair with arms (e.g., wheelchair, bedside commode, etc.): 3  Moving from lying on back to sitting on the side of the bed?: 3  Moving to and from a bed to a chair (including a wheelchair)?: 3  Need to walk in hospital room?: 3  Climbing 3-5 steps with a railing?: 3  Basic Mobility Total Score: 19       Therapeutic Activities and Exercises:  Pt sat EOB with S.  Pt and father educated on:  -safety with mobility  -importance of OOB activity  -pt progress and rec change from rehab to OP PT  -continued POC  Pt safe to amb in hallway with RN staff.   CM and MD team notified of rec change.     CASTRO BALANCE SCALE     1.SITTING TO STANDING:  (4) Pt able to stand without using hands and stabilize independently     2. STANDING UNSUPPORTED: (3) Pt able to stand 2 minutes with supervision     3. SITTING WITH BACK UNSUPPORTED BUT FEET SUPPORTED ON FLOOR: (4) Pt able to sit safely and securely 2 minutes     4. STANDING TO SITTING:(4) Pt sits safely with minimal use of hands     5. TRANSFERS:(4) Pt  able to transfer safely with minor use of hands    6. STANDING UNSUPPORTED WITH EYES CLOSED:(3) Pt able to stand 10 seconds with supervision    7. STANDING UNSUPPORTED WITH FEET TOGETHER:(2) Pt able to place feet together independently and to hold for 30 seconds    8. REACHING FORWARD WITH OUTSTRETCHED ARM WHILE STANDING:(3) Pt can reach forward >12.5 cm safely (5 inches)    9.  OBJECT FROM THE FLOOR FROM A STANDING POSITION:(4) Pt  able to  slipper safely and easily    10. TURNING TO LOOK BEHIND OVER LEFT AND RIGHT SHOULDERS WHILE STANDING:(4) Pt looks behind from both sides and weight shifts well    11. TURN 360  DEGREES:(4) Pt able to turn 360 degrees safely in 4 seconds or less    12. PLACING ALTERNATE FOOT ON STEP OR STOOL WHILE STANDING UNSUPPORTED:(1) Pt able to complete >2 steps needs minimal assist    13. STANDING UNSUPPORTED ONE FOOT IN FRONT: (1) Pt needs help to step but can hold 15 seconds     14. STANDING ON ONE LEG:(1) Pt tries to lift leg unable to hold 3 seconds but remains standing independently    Total Score 42/56    41-56 = low fall risk  21-40 = medium fall risk  0 -20 = high fall risk      Patient left up in chair with all lines intact, call button in reach, RN notified and sitter present present..    GOALS:   Multidisciplinary Problems     Physical Therapy Goals        Problem: Physical Therapy Goal    Goal Priority Disciplines Outcome Goal Variances Interventions   Physical Therapy Goal     PT, PT/OT Ongoing (interventions implemented as appropriate)     Description:  Goals to be met by: 18     Patient will increase functional independence with mobility by performin. Sit to stand transfer with Stand-by Assistance-met   Revised: sit to stand transfer with Supervision.   2. Bed to chair transfer with Stand-by Assistance using LRD.   3. Gait  x 100 feet with Contact Guard Assistance using Rolling Walker or LRD> -met   Revised:Gait x 200 ft S without AD  4. Lower extremity exercise program x20  reps per handout, with independence                           Time Tracking:     PT Received On: 18  PT Start Time: 1249     PT Stop Time: 1305  PT Total Time (min): 16 min     Billable Minutes: Gait Training 16    Treatment Type: Treatment  PT/PTA: PT     PTA Visit Number: 0     GALINA MAYA, PT  2018

## 2018-11-07 NOTE — PLAN OF CARE
Discharge Planning:    Sw met with patient, along with Dr. Garcia and CM RN, Melita.  Patient, patients father, and Doreen from [facility] was present.  Discharge plan is for patient to go to SNF vs. An assisted living with HH ordered.  Patient has been denied by the following SNF's:  [facility], [facility], and [facility].  Colt spoke with Camryn from  [facility], who reported that she will call Sw back with a decision.  Sw also still waiting to hear from [facility], [place], and [facility].    Update 4:11 PM:    Colt completed LOCET.      Colt will continue to follow.  Jabari Lorenzo, MSW,LCSW

## 2018-11-08 PROCEDURE — 11000001 HC ACUTE MED/SURG PRIVATE ROOM

## 2018-11-08 PROCEDURE — 25000003 PHARM REV CODE 250: Performed by: HOSPITALIST

## 2018-11-08 PROCEDURE — 99231 SBSQ HOSP IP/OBS SF/LOW 25: CPT | Mod: ,,, | Performed by: INTERNAL MEDICINE

## 2018-11-08 PROCEDURE — 63600175 PHARM REV CODE 636 W HCPCS: Performed by: HOSPITALIST

## 2018-11-08 PROCEDURE — S4991 NICOTINE PATCH NONLEGEND: HCPCS | Performed by: HOSPITALIST

## 2018-11-08 RX ADMIN — THERA TABS 1 TABLET: TAB at 09:11

## 2018-11-08 RX ADMIN — AMLODIPINE BESYLATE 5 MG: 5 TABLET ORAL at 09:11

## 2018-11-08 RX ADMIN — Medication 100 MG: at 09:11

## 2018-11-08 RX ADMIN — Medication 400 MG: at 09:11

## 2018-11-08 RX ADMIN — FOLIC ACID 1 MG: 1 TABLET ORAL at 09:11

## 2018-11-08 RX ADMIN — OLANZAPINE 5 MG: 5 TABLET, FILM COATED ORAL at 09:11

## 2018-11-08 RX ADMIN — ENOXAPARIN SODIUM 40 MG: 100 INJECTION SUBCUTANEOUS at 04:11

## 2018-11-08 RX ADMIN — GABAPENTIN 400 MG: 400 CAPSULE ORAL at 09:11

## 2018-11-08 RX ADMIN — NICOTINE 1 PATCH: 21 PATCH, EXTENDED RELEASE TRANSDERMAL at 09:11

## 2018-11-08 RX ADMIN — MICONAZOLE NITRATE: 20 OINTMENT TOPICAL at 09:11

## 2018-11-08 RX ADMIN — GABAPENTIN 400 MG: 400 CAPSULE ORAL at 04:11

## 2018-11-08 NOTE — PLAN OF CARE
Cm  left a message for Doreen Wilcox at [facility]  ([phone])  informing her that the patient has been denied for [place] SNF and no longer qualifies for in patient therapy. Cm awaiting return call. Cm  then called Sony and informed him that the patient has been denied by multiple [place] SNFs. Sony was confused saying he was given different information yesterday. Edgar explained that the patient needs to go to the [facility] assisted living with a sitter 24/7. Sony would not discuss the assisted living due to being given different information. Cm called Melita Hernández and left a message asking for him to call Sony. Cm  will continue to follow.

## 2018-11-08 NOTE — PLAN OF CARE
CLARENCE was advised by supervisor that she had asked Erin CIFUENTES to move forward with transfer of the pt to Memory Care unit. CM received call from the pt's father asking status of transfer. CM called and left  for Erin CIFUENTES. CM called the pt's father, Sony ([phone]) and advised him that the SW was to handle.

## 2018-11-09 PROBLEM — F10.931 ALCOHOL WITHDRAWAL DELIRIUM: Status: RESOLVED | Noted: 2018-10-06 | Resolved: 2018-11-09

## 2018-11-09 PROBLEM — G62.1 ALCOHOLIC PERIPHERAL NEUROPATHY: Status: RESOLVED | Noted: 2018-08-11 | Resolved: 2018-11-09

## 2018-11-09 PROBLEM — T14.8XXA ABRASION: Status: RESOLVED | Noted: 2018-10-06 | Resolved: 2018-11-09

## 2018-11-09 PROBLEM — Z74.09 IMPAIRED MOBILITY AND ADLS: Status: RESOLVED | Noted: 2018-10-08 | Resolved: 2018-11-09

## 2018-11-09 PROBLEM — Z78.9 IMPAIRED MOBILITY AND ADLS: Status: RESOLVED | Noted: 2018-10-08 | Resolved: 2018-11-09

## 2018-11-09 PROCEDURE — S4991 NICOTINE PATCH NONLEGEND: HCPCS | Performed by: HOSPITALIST

## 2018-11-09 PROCEDURE — 90471 IMMUNIZATION ADMIN: CPT | Performed by: INTERNAL MEDICINE

## 2018-11-09 PROCEDURE — 93005 ELECTROCARDIOGRAM TRACING: CPT

## 2018-11-09 PROCEDURE — 11000001 HC ACUTE MED/SURG PRIVATE ROOM

## 2018-11-09 PROCEDURE — 63600175 PHARM REV CODE 636 W HCPCS: Performed by: INTERNAL MEDICINE

## 2018-11-09 PROCEDURE — 3E0234Z INTRODUCTION OF SERUM, TOXOID AND VACCINE INTO MUSCLE, PERCUTANEOUS APPROACH: ICD-10-PCS | Performed by: INTERNAL MEDICINE

## 2018-11-09 PROCEDURE — 25000003 PHARM REV CODE 250: Performed by: HOSPITALIST

## 2018-11-09 PROCEDURE — 93010 ELECTROCARDIOGRAM REPORT: CPT | Mod: ,,, | Performed by: INTERNAL MEDICINE

## 2018-11-09 PROCEDURE — 99231 SBSQ HOSP IP/OBS SF/LOW 25: CPT | Mod: ,,, | Performed by: INTERNAL MEDICINE

## 2018-11-09 PROCEDURE — 63600175 PHARM REV CODE 636 W HCPCS: Performed by: HOSPITALIST

## 2018-11-09 PROCEDURE — 90715 TDAP VACCINE 7 YRS/> IM: CPT | Performed by: INTERNAL MEDICINE

## 2018-11-09 RX ORDER — LANOLIN ALCOHOL/MO/W.PET/CERES
100 CREAM (GRAM) TOPICAL DAILY
COMMUNITY
Start: 2018-11-10 | End: 2018-11-09

## 2018-11-09 RX ORDER — OLANZAPINE 5 MG/1
5 TABLET ORAL DAILY
Qty: 30 TABLET | Refills: 11 | Status: SHIPPED | OUTPATIENT
Start: 2018-11-10 | End: 2018-11-09

## 2018-11-09 RX ORDER — AMLODIPINE BESYLATE 5 MG/1
5 TABLET ORAL DAILY
Qty: 30 TABLET | Refills: 11 | Status: SHIPPED | OUTPATIENT
Start: 2018-11-10 | End: 2018-11-09

## 2018-11-09 RX ORDER — IBUPROFEN 200 MG
1 TABLET ORAL DAILY
Qty: 28 PATCH | Refills: 5 | Status: SHIPPED | OUTPATIENT
Start: 2018-11-10

## 2018-11-09 RX ORDER — GABAPENTIN 400 MG/1
400 CAPSULE ORAL 3 TIMES DAILY
Qty: 90 CAPSULE | Refills: 11 | Status: SHIPPED | OUTPATIENT
Start: 2018-11-09 | End: 2019-11-09

## 2018-11-09 RX ORDER — OLANZAPINE 5 MG/1
5 TABLET ORAL DAILY
Qty: 30 TABLET | Refills: 11 | Status: SHIPPED | OUTPATIENT
Start: 2018-11-10 | End: 2019-11-10

## 2018-11-09 RX ORDER — FOLIC ACID 1 MG/1
1 TABLET ORAL DAILY
Qty: 30 TABLET | Refills: 5 | Status: SHIPPED | OUTPATIENT
Start: 2018-11-10 | End: 2018-11-09

## 2018-11-09 RX ORDER — IBUPROFEN 200 MG
1 TABLET ORAL DAILY
Qty: 28 PATCH | Refills: 5 | COMMUNITY
Start: 2018-11-10 | End: 2018-11-09

## 2018-11-09 RX ORDER — LANOLIN ALCOHOL/MO/W.PET/CERES
400 CREAM (GRAM) TOPICAL 2 TIMES DAILY
Refills: 0 | COMMUNITY
Start: 2018-11-09 | End: 2018-11-09

## 2018-11-09 RX ORDER — LANOLIN ALCOHOL/MO/W.PET/CERES
100 CREAM (GRAM) TOPICAL DAILY
COMMUNITY
Start: 2018-11-10

## 2018-11-09 RX ORDER — OLANZAPINE 10 MG/1
10 TABLET ORAL NIGHTLY
Qty: 30 TABLET | Refills: 11 | Status: SHIPPED | OUTPATIENT
Start: 2018-11-09 | End: 2018-11-09

## 2018-11-09 RX ORDER — GABAPENTIN 400 MG/1
400 CAPSULE ORAL 3 TIMES DAILY
Qty: 90 CAPSULE | Refills: 11 | Status: SHIPPED | OUTPATIENT
Start: 2018-11-09 | End: 2018-11-09

## 2018-11-09 RX ORDER — OLANZAPINE 10 MG/1
10 TABLET ORAL NIGHTLY
Qty: 30 TABLET | Refills: 11 | Status: SHIPPED | OUTPATIENT
Start: 2018-11-09 | End: 2019-11-09

## 2018-11-09 RX ORDER — OLANZAPINE 5 MG/1
5 TABLET ORAL EVERY 8 HOURS PRN
Qty: 30 TABLET | Refills: 4 | Status: SHIPPED | OUTPATIENT
Start: 2018-11-09 | End: 2019-11-09

## 2018-11-09 RX ORDER — FOLIC ACID 1 MG/1
1 TABLET ORAL DAILY
Qty: 30 TABLET | Refills: 5 | Status: SHIPPED | OUTPATIENT
Start: 2018-11-10 | End: 2019-11-10

## 2018-11-09 RX ORDER — AMLODIPINE BESYLATE 5 MG/1
5 TABLET ORAL DAILY
Qty: 30 TABLET | Refills: 11 | Status: SHIPPED | OUTPATIENT
Start: 2018-11-10 | End: 2019-11-10

## 2018-11-09 RX ORDER — LANOLIN ALCOHOL/MO/W.PET/CERES
400 CREAM (GRAM) TOPICAL 2 TIMES DAILY
Qty: 60 TABLET | Refills: 6 | Status: SHIPPED | OUTPATIENT
Start: 2018-11-09

## 2018-11-09 RX ORDER — OLANZAPINE 5 MG/1
5 TABLET ORAL EVERY 8 HOURS PRN
Qty: 30 TABLET | Refills: 4 | Status: SHIPPED | OUTPATIENT
Start: 2018-11-09 | End: 2018-11-09

## 2018-11-09 RX ADMIN — OLANZAPINE 10 MG: 10 TABLET, FILM COATED ORAL at 10:11

## 2018-11-09 RX ADMIN — ENOXAPARIN SODIUM 40 MG: 100 INJECTION SUBCUTANEOUS at 04:11

## 2018-11-09 RX ADMIN — GABAPENTIN 400 MG: 400 CAPSULE ORAL at 04:11

## 2018-11-09 RX ADMIN — CLOSTRIDIUM TETANI TOXOID ANTIGEN (FORMALDEHYDE INACTIVATED), CORYNEBACTERIUM DIPHTHERIAE TOXOID ANTIGEN (FORMALDEHYDE INACTIVATED), BORDETELLA PERTUSSIS TOXOID ANTIGEN (GLUTARALDEHYDE INACTIVATED), BORDETELLA PERTUSSIS FILAMENTOUS HEMAGGLUTININ ANTIGEN (FORMALDEHYDE INACTIVATED), BORDETELLA PERTUSSIS PERTACTIN ANTIGEN, AND BORDETELLA PERTUSSIS FIMBRIAE 2/3 ANTIGEN 0.5 ML: 5; 2; 2.5; 5; 3; 5 INJECTION, SUSPENSION INTRAMUSCULAR at 04:11

## 2018-11-09 RX ADMIN — OLANZAPINE 10 MG: 10 TABLET, FILM COATED ORAL at 04:11

## 2018-11-09 RX ADMIN — FOLIC ACID 1 MG: 1 TABLET ORAL at 10:11

## 2018-11-09 RX ADMIN — Medication 400 MG: at 04:11

## 2018-11-09 RX ADMIN — GABAPENTIN 400 MG: 400 CAPSULE ORAL at 10:11

## 2018-11-09 RX ADMIN — MICONAZOLE NITRATE: 20 OINTMENT TOPICAL at 09:11

## 2018-11-09 RX ADMIN — OLANZAPINE 5 MG: 5 TABLET, FILM COATED ORAL at 10:11

## 2018-11-09 RX ADMIN — MICONAZOLE NITRATE: 20 OINTMENT TOPICAL at 10:11

## 2018-11-09 RX ADMIN — AMLODIPINE BESYLATE 5 MG: 5 TABLET ORAL at 10:11

## 2018-11-09 RX ADMIN — THERA TABS 1 TABLET: TAB at 10:11

## 2018-11-09 RX ADMIN — MICONAZOLE NITRATE: 20 OINTMENT TOPICAL at 04:11

## 2018-11-09 RX ADMIN — Medication 400 MG: at 10:11

## 2018-11-09 RX ADMIN — NICOTINE 1 PATCH: 21 PATCH, EXTENDED RELEASE TRANSDERMAL at 10:11

## 2018-11-09 RX ADMIN — Medication 100 MG: at 10:11

## 2018-11-09 NOTE — PLAN OF CARE
CM did call Mr Sony Patel ([phone]) to discuss DC planning. Call went to . Left message requesting he return the call. Pt has been denied at SNF facilities and PT-OT no longer recommending inpatient SNF. Plan will be for the pt to [facility] assisted living Memory Care Unit with 24/7 supervision.

## 2018-11-09 NOTE — PLAN OF CARE
Problem: Patient Care Overview  Goal: Plan of Care Review  Outcome: Ongoing (interventions implemented as appropriate)  Pt  was free of falls during shift, nad pt quench was not quench drinking excessive amount of water  will continue to monitor

## 2018-11-09 NOTE — PLAN OF CARE
"Problem: Patient Care Overview  Goal: Plan of Care Review  Pt free of falls and injury. Pt AAOx1..Uncooperative, denied evening medications.Tried to walk out of room, states:"I'm going to [employer] festival". Constantly asking for beer and cigarettes, no evidence of learning about cessation , wound care provided, dressing changed, fluids offered, denies pain during the shift.Bed low, breaks locked, SR up x2, call light within reach, will continue to monitor.      "

## 2018-11-09 NOTE — PLAN OF CARE
Per Jabari CIFUENTES, pt's father needs to sign lease at assisted living. If done by 3 today can DC to assisted living. If not will be remain here until Monday.

## 2018-11-09 NOTE — DISCHARGE SUMMARY
"[facility]  Discharge Summary  Hospital Medicine    Patient Name: Mohamud Patel  YOB: 1960    Admit Date: 10/4/2018    Discharge Date: 11/10/2018     Discharge Attending Physician: Matty Tapia MD     Team: [facility] HOSP MED R    Reason for Admission:   Chief Complaint   Patient presents with    Alcohol Intoxication     Pt presents to ED via EMS c/o +ETOH intoxication. EMS reports pt has been incontinent and unable to walk independently. + scabies        Active Hospital Problems    Diagnosis  POA    *Alcohol withdrawal [F10.239]  Yes    Major neurocognitive disorder [F01.50]  Unknown    Ulcer of toe of left foot [L97.529]  Yes    Mental health disorder [F99]  Yes    Wernicke encephalopathy [E51.2]  Yes    Wound of lower extremity [S81.809A]  Yes    Alcohol use disorder, severe, dependence [F10.20]  Yes     Chronic    Hyponatremia [E87.1]  Yes      Resolved Hospital Problems    Diagnosis Date Resolved POA    Impaired mobility and ADLs [Z74.09] 11/09/2018 Unknown    Abrasion [T14.8XXA] 11/09/2018 Yes    Alcohol withdrawal delirium [F10.231] 11/09/2018 Yes    Alcoholic peripheral neuropathy [G62.1] 11/09/2018 Yes       HPI:   As per Dr. Gudino "Mohamud Patel is a 57 y.o. man with a history of Severe Alcohol Abuse, Chronic Wounds of Bilateral LE's, Alcoholic Neuropathy, Hyponatremia, and Tobacco Use Disorder who was brought to the ED by EMS after his girlfriend called 911 while the patient was severely intoxicated. Per ED documentation, patient has not been caring for himself at home, often urinating and defecating on his clothes. He as also not been tending to his chronic bilateral LE wounds which includes a large horizontal laceration to the right foot. He is able to answer most questions appropriately but is not oriented to time or place. He endorses mild burning of his bilateral feet. He has very poor insight into the gravity of his alcohol addiction and its " "consequences on his health. He believes that he is taking care of himself well at home and that he has actually "cut back and doing a better job with his drinking;" however, he endorses drinking 6-12 beers per day at home, last drink yesterday morning. He believes that he took a medication for his urinary tract infection following last hospital discharge but is unsure. He denies current dysuria, oliguria, or darkened urine and believes that the last time he had dysuria was "several months ago." He has been doing minimal wound care to his bilateral feet. He was also seen in the ED by Psychiatry who feel that he does not need a PEC but does not have capacity to make his own medical decisions"    Hospital Course:     Mohamud Patel is a 57 y.o. man with a history of Severe Alcohol Abuse, Chronic Wounds of Bilateral LE's with Onychomycosis, Alcoholic Neuropathy, Hyponatremia, and Tobacco Use Disorder who was brought to the ED by EMS after his girlfriend called 911 while the patient was severely intoxicated. He is admitted to Hospital Medicine for Alcohol Withdrawal, possible Wernicke's Encephalopathy, and Acute Cystitis. Pt continues to have gait instability, weakness, recurrent falls when he attempts to get out of bed, has poor insight into his addiction and encephalopathy and poor decision making. Psychiatry consulted, pt determined not to have capacity to make own medical decision at this time. Awaiting placement at [facility] for continued gait training as he has made significant strides with PT and no longer requires restraints.     Wernicke's Encephalopathy (WE) with Dementia and Behavioral Disturbance  Severe Alcohol Use Disorder   Alcohol Withdrawal (Resolved)  Alcoholic Neuropathy - Fall Risk  Medically, he is outside the window for Alcohol Withdrawal and he has completed the appropriate course of IV Thiamine for Wernicke's Encephalopathy. Continues to have severe gait instability but has made strides with " working with PT. With psychiatry assistance, patient now much more alert and participatory in his care - able to feed himself and no longer requires restrains with sitter at bedside. Zyprexa 5 mg daily and 10 mg HS scheduled and Zyprexa 5 mg PO/IM q8 PRN non-redirectable agitation. Maximum total daily dose of 30 mg. Qtc noted to be 436 (11/2), f/u repeat EKG AM 11/6 (452). Cont gabapentin to 300mg TID for neuropathy and alcohol cravings. Consider naltrexone if craving continues   -refrain from using physical restraints as much as possible   -For Wernicke's Encephalopathy: Finished course of IV Thiamine, transitioned to Thiamine 100 mg PO daily. Folic acid and MVI daily.   -Valium taper completed on 10/15.     Multiple Bilateral Lower Extremity Wounds  Onychomycosis of Bilateral Feet   -Wound Care consulted, appreciate assistance - have debrided wounds, applying medi-honey, no evidence of active infection. Miconazole ointment to toes  -Podiatry was consulted, no other acute interventions at this time      Tobacco Use Disorder  -Nicotine 21 mg patch daily     Nutrition Recommendations:  1. Continue current regular diet with double portions. Pt with good intake.   Goals: Intake >/=85% EEN/EPN  Nutrition Goal Status: goal met     On d/c day patient medically stable and less agitated.       Principal Problem: Alcohol withdrawal    Other Problems Addressed:  Wernicke's Encephalopathy (WE) with Dementia and Behavioral Disturbance  Severe Alcohol Use Disorder   Alcohol Withdrawal (Resolved)  Alcoholic Neuropathy - Fall Risk  Multiple Bilateral Lower Extremity Wounds  Onychomycosis of Bilateral Feet   Tobacco Use Disorder    Procedures Performed: None  Special Care, Treatment, and Services Provided: None    Consults: Psychiatry and Podiatry     Significant Diagnostic Studies:  No results found for: EF  Hemoglobin A1C   Date Value Ref Range Status   10/05/2018 5.3 4.0 - 5.6 % Final     Comment:     ADA Screening  "Guidelines:  5.7-6.4%  Consistent with prediabetes  >or=6.5%  Consistent with diabetes  High levels of fetal hemoglobin interfere with the HbA1C  assay. Heterozygous hemoglobin variants (HbS, HgC, etc)do  not significantly interfere with this assay.   However, presence of multiple variants may affect accuracy.     08/10/2018 4.4 4.0 - 5.6 % Final     Comment:     ADA Screening Guidelines:  5.7-6.4%  Consistent with prediabetes  >or=6.5%  Consistent with diabetes  High levels of fetal hemoglobin interfere with the HbA1C  assay. Heterozygous hemoglobin variants (HbS, HgC, etc)do  not significantly interfere with this assay.   However, presence of multiple variants may affect accuracy.       CBC: No results for input(s): WBC, RBC, HGB, HCT, PLT, MCV, MCH, MCHC in the last 168 hours.  CMP:   Recent Labs   Lab 11/05/18  0636   GLU 89   CALCIUM 10.4      K 3.9   CO2 26      BUN 17   CREATININE 0.8         Final Diagnoses: Same as principal problem.    Discharged Condition: fair  Face to face services were provided on 11/9/2018   Time Spent:  I spent > 30 minutes on the discharge, which included reviewing hospital course with patient/family, reviewing discharge medications, and arranging follow-up care.    Physical Exam on 11/9/2018:  /71 (Patient Position: Sitting)   Pulse 92   Temp 97.7 °F (36.5 °C) (Oral)   Resp 16   Ht 6' 2" (1.88 m)   Wt 76.8 kg (169 lb 5 oz)   SpO2 97%   BMI 21.74 kg/m²   General:  male  in no acute distress.   HEENT: NCAT. PERRL. EOMI. Sclera Anicteric.  CVS: RRR. Normal S1 S2. No murmurs  Pulm: CTAB. Normal respiratory effort. No wheezes, rhonchi, or crackles.  Abdomen: Soft. Non-distended. No tenderness to palpation. No rebound or guarding. +BS.  Extremities: No edema. No cyanosis. Full ROM.  Skin: multiple healing wounds on bilateral feet and LE's        Disposition: Home or Self Care    Follow Up Instructions:   Follow-up Information     Lydia Vazquez  NP On " 10/30/2018.    Why:  10:00am  Contact information:  [address]   [address]  [phone]           Enmanuel Ferguson MD. Schedule an appointment as soon as possible for a visit in 1 week.    Specialty:  Internal Medicine  Why:  Post hospital follow up  Contact information:  [address] [address]  [phone]                 No future appointments.    Medications:  Reconciled Home Medications:      Medication List      START taking these medications    amLODIPine 5 MG tablet  Commonly known as:  NORVASC  Take 1 tablet (5 mg total) by mouth once daily.  Start taking on:  11/10/2018     folic acid 1 MG tablet  Commonly known as:  FOLVITE  Take 1 tablet (1 mg total) by mouth once daily.  Start taking on:  11/10/2018     gabapentin 400 MG capsule  Commonly known as:  NEURONTIN  Take 1 capsule (400 mg total) by mouth 3 (three) times daily.     magnesium oxide 400 mg (241.3 mg magnesium) tablet  Commonly known as:  MAG-OX  Take 1 tablet (400 mg total) by mouth 2 (two) times daily.     miconazole nitrate 2% 2 % Oint  Commonly known as:  MICOTIN  Apply topically 2 (two) times daily.     multivitamin tablet  Commonly known as:  THERAGRAN  Take 1 tablet by mouth once daily.  Start taking on:  11/10/2018     nicotine 21 mg/24 hr  Commonly known as:  NICODERM CQ  Place 1 patch onto the skin once daily.  Start taking on:  11/10/2018     * OLANZapine 10 MG tablet  Commonly known as:  ZyPREXA  Take 1 tablet (10 mg total) by mouth every evening.     * OLANZapine 5 MG tablet  Commonly known as:  ZyPREXA  Take 1 tablet (5 mg total) by mouth every 8 (eight) hours as needed (Non-redirectable agitation (NOT anxiety) USE FIRST).     * OLANZapine 5 MG tablet  Commonly known as:  ZyPREXA  Take 1 tablet (5 mg total) by mouth once daily.  Start taking on:  11/10/2018     thiamine 100 MG tablet  Take 1 tablet (100 mg total) by mouth once daily.  Start taking on:  11/10/2018         * This list has 3 medication(s)  that are the same as other medications prescribed for you. Read the directions carefully, and ask your doctor or other care provider to review them with you.            STOP taking these medications    sulfamethoxazole-trimethoprim 800-160mg 800-160 mg Tab  Commonly known as:  BACTRIM DS          Current Discharge Medication List      START taking these medications    Details   amLODIPine (NORVASC) 5 MG tablet Take 1 tablet (5 mg total) by mouth once daily.  Qty: 30 tablet, Refills: 11      folic acid (FOLVITE) 1 MG tablet Take 1 tablet (1 mg total) by mouth once daily.  Qty: 30 tablet, Refills: 5      gabapentin (NEURONTIN) 400 MG capsule Take 1 capsule (400 mg total) by mouth 3 (three) times daily.  Qty: 90 capsule, Refills: 11      magnesium oxide (MAG-OX) 400 mg (241.3 mg magnesium) tablet Take 1 tablet (400 mg total) by mouth 2 (two) times daily.  Qty: 60 tablet, Refills: 6      miconazole nitrate 2% (MICOTIN) 2 % Oint Apply topically 2 (two) times daily.  Qty: 1 Tube, Refills: 3      multivitamin (THERAGRAN) tablet Take 1 tablet by mouth once daily.  Qty: 30 tablet, Refills: 3      nicotine (NICODERM CQ) 21 mg/24 hr Place 1 patch onto the skin once daily.  Qty: 28 patch, Refills: 5      !! OLANZapine (ZYPREXA) 10 MG tablet Take 1 tablet (10 mg total) by mouth every evening.  Qty: 30 tablet, Refills: 11      !! OLANZapine (ZYPREXA) 5 MG tablet Take 1 tablet (5 mg total) by mouth once daily.  Qty: 30 tablet, Refills: 11      !! OLANZapine (ZYPREXA) 5 MG tablet Take 1 tablet (5 mg total) by mouth every 8 (eight) hours as needed (Non-redirectable agitation (NOT anxiety) USE FIRST).  Qty: 30 tablet, Refills: 4      thiamine 100 MG tablet Take 1 tablet (100 mg total) by mouth once daily.       !! - Potential duplicate medications found. Please discuss with provider.      STOP taking these medications       sulfamethoxazole-trimethoprim 800-160mg (BACTRIM DS) 800-160 mg Tab Comments:   Reason for Stopping:                Discharge Instructions:  - Follow up with psych as scheduled   - Refrain from smoking and drinking      Matty Tapia MD  Department of Hospital Medicine

## 2018-11-09 NOTE — PLAN OF CARE
Discharge Planning:    Colt met with father in patients room.  Father stated that paperwork needed to be completed for patient.  Colt gave MD the paperwork to complete.  Colt faxed completed paperwork to Shyanne [phone].  Colt spoke with Shyanne [phone].  Shyanne stated that paperwork was received.  She reported that she is waiting on patients father to sign the lease before patient can come to the facility.  Father has until 3:00pm today to sign the lease or he has to wait until Monday.  Father reported that he will go there to sign the paperwork.      Update 2:15 PM     Father signed lease - patient can discharge today.  Nurse to call report to [phone]/ask for Deon. [facility], [address], arranged for pickup at 7:00pm.  Father, Sony, notified of discharge.        Jabari Lorenzo, MSW,LCSW

## 2018-11-09 NOTE — PROGRESS NOTES
Consulted for bilateral heels  Heels with xerosis and offered moisturizing cream for dry cracked heels. No active wounds noted to heels.   Great toes with dry scabbed crusts. Betadine has been being applied daily to sites.   Lower extremity scabs have resolved. Right dorsal foot wound resolving with use of medihoney daily.      11/09/18 1015       Wound 10/05/18 1600 Ulceration plantar Toe, first   Date First Assessed/Time First Assessed: 10/05/18 1600   Pre-existing: Yes  Primary Wound Type: Ulceration  Side: Left  Orientation: plantar  Location: Toe, first   Wound Image    Wound WDL ex   Dressing Appearance Open to air;No dressing   Drainage Amount None   Drainage Characteristics/Odor No odor   Appearance Dry;Black  (crusted scab)   Periwound Area Intact   Wound Edges Open   Wound Length (cm) 1 cm   Wound Width (cm) 2 cm   Care Cleansed with:;Sterile normal saline;Applied:;Povidone iodine       Wound 10/05/18 1600 Laceration anterior Foot   Date First Assessed/Time First Assessed: 10/05/18 1600   Primary Wound Type: Laceration  Side: Right  Orientation: anterior  Location: Foot  Removal Indication and Assessment: not present upon transfer   Wound Image    Wound WDL ex   Dressing Appearance Open to air;No dressing   Drainage Amount None   Drainage Characteristics/Odor No odor   Appearance Pink;Moist  (lifted tan crust with cleansing)   Tissue loss description Partial thickness   Periwound Area Intact   Wound Edges Open   Wound Length (cm) 0.4 cm   Wound Width (cm) 3 cm   Wound Depth (cm) 0.1 cm   Wound Volume (cm^3) 0.12 cm^3   Care Cleansed with:;Sterile normal saline   Dressing Applied;Honey;Foam       Wound 10/06/18 Ulceration anterior Toe, first   Date First Assessed: 10/06/18   Pre-existing: Yes  Primary Wound Type: Ulceration  Side: Right  Orientation: anterior  Location: Toe, first   Wound Image    Wound WDL ex   Dressing Appearance Open to air;No dressing   Drainage Amount None   Drainage  Characteristics/Odor No odor   Appearance Dry  (brown crusted scab)   Periwound Area Intact   Wound Edges Open   Wound Length (cm) 1 cm   Wound Width (cm) 2 cm   Care Cleansed with:;Sterile normal saline;Applied:;Povidone iodine     Continue present treatment.  Andi Parekh RN CWON  [id number]

## 2018-11-09 NOTE — PROGRESS NOTES
Pt refused  2100 medication, uncooperative, confused, readministered gabapentin, olanzapine and magnesium sulfate at 04:37 am.

## 2018-11-09 NOTE — PLAN OF CARE
[facility]    FACILITY TRANSFER ORDERS      Patient Name: Mohamud Patel  YOB: 1960    PCP: Enmanuel Ferguson MD   PCP Address: [address] [place] [address]  PCP Phone Number: [phone]  PCP Fax: [phone]    Encounter Date: 11/09/2018    Admit to: [facility]     Vital Signs:  Routine    Diagnoses:   Active Hospital Problems    Diagnosis  POA    *Alcohol withdrawal [F10.239]  Yes    Major neurocognitive disorder [F01.50]  Unknown    Ulcer of toe of left foot [L97.529]  Yes    Mental health disorder [F99]  Yes    Wernicke encephalopathy [E51.2]  Yes    Wound of lower extremity [S81.809A]  Yes    Alcohol use disorder, severe, dependence [F10.20]  Yes     Chronic    Hyponatremia [E87.1]  Yes      Resolved Hospital Problems    Diagnosis Date Resolved POA    Impaired mobility and ADLs [Z74.09] 11/09/2018 Unknown    Abrasion [T14.8XXA] 11/09/2018 Yes    Alcohol withdrawal delirium [F10.231] 11/09/2018 Yes    Alcoholic peripheral neuropathy [G62.1] 11/09/2018 Yes       Allergies:Review of patient's allergies indicates:  No Known Allergies    Diet: Regular    Activities: As tolerated    Nursing:   -Zyprexa 5 mg daily and 10 mg HS scheduled and Zyprexa 5 mg PO/IM q8 PRN non-redirectable agitation. Maximum total daily dose of 30 mg. Qtc on 11/6 was 452  -gabapentin to 300mg TID for neuropathy and alcohol cravings   -consider naltrexone if craving continues   -refrain from using physical restraints as much as possible   -Thiamine 100 mg PO daily. Folic acid and MVI daily.     Labs: None    CONSULTS:    PT/OT    MISCELLANEOUS CARE:  None    WOUND CARE ORDERS  Bilateral heal abrasion - applying medi-honey, no evidence of active infection. Miconazole ointment to toes        Medications: Review discharge medications with patient and family and provide education.      Current Discharge Medication List      START taking these medications    Details    amLODIPine (NORVASC) 5 MG tablet Take 1 tablet (5 mg total) by mouth once daily.  Qty: 30 tablet, Refills: 11      folic acid (FOLVITE) 1 MG tablet Take 1 tablet (1 mg total) by mouth once daily.  Qty: 30 tablet, Refills: 5      gabapentin (NEURONTIN) 400 MG capsule Take 1 capsule (400 mg total) by mouth 3 (three) times daily.  Qty: 90 capsule, Refills: 11      magnesium oxide (MAG-OX) 400 mg (241.3 mg magnesium) tablet Take 1 tablet (400 mg total) by mouth 2 (two) times daily.  Refills: 0      miconazole nitrate 2% (MICOTIN) 2 % Oint Apply topically 2 (two) times daily.  Qty: 1 Tube, Refills: 3      multivitamin (THERAGRAN) tablet Take 1 tablet by mouth once daily.      nicotine (NICODERM CQ) 21 mg/24 hr Place 1 patch onto the skin once daily.  Qty: 28 patch, Refills: 5      !! OLANZapine (ZYPREXA) 10 MG tablet Take 1 tablet (10 mg total) by mouth every evening.  Qty: 30 tablet, Refills: 11      !! OLANZapine (ZYPREXA) 5 MG tablet Take 1 tablet (5 mg total) by mouth once daily.  Qty: 30 tablet, Refills: 11      !! OLANZapine (ZYPREXA) 5 MG tablet Take 1 tablet (5 mg total) by mouth every 8 (eight) hours as needed (Non-redirectable agitation (NOT anxiety) USE FIRST).  Qty: 30 tablet, Refills: 4      thiamine 100 MG tablet Take 1 tablet (100 mg total) by mouth once daily.       !! - Potential duplicate medications found. Please discuss with provider.      STOP taking these medications       sulfamethoxazole-trimethoprim 800-160mg (BACTRIM DS) 800-160 mg Tab Comments:   Reason for Stopping:                    _________________________________  Matty Tapia MD  11/09/2018

## 2018-11-09 NOTE — PROGRESS NOTES
Hospital Medicine             Progress Note         Team: [facility] HOSP MED R Matty Tapia MD   Admit Date: 10/4/2018   YONAS: 11/9/2018  Code status: Full Code   Principal Problem: Alcohol withdrawal         Interval hx: Patient seen and examined at bedside today. Hemodynamically stable and afebrile overnight. No significant issues as per nursing staff. Patient denies any complaints including fevers, chills, cp, dyspnea, abdominal pain, N/V/C/D. Patient is Alert and Oriented. Asked when he can go home.    ROS   Constitutional: negative for fevers and chills  Respiratory: negative for cough, shortness of breath   Cardiovascular: negative for chest discomfort, palpitations   Gastrointestinal: negative for nausea or vomiting, abdominal pain, constipation, diarrhea     PEx   Temp:  [97.2 °F (36.2 °C)-99.3 °F (37.4 °C)]   Pulse:  []   Resp:  [16-18]   BP: (116-129)/(58-77)   SpO2:  [97 %-99 %]      General:  male  in no acute distress.   HEENT: NCAT. PERRL. EOMI. Sclera Anicteric.  CVS: RRR. Normal S1 S2. No murmurs  Pulm: CTAB. Normal respiratory effort. No wheezes, rhonchi, or crackles.  Abdomen: Soft. Non-distended. No tenderness to palpation. No rebound or guarding. +BS.  Extremities: No edema. No cyanosis. Full ROM.  Skin: multiple healing wounds on bilateral feet and LE's       amLODIPine  5 mg Oral Daily    enoxparin  40 mg Subcutaneous Daily    folic acid  1 mg Oral Daily    gabapentin  400 mg Oral TID    magnesium oxide  400 mg Oral BID    miconazole nitrate 2%   Topical (Top) BID    multivitamin  1 tablet Oral Daily    nicotine  1 patch Transdermal Daily    OLANZapine  10 mg Oral QHS    OLANZapine  5 mg Oral Daily    thiamine  100 mg Oral Daily       OLANZapine, sodium chloride 0.9%    Assessment & Plan:  Mohamud Patel is a 57 y.o. man with a history of Severe Alcohol Abuse, Chronic Wounds of Bilateral LE's with Onychomycosis, Alcoholic Neuropathy, Hyponatremia, and Tobacco  Use Disorder who was brought to the ED by EMS after his girlfriend called 911 while the patient was severely intoxicated. He is admitted to Hospital Medicine for Alcohol Withdrawal, possible Wernicke's Encephalopathy, and Acute Cystitis. Pt continues to have gait instability, weakness, recurrent falls when he attempts to get out of bed, has poor insight into his addiction and encephalopathy and poor decision making. Psychiatry consulted, pt determined not to have capacity to make own medical decision at this time. Awaiting placement at [facility] for continued gait training as he has made significant strides with PT and no longer requires restraints.    Wernicke's Encephalopathy (WE) with Dementia and Behavioral Disturbance  Severe Alcohol Use Disorder   Alcohol Withdrawal (Resolved)  Alcoholic Neuropathy - Fall Risk  Medically, he is outside the window for Alcohol Withdrawal and he has completed the appropriate course of IV Thiamine for Wernicke's Encephalopathy. Continues to have severe gait instability but has made strides with working with PT. With psychiatry assistance, patient now much more alert and participatory in his care - able to feed himself and no longer requires restrains with sitter at bedside  -Zyprexa 5 mg daily and 10 mg HS scheduled and Zyprexa 5 mg PO/IM q8 PRN non-redirectable agitation. Maximum total daily dose of 30 mg.  -Qtc noted to be 436 (11/2), f/u repeat EKG AM 11/6 (452), will repeat tomorrow  -gabapentin to 300mg TID for neuropathy and alcohol cravings   -consider naltrexone if craving continues   -psychiatry consulted, appreciate recs  -refrain from using physical restraints as much as possible   -For Wernicke's Encephalopathy: Finished course of IV Thiamine, transitioned to Thiamine 100 mg PO daily. Folic acid and MVI daily.   -Valium taper completed on 10/15.   -CMP, Mg, Phos - spaced to q48 hours  -Fall precautions  -PT/OT consulted, performing well with OT and PT  -IRP consult placed  for ongoing rehab but denied as patient improved gait and balance training     Multiple Bilateral Lower Extremity Wounds  Onychomycosis of Bilateral Feet   -Wound Care consulted, appreciate assistance - have debrided wounds, applying medi-honey, no evidence of active infection. Miconazole ointment to toes  -Podiatry was consulted, no other acute interventions at this time      Tobacco Use Disorder  -Nicotine 21 mg patch daily       Nutrition Recommendations:  1. Continue current regular diet with double portions. Pt with good intake.   Goals: Intake >/=85% EEN/EPN  Nutrition Goal Status: goal met      DVT PPx: Lovenox SubQ Daily    Diet: Regular  Discharge plan and follow up: Medically ready for discharge. Denied by [facility] and multiple SNFs. Please refer to CM and SW for disposition updates. Working on assisted living with sitter.       Matty Tapia MD  Hospital Medicine Staff  [facility]   Pager: [phone]

## 2018-11-09 NOTE — PLAN OF CARE
Problem: Patient Care Overview  Goal: Plan of Care Review  Outcome: Outcome(s) achieved Date Met: 11/09/18  Patient had no falls on day of care. Vital signs stable. Attempted to call report to assisting living facility without success 4 times, the phone number is ringing and then hanging up with no option to leave a voice mail. Will continue to attempt to contact them.

## 2018-11-10 VITALS
WEIGHT: 169.31 LBS | SYSTOLIC BLOOD PRESSURE: 137 MMHG | RESPIRATION RATE: 16 BRPM | TEMPERATURE: 98 F | HEART RATE: 83 BPM | HEIGHT: 74 IN | BODY MASS INDEX: 21.73 KG/M2 | OXYGEN SATURATION: 96 % | DIASTOLIC BLOOD PRESSURE: 68 MMHG

## 2018-11-10 PROCEDURE — 25000003 PHARM REV CODE 250: Performed by: HOSPITALIST

## 2018-11-10 PROCEDURE — 99239 HOSP IP/OBS DSCHRG MGMT >30: CPT | Mod: ,,, | Performed by: INTERNAL MEDICINE

## 2018-11-10 PROCEDURE — S4991 NICOTINE PATCH NONLEGEND: HCPCS | Performed by: HOSPITALIST

## 2018-11-10 RX ADMIN — NICOTINE 1 PATCH: 21 PATCH, EXTENDED RELEASE TRANSDERMAL at 09:11

## 2018-11-10 RX ADMIN — GABAPENTIN 400 MG: 400 CAPSULE ORAL at 09:11

## 2018-11-10 RX ADMIN — FOLIC ACID 1 MG: 1 TABLET ORAL at 09:11

## 2018-11-10 RX ADMIN — THERA TABS 1 TABLET: TAB at 09:11

## 2018-11-10 RX ADMIN — Medication 400 MG: at 09:11

## 2018-11-10 RX ADMIN — Medication 100 MG: at 09:11

## 2018-11-10 RX ADMIN — MICONAZOLE NITRATE: 20 OINTMENT TOPICAL at 10:11

## 2018-11-10 RX ADMIN — AMLODIPINE BESYLATE 5 MG: 5 TABLET ORAL at 09:11

## 2018-11-10 RX ADMIN — OLANZAPINE 5 MG: 5 TABLET, FILM COATED ORAL at 09:11

## 2018-11-10 NOTE — NURSING
Multiple calls placed to [facility] assisted living and pages out to on call , in house ED case manager. [facility] transportation returned due to inability to give report and no knowledge of staff availability at receiving unit. Discharge delayed until AM tomorrow.

## 2018-11-10 NOTE — PLAN OF CARE
Patient VSS. Patient denies pain.  Free from falls or injury during shift. Patient repositioned independently. No acute distress noted at this time. Pt is aware of discharge planning. Patient bed in lowest position, call light in reach, and personal items at bedside. Will continue to monitor. safety maintained.

## 2018-11-10 NOTE — PROGRESS NOTES
Hospital Medicine             Progress Note         Team: [facility] [place] [facility] R Matty Tapia MD   Admit Date: 10/4/2018   YONAS: 11/9/2018  Code status: Full Code   Principal Problem: Alcohol withdrawal         Interval hx: Patient seen and examined at bedside today. Hemodynamically stable and afebrile overnight. No significant issues as per nursing staff. Patient denies any complaints including fevers, chills, cp, dyspnea, abdominal pain, N/V/C/D. Patient is Alert and Oriented.   Was excited to be leaving to [facility] assisted living. Unfort. Transportation was unable to pick patient up due to inability to give report and no knowledge of staff availability at receiving unit. Discharge delayed until AM tomorrow. Calmer and no agitation noted.         ROS   Constitutional: negative for fevers and chills  Respiratory: negative for cough, shortness of breath   Cardiovascular: negative for chest discomfort, palpitations   Gastrointestinal: negative for nausea or vomiting, abdominal pain, constipation, diarrhea       PEx   Temp:  [97.7 °F (36.5 °C)-98.3 °F (36.8 °C)]   Pulse:  []   Resp:  [16-20]   BP: (114-124)/(70-72)   SpO2:  [97 %-98 %]      General:  male  in no acute distress.   HEENT: NCAT. PERRL. EOMI. Sclera Anicteric.  CVS: RRR. Normal S1 S2. No murmurs  Pulm: CTAB. Normal respiratory effort. No wheezes, rhonchi, or crackles.  Abdomen: Soft. Non-distended. No tenderness to palpation. No rebound or guarding. +BS.  Extremities: No edema. No cyanosis. Full ROM.  Skin: multiple healing wounds on bilateral feet and LE's       amLODIPine  5 mg Oral Daily    enoxparin  40 mg Subcutaneous Daily    folic acid  1 mg Oral Daily    gabapentin  400 mg Oral TID    magnesium oxide  400 mg Oral BID    miconazole nitrate 2%   Topical (Top) BID    multivitamin  1 tablet Oral Daily    nicotine  1 patch Transdermal Daily    OLANZapine  10 mg Oral QHS    OLANZapine  5 mg Oral Daily    thiamine  100 mg Oral  Daily       OLANZapine, sodium chloride 0.9%    Assessment & Plan:  Mohamud Patel is a 57 y.o. man with a history of Severe Alcohol Abuse, Chronic Wounds of Bilateral LE's with Onychomycosis, Alcoholic Neuropathy, Hyponatremia, and Tobacco Use Disorder who was brought to the ED by EMS after his girlfriend called 911 while the patient was severely intoxicated. He is admitted to Hospital Medicine for Alcohol Withdrawal, possible Wernicke's Encephalopathy, and Acute Cystitis. Pt continues to have gait instability, weakness, recurrent falls when he attempts to get out of bed, has poor insight into his addiction and encephalopathy and poor decision making. Psychiatry consulted, pt determined not to have capacity to make own medical decision at this time. Awaiting placement at [facility] for continued gait training as he has made significant strides with PT and no longer requires restraints.    Wernicke's Encephalopathy (WE) with Dementia and Behavioral Disturbance  Severe Alcohol Use Disorder   Alcohol Withdrawal (Resolved)  Alcoholic Neuropathy - Fall Risk  Medically, he is outside the window for Alcohol Withdrawal and he has completed the appropriate course of IV Thiamine for Wernicke's Encephalopathy. Continues to have severe gait instability but has made strides with working with PT. With psychiatry assistance, patient now much more alert and participatory in his care - able to feed himself and no longer requires restrains with sitter at bedside  -Zyprexa 5 mg daily and 10 mg HS scheduled and Zyprexa 5 mg PO/IM q8 PRN non-redirectable agitation. Maximum total daily dose of 30 mg.  -Qtc noted to be 436 (11/2), f/u repeat EKG AM 11/6 (452)  -gabapentin to 300mg TID for neuropathy and alcohol cravings   -consider naltrexone if craving continues   -psychiatry consulted, appreciate recs  -refrain from using physical restraints as much as possible   -For Wernicke's Encephalopathy: Finished course of IV Thiamine,  transitioned to Thiamine 100 mg PO daily. Folic acid and MVI daily.   -Valium taper completed on 10/15.   -CMP, Mg, Phos - spaced to q48 hours  -Fall precautions  -PT/OT consulted, performing well with OT and PT  -IRP consult placed for ongoing rehab but denied as patient improved gait and balance training    D/C tomorrow AM.       Multiple Bilateral Lower Extremity Wounds  Onychomycosis of Bilateral Feet   -Wound Care consulted, appreciate assistance - have debrided wounds, applying medi-honey, no evidence of active infection. Miconazole ointment to toes  -Podiatry was consulted, no other acute interventions at this time      Tobacco Use Disorder  -Nicotine 21 mg patch daily       Nutrition Recommendations:  1. Continue current regular diet with double portions. Pt with good intake.   Goals: Intake >/=85% EEN/EPN  Nutrition Goal Status: goal met      DVT PPx: Lovenox SubQ Daily    Diet: Regular  Discharge plan and follow up: Medically ready for discharge. Accepted to [facility]. Discharged changed to ayesha AM.     Matty Tapia MD  Hospital Medicine Staff  [facility]   Pager: [phone]

## 2018-11-10 NOTE — NURSING
Called assisted living home and finally got through to a voice mail box. Left a message to call back to .

## 2018-11-10 NOTE — PLAN OF CARE
CLARENCE spoke with Shyanne [phone]), asst mgr, of [facility]. Patient is ready for discharge, they have everything they need, no need to give report. Patient going to room 164. Secure transport arranged via [employer] wheelchair van for within the hour. Pharmacy to deliver meds. Deepthi SY updated. Patient's father aware.

## 2018-11-10 NOTE — PLAN OF CARE
Patient did not discharge yesterday because nursing was unable to get a hold of anyone at [facility]. CM attempted this am and phone forwarded to voicemail.  CM called Doreen Wilcox but her mailbox is full. Will continue to try and contact [facility].

## 2018-11-11 NOTE — PLAN OF CARE
11/11/18 1643   Final Note   Assessment Type Final Discharge Note   Anticipated Discharge Disposition Home  (assisted living)   Hospital Follow Up  Appt(s) scheduled? No

## 2018-11-13 NOTE — PHYSICIAN QUERY
PT Name: Mohamud Patel  MR #: [id number]     Physician Query Form - Documentation Clarification      Laura Crenshaw RN CDS    Contact Information: [phone]    This form is a permanent document in the medical record.     Query Date: November 13, 2018    By submitting this query, we are merely seeking further clarification of documentation. Please utilize your independent clinical judgment when addressing the question(s) below.    The Medical record reflects the following:    Supporting Clinical Findings Location in Medical Record      Wound 10/05/18 1600 Ulceration plantar Toe, first  Date First Assessed/Time First Assessed: 10/05/18 1600     Pre-existing: Yes Primary Wound   Type: Ulceration Side: Left Orientation:   plantar Location: Toe, first  Appearance Dry;Black  (crusted scab)       Wound 10/05/18 1600 Laceration anterior Foot    Date First Assessed/Time First Assessed: 10/05/18 1600 Primary Wound Type: Laceration Side: Right Orientation: anterior Location: Foot Removal Indication and Assessment: not present upon transfer    Appearance Pink;Moist  (lifted tan crust with cleansing)   Tissue loss description Partial thickness         Wound 10/06/18 Ulceration anterior Toe, first    Date First Assessed: 10/06/18   Pre-existing: Yes Primary Wound Type: Ulceration Side:   Right Orientation: anterior Location: Toe, first    Appearance Dry  (brown crusted scab)        11/9 Wound Care PN   Cleansed with:;Sterile normal saline;Applied:;Povidone iodine     Cleansed with:;Sterile normal saline Dressing Applied;Honey;Foam     Care Cleansed with:;Sterile normal saline;Applied:;Povidone iodine                                                                                  Doctor, Please specify diagnosis or diagnoses associated with above clinical findings.    Please document type of chronic wounds of Bilateral LE.    please select all that apply    Provider Use Only        [ X  ] non-pressure ulcer left toe,  POA  *please specify severity:  _X__Skin breakdown only  ___Fat layer exposed  ___Muscle involvement without evidence of necrosis  ___Muscle necrosis  ___other, ___    [ X  ] Laceration right anterior foot, not POA  *please specify severity:  _X__Skin breakdown only  ___Fat layer exposed  ___Muscle involvement without evidence of necrosis  ___Muscle necrosis  ___other, __    [ X  ] non-pressure ulcer right toe, POA  *please specify severity:  _X__Skin breakdown only  ___Fat layer exposed  ___Muscle involvement without evidence of necrosis  ___Muscle necrosis  ___other, ___        [  ] Other clarification of type(s) of chronic wounds of BLE (s)type,_____, site(s), _______________, Severity or stage(s), ______________                                                                                                                  Clinically Undetermined

## 2018-11-14 NOTE — PHYSICIAN QUERY
PT Name: Mohamud Patel  MR #: [id number]    Physician Query Form - Nutrition Clarification     Laura Crenshaw RN CDS    Contact Information: [phone]    This form is a permanent document in the medical record.     Query Date: November 14, 2018    By submitting this query, we are merely seeking further clarification of documentation.. Please utilize your independent clinical judgment when addressing the question(s) below.    The Medical record contains the following:   Indicators  Supporting Clinical Findings Location in Medical Record   X % of Estimated Energy Intake over a time frame from p.o., TF, or TPN % Intake of Estimated Energy Needs: 75 - 100 %   % Meal Intake: 75 - 100 %     Energy Calorie Requirements (kcal): 1966   Energy Need Method: Christian-St Jeor(x 1.25 (PAL))   Protein Requirements: 82-95 gm (1.2-1.4 gm/kg)      10/15 Nutrition PN   X Weight Status over a time frame Noted possible mild temporal wasting as unable to perform NFPE and mild wt loss x 2 months     % Weight Change From Usual Weight: -5.04 %      10/15 Nutrition PN   X Subcutaneous Fat and/or Muscle Loss loss of muscle mass, underweight    10/15 Nutrition PN    Fluid Accumulation or Edema      Reduced  Strength     X Wt / BMI / Usual Body Weight Weight: 67.8 kg (149 lb 7.6 oz)   BMI (Calculated): 19.2   Ideal Body Weight (IBW), Male: 190 lb      10/15 Nutrition PN   X Delayed Wound Healing / Failure to Thrive Multiple BLE pressure ulcers    10/15 Nutrition PN   X Acute or Chronic Illness history of Severe Alcohol Abuse, Chronic Wounds of Bilateral LE's, Alcoholic Neuropathy, Hyponatremia, and Tobacco Use Disorder     He is admitted to Hospital Medicine for Alcohol Withdrawal, possible Wernicke's Encephalopathy, and Complicated UTI    10/5 H&P    Medication      Treatment     X Other Recommendation/Intervention:   1. Order Arginaid TID to aid in wound healing and encourage intake of high protein foods.   2. Continue current  regular diet.   3. RD following.    10/15 PN     AND / ASPEN Clinical Characteristics (October 2011)  A minimum of two characteristics is recommended for diagnosing either moderate or severe malnutrition   Mild Malnutrition Moderate Malnutrition Severe Malnutrition   Energy Intake from p.o., TF or TPN. < 75% intake of estimated energy needs for less than 7 days < 75% intake of estimated energy needs for greater than 7 days < 50% intake of estimated energy needs for > 5 days   Weight Loss 1-2% in 1 month  5% in 3 months  7.5% in 6 months  10% in 1 year 1-2 % in 1 week  5% in 1 month  7.5% in 3 months  10% in 6 months  20% in 1 year > 2% in 1 week  > 5% in 1 month  > 7.5% in 3 months  > 10% in 6 months  > 20% in 1 year   Physical Findings     None *Mild subcutaneous fat and/or muscle loss  *Mild fluid accumulation  *Stage II decubitus  *Surgical wound or non-healing wound *Mod/severe subcutaneous fat and/or muscle loss  *Mod/severe fluid accumulation  *Stage III or IV decubitus  *Non-healing surgical wound     Provider, please specify diagnosis or diagnoses associated with above clinical findings.    [ X ]   Moderate Protein-Calorie Malnutrition   [  ]    Severe Protein-Calorie Malnutrition   [  ]   Underweight   [  ]   Other Nutritional Diagnosis (please specify):    [  ]   Other:     Clinically Undetermined       Please document in your progress notes daily for the duration of treatment until resolved and include in your discharge summary.

## 2018-11-17 ENCOUNTER — HOSPITAL ENCOUNTER (EMERGENCY)
Facility: HOSPITAL | Age: 58
Discharge: NURSING, REHAB OR LONG-TERM CARE | End: 2018-11-18
Attending: EMERGENCY MEDICINE
Payer: MEDICAID

## 2018-11-17 VITALS
RESPIRATION RATE: 18 BRPM | BODY MASS INDEX: 21.69 KG/M2 | SYSTOLIC BLOOD PRESSURE: 105 MMHG | HEIGHT: 74 IN | TEMPERATURE: 99 F | OXYGEN SATURATION: 98 % | WEIGHT: 169 LBS | HEART RATE: 80 BPM | DIASTOLIC BLOOD PRESSURE: 58 MMHG

## 2018-11-17 DIAGNOSIS — N39.0 URINARY TRACT INFECTION WITHOUT HEMATURIA, SITE UNSPECIFIED: Primary | ICD-10-CM

## 2018-11-17 DIAGNOSIS — F10.929 ALCOHOLIC INTOXICATION WITH COMPLICATION: ICD-10-CM

## 2018-11-17 LAB
ALBUMIN SERPL BCP-MCNC: 3.7 G/DL
ALP SERPL-CCNC: 62 U/L
ALT SERPL W/O P-5'-P-CCNC: 18 U/L
ANION GAP SERPL CALC-SCNC: 13 MMOL/L
APAP SERPL-MCNC: <3 UG/ML
AST SERPL-CCNC: 20 U/L
BASOPHILS # BLD AUTO: 0.06 K/UL
BASOPHILS NFR BLD: 0.6 %
BILIRUB SERPL-MCNC: 0.4 MG/DL
BUN SERPL-MCNC: 12 MG/DL
CALCIUM SERPL-MCNC: 10.3 MG/DL
CHLORIDE SERPL-SCNC: 98 MMOL/L
CO2 SERPL-SCNC: 23 MMOL/L
CREAT SERPL-MCNC: 0.7 MG/DL
DIFFERENTIAL METHOD: ABNORMAL
EOSINOPHIL # BLD AUTO: 0.5 K/UL
EOSINOPHIL NFR BLD: 4.3 %
ERYTHROCYTE [DISTWIDTH] IN BLOOD BY AUTOMATED COUNT: 14.6 %
EST. GFR  (AFRICAN AMERICAN): >60 ML/MIN/1.73 M^2
EST. GFR  (NON AFRICAN AMERICAN): >60 ML/MIN/1.73 M^2
ETHANOL SERPL-MCNC: 116 MG/DL
GLUCOSE SERPL-MCNC: 98 MG/DL
HCT VFR BLD AUTO: 33.2 %
HGB BLD-MCNC: 11 G/DL
IMM GRANULOCYTES # BLD AUTO: 0.03 K/UL
IMM GRANULOCYTES NFR BLD AUTO: 0.3 %
LYMPHOCYTES # BLD AUTO: 1.7 K/UL
LYMPHOCYTES NFR BLD: 16 %
MCH RBC QN AUTO: 32.5 PG
MCHC RBC AUTO-ENTMCNC: 33.1 G/DL
MCV RBC AUTO: 98 FL
MONOCYTES # BLD AUTO: 1.2 K/UL
MONOCYTES NFR BLD: 11.4 %
NEUTROPHILS # BLD AUTO: 7.1 K/UL
NEUTROPHILS NFR BLD: 67.4 %
NRBC BLD-RTO: 0 /100 WBC
PLATELET # BLD AUTO: 263 K/UL
PMV BLD AUTO: 9.5 FL
POTASSIUM SERPL-SCNC: 4.1 MMOL/L
PROT SERPL-MCNC: 7.6 G/DL
RBC # BLD AUTO: 3.38 M/UL
SODIUM SERPL-SCNC: 134 MMOL/L
TSH SERPL DL<=0.005 MIU/L-ACNC: 1.38 UIU/ML
WBC # BLD AUTO: 10.6 K/UL

## 2018-11-17 PROCEDURE — 63600175 PHARM REV CODE 636 W HCPCS: Performed by: PHYSICIAN ASSISTANT

## 2018-11-17 PROCEDURE — 99284 EMERGENCY DEPT VISIT MOD MDM: CPT | Mod: ,,, | Performed by: PHYSICIAN ASSISTANT

## 2018-11-17 PROCEDURE — 80320 DRUG SCREEN QUANTALCOHOLS: CPT

## 2018-11-17 PROCEDURE — 85025 COMPLETE CBC W/AUTO DIFF WBC: CPT

## 2018-11-17 PROCEDURE — 96367 TX/PROPH/DG ADDL SEQ IV INF: CPT

## 2018-11-17 PROCEDURE — 80053 COMPREHEN METABOLIC PANEL: CPT

## 2018-11-17 PROCEDURE — 84425 ASSAY OF VITAMIN B-1: CPT

## 2018-11-17 PROCEDURE — 84443 ASSAY THYROID STIM HORMONE: CPT

## 2018-11-17 PROCEDURE — 99285 EMERGENCY DEPT VISIT HI MDM: CPT | Mod: 25

## 2018-11-17 PROCEDURE — 81001 URINALYSIS AUTO W/SCOPE: CPT

## 2018-11-17 PROCEDURE — 96365 THER/PROPH/DIAG IV INF INIT: CPT

## 2018-11-17 PROCEDURE — 80329 ANALGESICS NON-OPIOID 1 OR 2: CPT

## 2018-11-17 PROCEDURE — 96366 THER/PROPH/DIAG IV INF ADDON: CPT

## 2018-11-17 PROCEDURE — 80307 DRUG TEST PRSMV CHEM ANLYZR: CPT

## 2018-11-17 PROCEDURE — 25000003 PHARM REV CODE 250: Performed by: PHYSICIAN ASSISTANT

## 2018-11-17 RX ADMIN — THIAMINE HYDROCHLORIDE 100 MG: 100 INJECTION, SOLUTION INTRAMUSCULAR; INTRAVENOUS at 09:11

## 2018-11-17 RX ADMIN — SODIUM CHLORIDE 1000 ML: 0.9 INJECTION, SOLUTION INTRAVENOUS at 09:11

## 2018-11-18 LAB
AMPHET+METHAMPHET UR QL: NEGATIVE
BACTERIA #/AREA URNS AUTO: ABNORMAL /HPF
BARBITURATES UR QL SCN>200 NG/ML: NEGATIVE
BENZODIAZ UR QL SCN>200 NG/ML: NEGATIVE
BILIRUB UR QL STRIP: NEGATIVE
BZE UR QL SCN: NEGATIVE
CANNABINOIDS UR QL SCN: NEGATIVE
CLARITY UR REFRACT.AUTO: CLEAR
COLOR UR AUTO: YELLOW
CREAT UR-MCNC: 26 MG/DL
GLUCOSE UR QL STRIP: NEGATIVE
HGB UR QL STRIP: NEGATIVE
KETONES UR QL STRIP: NEGATIVE
LEUKOCYTE ESTERASE UR QL STRIP: ABNORMAL
METHADONE UR QL SCN>300 NG/ML: NEGATIVE
MICROSCOPIC COMMENT: ABNORMAL
NITRITE UR QL STRIP: POSITIVE
OPIATES UR QL SCN: NEGATIVE
PCP UR QL SCN>25 NG/ML: NEGATIVE
PH UR STRIP: 6 [PH] (ref 5–8)
PROT UR QL STRIP: NEGATIVE
RBC #/AREA URNS AUTO: 0 /HPF (ref 0–4)
SP GR UR STRIP: 1 (ref 1–1.03)
SQUAMOUS #/AREA URNS AUTO: 0 /HPF
TOXICOLOGY INFORMATION: NORMAL
URN SPEC COLLECT METH UR: ABNORMAL
WBC #/AREA URNS AUTO: 6 /HPF (ref 0–5)

## 2018-11-18 PROCEDURE — 63600175 PHARM REV CODE 636 W HCPCS: Performed by: PHYSICIAN ASSISTANT

## 2018-11-18 RX ORDER — CEPHALEXIN 500 MG/1
500 CAPSULE ORAL 2 TIMES DAILY
Qty: 14 CAPSULE | Refills: 0 | Status: SHIPPED | OUTPATIENT
Start: 2018-11-18 | End: 2018-11-25

## 2018-11-18 RX ADMIN — CEFTRIAXONE 2 G: 2 INJECTION, SOLUTION INTRAVENOUS at 12:11

## 2018-11-18 NOTE — ED PROVIDER NOTES
Encounter Date: 11/17/2018       History     Chief Complaint   Patient presents with    Medical Clearance     Pt escaped from the memory unit at the [facility] and walked to his old home. Patient is unable to return to the nursing home until he is medically cleared. He did admit to ETOH intake during his hiatus.     Patient is a 58 year old male with PMHX of alcoholism and neuropathy. He presents to the ED via EMS for medical clearance. According to records, patient escaped from the [facility] memory unit today. Patient admits to consuming alcohol today, last consumption approximately three hours PTA.           Review of patient's allergies indicates:  No Known Allergies  Past Medical History:   Diagnosis Date    Neuropathy     Scabies      History reviewed. No pertinent surgical history.  No family history on file.  Social History     Tobacco Use    Smoking status: Current Every Day Smoker     Packs/day: 2.00     Types: Cigarettes    Smokeless tobacco: Never Used   Substance Use Topics    Alcohol use: Yes     Alcohol/week: 42.0 oz     Types: 70 Shots of liquor per week    Drug use: No     Review of Systems   Unable to perform ROS: Mental status change       Physical Exam     Initial Vitals [11/17/18 2023]   BP Pulse Resp Temp SpO2   (!) 142/74 84 18 98.8 °F (37.1 °C) 98 %      MAP       --         Physical Exam    Vitals reviewed.  Constitutional: He appears well-developed and well-nourished. No distress.   HENT:   Head: Normocephalic.   Eyes: Conjunctivae and EOM are normal. Pupils are equal, round, and reactive to light.   Neck: Normal range of motion.   Cardiovascular: Normal rate and regular rhythm.   No murmur heard.  Pulmonary/Chest: Breath sounds normal. No respiratory distress. He has no wheezes. He has no rales.   Abdominal: Soft. Bowel sounds are normal. He exhibits no distension. There is no tenderness.   Musculoskeletal: Normal range of motion.   Neurological: He is alert. He has normal strength. No  sensory deficit. Coordination and gait normal. GCS eye subscore is 4. GCS verbal subscore is 4. GCS motor subscore is 6.   Patient oriented to place and self. Patient disoriented to time. Motor strength of b/l UE and LE 5/5. Finger to nose negative. Heel to shin negative. Pronator drift negative. No facial droop or asymmetry. Speech is clear. Tongue protrudes midline with no fasciculations.   Skin: Skin is warm and dry. No erythema.   Psychiatric: His affect is angry and labile. His speech is rapid and/or pressured. He is agitated, aggressive and combative.         ED Course   Procedures  Labs Reviewed   CBC W/ AUTO DIFFERENTIAL - Abnormal; Notable for the following components:       Result Value    RBC 3.38 (*)     Hemoglobin 11.0 (*)     Hematocrit 33.2 (*)     MCH 32.5 (*)     RDW 14.6 (*)     Mono # 1.2 (*)     Lymph% 16.0 (*)     All other components within normal limits   COMPREHENSIVE METABOLIC PANEL - Abnormal; Notable for the following components:    Sodium 134 (*)     All other components within normal limits   URINALYSIS, REFLEX TO URINE CULTURE - Abnormal; Notable for the following components:    Nitrite, UA Positive (*)     Leukocytes, UA 1+ (*)     All other components within normal limits    Narrative:     1 CUP  Preferred Collection Type->Urine, Clean Catch   ALCOHOL,MEDICAL (ETHANOL) - Abnormal; Notable for the following components:    Alcohol, Medical, Serum 116 (*)     All other components within normal limits   ACETAMINOPHEN LEVEL - Abnormal; Notable for the following components:    Acetaminophen (Tylenol), Serum <3.0 (*)     All other components within normal limits   URINALYSIS MICROSCOPIC - Abnormal; Notable for the following components:    WBC, UA 6 (*)     Bacteria, UA Moderate (*)     All other components within normal limits    Narrative:     1 CUP  Preferred Collection Type->Urine, Clean Catch   TSH   DRUG SCREEN PANEL, URINE EMERGENCY    Narrative:     1 CUP  Preferred Collection  Type->Urine, Clean Catch   VITAMIN B1          Imaging Results          CT Head Without Contrast (Final result)  Result time 11/18/18 00:09:06    Final result by Jon Gutierrez MD (11/18/18 00:09:06)                 Impression:      No acute findings.  No evidence of hemorrhage or major vascular distribution infarct.    Stable generalized cerebral volume loss and chronic microvascular ischemic changes as above.    Electronically signed by resident: Oscar Jeong  Date:    11/17/2018  Time:    23:41    Electronically signed by: Jon Gutierrez MD  Date:    11/18/2018  Time:    00:09             Narrative:    EXAMINATION:  CT HEAD WITHOUT CONTRAST    CLINICAL HISTORY:  Confusion/delirium, altered LOC, unexplained;    TECHNIQUE:  Low dose axial images were obtained through the head.  Coronal and sagittal reformations were also performed. Contrast was not administered.    COMPARISON:  CT head without from 10/12/2018, 10/08/2018, and 08/10/2018.    FINDINGS:  Generalized cerebral volume loss with compensatory enlargement of the ventricular system, similar prior study.  No definite hydrocephalus.    The brain demonstrates a stable degree of chronic microvascular ischemic changes.  No parenchymal mass, hemorrhage, edema or major vascular distribution infarct.    No extra-axial blood or fluid collections.    The cranium appears intact. Mastoid air cells and paranasal sinuses are essentially clear.  The orbits and intraorbital contents are unremarkable.                                       APC / Resident Notes:   Patient is a 58 year old male presents to the ED for emergent evaluation of medical clearance.     Will order labs and imaging. Will order IVF. Patient received thiamine IV out of precaution. Will continue to monitor.     Differential diagnoses include, but are not limited to: wernicke's encephalopathy, SDH, CVA, intoxication, or electrolyte imbalance.     No leukocytosis. Hemodynamically stable. Elevated ethanol  116. UA found to have 1+ leukocytes and nitrite positive. On microscopic analysis, found to have  6 WBCs. Will treat with rocephin IV, while in ED. UDS unremarkable. CT head found to have No acute findings.     Patient reassessed, reports symptoms improved with ED management. No signs trauma. Patient symptomatically improved. Clinically sober and hemodynamically stable. Tolerating PO. Ambulates without difficulty. I have discussed emergency department findings, and plan with the patient. Will discharge home with keflex and F/U with PCP. Patient verbalizes understanding of plan and agrees. Return precautions given.     I have discussed and reviewed with my supervising physician.         Attending Attestation:     Physician Attestation Statement for NP/PA:   I have conducted a face to face encounter with this patient in addition to the NP/PA, due to    Other NP/PA Attestation Additions:      Medical Decision Making: HD STABLE NO SIGNS OF ACUTE AIRWAY COMPROMISE OR PYELONEPHRITIS           Clinical Impression:   The primary encounter diagnosis was Urinary tract infection without hematuria, site unspecified. A diagnosis of Alcoholic intoxication with complication was also pertinent to this visit.      Disposition:   Disposition: Discharged                        Marisa Wilkinson PA-C  11/18/18 0530       Amando Martinez DO  11/18/18 0610

## 2018-11-18 NOTE — ED TRIAGE NOTES
Mohamud Greg Patel, a 58 y.o. male presents to the ED for medical clearance. Pt states that he does not want to be here and doesn't know why he was sent here. Pt cursing and yelling in room and refusing cardiac monitoring at this time.     Triage note:  Chief Complaint   Patient presents with    Medical Clearance     [facility]. Patient is unable to return to the nursing home until he is medically cleared. He did admit to ETOH intake during his hiatus.     Review of patient's allergies indicates:  No Known Allergies  Past Medical History:   Diagnosis Date    Neuropathy     Scabies

## 2018-11-18 NOTE — ED NOTES
Pt cooperated to draw blood and administer medications but repeatedly insults me. He is aware that we will obtain CT next.

## 2018-11-18 NOTE — ED NOTES
Pt is upset and yelling at me. Pt is disoriented to time and repeats that he does not live at a nursing home. We are unable to draw blood at this time due to patient being uncooperative.

## 2018-11-18 NOTE — ED NOTES
Gave patient sandwich and snacks. Pt continues to threaten myself and staff when present in room.

## 2018-11-18 NOTE — ED NOTES
Called [facility] for transportation back to [facility]. They will be here within the hour.

## 2018-11-21 LAB — VIT B1 BLD-MCNC: 61 UG/L (ref 38–122)

## 2022-04-19 ENCOUNTER — HOSPITAL ENCOUNTER (EMERGENCY)
Facility: HOSPITAL | Age: 62
Discharge: HOME | End: 2022-04-20
Attending: EMERGENCY MEDICINE
Payer: MEDICAID
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
## 2022-04-19 DIAGNOSIS — S52.521A TRAUMATIC CLOSED NONDISP TORUS FRACTURE OF DISTAL RADIAL METAPHYSIS, RIGHT, INITIAL ENCOUNTER: Primary | ICD-10-CM

## 2022-04-19 PROBLEM — S52.501A DISTAL RADIUS FRACTURE, RIGHT: Status: ACTIVE | Noted: 2022-04-19

## 2022-04-19 LAB
ANION GAP SERPL CALC-SCNC: 13 MMOL/L (ref 8–16)
BASOPHILS # BLD AUTO: 0.05 K/UL (ref 0–0.2)
BASOPHILS NFR BLD: 0.4 % (ref 0–1.9)
BUN SERPL-MCNC: 16 MG/DL (ref 8–23)
CALCIUM SERPL-MCNC: 9.8 MG/DL (ref 8.7–10.5)
CHLORIDE SERPL-SCNC: 99 MMOL/L (ref 95–110)
CO2 SERPL-SCNC: 28 MMOL/L (ref 23–29)
CREAT SERPL-MCNC: 0.8 MG/DL (ref 0.5–1.4)
DIFFERENTIAL METHOD: ABNORMAL
EOSINOPHIL # BLD AUTO: 0 K/UL (ref 0–0.5)
EOSINOPHIL NFR BLD: 0.2 % (ref 0–8)
ERYTHROCYTE [DISTWIDTH] IN BLOOD BY AUTOMATED COUNT: 14.9 % (ref 11.5–14.5)
EST. GFR  (AFRICAN AMERICAN): >60 ML/MIN/1.73 M^2
EST. GFR  (NON AFRICAN AMERICAN): >60 ML/MIN/1.73 M^2
GLUCOSE SERPL-MCNC: 108 MG/DL (ref 70–110)
HCT VFR BLD AUTO: 26.7 % (ref 40–54)
HGB BLD-MCNC: 8.5 G/DL (ref 14–18)
IMM GRANULOCYTES # BLD AUTO: 0.06 K/UL (ref 0–0.04)
IMM GRANULOCYTES NFR BLD AUTO: 0.5 % (ref 0–0.5)
LYMPHOCYTES # BLD AUTO: 0.5 K/UL (ref 1–4.8)
LYMPHOCYTES NFR BLD: 4.2 % (ref 18–48)
MCH RBC QN AUTO: 29.6 PG (ref 27–31)
MCHC RBC AUTO-ENTMCNC: 31.8 G/DL (ref 32–36)
MCV RBC AUTO: 93 FL (ref 82–98)
MONOCYTES # BLD AUTO: 0.7 K/UL (ref 0.3–1)
MONOCYTES NFR BLD: 6.1 % (ref 4–15)
NEUTROPHILS # BLD AUTO: 10.4 K/UL (ref 1.8–7.7)
NEUTROPHILS NFR BLD: 88.6 % (ref 38–73)
NRBC BLD-RTO: 0 /100 WBC
PLATELET # BLD AUTO: 441 K/UL (ref 150–450)
PMV BLD AUTO: 8.9 FL (ref 9.2–12.9)
POTASSIUM SERPL-SCNC: 3.5 MMOL/L (ref 3.5–5.1)
RBC # BLD AUTO: 2.87 M/UL (ref 4.6–6.2)
SODIUM SERPL-SCNC: 140 MMOL/L (ref 136–145)
TROPONIN I SERPL DL<=0.01 NG/ML-MCNC: <0.006 NG/ML (ref 0–0.03)
WBC # BLD AUTO: 11.78 K/UL (ref 3.9–12.7)

## 2022-04-19 PROCEDURE — 29125 APPL SHORT ARM SPLINT STATIC: CPT | Mod: RT

## 2022-04-19 PROCEDURE — 93010 ELECTROCARDIOGRAM REPORT: CPT | Mod: ,,, | Performed by: INTERNAL MEDICINE

## 2022-04-19 PROCEDURE — 85025 COMPLETE CBC W/AUTO DIFF WBC: CPT | Performed by: STUDENT IN AN ORGANIZED HEALTH CARE EDUCATION/TRAINING PROGRAM

## 2022-04-19 PROCEDURE — 84484 ASSAY OF TROPONIN QUANT: CPT | Performed by: STUDENT IN AN ORGANIZED HEALTH CARE EDUCATION/TRAINING PROGRAM

## 2022-04-19 PROCEDURE — 80048 BASIC METABOLIC PNL TOTAL CA: CPT | Performed by: STUDENT IN AN ORGANIZED HEALTH CARE EDUCATION/TRAINING PROGRAM

## 2022-04-19 PROCEDURE — 99285 PR EMERGENCY DEPT VISIT,LEVEL V: ICD-10-PCS | Mod: ,,, | Performed by: EMERGENCY MEDICINE

## 2022-04-19 PROCEDURE — 99284 EMERGENCY DEPT VISIT MOD MDM: CPT | Mod: 25

## 2022-04-19 PROCEDURE — 99285 EMERGENCY DEPT VISIT HI MDM: CPT | Mod: ,,, | Performed by: EMERGENCY MEDICINE

## 2022-04-19 PROCEDURE — 93005 ELECTROCARDIOGRAM TRACING: CPT

## 2022-04-19 PROCEDURE — 93010 EKG 12-LEAD: ICD-10-PCS | Mod: ,,, | Performed by: INTERNAL MEDICINE

## 2022-04-19 NOTE — ED NOTES
Mohamud Patel, a 61 y.o. male presents to the ED w/ complaint of unwitnessed fall today pt reports right wrist pain,deneis any other complaints at this time     Triage note:  Chief Complaint   Patient presents with    Fall     Admitted to [facility] today, unwitnessed fall. Unknown if hit head. Pt states his whole body hurts. C-collar applied by EMS. Hx of tongue cancer, dementia.      Review of patient's allergies indicates:  No Known Allergies  Past Medical History:   Diagnosis Date    Neuropathy     Scabies       LOC: The patient is awake, alert and aware of environment with an appropriate affect, the patient is oriented x 3 and speaking appropriately.   APPEARANCE: Patient appears comfortable and in no acute distress, patient is clean and well groomed.  SKIN: The skin is warm and dry, color consistent with ethnicity, patient has normal skin turgor and moist mucus membranes, skin intact, no breakdown or bruising noted.   MUSCULOSKELETAL: generalized weakness  RESPIRATORY: Airway is open and patent, respirations are spontaneous, patient has a normal effort and rate, no accessory muscle use noted, pt placed on continuous pulse ox with O2 sats noted at 97% on room air.  CARDIAC:. Patient has a normal rate and regular rhythm, no edema noted, capillary refill < 3 seconds.   GASTRO: Soft and non tender to palpation, no distention noted, normoactive bowel sounds present in all four quadrants. Pt states bowel movements have been regular.  : Pt denies any pain or frequency with urination.  NEURO: Pt opens eyes spontaneously, behavior appropriate to situation, follows commands, facial expression symmetrical, bilateral hand grasp equal and even, purposeful motor response noted, normal sensation in all extremities when touched with a finger.

## 2022-04-19 NOTE — ED NOTES
Pt has hx of peg tube, not currently in place. Unknown if pt is currently eating PO or when peg tube fell out.

## 2022-04-19 NOTE — ED PROVIDER NOTES
Encounter Date: 4/19/2022       History     Chief Complaint   Patient presents with    Fall     Admitted to [facility] today, unwitnessed fall. Unknown if hit head. Pt states his whole body hurts. C-collar applied by EMS. Hx of tongue cancer, dementia.      Mohamud Patel is a 61 year old man presenting with new onset R lateral wrist pain and neck pain that is midline after a fall. He has a history of dementia, wernicke encephalopathy, tongue cancer, and PEG status. The fall was unwitnessed. He reports he did not lose consciousness. He is unsure if he fell on his hand. He reports that he did not lose consciousness. He reports that he lost his balance and fell. He denies fever, chills, nausea, vomiting, constipation, diarrhea, dizziness, or headaches. He has a history of alcohol abuse and is a current smoker.          Review of patient's allergies indicates:  No Known Allergies  Past Medical History:   Diagnosis Date    Neuropathy     Scabies      No past surgical history on file.  No family history on file.  Social History     Tobacco Use    Smoking status: Current Every Day Smoker     Packs/day: 2.00     Types: Cigarettes    Smokeless tobacco: Never Used   Substance Use Topics    Alcohol use: Yes     Alcohol/week: 70.0 standard drinks     Types: 70 Shots of liquor per week    Drug use: No     Review of Systems   Constitutional: Negative for chills, fatigue and fever.   HENT: Negative for congestion and sore throat.    Respiratory: Negative for cough and shortness of breath.    Cardiovascular: Negative for chest pain and palpitations.   Gastrointestinal: Negative for abdominal distention, abdominal pain, constipation, diarrhea, nausea and vomiting.   Genitourinary: Negative for dysuria and hematuria.   Musculoskeletal: Positive for arthralgias and neck pain. Negative for back pain.   Neurological: Positive for weakness. Negative for dizziness, syncope and headaches.   Psychiatric/Behavioral: Negative  for confusion.       Physical Exam     Initial Vitals [04/19/22 1741]   BP Pulse Resp Temp SpO2   114/78 87 18 98.7 °F (37.1 °C) 96 %      MAP       --         Physical Exam    Nursing note and vitals reviewed.  Constitutional:   Debilitated, cachexia   HENT:   Nose: Nose normal.   Eyes: Conjunctivae and EOM are normal. Pupils are equal, round, and reactive to light. Right eye exhibits no discharge. Left eye exhibits no discharge.   Neck:   Tenderness in midline   Normal range of motion.  Cardiovascular: Normal rate and regular rhythm.   Pulmonary/Chest: Breath sounds normal. No respiratory distress.   Abdominal: Abdomen is soft. Bowel sounds are normal. He exhibits no distension.   Musculoskeletal:         General: Tenderness present.      Cervical back: Normal range of motion.      Comments: Unable to raise arms above head. (pt reports baseline)  R wrist tenderness, ROM intact     Neurological: He is alert. He has normal strength.   ambulates well   Skin: Skin is warm and dry.   Psychiatric: His behavior is normal.   Easily upset         ED Course   Procedures  Labs Reviewed   CBC W/ AUTO DIFFERENTIAL - Abnormal; Notable for the following components:       Result Value    RBC 2.87 (*)     Hemoglobin 8.5 (*)     Hematocrit 26.7 (*)     MCHC 31.8 (*)     RDW 14.9 (*)     MPV 8.9 (*)     Gran # (ANC) 10.4 (*)     Immature Grans (Abs) 0.06 (*)     Lymph # 0.5 (*)     Gran % 88.6 (*)     Lymph % 4.2 (*)     All other components within normal limits   BASIC METABOLIC PANEL   TROPONIN I     EKG Readings: (Independently Interpreted)   Initial Reading: No STEMI. Rhythm: Normal Sinus Rhythm. Heart Rate: 93.       Imaging Results          X-Ray Wrist Complete Right (Final result)  Result time 04/19/22 23:26:46    Final result by Vazquez Clement MD (04/19/22 23:26:46)                 Impression:      As above.      Electronically signed by: Vazquez Clement MD  Date:    04/19/2022  Time:    23:26             Narrative:     EXAMINATION:  XR WRIST COMPLETE 3 VIEWS RIGHT    CLINICAL HISTORY:  post splint;    TECHNIQUE:  PA, lateral, and oblique views of the right wrist were performed.    COMPARISON:  04/19/2022.    FINDINGS:  Previously described impacted fracture of the distal radius appears similar to prior exam following closed reduction and splinting.                               X-Ray Elbow Complete Right (Final result)  Result time 04/19/22 19:57:10    Final result by Eugenio Li MD (04/19/22 19:57:10)                 Impression:      No acute displaced fracture-dislocation identified.      Electronically signed by: Eugenio Li MD  Date:    04/19/2022  Time:    19:57             Narrative:    EXAMINATION:  XR ELBOW COMPLETE 3 VIEW RIGHT    CLINICAL HISTORY:  . Unspecified fall, initial encounter    TECHNIQUE:  AP, lateral, and oblique views of the right elbow were performed.    COMPARISON:  None    FINDINGS:  Bones are well mineralized.  Overall alignment is within normal limits.  No displaced fracture, dislocation or destructive osseous process.  Tiny enthesophyte at the posterior olecranon.  No large elbow joint effusion seen.  Joint spaces appear relatively maintained. No subcutaneous emphysema or radiodense retained foreign body.                               X-Ray Wrist Complete Right (Final result)  Result time 04/19/22 19:58:27    Final result by Eugenio Li MD (04/19/22 19:58:27)                 Impression:      Distal radial acute fracture, as above.      Electronically signed by: Eugenoi Li MD  Date:    04/19/2022  Time:    19:58             Narrative:    EXAMINATION:  XR HAND COMPLETE 3 VIEW RIGHT; XR WRIST COMPLETE 3 VIEWS RIGHT    CLINICAL HISTORY:  fall; Unspecified fall, initial encounter    TECHNIQUE:  PA, lateral, and oblique views of the right hand and also right wrist were performed.    COMPARISON:  None    FINDINGS:  Acute appearing transverse type fracture through the distal radial meta epiphyseal  junction with slight impaction.  No definite fracture planes extending to the articular surface.  No significant angulation.  No dislocation or destructive osseous process.  Baseline minimal to mild DJD of the hand and wrist.  Carpus appears intact.  There is associated mild soft tissue swelling about the distal forearm.  No subcutaneous emphysema or radiodense retained foreign body.                               X-Ray Hand 3 View Right (Final result)  Result time 04/19/22 19:58:27    Final result by Eugenio Li MD (04/19/22 19:58:27)                 Impression:      Distal radial acute fracture, as above.      Electronically signed by: Eugenio Li MD  Date:    04/19/2022  Time:    19:58             Narrative:    EXAMINATION:  XR HAND COMPLETE 3 VIEW RIGHT; XR WRIST COMPLETE 3 VIEWS RIGHT    CLINICAL HISTORY:  fall; Unspecified fall, initial encounter    TECHNIQUE:  PA, lateral, and oblique views of the right hand and also right wrist were performed.    COMPARISON:  None    FINDINGS:  Acute appearing transverse type fracture through the distal radial meta epiphyseal junction with slight impaction.  No definite fracture planes extending to the articular surface.  No significant angulation.  No dislocation or destructive osseous process.  Baseline minimal to mild DJD of the hand and wrist.  Carpus appears intact.  There is associated mild soft tissue swelling about the distal forearm.  No subcutaneous emphysema or radiodense retained foreign body.                                CT Head Without Contrast (Final result)  Result time 04/19/22 19:52:49    Final result by Eugenio Li MD (04/19/22 19:52:49)                 Impression:      1. Left greater than right parieto-occipital suspected localized scalp soft tissue swelling/contusion without displaced skull fracture or acute intracranial abnormality identified.  2. Involutional change and chronic microvascular ischemic change.  3. No CT evidence of cervical  spine acute osseous traumatic injury.  4. Cervical spondylosis most prominent at C3-4 through C6-7 levels, as above.  5. Bilateral cervical chain complex hypodense masses with suspected peripheral calcification, noting incompletely imaged on the left and overall suboptimally evaluated without intravenous contrast.  These are nonspecific but could represent necrotic lymph nodes although no additional definitively enlarged lymph nodes are seen versus potential lymphatic malformations or branchial cleft cysts, among others.  Clinical correlation and with any prior imaging from outside facility if/when available.  Further evaluation/follow-up as warranted.  6. Small thyroid nodules.  Further evaluation with elective/nonemergent thyroid ultrasound can be obtained as warranted.  7. Additional findings as above.  This report was flagged in Epic as abnormal.  This report was flagged in Epic as containing an incidental finding.      Electronically signed by: Eugenio Li MD  Date:    04/19/2022  Time:    19:52             Narrative:    EXAMINATION:  CT HEAD WITHOUT CONTRAST; CT CERVICAL SPINE WITHOUT CONTRAST    CLINICAL HISTORY:  Head trauma, abnormal mental status (Age 19-64y);; Neck pain, acute, no red flags;    TECHNIQUE:  Low dose axial CT images obtained throughout the head and cervical spine without intravenous contrast. Sagittal and coronal reconstructions were performed.    COMPARISON:  Head CT most recent 11/17/2018; chest radiographs 08/10/2018    FINDINGS:  Patient is slightly rotated and tilted within the scanner.  Beam hardening with streak artifact slightly limits evaluation.    Head CT:    Intracranial compartment:    Generalized cerebral volume loss.  Ventricles are midline and stable in size and configuration without distortion by mass effect or acute hydrocephalus.  No extra-axial blood or fluid collections.    Grossly stable distribution of patchy hypoattenuation of the subcortical and periventricular  white matter likely sequela of chronic microvascular ischemic change.  No definite new focal areas of abnormal parenchymal attenuation.  Bilateral basal ganglia calcifications noted.  No parenchymal mass, hemorrhage, edema or major vascular distribution infarct.  Skull base atherosclerotic vascular calcifications noted.    Skull/extracranial contents (limited evaluation): New areas suspected localized soft tissue swelling/contusion overlying the left greater than right parieto-occipital calvarium.  Grossly similar soft tissue prominence with subcutaneous stranding overlying the left frontal calvarium likely related to chronic scarring, with superimposed scalp new contusion not excluded.  No displaced skull fracture.  New partial opacification of right mastoid air cells.  Left mastoid air cells are clear.  Mild patchy mucosal thickening of the paranasal sinuses bilaterally without air-fluid levels.  Imaged portion of the orbits are within normal limits.    Cervical spine CT: There is slight reversal of the cervical lordosis centered at C5.  No occipital condylar fracture.  Mild to moderate degenerative change at the atlantodental interval.  Dens and lateral masses are otherwise well aligned and intact.  Chronic appearing minimal anterior wedge deformity of C5 vertebral body without associated radiolucency.  No convincing evidence for acute vertebral compression fracture.  There is partial fusion at C6-7 intervertebral disc space particularly along the anterior aspect.  There is ankylosis at right C4-5 facet.  There is degenerative related minimal grade 1 anterolisthesis of C4 on 5 and also C7 on T1.  No significant prevertebral soft tissue thickening.  No paraspinal mass or fluid collection.  Small well corticated nonspecific nuchal calcification adjacent to C6 spinous process.  No acute displaced fracture, dislocation or destructive osseous process.  Multilevel degenerative disc disease with prominent anterior  marginal osteophytes and  uncovertebral and facet arthrosis most significant at C4-5 through C7-T1 levels.    C2-3: Mild left neural foraminal narrowing.  No significant spinal canal stenosis or right neural foraminal narrowing.    C3-4: Moderate right and mild-to-moderate left neural foraminal narrowing.  No significant spinal canal stenosis.    C4-5: Posterior disc osteophyte complex resulting in mild acquired canal stenosis.  Moderate to severe right and moderate left neural foraminal narrowing.    C5-6: Posterior disc osteophyte complex resulting in mild acquired canal stenosis.  Mild right and moderate to severe left neural foraminal narrowing.    C6-7: Posterior disc osteophyte complex resulting in mild acquired canal stenosis.  Moderate bilateral neural foraminal narrowing.    C7-T1: Posterior disc osteophyte complex resulting in borderline acquired canal stenosis.  Moderate right and mild left neural foraminal narrowing.    Several hypodense foci at the bilateral thyroid lobes suggesting nodules.  The largest on the right measures 0.8 cm in the largest on the left measures 1.1 cm.  Minimal biapical pleuroparenchymal scarring and minimal right paraseptal emphysematous change.  No apical pneumothorax.  Scattered atherosclerotic vascular calcifications noted.  Right IJ CVC partially imaged.  There is there is 3.1 x 1.8 x 2.4 cm slightly heterogeneous and lobulated hypodense mass with subtle suspected peripheral calcification along the right cervical chain at the level of spanning C3 to C5-6 level.  There is adjacent mild fat stranding stranding.  There is partially imaged lobulated slightly heterogeneous hypodense mass with subtle peripheral calcification along the left mid to upper cervical chain measuring at least 6.4 cm in coronal dimension and 2.8 cm in transverse dimension.  There is adjacent mild fat stranding as well as mild mass effect upon the left carotid bifurcation which is slightly  displaced  medially as well as mild mass effect upon the left vallecular and piriform recess.  Airway otherwise remains relatively midline and patent.                                CT Cervical Spine Without Contrast (Final result)  Result time 04/19/22 19:52:49    Final result by Eugenio Li MD (04/19/22 19:52:49)                 Impression:      1. Left greater than right parieto-occipital suspected localized scalp soft tissue swelling/contusion without displaced skull fracture or acute intracranial abnormality identified.  2. Involutional change and chronic microvascular ischemic change.  3. No CT evidence of cervical spine acute osseous traumatic injury.  4. Cervical spondylosis most prominent at C3-4 through C6-7 levels, as above.  5. Bilateral cervical chain complex hypodense masses with suspected peripheral calcification, noting incompletely imaged on the left and overall suboptimally evaluated without intravenous contrast.  These are nonspecific but could represent necrotic lymph nodes although no additional definitively enlarged lymph nodes are seen versus potential lymphatic malformations or branchial cleft cysts, among others.  Clinical correlation and with any prior imaging from outside facility if/when available.  Further evaluation/follow-up as warranted.  6. Small thyroid nodules.  Further evaluation with elective/nonemergent thyroid ultrasound can be obtained as warranted.  7. Additional findings as above.  This report was flagged in Epic as abnormal.  This report was flagged in Epic as containing an incidental finding.      Electronically signed by: Eugenio Li MD  Date:    04/19/2022  Time:    19:52             Narrative:    EXAMINATION:  CT HEAD WITHOUT CONTRAST; CT CERVICAL SPINE WITHOUT CONTRAST    CLINICAL HISTORY:  Head trauma, abnormal mental status (Age 19-64y);; Neck pain, acute, no red flags;    TECHNIQUE:  Low dose axial CT images obtained throughout the head and cervical spine  without intravenous contrast. Sagittal and coronal reconstructions were performed.    COMPARISON:  Head CT most recent 11/17/2018; chest radiographs 08/10/2018    FINDINGS:  Patient is slightly rotated and tilted within the scanner.  Beam hardening with streak artifact slightly limits evaluation.    Head CT:    Intracranial compartment:    Generalized cerebral volume loss.  Ventricles are midline and stable in size and configuration without distortion by mass effect or acute hydrocephalus.  No extra-axial blood or fluid collections.    Grossly stable distribution of patchy hypoattenuation of the subcortical and periventricular white matter likely sequela of chronic microvascular ischemic change.  No definite new focal areas of abnormal parenchymal attenuation.  Bilateral basal ganglia calcifications noted.  No parenchymal mass, hemorrhage, edema or major vascular distribution infarct.  Skull base atherosclerotic vascular calcifications noted.    Skull/extracranial contents (limited evaluation): New areas suspected localized soft tissue swelling/contusion overlying the left greater than right parieto-occipital calvarium.  Grossly similar soft tissue prominence with subcutaneous stranding overlying the left frontal calvarium likely related to chronic scarring, with superimposed scalp new contusion not excluded.  No displaced skull fracture.  New partial opacification of right mastoid air cells.  Left mastoid air cells are clear.  Mild patchy mucosal thickening of the paranasal sinuses bilaterally without air-fluid levels.  Imaged portion of the orbits are within normal limits.    Cervical spine CT: There is slight reversal of the cervical lordosis centered at C5.  No occipital condylar fracture.  Mild to moderate degenerative change at the atlantodental interval.  Dens and lateral masses are otherwise well aligned and intact.  Chronic appearing minimal anterior wedge deformity of C5 vertebral body without associated  radiolucency.  No convincing evidence for acute vertebral compression fracture.  There is partial fusion at C6-7 intervertebral disc space particularly along the anterior aspect.  There is ankylosis at right C4-5 facet.  There is degenerative related minimal grade 1 anterolisthesis of C4 on 5 and also C7 on T1.  No significant prevertebral soft tissue thickening.  No paraspinal mass or fluid collection.  Small well corticated nonspecific nuchal calcification adjacent to C6 spinous process.  No acute displaced fracture, dislocation or destructive osseous process.  Multilevel degenerative disc disease with prominent anterior marginal osteophytes and  uncovertebral and facet arthrosis most significant at C4-5 through C7-T1 levels.    C2-3: Mild left neural foraminal narrowing.  No significant spinal canal stenosis or right neural foraminal narrowing.    C3-4: Moderate right and mild-to-moderate left neural foraminal narrowing.  No significant spinal canal stenosis.    C4-5: Posterior disc osteophyte complex resulting in mild acquired canal stenosis.  Moderate to severe right and moderate left neural foraminal narrowing.    C5-6: Posterior disc osteophyte complex resulting in mild acquired canal stenosis.  Mild right and moderate to severe left neural foraminal narrowing.    C6-7: Posterior disc osteophyte complex resulting in mild acquired canal stenosis.  Moderate bilateral neural foraminal narrowing.    C7-T1: Posterior disc osteophyte complex resulting in borderline acquired canal stenosis.  Moderate right and mild left neural foraminal narrowing.    Several hypodense foci at the bilateral thyroid lobes suggesting nodules.  The largest on the right measures 0.8 cm in the largest on the left measures 1.1 cm.  Minimal biapical pleuroparenchymal scarring and minimal right paraseptal emphysematous change.  No apical pneumothorax.  Scattered atherosclerotic vascular calcifications noted.  Right IJ CVC partially imaged.   There is there is 3.1 x 1.8 x 2.4 cm slightly heterogeneous and lobulated hypodense mass with subtle suspected peripheral calcification along the right cervical chain at the level of spanning C3 to C5-6 level.  There is adjacent mild fat stranding stranding.  There is partially imaged lobulated slightly heterogeneous hypodense mass with subtle peripheral calcification along the left mid to upper cervical chain measuring at least 6.4 cm in coronal dimension and 2.8 cm in transverse dimension.  There is adjacent mild fat stranding as well as mild mass effect upon the left carotid bifurcation which is slightly displaced  medially as well as mild mass effect upon the left vallecular and piriform recess.  Airway otherwise remains relatively midline and patent.                                 Medications - No data to display  Medical Decision Making:   History:   Old Medical Records: I decided to obtain old medical records.  Initial Assessment:   Mohamud Patel is a 61 year old man presenting with new onset R lateral wrist pain and neck pain that is midline. He has a history of dementia, wernicke encephalopathy, tongue cancer, and PEG status.   Differential Diagnosis:   Mechanical fall  Wrist fracture  Syncope  Clinical Tests:   Lab Tests: Ordered and Reviewed  Radiological Study: Ordered and Reviewed  Medical Tests: Ordered and Reviewed  ED Management:  He was seen and assessed in the ED by Gold Team. CT Head and C Spine showed no acute intracranial findings or fracture. CT head showed soft tissue swelling with contusion. Xray series of LUE reveling R distal radius fracture. Sugar Tong splint applied by Ortho. Trop and EKG were not supportive of cardiogenic causes of falls. Given history and workup, likely mechanical fall.     To note on imaging: Several hypodense foci at the bilateral thyroid lobes suggesting nodules.  The largest on the right measures 0.8 cm in the largest on the left measures 1.1 cm. There is  there is 3.1 x 1.8 x 2.4 cm slightly heterogeneous and lobulated hypodense mass with subtle suspected peripheral calcification along the right cervical chain at the level of spanning C3 to C5-6 level. There is partially imaged lobulated slightly heterogeneous hypodense mass with subtle peripheral calcification along the left mid to upper cervical chain measuring at least 6.4 cm in coronal dimension and 2.8 cm in transverse dimension. Recommend follow up with PCP concerning these findings.     He will need to follow up with ortho hand clinic in one week to discuss about surgery. If not at [facility] because of insurance, he may need to go to [facility]. Ortho will reach out to patient regarding information on scheduling surgery. If he doesn't hear from [facility] Ortho in one week, he was advised to call [facility] hand clinic in one week, just in case [facility] ortho can't get in touch with him in the morning. Patient was discharged back to NH.             Attending Attestation:   Physician Attestation Statement for Resident:  As the supervising MD   Physician Attestation Statement: I have personally seen and examined this patient.   I agree with the above history. -:   As the supervising MD I agree with the above PE.    As the supervising MD I agree with the above treatment, course, plan, and disposition.   -: CT findings concerning for possible cancer and will need close outpatient follow up for this, likely not related to his presentation today.  He does have anemia worse than baseline but last baseline was 4 years ago.  Ok for discharge with outpt follow up and return precautions.                         Clinical Impression:   Final diagnoses:  [W19.XXXA] Fall  [S52.761A] Traumatic closed nondisp torus fracture of distal radial metaphysis, right, initial encounter (Primary)          ED Disposition Condition    Discharge Fair        ED Prescriptions     None        Follow-up Information     Follow up With Specialties Details Why Contact Info     Enmanuel Ferguson MD Internal Medicine In 1 week  [address] [address]  [phone]             Murray Fan MD  Resident  04/19/22 2356       Alejandro Diaz MD  04/21/22 2038

## 2022-04-20 ENCOUNTER — DOCUMENTATION ONLY (OUTPATIENT)
Dept: ORTHOPEDICS | Facility: HOSPITAL | Age: 62
End: 2022-04-20
Payer: MEDICAID

## 2022-04-20 VITALS
SYSTOLIC BLOOD PRESSURE: 107 MMHG | RESPIRATION RATE: 20 BRPM | WEIGHT: 170 LBS | HEART RATE: 98 BPM | OXYGEN SATURATION: 100 % | DIASTOLIC BLOOD PRESSURE: 60 MMHG | BODY MASS INDEX: 21.83 KG/M2 | TEMPERATURE: 98 F

## 2022-04-20 NOTE — SUBJECTIVE & OBJECTIVE
Past Medical History:   Diagnosis Date    Neuropathy     Scabies        No past surgical history on file.    Review of patient's allergies indicates:  No Known Allergies    No current facility-administered medications for this encounter.     Current Outpatient Medications   Medication Sig    amLODIPine (NORVASC) 5 MG tablet Take 1 tablet (5 mg total) by mouth once daily.    folic acid (FOLVITE) 1 MG tablet Take 1 tablet (1 mg total) by mouth once daily.    gabapentin (NEURONTIN) 400 MG capsule Take 1 capsule (400 mg total) by mouth 3 (three) times daily.    magnesium oxide (MAG-OX) 400 mg (241.3 mg magnesium) tablet Take 1 tablet (400 mg total) by mouth 2 (two) times daily.    miconazole nitrate 2% (MICOTIN) 2 % Oint Apply topically 2 (two) times daily.    multivitamin (THERAGRAN) tablet Take 1 tablet by mouth once daily.    nicotine (NICODERM CQ) 21 mg/24 hr Place 1 patch onto the skin once daily.    OLANZapine (ZYPREXA) 5 MG tablet Take 1 tablet (5 mg total) by mouth once daily.    thiamine 100 MG tablet Take 1 tablet (100 mg total) by mouth once daily.     Family History    None       Tobacco Use    Smoking status: Current Every Day Smoker     Packs/day: 2.00     Types: Cigarettes    Smokeless tobacco: Never Used   Substance and Sexual Activity    Alcohol use: Yes     Alcohol/week: 70.0 standard drinks     Types: 70 Shots of liquor per week    Drug use: No    Sexual activity: Yes     Partners: Female       ROS  Constitutional: negative for fevers/chills/night sweats  Eyes: no acute visual changes  ENT: negative acute  for hearing loss  Respiratory: negative for dyspnea  Cardiovascular: negative for chest pain  Gastrointestinal: negative for abdominal pain  Genitourinary: negative for dysuria  Neurological: negative for headaches  Behavioral/Psych: negative for hallucinations  Endocrine: negative for temperature intolerance  MSK: per HPI    Objective:     Vital Signs (Most Recent):  Temp: 98.5 °F (36.9 °C)  (04/19/22 2136)  Pulse: 100 (04/19/22 2136)  Resp: 20 (04/19/22 2136)  BP: (!) 106/55 (04/19/22 2136)  SpO2: 100 % (04/19/22 2136)   Vital Signs (24h Range):  Temp:  [98.5 °F (36.9 °C)-98.7 °F (37.1 °C)] 98.5 °F (36.9 °C)  Pulse:  [] 100  Resp:  [18-20] 20  SpO2:  [96 %-100 %] 100 %  BP: (106-135)/(55-78) 106/55           There is no height or weight on file to calculate BMI.    No intake or output data in the 24 hours ending 04/19/22 2328      Ortho/SPM Exam      Vitals: Afebrile.  Vital signs stable.  General: No acute distress.  Cardio: Regular rate.  Chest: No increased work of breathing.     Right Upper Extremity     -Skin, Intact : no ecchymosis, lesions, lacerations or abrasions   -TTP over the right wrist   -Deltoid, biceps, triceps intact 5/5 strength  -Compartments soft and compressible  -A/P ROM full in fingers, wrist, elbow, shoulder  -SILT M/R/U/Ax  -Motor intact Ain/PIN/U/Ax  -WWP  -Radial Artery palpable     Left Upper Extremity     -Non tender to palpation of entire arm   -Deltoid, biceps, triceps intact 5/5 strength  -Compartments soft and compressible  -A/P ROM full in fingers, wrist, elbow, shoulder  -SILT M/R/U/Ax  -Motor intact Ain/PIN/U/Ax  -WWP  -Radial Artery palpable     Right Lower Extremity Exam     - Non-tender to palpation of the entire leg  - Quad/ Hip flexor intact   - Compartments soft and compressible  - A/P ROM full to the toes, ankle, knee, and hip  - TA/EHL/Gastroc/FHL intact  - SILT throughout  - WWP        Left Lower Extremity Exam    - Non-tender to palpation of the entire leg  - Quad/ Hip flexor intact   - Compartments soft and compressible  - A/P ROM full to the toes, ankle, knee, and hip  - TA/EHL/Gastroc/FHL intact  - SILT throughout  - DP and PT palpable  - WWP      All joints (shoulder/elbow/wrist/hip/knee/ankle) were examined and had full ROM and were non-tender to palpation except as above         Significant Labs: All pertinent labs within the past 24 hours have  been reviewed.    Significant Imaging: I have reviewed and interpreted all pertinent imaging results/findings. Xray of the right wrist with minimally displaced right distal radius fracture

## 2022-04-20 NOTE — CARE UPDATE
Attempted to reach patient on both phone numbers available in the record. No answer on either number. Will continue to try to get in touch with him for follow up. He was instructed prior to discharge to make an appointment for his right arm in one week.     Marcelo Irizarry MD

## 2022-04-20 NOTE — ED NOTES
Patient is alert and resting in bed in NAD. Pt continues to attempt to get out of bed but is redirectable. Patient denies any needs/complaints at this time. VSS. Call light within reach, bed in lowest locked position with side rails raised x2, direct visual contact maintained. Will cont to monitor.     Transportation ETA 4 hours

## 2022-04-20 NOTE — CONSULTS
[facility] - Emergency Dept  Orthopedics  Consult Note    Patient Name: Mohamud Patel  MRN: [id number]  Admission Date: 4/19/2022  Hospital Length of Stay: 0 days  Attending Provider: Alejandro Diaz MD  Primary Care Provider: Enmanuel Ferguson MD      Inpatient consult to Orthopedic Surgery  Consult performed by: Marcelo Irizarry MD  Consult ordered by: Murray Fan MD        Subjective:     Principal Problem:Distal radius fracture, right    Chief Complaint:   Chief Complaint   Patient presents with    Fall     Admitted to [facility] today, unwitnessed fall. Unknown if hit head. Pt states his whole body hurts. C-collar applied by EMS. Hx of tongue cancer, dementia.         HPI: Mohamud Patel is a 61 y.o. male LHD with PMH of alcohol abuse and Wernicke encephalopathy who suffered an unwitnessed fall at a nursing home earlier today. He complains of pain to his right wrist. Xrays in the ED show a minimally displaced right distal radius fracture. Fracture is closed and right hand is NVI. He reports no other areas of pain currently.       Past Medical History:   Diagnosis Date    Neuropathy     Scabies        No past surgical history on file.    Review of patient's allergies indicates:  No Known Allergies    No current facility-administered medications for this encounter.     Current Outpatient Medications   Medication Sig    amLODIPine (NORVASC) 5 MG tablet Take 1 tablet (5 mg total) by mouth once daily.    folic acid (FOLVITE) 1 MG tablet Take 1 tablet (1 mg total) by mouth once daily.    gabapentin (NEURONTIN) 400 MG capsule Take 1 capsule (400 mg total) by mouth 3 (three) times daily.    magnesium oxide (MAG-OX) 400 mg (241.3 mg magnesium) tablet Take 1 tablet (400 mg total) by mouth 2 (two) times daily.    miconazole nitrate 2% (MICOTIN) 2 % Oint Apply topically 2 (two) times daily.    multivitamin (THERAGRAN) tablet Take 1 tablet by mouth once daily.    nicotine (NICODERM CQ) 21 mg/24 hr  Place 1 patch onto the skin once daily.    OLANZapine (ZYPREXA) 5 MG tablet Take 1 tablet (5 mg total) by mouth once daily.    thiamine 100 MG tablet Take 1 tablet (100 mg total) by mouth once daily.     Family History    None       Tobacco Use    Smoking status: Current Every Day Smoker     Packs/day: 2.00     Types: Cigarettes    Smokeless tobacco: Never Used   Substance and Sexual Activity    Alcohol use: Yes     Alcohol/week: 70.0 standard drinks     Types: 70 Shots of liquor per week    Drug use: No    Sexual activity: Yes     Partners: Female       ROS  Constitutional: negative for fevers/chills/night sweats  Eyes: no acute visual changes  ENT: negative acute  for hearing loss  Respiratory: negative for dyspnea  Cardiovascular: negative for chest pain  Gastrointestinal: negative for abdominal pain  Genitourinary: negative for dysuria  Neurological: negative for headaches  Behavioral/Psych: negative for hallucinations  Endocrine: negative for temperature intolerance  MSK: per HPI    Objective:     Vital Signs (Most Recent):  Temp: 98.5 °F (36.9 °C) (04/19/22 2136)  Pulse: 100 (04/19/22 2136)  Resp: 20 (04/19/22 2136)  BP: (!) 106/55 (04/19/22 2136)  SpO2: 100 % (04/19/22 2136)   Vital Signs (24h Range):  Temp:  [98.5 °F (36.9 °C)-98.7 °F (37.1 °C)] 98.5 °F (36.9 °C)  Pulse:  [] 100  Resp:  [18-20] 20  SpO2:  [96 %-100 %] 100 %  BP: (106-135)/(55-78) 106/55           There is no height or weight on file to calculate BMI.    No intake or output data in the 24 hours ending 04/19/22 2328      Ortho/SPM Exam      Vitals: Afebrile.  Vital signs stable.  General: No acute distress.  Cardio: Regular rate.  Chest: No increased work of breathing.     Right Upper Extremity     -Skin, Intact : no ecchymosis, lesions, lacerations or abrasions   -TTP over the right wrist   -Deltoid, biceps, triceps intact 5/5 strength  -Compartments soft and compressible  -A/P ROM full in fingers, wrist, elbow, shoulder  -SILT  M/R/U/Ax  -Motor intact Ain/PIN/U/Ax  -WWP  -Radial Artery palpable     Left Upper Extremity     -Non tender to palpation of entire arm   -Deltoid, biceps, triceps intact 5/5 strength  -Compartments soft and compressible  -A/P ROM full in fingers, wrist, elbow, shoulder  -SILT M/R/U/Ax  -Motor intact Ain/PIN/U/Ax  -WWP  -Radial Artery palpable     Right Lower Extremity Exam     - Non-tender to palpation of the entire leg  - Quad/ Hip flexor intact   - Compartments soft and compressible  - A/P ROM full to the toes, ankle, knee, and hip  - TA/EHL/Gastroc/FHL intact  - SILT throughout  - WWP        Left Lower Extremity Exam    - Non-tender to palpation of the entire leg  - Quad/ Hip flexor intact   - Compartments soft and compressible  - A/P ROM full to the toes, ankle, knee, and hip  - TA/EHL/Gastroc/FHL intact  - SILT throughout  - DP and PT palpable  - WWP      All joints (shoulder/elbow/wrist/hip/knee/ankle) were examined and had full ROM and were non-tender to palpation except as above         Significant Labs: All pertinent labs within the past 24 hours have been reviewed.    Significant Imaging: I have reviewed and interpreted all pertinent imaging results/findings. Xray of the right wrist with minimally displaced right distal radius fracture     Assessment/Plan:     * Distal radius fracture, right  Mohamud Patel is a 61 y.o. male who presents after unwitnessed fall on 04/19/2022 with immediate pain  to the right wrist. Fracture is closed and they are neurovascularly intact. PMH s/f Alcohol abuse and Wernicke Encephalopathy. Takes no home anticoagulation. Left hand dominant.     -- Right Distal radius fracture splinted in sugar tong splint at bedside in the ED, see below procedure note   -- NWB to affected extremity   -- Follow up in ortho clinic in one week  -- Sling for comfort   -- Pain control per primary   -- Educated on signs and symptoms of compartment syndrome   -- Instructed to keep splint  clean and dry      Procedure Note: Right distal radius splint application  Patient was explained risks, benefits, and alternatives to treatment and verbalized consent to proceed. Time out was performed and patient name, , site, and procedure were confirmed. A well molded sugar tong splint was applied. Post-reduction films were performed and confirmed adequate reduction. Patient tolerated the procedure well.             Marcelo Irizarry MD  Orthopedics  [facility] - Emergency Dept

## 2022-04-20 NOTE — ED NOTES
Patient is alert and resting in bed in NAD. Denies any needs/complaints at this time. Patient provided with warm blanket. VSS. Will continue to monitor.

## 2022-04-20 NOTE — ASSESSMENT & PLAN NOTE
Mohamud Patel is a 61 y.o. male who presents after unwitnessed fall on 2022 with immediate pain  to the right wrist. Fracture is closed and they are neurovascularly intact. PMH s/f Alcohol abuse and Wernicke Encephalopathy. Takes no home anticoagulation. Left hand dominant.     -- Right Distal radius fracture splinted in sugar tong splint at bedside in the ED, see below procedure note   -- NWB to affected extremity   -- Follow up in ortho clinic in one week  -- Sling for comfort   -- Pain control per primary   -- Educated on signs and symptoms of compartment syndrome   -- Instructed to keep splint clean and dry      Procedure Note: Right distal radius splint application  Patient was explained risks, benefits, and alternatives to treatment and verbalized consent to proceed. Time out was performed and patient name, , site, and procedure were confirmed. A well molded sugar tong splint was applied. Post-reduction films were performed and confirmed adequate reduction. Patient tolerated the procedure well.

## 2022-04-20 NOTE — DISCHARGE INSTRUCTIONS
Diagnosis:   1. Traumatic closed nondisp torus fracture of distal radial metaphysis, right, initial encounter    2. Fall        Tests you had showed:   Labs Reviewed   CBC W/ AUTO DIFFERENTIAL - Abnormal; Notable for the following components:       Result Value    RBC 2.87 (*)     Hemoglobin 8.5 (*)     Hematocrit 26.7 (*)     MCHC 31.8 (*)     RDW 14.9 (*)     MPV 8.9 (*)     Gran # (ANC) 10.4 (*)     Immature Grans (Abs) 0.06 (*)     Lymph # 0.5 (*)     Gran % 88.6 (*)     Lymph % 4.2 (*)     All other components within normal limits   BASIC METABOLIC PANEL   TROPONIN I      X-Ray Wrist Complete Right   Final Result      As above.         Electronically signed by: Vazquez Clement MD   Date:    04/19/2022   Time:    23:26      X-Ray Elbow Complete Right   Final Result      No acute displaced fracture-dislocation identified.         Electronically signed by: Eugenio Li MD   Date:    04/19/2022   Time:    19:57      X-Ray Wrist Complete Right   Final Result      Distal radial acute fracture, as above.         Electronically signed by: Eugenio Li MD   Date:    04/19/2022   Time:    19:58      X-Ray Hand 3 View Right   Final Result      Distal radial acute fracture, as above.         Electronically signed by: Eugenio Li MD   Date:    04/19/2022   Time:    19:58      CT Head Without Contrast   Final Result   Abnormal      1. Left greater than right parieto-occipital suspected localized scalp soft tissue swelling/contusion without displaced skull fracture or acute intracranial abnormality identified.   2. Involutional change and chronic microvascular ischemic change.   3. No CT evidence of cervical spine acute osseous traumatic injury.   4. Cervical spondylosis most prominent at C3-4 through C6-7 levels, as above.   5. Bilateral cervical chain complex hypodense masses with suspected peripheral calcification, noting incompletely imaged on the left and overall suboptimally evaluated without intravenous contrast.   These are nonspecific but could represent necrotic lymph nodes although no additional definitively enlarged lymph nodes are seen versus potential lymphatic malformations or branchial cleft cysts, among others.  Clinical correlation and with any prior imaging from outside facility if/when available.  Further evaluation/follow-up as warranted.   6. Small thyroid nodules.  Further evaluation with elective/nonemergent thyroid ultrasound can be obtained as warranted.   7. Additional findings as above.   This report was flagged in Epic as abnormal.  This report was flagged in Epic as containing an incidental finding.         Electronically signed by: Eugenio Li MD   Date:    04/19/2022   Time:    19:52      CT Cervical Spine Without Contrast   Final Result   Abnormal      1. Left greater than right parieto-occipital suspected localized scalp soft tissue swelling/contusion without displaced skull fracture or acute intracranial abnormality identified.   2. Involutional change and chronic microvascular ischemic change.   3. No CT evidence of cervical spine acute osseous traumatic injury.   4. Cervical spondylosis most prominent at C3-4 through C6-7 levels, as above.   5. Bilateral cervical chain complex hypodense masses with suspected peripheral calcification, noting incompletely imaged on the left and overall suboptimally evaluated without intravenous contrast.  These are nonspecific but could represent necrotic lymph nodes although no additional definitively enlarged lymph nodes are seen versus potential lymphatic malformations or branchial cleft cysts, among others.  Clinical correlation and with any prior imaging from outside facility if/when available.  Further evaluation/follow-up as warranted.   6. Small thyroid nodules.  Further evaluation with elective/nonemergent thyroid ultrasound can be obtained as warranted.   7. Additional findings as above.   This report was flagged in Epic as abnormal.  This report was  flagged in Epic as containing an incidental finding.         Electronically signed by: Eugenio Li MD   Date:    04/19/2022   Time:    19:52          Treatments you received were:   Medications - No data to display    Home Care Instructions:  - Medications: Continue taking your home medications as prescribed    Follow-Up Plan:  - Follow-up with: Ortho Hand clinic within 7 days. If unable to reach patient, advise to make appointment at [facility] for follow-up    - Additional testing and/or evaluation will be directed by your primary doctor    Return to the Emergency Department for symptoms including but not limited to: worsening symptoms, severe back pain, shortness of breath or chest pain, vomiting with inability to hold down fluids, blood in vomit or poop, fevers greater than 100.4°F, passing out/fainting/unconsciousness, or other concerning symptoms.     Keep splint clean and dry   Wear sling for comfort  Return to the ED if you have worsening pain or numbness/tingling in you hand

## 2023-06-23 NOTE — PROGRESS NOTES
"Hospital Medicine   Progress Note     [place]: [facility] [place] Prateek Gudino MD   Admit Date: 10/4/2018   YONAS 10/24/2018   Code status: Full Code   Principal Problem: Alcohol withdrawal     Interval hx: Patient seen and examined at bedside today. Overnight: KIRA. This AM, patient agitated and saying he "needs to get out of here." Patient remains only oriented to person and place (not to time). Said that he will "go live in his garage when he leaves."    ROS   Constitutional: negative for fevers and chills  Respiratory: negative for cough, shortness of breath   Cardiovascular: negative for chest discomfort, palpitations   Gastrointestinal: negative for nausea or vomiting, abdominal pain, constipation, diarrhea     PEx   Temp:  [98 °F (36.7 °C)-98.4 °F (36.9 °C)]   Pulse:  [71-97]   Resp:  [16-18]   BP: (113-128)/(58-80)   SpO2:  [97 %-100 %]      I & O (Last 24H):     Intake/Output Summary (Last 24 hours) at 10/20/2018 0832  Last data filed at 10/19/2018 1800  Gross per 24 hour   Intake 690 ml   Output 300 ml   Net 390 ml       General:  male  in no acute distress. Resting in bed. Globally weak   HEENT: NCAT. PERRL. EOMI. Sclera Anicteric.  CVS: RRR. Normal S1 S2. No murmurs  Pulm: CTAB. Normal respiratory effort. No wheezes, rhonchi, or crackles.  Abdomen: Soft. Non-distended. No tenderness to palpation. No rebound or guarding. +BS.  Extremities: No edema. No cyanosis. Full ROM.  Skin: multiple scabbed lesions on bilateral feet much improved, thickened skin on bilateral dorsal feet worse at the toes consistent with onychomycosis, large linear laceration with secondary healing on right foot, no surrounding erythema or induration  Neuro: Alert, oriented x 2 (person, place), Spont mvt of all extremities with no focal deficits noted.     amLODIPine  5 mg Oral Daily    enoxparin  40 mg Subcutaneous Daily    folic acid  1 mg Oral Daily    magnesium oxide  400 mg Oral BID    miconazole nitrate 2%   Topical (Top) " Tele Summary  SR/ST on the monitor  HR:88 to 108  18/08/36             BID    multivitamin  1 tablet Oral Daily    nicotine  1 patch Transdermal Daily    thiamine  100 mg Oral Daily     OLANZapine, OLANZapine, sodium chloride 0.9%    Assessment & Plan:  Mohamud Patel is a 57 y.o. man with a history of Severe Alcohol Abuse, Chronic Wounds of Bilateral LE's with Onychomycosis, Alcoholic Neuropathy, Hyponatremia, and Tobacco Use Disorder who was brought to the ED by EMS after his girlfriend called 911 while the patient was severely intoxicated. He is admitted to Hospital Medicine for Alcohol Withdrawal, possible Wernicke's Encephalopathy, and Acute Cystitis now resolved. Awaiting placement (IPR vs. SNF) as he remains without capacity to make informed medical decisions.     Alcohol Withdrawal  Severe Alcohol Use Disorder  Wernicke's Encephalopathy (WE) with Dementia  Hyponatremia, Suspect Beer Potomania (resolved)  Alcoholic Neuropathy  Fall Risk, Requiring Restraints  -Valium taper completed on 10/15.   - Zyprexa 5 mg PO/IM q8 PRN non-redirectable agitation.  - Patient tolerated restraint removal for several hours, was redirectable, but ultimately made many attempts to get out of bed, very unsteady gait, restraints replaced  -CIWA assessments every 4 hours per nursing  -For Wernicke's Encephalopathy: Finished course of IV Thiamine, transitioned to Thiamine 100 mg PO daily. Folic acid and MVI daily.   -pt continues to have gait instability, weakness, recurrent falls when he attempts to get out of bed, has poor insight into his addiction and encephalopathy and poor decision making. Patient's father contacted per dispo section.   -encourage PO intake   -CMP, Mg, Phos - spaced to q48 hours  -Fall, aspiration, and seizure precautions  -PT/OT consulted  -Homocysteine level wnl's, low suspicion for Vitamin B12 deficiency at this time     Multiple Bilateral Lower Extremity Wounds  Onychomycosis of Bilateral Feet   -Wound Care consulted, appreciate assistance - have debrided wounds,  applying medi-honey, no evidence of active infection. Miconazole ointment to toes  -Podiatry was consulted for further assistance     Tobacco Use Disorder  -Nicotine 21 mg patch daily      DVT PPx: Lovenox SubQ Daily    Diet: Regular  Discharge plan and follow up: Plan for possible D/C to NH with long-term care per patient's father's wishes. Denied from father's first choice. Escalating case for additional placement ideas, consider other nursing homes vs. [facility] or [facility] vs. Memory units      Prateek Gudino MD  Hospital Medicine Staff  [facility]   Pager: [phone]

## 2023-09-11 NOTE — ASSESSMENT & PLAN NOTE
-  Possibly /2 beer potomania  -  NS with 20m Eq potassium at 125 ml/hr started  -  Monitoring sodium        26.3

## 2023-11-12 NOTE — ED NOTES
LOC: The patient is awake, alert, and aware of environment. The patient is oriented x 2, laughing when asked questions by the nurse, and refuses to answer.   APPEARANCE: No acute distress noted.   PSYCHOSOCIAL: Patient appears agitated.   SKIN: The skin is warm, dry. Necrosis and swelling to R great toe noted, with swelling/redness up to pt calf.   RESPIRATORY: Airway is open and patent. Bilateral chest rise and fall. Respirations are spontaneous, even and unlabored. Normal effort and rate noted. No accessory muscle use noted.   CARDIAC: Patient has a normal rate and rhythm. Denies chest pain or SOB.   ABDOMEN: Soft and non tender to palpation. No distention noted.   URINARY:  Voids independently.   EXTREMITIES: No swelling noted.   NEUROLOGIC: Eyes open spontaneously. Speech clear. Tolerating saliva secretions well. Able to follow commands, demonstrating ability to actively and appropriately communicate within context of current conversation. Symmetrical facial muscles. Moving all extremities well. Movement is purposeful.   MUSCULOSKELETAL: No obvious deformities noted.      on the discharge service for the patient. I have reviewed and made amendments to the documentation where necessary.